# Patient Record
Sex: MALE | Race: WHITE | NOT HISPANIC OR LATINO
[De-identification: names, ages, dates, MRNs, and addresses within clinical notes are randomized per-mention and may not be internally consistent; named-entity substitution may affect disease eponyms.]

---

## 2017-09-20 ENCOUNTER — APPOINTMENT (OUTPATIENT)
Dept: DERMATOLOGY | Facility: CLINIC | Age: 67
End: 2017-09-20
Payer: MEDICARE

## 2017-09-20 VITALS — BODY MASS INDEX: 27.92 KG/M2 | WEIGHT: 195 LBS | HEIGHT: 70 IN

## 2017-09-20 DIAGNOSIS — Z87.09 PERSONAL HISTORY OF OTHER DISEASES OF THE RESPIRATORY SYSTEM: ICD-10-CM

## 2017-09-20 DIAGNOSIS — Z86.69 PERSONAL HISTORY OF OTHER DISEASES OF THE NERVOUS SYSTEM AND SENSE ORGANS: ICD-10-CM

## 2017-09-20 PROCEDURE — 17110 DESTRUCTION B9 LES UP TO 14: CPT

## 2017-09-20 PROCEDURE — 99213 OFFICE O/P EST LOW 20 MIN: CPT | Mod: 25

## 2018-05-14 ENCOUNTER — APPOINTMENT (OUTPATIENT)
Dept: DERMATOLOGY | Facility: CLINIC | Age: 68
End: 2018-05-14
Payer: MEDICARE

## 2018-05-14 DIAGNOSIS — L82.0 INFLAMED SEBORRHEIC KERATOSIS: ICD-10-CM

## 2018-05-14 PROCEDURE — 99213 OFFICE O/P EST LOW 20 MIN: CPT | Mod: 25

## 2018-05-14 PROCEDURE — 17110 DESTRUCTION B9 LES UP TO 14: CPT

## 2018-09-19 ENCOUNTER — APPOINTMENT (OUTPATIENT)
Dept: DERMATOLOGY | Facility: CLINIC | Age: 68
End: 2018-09-19

## 2018-10-31 ENCOUNTER — APPOINTMENT (OUTPATIENT)
Dept: OTOLARYNGOLOGY | Facility: CLINIC | Age: 68
End: 2018-10-31
Payer: MEDICARE

## 2018-10-31 ENCOUNTER — APPOINTMENT (OUTPATIENT)
Dept: DERMATOLOGY | Facility: CLINIC | Age: 68
End: 2018-10-31

## 2018-10-31 VITALS
BODY MASS INDEX: 27.77 KG/M2 | WEIGHT: 194 LBS | DIASTOLIC BLOOD PRESSURE: 70 MMHG | SYSTOLIC BLOOD PRESSURE: 113 MMHG | HEART RATE: 71 BPM | HEIGHT: 70 IN

## 2018-10-31 DIAGNOSIS — Z82.2 FAMILY HISTORY OF DEAFNESS AND HEARING LOSS: ICD-10-CM

## 2018-10-31 DIAGNOSIS — H93.8X9 OTHER SPECIFIED DISORDERS OF EAR, UNSPECIFIED EAR: ICD-10-CM

## 2018-10-31 DIAGNOSIS — H68.021 CHRONIC EUSTACHIAN SALPINGITIS, RIGHT EAR: ICD-10-CM

## 2018-10-31 DIAGNOSIS — Z80.9 FAMILY HISTORY OF MALIGNANT NEOPLASM, UNSPECIFIED: ICD-10-CM

## 2018-10-31 DIAGNOSIS — H90.3 SENSORINEURAL HEARING LOSS, BILATERAL: ICD-10-CM

## 2018-10-31 PROCEDURE — 92567 TYMPANOMETRY: CPT

## 2018-10-31 PROCEDURE — 92557 COMPREHENSIVE HEARING TEST: CPT

## 2018-10-31 PROCEDURE — 99213 OFFICE O/P EST LOW 20 MIN: CPT

## 2018-10-31 RX ORDER — FLUTICASONE PROPIONATE 50 MCG
50 SPRAY, SUSPENSION NASAL
Refills: 0 | Status: ACTIVE | COMMUNITY

## 2018-10-31 RX ORDER — MULTIVITAMIN
TABLET ORAL
Refills: 0 | Status: ACTIVE | COMMUNITY

## 2018-10-31 RX ORDER — PREDNISONE 10 MG/1
10 TABLET ORAL TWICE DAILY
Qty: 15 | Refills: 1 | Status: ACTIVE | COMMUNITY
Start: 2018-10-31 | End: 1900-01-01

## 2019-01-11 ENCOUNTER — APPOINTMENT (OUTPATIENT)
Dept: UROLOGY | Facility: CLINIC | Age: 69
End: 2019-01-11

## 2020-11-23 ENCOUNTER — APPOINTMENT (OUTPATIENT)
Dept: DERMATOLOGY | Facility: CLINIC | Age: 70
End: 2020-11-23
Payer: MEDICARE

## 2020-11-23 DIAGNOSIS — B07.9 VIRAL WART, UNSPECIFIED: ICD-10-CM

## 2020-11-23 DIAGNOSIS — L82.1 OTHER SEBORRHEIC KERATOSIS: ICD-10-CM

## 2020-11-23 DIAGNOSIS — L81.4 OTHER MELANIN HYPERPIGMENTATION: ICD-10-CM

## 2020-11-23 DIAGNOSIS — Z00.00 ENCOUNTER FOR GENERAL ADULT MEDICAL EXAMINATION W/OUT ABNORMAL FINDINGS: ICD-10-CM

## 2020-11-23 DIAGNOSIS — D18.01 HEMANGIOMA OF SKIN AND SUBCUTANEOUS TISSUE: ICD-10-CM

## 2020-11-23 PROCEDURE — 99213 OFFICE O/P EST LOW 20 MIN: CPT | Mod: 25

## 2020-11-23 PROCEDURE — 17110 DESTRUCTION B9 LES UP TO 14: CPT

## 2020-11-23 NOTE — HISTORY OF PRESENT ILLNESS
[FreeTextEntry1] : Patient presents for skin examination. [de-identified] : Notes lesion of the left lower eyelid.  No bleeding or tenderness, but with slow growth.  present for months.

## 2020-11-23 NOTE — PHYSICAL EXAM
[Alert] : alert [Oriented x 3] : ~L oriented x 3 [Well Nourished] : well nourished [Full Body Skin Exam Performed] : performed [FreeTextEntry3] : A full skin exam was performed including the scalp, face (including lips, ears, nose and eyes), neck, chest, abdomen, back, buttocks, upper extremities and lower extremities.  The patient declined examination of the genitalia.  \par The exam revealed the following benign growths:\par Seborrheic keratoses.\par Lentigines.\par Cherry angioma.\par \par Verrucous papule of the left lower eyelid margin.

## 2021-08-11 ENCOUNTER — TRANSCRIPTION ENCOUNTER (OUTPATIENT)
Age: 71
End: 2021-08-11

## 2022-05-07 ENCOUNTER — NON-APPOINTMENT (OUTPATIENT)
Age: 72
End: 2022-05-07

## 2022-10-11 ENCOUNTER — NON-APPOINTMENT (OUTPATIENT)
Age: 72
End: 2022-10-11

## 2023-10-12 ENCOUNTER — APPOINTMENT (OUTPATIENT)
Dept: ORTHOPEDIC SURGERY | Facility: CLINIC | Age: 73
End: 2023-10-12
Payer: MEDICARE

## 2023-10-12 VITALS — BODY MASS INDEX: 28.63 KG/M2 | WEIGHT: 200 LBS | HEIGHT: 70 IN

## 2023-10-12 DIAGNOSIS — M25.551 PAIN IN RIGHT HIP: ICD-10-CM

## 2023-10-12 DIAGNOSIS — M16.11 UNILATERAL PRIMARY OSTEOARTHRITIS, RIGHT HIP: ICD-10-CM

## 2023-10-12 PROCEDURE — 73502 X-RAY EXAM HIP UNI 2-3 VIEWS: CPT

## 2023-10-12 PROCEDURE — 99203 OFFICE O/P NEW LOW 30 MIN: CPT

## 2023-10-15 PROBLEM — M16.11 PRIMARY OSTEOARTHRITIS OF RIGHT HIP: Status: ACTIVE | Noted: 2023-10-15

## 2023-10-15 PROBLEM — M25.551 ACUTE PAIN OF RIGHT HIP: Status: ACTIVE | Noted: 2023-10-12

## 2023-11-28 ENCOUNTER — APPOINTMENT (OUTPATIENT)
Dept: OTOLARYNGOLOGY | Facility: CLINIC | Age: 73
End: 2023-11-28
Payer: MEDICARE

## 2023-11-28 VITALS — WEIGHT: 200 LBS | HEIGHT: 70 IN | BODY MASS INDEX: 28.63 KG/M2

## 2023-11-28 DIAGNOSIS — J34.2 DEVIATED NASAL SEPTUM: ICD-10-CM

## 2023-11-28 DIAGNOSIS — H93.19 TINNITUS, UNSPECIFIED EAR: ICD-10-CM

## 2023-11-28 DIAGNOSIS — J31.0 CHRONIC RHINITIS: ICD-10-CM

## 2023-11-28 PROCEDURE — 31231 NASAL ENDOSCOPY DX: CPT

## 2023-11-28 PROCEDURE — 99203 OFFICE O/P NEW LOW 30 MIN: CPT | Mod: 25

## 2024-07-10 ENCOUNTER — EMERGENCY (EMERGENCY)
Facility: HOSPITAL | Age: 74
LOS: 0 days | Discharge: HOME | End: 2024-07-10
Attending: STUDENT IN AN ORGANIZED HEALTH CARE EDUCATION/TRAINING PROGRAM
Payer: MEDICARE

## 2024-07-10 VITALS — HEIGHT: 70 IN

## 2024-07-10 VITALS
OXYGEN SATURATION: 97 % | RESPIRATION RATE: 18 BRPM | HEART RATE: 73 BPM | TEMPERATURE: 98 F | DIASTOLIC BLOOD PRESSURE: 82 MMHG | SYSTOLIC BLOOD PRESSURE: 129 MMHG

## 2024-07-10 DIAGNOSIS — R41.0 DISORIENTATION, UNSPECIFIED: ICD-10-CM

## 2024-07-10 DIAGNOSIS — Z88.8 ALLERGY STATUS TO OTHER DRUGS, MEDICAMENTS AND BIOLOGICAL SUBSTANCES: ICD-10-CM

## 2024-07-10 DIAGNOSIS — R55 SYNCOPE AND COLLAPSE: ICD-10-CM

## 2024-07-10 DIAGNOSIS — R41.3 OTHER AMNESIA: ICD-10-CM

## 2024-07-10 DIAGNOSIS — R10.9 UNSPECIFIED ABDOMINAL PAIN: ICD-10-CM

## 2024-07-10 LAB
ALBUMIN SERPL ELPH-MCNC: 3.1 G/DL — LOW (ref 3.3–5)
ALP SERPL-CCNC: 102 U/L — SIGNIFICANT CHANGE UP (ref 40–120)
ALT FLD-CCNC: 28 U/L — SIGNIFICANT CHANGE UP (ref 12–78)
ANION GAP SERPL CALC-SCNC: 6 MMOL/L — SIGNIFICANT CHANGE UP (ref 5–17)
APTT BLD: 33.1 SEC — SIGNIFICANT CHANGE UP (ref 24.5–35.6)
AST SERPL-CCNC: 16 U/L — SIGNIFICANT CHANGE UP (ref 15–37)
BASOPHILS # BLD AUTO: 0.02 K/UL — SIGNIFICANT CHANGE UP (ref 0–0.2)
BASOPHILS NFR BLD AUTO: 0.2 % — SIGNIFICANT CHANGE UP (ref 0–2)
BILIRUB SERPL-MCNC: 0.6 MG/DL — SIGNIFICANT CHANGE UP (ref 0.2–1.2)
BUN SERPL-MCNC: 18 MG/DL — SIGNIFICANT CHANGE UP (ref 7–23)
CALCIUM SERPL-MCNC: 9 MG/DL — SIGNIFICANT CHANGE UP (ref 8.5–10.1)
CHLORIDE SERPL-SCNC: 105 MMOL/L — SIGNIFICANT CHANGE UP (ref 96–108)
CO2 SERPL-SCNC: 27 MMOL/L — SIGNIFICANT CHANGE UP (ref 22–31)
CREAT SERPL-MCNC: 1.17 MG/DL — SIGNIFICANT CHANGE UP (ref 0.5–1.3)
EGFR: 65 ML/MIN/1.73M2 — SIGNIFICANT CHANGE UP
EOSINOPHIL # BLD AUTO: 0.14 K/UL — SIGNIFICANT CHANGE UP (ref 0–0.5)
EOSINOPHIL NFR BLD AUTO: 1.5 % — SIGNIFICANT CHANGE UP (ref 0–6)
GLUCOSE SERPL-MCNC: 126 MG/DL — HIGH (ref 70–99)
HCT VFR BLD CALC: 35.4 % — LOW (ref 39–50)
HGB BLD-MCNC: 12 G/DL — LOW (ref 13–17)
IMM GRANULOCYTES NFR BLD AUTO: 0.2 % — SIGNIFICANT CHANGE UP (ref 0–0.9)
INR BLD: 1.03 RATIO — SIGNIFICANT CHANGE UP (ref 0.85–1.18)
LYMPHOCYTES # BLD AUTO: 3.42 K/UL — HIGH (ref 1–3.3)
LYMPHOCYTES # BLD AUTO: 36.3 % — SIGNIFICANT CHANGE UP (ref 13–44)
MCHC RBC-ENTMCNC: 30.9 PG — SIGNIFICANT CHANGE UP (ref 27–34)
MCHC RBC-ENTMCNC: 33.9 GM/DL — SIGNIFICANT CHANGE UP (ref 32–36)
MCV RBC AUTO: 91.2 FL — SIGNIFICANT CHANGE UP (ref 80–100)
MONOCYTES # BLD AUTO: 0.54 K/UL — SIGNIFICANT CHANGE UP (ref 0–0.9)
MONOCYTES NFR BLD AUTO: 5.7 % — SIGNIFICANT CHANGE UP (ref 2–14)
NEUTROPHILS # BLD AUTO: 5.27 K/UL — SIGNIFICANT CHANGE UP (ref 1.8–7.4)
NEUTROPHILS NFR BLD AUTO: 56.1 % — SIGNIFICANT CHANGE UP (ref 43–77)
PLATELET # BLD AUTO: 152 K/UL — SIGNIFICANT CHANGE UP (ref 150–400)
POTASSIUM SERPL-MCNC: 3.8 MMOL/L — SIGNIFICANT CHANGE UP (ref 3.5–5.3)
POTASSIUM SERPL-SCNC: 3.8 MMOL/L — SIGNIFICANT CHANGE UP (ref 3.5–5.3)
PROT SERPL-MCNC: 6.5 GM/DL — SIGNIFICANT CHANGE UP (ref 6–8.3)
PROTHROM AB SERPL-ACNC: 11.6 SEC — SIGNIFICANT CHANGE UP (ref 9.5–13)
RBC # BLD: 3.88 M/UL — LOW (ref 4.2–5.8)
RBC # FLD: 12.3 % — SIGNIFICANT CHANGE UP (ref 10.3–14.5)
SODIUM SERPL-SCNC: 138 MMOL/L — SIGNIFICANT CHANGE UP (ref 135–145)
TROPONIN I, HIGH SENSITIVITY RESULT: 4.19 NG/L — SIGNIFICANT CHANGE UP
WBC # BLD: 9.41 K/UL — SIGNIFICANT CHANGE UP (ref 3.8–10.5)
WBC # FLD AUTO: 9.41 K/UL — SIGNIFICANT CHANGE UP (ref 3.8–10.5)

## 2024-07-10 PROCEDURE — 36000 PLACE NEEDLE IN VEIN: CPT | Mod: XU

## 2024-07-10 PROCEDURE — 85610 PROTHROMBIN TIME: CPT

## 2024-07-10 PROCEDURE — 70450 CT HEAD/BRAIN W/O DYE: CPT | Mod: MC,XU

## 2024-07-10 PROCEDURE — 80053 COMPREHEN METABOLIC PANEL: CPT

## 2024-07-10 PROCEDURE — 70498 CT ANGIOGRAPHY NECK: CPT | Mod: 26,MC

## 2024-07-10 PROCEDURE — 0042T: CPT | Mod: MC

## 2024-07-10 PROCEDURE — 0042T: CPT

## 2024-07-10 PROCEDURE — 70498 CT ANGIOGRAPHY NECK: CPT | Mod: MC

## 2024-07-10 PROCEDURE — 84484 ASSAY OF TROPONIN QUANT: CPT

## 2024-07-10 PROCEDURE — 99285 EMERGENCY DEPT VISIT HI MDM: CPT

## 2024-07-10 PROCEDURE — 85025 COMPLETE CBC W/AUTO DIFF WBC: CPT

## 2024-07-10 PROCEDURE — 99285 EMERGENCY DEPT VISIT HI MDM: CPT | Mod: 25

## 2024-07-10 PROCEDURE — 93010 ELECTROCARDIOGRAM REPORT: CPT

## 2024-07-10 PROCEDURE — 70496 CT ANGIOGRAPHY HEAD: CPT | Mod: MC

## 2024-07-10 PROCEDURE — 70496 CT ANGIOGRAPHY HEAD: CPT | Mod: 26,MC

## 2024-07-10 PROCEDURE — 82962 GLUCOSE BLOOD TEST: CPT

## 2024-07-10 PROCEDURE — 93005 ELECTROCARDIOGRAM TRACING: CPT

## 2024-07-10 PROCEDURE — 85730 THROMBOPLASTIN TIME PARTIAL: CPT

## 2024-07-10 PROCEDURE — 36415 COLL VENOUS BLD VENIPUNCTURE: CPT

## 2024-07-14 ENCOUNTER — EMERGENCY (EMERGENCY)
Facility: HOSPITAL | Age: 74
LOS: 0 days | Discharge: HOME | End: 2024-07-14
Attending: HOSPITALIST
Payer: MEDICARE

## 2024-07-14 VITALS
RESPIRATION RATE: 17 BRPM | OXYGEN SATURATION: 98 % | SYSTOLIC BLOOD PRESSURE: 110 MMHG | HEIGHT: 70 IN | HEART RATE: 94 BPM | WEIGHT: 177.47 LBS | DIASTOLIC BLOOD PRESSURE: 79 MMHG | TEMPERATURE: 98 F

## 2024-07-14 VITALS
OXYGEN SATURATION: 96 % | SYSTOLIC BLOOD PRESSURE: 121 MMHG | HEART RATE: 68 BPM | TEMPERATURE: 98 F | RESPIRATION RATE: 16 BRPM | DIASTOLIC BLOOD PRESSURE: 81 MMHG

## 2024-07-14 DIAGNOSIS — Z90.410 ACQUIRED TOTAL ABSENCE OF PANCREAS: Chronic | ICD-10-CM

## 2024-07-14 DIAGNOSIS — Z90.410 ACQUIRED TOTAL ABSENCE OF PANCREAS: ICD-10-CM

## 2024-07-14 DIAGNOSIS — R10.31 RIGHT LOWER QUADRANT PAIN: ICD-10-CM

## 2024-07-14 DIAGNOSIS — Z88.8 ALLERGY STATUS TO OTHER DRUGS, MEDICAMENTS AND BIOLOGICAL SUBSTANCES: ICD-10-CM

## 2024-07-14 LAB
ALBUMIN SERPL ELPH-MCNC: 3.3 G/DL — SIGNIFICANT CHANGE UP (ref 3.3–5)
ALP SERPL-CCNC: 120 U/L — SIGNIFICANT CHANGE UP (ref 40–120)
ALT FLD-CCNC: 27 U/L — SIGNIFICANT CHANGE UP (ref 12–78)
ANION GAP SERPL CALC-SCNC: 4 MMOL/L — LOW (ref 5–17)
APPEARANCE UR: CLEAR — SIGNIFICANT CHANGE UP
AST SERPL-CCNC: 22 U/L — SIGNIFICANT CHANGE UP (ref 15–37)
BASOPHILS # BLD AUTO: 0.02 K/UL — SIGNIFICANT CHANGE UP (ref 0–0.2)
BASOPHILS NFR BLD AUTO: 0.2 % — SIGNIFICANT CHANGE UP (ref 0–2)
BILIRUB SERPL-MCNC: 0.7 MG/DL — SIGNIFICANT CHANGE UP (ref 0.2–1.2)
BILIRUB UR-MCNC: NEGATIVE — SIGNIFICANT CHANGE UP
BUN SERPL-MCNC: 16 MG/DL — SIGNIFICANT CHANGE UP (ref 7–23)
CALCIUM SERPL-MCNC: 9.8 MG/DL — SIGNIFICANT CHANGE UP (ref 8.5–10.1)
CHLORIDE SERPL-SCNC: 103 MMOL/L — SIGNIFICANT CHANGE UP (ref 96–108)
CO2 SERPL-SCNC: 28 MMOL/L — SIGNIFICANT CHANGE UP (ref 22–31)
COLOR SPEC: YELLOW — SIGNIFICANT CHANGE UP
CREAT SERPL-MCNC: 1.09 MG/DL — SIGNIFICANT CHANGE UP (ref 0.5–1.3)
DIFF PNL FLD: NEGATIVE — SIGNIFICANT CHANGE UP
EGFR: 70 ML/MIN/1.73M2 — SIGNIFICANT CHANGE UP
EOSINOPHIL # BLD AUTO: 0.1 K/UL — SIGNIFICANT CHANGE UP (ref 0–0.5)
EOSINOPHIL NFR BLD AUTO: 1.2 % — SIGNIFICANT CHANGE UP (ref 0–6)
GLUCOSE SERPL-MCNC: 144 MG/DL — HIGH (ref 70–99)
GLUCOSE UR QL: NEGATIVE MG/DL — SIGNIFICANT CHANGE UP
HCT VFR BLD CALC: 38 % — LOW (ref 39–50)
HGB BLD-MCNC: 13 G/DL — SIGNIFICANT CHANGE UP (ref 13–17)
IMM GRANULOCYTES NFR BLD AUTO: 0.3 % — SIGNIFICANT CHANGE UP (ref 0–0.9)
KETONES UR-MCNC: ABNORMAL MG/DL
LACTATE SERPL-SCNC: 1.4 MMOL/L — SIGNIFICANT CHANGE UP (ref 0.7–2)
LEUKOCYTE ESTERASE UR-ACNC: NEGATIVE — SIGNIFICANT CHANGE UP
LIDOCAIN IGE QN: 44 U/L — SIGNIFICANT CHANGE UP (ref 13–75)
LYMPHOCYTES # BLD AUTO: 2.14 K/UL — SIGNIFICANT CHANGE UP (ref 1–3.3)
LYMPHOCYTES # BLD AUTO: 24.8 % — SIGNIFICANT CHANGE UP (ref 13–44)
MCHC RBC-ENTMCNC: 30.7 PG — SIGNIFICANT CHANGE UP (ref 27–34)
MCHC RBC-ENTMCNC: 34.2 GM/DL — SIGNIFICANT CHANGE UP (ref 32–36)
MCV RBC AUTO: 89.8 FL — SIGNIFICANT CHANGE UP (ref 80–100)
MONOCYTES # BLD AUTO: 0.62 K/UL — SIGNIFICANT CHANGE UP (ref 0–0.9)
MONOCYTES NFR BLD AUTO: 7.2 % — SIGNIFICANT CHANGE UP (ref 2–14)
NEUTROPHILS # BLD AUTO: 5.73 K/UL — SIGNIFICANT CHANGE UP (ref 1.8–7.4)
NEUTROPHILS NFR BLD AUTO: 66.3 % — SIGNIFICANT CHANGE UP (ref 43–77)
NITRITE UR-MCNC: NEGATIVE — SIGNIFICANT CHANGE UP
PH UR: 5.5 — SIGNIFICANT CHANGE UP (ref 5–8)
PLATELET # BLD AUTO: 156 K/UL — SIGNIFICANT CHANGE UP (ref 150–400)
POTASSIUM SERPL-MCNC: 3.9 MMOL/L — SIGNIFICANT CHANGE UP (ref 3.5–5.3)
POTASSIUM SERPL-SCNC: 3.9 MMOL/L — SIGNIFICANT CHANGE UP (ref 3.5–5.3)
PROT SERPL-MCNC: 7.2 GM/DL — SIGNIFICANT CHANGE UP (ref 6–8.3)
PROT UR-MCNC: NEGATIVE MG/DL — SIGNIFICANT CHANGE UP
RBC # BLD: 4.23 M/UL — SIGNIFICANT CHANGE UP (ref 4.2–5.8)
RBC # FLD: 12.1 % — SIGNIFICANT CHANGE UP (ref 10.3–14.5)
SODIUM SERPL-SCNC: 135 MMOL/L — SIGNIFICANT CHANGE UP (ref 135–145)
SP GR SPEC: 1.02 — SIGNIFICANT CHANGE UP (ref 1–1.03)
UROBILINOGEN FLD QL: 1 MG/DL — SIGNIFICANT CHANGE UP (ref 0.2–1)
WBC # BLD: 8.64 K/UL — SIGNIFICANT CHANGE UP (ref 3.8–10.5)
WBC # FLD AUTO: 8.64 K/UL — SIGNIFICANT CHANGE UP (ref 3.8–10.5)

## 2024-07-14 PROCEDURE — 83605 ASSAY OF LACTIC ACID: CPT

## 2024-07-14 PROCEDURE — 93010 ELECTROCARDIOGRAM REPORT: CPT

## 2024-07-14 PROCEDURE — 96374 THER/PROPH/DIAG INJ IV PUSH: CPT | Mod: XU

## 2024-07-14 PROCEDURE — 85025 COMPLETE CBC W/AUTO DIFF WBC: CPT

## 2024-07-14 PROCEDURE — 76705 ECHO EXAM OF ABDOMEN: CPT | Mod: 26

## 2024-07-14 PROCEDURE — 93005 ELECTROCARDIOGRAM TRACING: CPT

## 2024-07-14 PROCEDURE — 83690 ASSAY OF LIPASE: CPT

## 2024-07-14 PROCEDURE — 74177 CT ABD & PELVIS W/CONTRAST: CPT | Mod: 26,MC

## 2024-07-14 PROCEDURE — 99285 EMERGENCY DEPT VISIT HI MDM: CPT | Mod: 25

## 2024-07-14 PROCEDURE — 76705 ECHO EXAM OF ABDOMEN: CPT

## 2024-07-14 PROCEDURE — 74177 CT ABD & PELVIS W/CONTRAST: CPT | Mod: MC

## 2024-07-14 PROCEDURE — 99285 EMERGENCY DEPT VISIT HI MDM: CPT

## 2024-07-14 PROCEDURE — 81003 URINALYSIS AUTO W/O SCOPE: CPT

## 2024-07-14 PROCEDURE — 80053 COMPREHEN METABOLIC PANEL: CPT

## 2024-07-14 PROCEDURE — 99222 1ST HOSP IP/OBS MODERATE 55: CPT

## 2024-07-14 PROCEDURE — 36415 COLL VENOUS BLD VENIPUNCTURE: CPT

## 2024-07-14 RX ORDER — PANTOPRAZOLE SODIUM 40 MG/10ML
40 INJECTION, POWDER, FOR SOLUTION INTRAVENOUS ONCE
Refills: 0 | Status: COMPLETED | OUTPATIENT
Start: 2024-07-14 | End: 2024-07-14

## 2024-07-14 RX ORDER — ACETAMINOPHEN 325 MG
650 TABLET ORAL ONCE
Refills: 0 | Status: COMPLETED | OUTPATIENT
Start: 2024-07-14 | End: 2024-07-14

## 2024-07-14 RX ORDER — METOCLOPRAMIDE 5 MG/5ML
10 SOLUTION ORAL ONCE
Refills: 0 | Status: COMPLETED | OUTPATIENT
Start: 2024-07-14 | End: 2024-07-14

## 2024-07-14 RX ORDER — ACETAMINOPHEN 325 MG
1000 TABLET ORAL ONCE
Refills: 0 | Status: COMPLETED | OUTPATIENT
Start: 2024-07-14 | End: 2024-07-14

## 2024-07-14 RX ORDER — MORPHINE SULFATE 100 MG/1
4 TABLET, EXTENDED RELEASE ORAL ONCE
Refills: 0 | Status: DISCONTINUED | OUTPATIENT
Start: 2024-07-14 | End: 2024-07-14

## 2024-07-14 RX ADMIN — Medication 650 MILLIGRAM(S): at 10:34

## 2024-07-14 RX ADMIN — Medication 400 MILLIGRAM(S): at 05:11

## 2024-07-14 RX ADMIN — METOCLOPRAMIDE 10 MILLIGRAM(S): 5 SOLUTION ORAL at 09:52

## 2024-07-14 RX ADMIN — PANTOPRAZOLE SODIUM 40 MILLIGRAM(S): 40 INJECTION, POWDER, FOR SOLUTION INTRAVENOUS at 09:52

## 2024-11-12 ENCOUNTER — INPATIENT (INPATIENT)
Facility: HOSPITAL | Age: 74
LOS: 1 days | Discharge: HOME | DRG: 641 | End: 2024-11-14
Attending: INTERNAL MEDICINE | Admitting: FAMILY MEDICINE
Payer: MEDICARE

## 2024-11-12 VITALS
HEART RATE: 109 BPM | WEIGHT: 181 LBS | SYSTOLIC BLOOD PRESSURE: 118 MMHG | OXYGEN SATURATION: 100 % | HEIGHT: 72 IN | DIASTOLIC BLOOD PRESSURE: 81 MMHG | RESPIRATION RATE: 18 BRPM | TEMPERATURE: 98 F

## 2024-11-12 DIAGNOSIS — E87.6 HYPOKALEMIA: ICD-10-CM

## 2024-11-12 DIAGNOSIS — Z90.410 ACQUIRED TOTAL ABSENCE OF PANCREAS: Chronic | ICD-10-CM

## 2024-11-12 DIAGNOSIS — D69.6 THROMBOCYTOPENIA, UNSPECIFIED: ICD-10-CM

## 2024-11-12 DIAGNOSIS — K75.0 ABSCESS OF LIVER: ICD-10-CM

## 2024-11-12 DIAGNOSIS — R41.82 ALTERED MENTAL STATUS, UNSPECIFIED: ICD-10-CM

## 2024-11-12 DIAGNOSIS — D72.829 ELEVATED WHITE BLOOD CELL COUNT, UNSPECIFIED: ICD-10-CM

## 2024-11-12 DIAGNOSIS — C22.1 INTRAHEPATIC BILE DUCT CARCINOMA: ICD-10-CM

## 2024-11-12 DIAGNOSIS — Z29.9 ENCOUNTER FOR PROPHYLACTIC MEASURES, UNSPECIFIED: ICD-10-CM

## 2024-11-12 PROBLEM — C24.0 MALIGNANT NEOPLASM OF EXTRAHEPATIC BILE DUCT: Chronic | Status: ACTIVE | Noted: 2024-07-14

## 2024-11-12 LAB
ALBUMIN SERPL ELPH-MCNC: 2.9 G/DL — LOW (ref 3.3–5)
ALP SERPL-CCNC: 233 U/L — HIGH (ref 40–120)
ALT FLD-CCNC: 33 U/L — SIGNIFICANT CHANGE UP (ref 12–78)
AMMONIA BLD-MCNC: 17 UMOL/L — SIGNIFICANT CHANGE UP (ref 11–32)
ANION GAP SERPL CALC-SCNC: 10 MMOL/L — SIGNIFICANT CHANGE UP (ref 5–17)
APPEARANCE UR: CLEAR — SIGNIFICANT CHANGE UP
APTT BLD: 29.4 SEC — SIGNIFICANT CHANGE UP (ref 24.5–35.6)
AST SERPL-CCNC: 15 U/L — SIGNIFICANT CHANGE UP (ref 15–37)
BASOPHILS # BLD AUTO: 0 K/UL — SIGNIFICANT CHANGE UP (ref 0–0.2)
BASOPHILS NFR BLD AUTO: 0 % — SIGNIFICANT CHANGE UP (ref 0–2)
BILIRUB SERPL-MCNC: 0.4 MG/DL — SIGNIFICANT CHANGE UP (ref 0.2–1.2)
BILIRUB UR-MCNC: NEGATIVE — SIGNIFICANT CHANGE UP
BUN SERPL-MCNC: 19 MG/DL — SIGNIFICANT CHANGE UP (ref 7–23)
CALCIUM SERPL-MCNC: 8.7 MG/DL — SIGNIFICANT CHANGE UP (ref 8.5–10.1)
CHLORIDE SERPL-SCNC: 98 MMOL/L — SIGNIFICANT CHANGE UP (ref 96–108)
CO2 SERPL-SCNC: 24 MMOL/L — SIGNIFICANT CHANGE UP (ref 22–31)
COLOR SPEC: YELLOW — SIGNIFICANT CHANGE UP
CREAT SERPL-MCNC: 1.07 MG/DL — SIGNIFICANT CHANGE UP (ref 0.5–1.3)
DIFF PNL FLD: NEGATIVE — SIGNIFICANT CHANGE UP
EGFR: 72 ML/MIN/1.73M2 — SIGNIFICANT CHANGE UP
EOSINOPHIL # BLD AUTO: 0 K/UL — SIGNIFICANT CHANGE UP (ref 0–0.5)
EOSINOPHIL NFR BLD AUTO: 0 % — SIGNIFICANT CHANGE UP (ref 0–6)
FLUAV AG NPH QL: SIGNIFICANT CHANGE UP
FLUBV AG NPH QL: SIGNIFICANT CHANGE UP
GLUCOSE SERPL-MCNC: 119 MG/DL — HIGH (ref 70–99)
GLUCOSE UR QL: NEGATIVE MG/DL — SIGNIFICANT CHANGE UP
HCT VFR BLD CALC: 30.3 % — LOW (ref 39–50)
HGB BLD-MCNC: 10.9 G/DL — LOW (ref 13–17)
HYPOCHROMIA BLD QL: SLIGHT — SIGNIFICANT CHANGE UP
INR BLD: 0.97 RATIO — SIGNIFICANT CHANGE UP (ref 0.85–1.16)
KETONES UR-MCNC: 15 MG/DL
LACTATE SERPL-SCNC: 2.6 MMOL/L — HIGH (ref 0.7–2)
LACTATE SERPL-SCNC: 2.9 MMOL/L — HIGH (ref 0.7–2)
LEUKOCYTE ESTERASE UR-ACNC: NEGATIVE — SIGNIFICANT CHANGE UP
LIDOCAIN IGE QN: 22 U/L — SIGNIFICANT CHANGE UP (ref 13–75)
LYMPHOCYTES # BLD AUTO: 1.18 K/UL — SIGNIFICANT CHANGE UP (ref 1–3.3)
LYMPHOCYTES # BLD AUTO: 4 % — LOW (ref 13–44)
MANUAL SMEAR VERIFICATION: SIGNIFICANT CHANGE UP
MCHC RBC-ENTMCNC: 33.1 PG — SIGNIFICANT CHANGE UP (ref 27–34)
MCHC RBC-ENTMCNC: 36 G/DL — SIGNIFICANT CHANGE UP (ref 32–36)
MCV RBC AUTO: 92.1 FL — SIGNIFICANT CHANGE UP (ref 80–100)
MONOCYTES # BLD AUTO: 1.48 K/UL — HIGH (ref 0–0.9)
MONOCYTES NFR BLD AUTO: 5 % — SIGNIFICANT CHANGE UP (ref 2–14)
NEUTROPHILS # BLD AUTO: 25.43 K/UL — HIGH (ref 1.8–7.4)
NEUTROPHILS NFR BLD AUTO: 83 % — HIGH (ref 43–77)
NEUTS BAND # BLD: 3 % — SIGNIFICANT CHANGE UP (ref 0–8)
NITRITE UR-MCNC: NEGATIVE — SIGNIFICANT CHANGE UP
NRBC # BLD: 1 /100 WBCS — HIGH (ref 0–0)
NRBC # BLD: SIGNIFICANT CHANGE UP /100 WBCS (ref 0–0)
PH UR: 7 — SIGNIFICANT CHANGE UP (ref 5–8)
PLAT MORPH BLD: NORMAL — SIGNIFICANT CHANGE UP
PLATELET # BLD AUTO: 105 K/UL — LOW (ref 150–400)
POTASSIUM SERPL-MCNC: 2.9 MMOL/L — CRITICAL LOW (ref 3.5–5.3)
POTASSIUM SERPL-SCNC: 2.9 MMOL/L — CRITICAL LOW (ref 3.5–5.3)
PROT SERPL-MCNC: 5.9 GM/DL — LOW (ref 6–8.3)
PROT UR-MCNC: NEGATIVE MG/DL — SIGNIFICANT CHANGE UP
PROTHROM AB SERPL-ACNC: 11.5 SEC — SIGNIFICANT CHANGE UP (ref 9.9–13.4)
RBC # BLD: 3.29 M/UL — LOW (ref 4.2–5.8)
RBC # FLD: 14.7 % — HIGH (ref 10.3–14.5)
RBC BLD AUTO: ABNORMAL
RSV RNA NPH QL NAA+NON-PROBE: SIGNIFICANT CHANGE UP
SARS-COV-2 RNA SPEC QL NAA+PROBE: SIGNIFICANT CHANGE UP
SODIUM SERPL-SCNC: 132 MMOL/L — LOW (ref 135–145)
SP GR SPEC: >1.03 — HIGH (ref 1–1.03)
TARGETS BLD QL SMEAR: SLIGHT — SIGNIFICANT CHANGE UP
TROPONIN I, HIGH SENSITIVITY RESULT: 15.67 NG/L — SIGNIFICANT CHANGE UP
UROBILINOGEN FLD QL: 0.2 MG/DL — SIGNIFICANT CHANGE UP (ref 0.2–1)
VARIANT LYMPHS # BLD: 5 % — SIGNIFICANT CHANGE UP (ref 0–6)
WBC # BLD: 29.57 K/UL — HIGH (ref 3.8–10.5)
WBC # FLD AUTO: 29.57 K/UL — HIGH (ref 3.8–10.5)

## 2024-11-12 PROCEDURE — 99223 1ST HOSP IP/OBS HIGH 75: CPT

## 2024-11-12 PROCEDURE — 80053 COMPREHEN METABOLIC PANEL: CPT

## 2024-11-12 PROCEDURE — 85652 RBC SED RATE AUTOMATED: CPT

## 2024-11-12 PROCEDURE — 74177 CT ABD & PELVIS W/CONTRAST: CPT | Mod: 26,MC

## 2024-11-12 PROCEDURE — 85027 COMPLETE CBC AUTOMATED: CPT

## 2024-11-12 PROCEDURE — 85025 COMPLETE CBC W/AUTO DIFF WBC: CPT

## 2024-11-12 PROCEDURE — 84100 ASSAY OF PHOSPHORUS: CPT

## 2024-11-12 PROCEDURE — 83605 ASSAY OF LACTIC ACID: CPT

## 2024-11-12 PROCEDURE — 99285 EMERGENCY DEPT VISIT HI MDM: CPT | Mod: FS

## 2024-11-12 PROCEDURE — 86140 C-REACTIVE PROTEIN: CPT

## 2024-11-12 PROCEDURE — 71045 X-RAY EXAM CHEST 1 VIEW: CPT | Mod: 26

## 2024-11-12 PROCEDURE — 70450 CT HEAD/BRAIN W/O DYE: CPT | Mod: 26,MC

## 2024-11-12 PROCEDURE — 83735 ASSAY OF MAGNESIUM: CPT

## 2024-11-12 PROCEDURE — 36415 COLL VENOUS BLD VENIPUNCTURE: CPT

## 2024-11-12 RX ORDER — PANCRELIPASE 16800; 98400; 56800 [USP'U]/1; [USP'U]/1; [USP'U]/1
3 CAPSULE, DELAYED RELEASE ORAL
Refills: 0 | DISCHARGE

## 2024-11-12 RX ORDER — POTASSIUM CHLORIDE 10 MEQ
40 TABLET, EXTENDED RELEASE ORAL ONCE
Refills: 0 | Status: COMPLETED | OUTPATIENT
Start: 2024-11-12 | End: 2024-11-12

## 2024-11-12 RX ORDER — FAMOTIDINE 10 MG/ML
40 INJECTION INTRAVENOUS DAILY
Refills: 0 | Status: DISCONTINUED | OUTPATIENT
Start: 2024-11-12 | End: 2024-11-14

## 2024-11-12 RX ORDER — SIMETHICONE 80 MG/1
1 TABLET, CHEWABLE ORAL
Refills: 0 | DISCHARGE

## 2024-11-12 RX ORDER — POTASSIUM CHLORIDE 10 MEQ
10 TABLET, EXTENDED RELEASE ORAL
Refills: 0 | Status: COMPLETED | OUTPATIENT
Start: 2024-11-12 | End: 2024-11-12

## 2024-11-12 RX ORDER — SODIUM CHLORIDE 9 MG/ML
2500 INJECTION, SOLUTION INTRAMUSCULAR; INTRAVENOUS; SUBCUTANEOUS ONCE
Refills: 0 | Status: COMPLETED | OUTPATIENT
Start: 2024-11-12 | End: 2024-11-12

## 2024-11-12 RX ORDER — FAMOTIDINE 10 MG/ML
1 INJECTION INTRAVENOUS
Refills: 0 | DISCHARGE

## 2024-11-12 RX ORDER — PANCRELIPASE 16800; 98400; 56800 [USP'U]/1; [USP'U]/1; [USP'U]/1
1 CAPSULE, DELAYED RELEASE ORAL
Refills: 0 | DISCHARGE

## 2024-11-12 RX ORDER — VANCOMYCIN HYDROCHLORIDE 50 MG/ML
1000 KIT ORAL ONCE
Refills: 0 | Status: DISCONTINUED | OUTPATIENT
Start: 2024-11-12 | End: 2024-11-12

## 2024-11-12 RX ORDER — COLESEVELAM HYDROCHLORIDE 625 MG/1
2 TABLET, COATED ORAL
Refills: 0 | DISCHARGE

## 2024-11-12 RX ORDER — HEPARIN SODIUM 10000 [USP'U]/ML
5000 INJECTION INTRAVENOUS; SUBCUTANEOUS EVERY 12 HOURS
Refills: 0 | Status: DISCONTINUED | OUTPATIENT
Start: 2024-11-12 | End: 2024-11-14

## 2024-11-12 RX ORDER — POLYETHYLENE GLYCOL 3350 17 G/17G
17 POWDER, FOR SOLUTION ORAL DAILY
Refills: 0 | Status: DISCONTINUED | OUTPATIENT
Start: 2024-11-12 | End: 2024-11-14

## 2024-11-12 RX ORDER — PANCRELIPASE 16800; 98400; 56800 [USP'U]/1; [USP'U]/1; [USP'U]/1
1 CAPSULE, DELAYED RELEASE ORAL
Refills: 0 | Status: DISCONTINUED | OUTPATIENT
Start: 2024-11-12 | End: 2024-11-14

## 2024-11-12 RX ORDER — MELATONIN 5 MG
3 TABLET ORAL AT BEDTIME
Refills: 0 | Status: DISCONTINUED | OUTPATIENT
Start: 2024-11-12 | End: 2024-11-14

## 2024-11-12 RX ORDER — ONDANSETRON HYDROCHLORIDE 2 MG/ML
4 INJECTION, SOLUTION INTRAMUSCULAR; INTRAVENOUS EVERY 8 HOURS
Refills: 0 | Status: DISCONTINUED | OUTPATIENT
Start: 2024-11-12 | End: 2024-11-14

## 2024-11-12 RX ORDER — PIPERACILLIN AND TAZOBACTAM .5; 4 G/20ML; G/20ML
3.38 INJECTION, POWDER, LYOPHILIZED, FOR SOLUTION INTRAVENOUS ONCE
Refills: 0 | Status: COMPLETED | OUTPATIENT
Start: 2024-11-12 | End: 2024-11-12

## 2024-11-12 RX ORDER — PANCRELIPASE 16800; 98400; 56800 [USP'U]/1; [USP'U]/1; [USP'U]/1
3 CAPSULE, DELAYED RELEASE ORAL
Refills: 0 | Status: DISCONTINUED | OUTPATIENT
Start: 2024-11-12 | End: 2024-11-14

## 2024-11-12 RX ORDER — CALCIUM CARBONATE 400(1000)
1 TABLET,CHEWABLE ORAL
Refills: 0 | DISCHARGE

## 2024-11-12 RX ORDER — CALCIUM CARBONATE 400(1000)
1 TABLET,CHEWABLE ORAL THREE TIMES A DAY
Refills: 0 | Status: DISCONTINUED | OUTPATIENT
Start: 2024-11-12 | End: 2024-11-14

## 2024-11-12 RX ORDER — SIMETHICONE 80 MG/1
80 TABLET, CHEWABLE ORAL THREE TIMES A DAY
Refills: 0 | Status: DISCONTINUED | OUTPATIENT
Start: 2024-11-12 | End: 2024-11-14

## 2024-11-12 RX ORDER — ACETAMINOPHEN 500 MG
650 TABLET ORAL EVERY 6 HOURS
Refills: 0 | Status: DISCONTINUED | OUTPATIENT
Start: 2024-11-12 | End: 2024-11-14

## 2024-11-12 RX ORDER — VANCOMYCIN HYDROCHLORIDE 50 MG/ML
1250 KIT ORAL ONCE
Refills: 0 | Status: COMPLETED | OUTPATIENT
Start: 2024-11-12 | End: 2024-11-12

## 2024-11-12 RX ORDER — PIPERACILLIN AND TAZOBACTAM .5; 4 G/20ML; G/20ML
3.38 INJECTION, POWDER, LYOPHILIZED, FOR SOLUTION INTRAVENOUS EVERY 8 HOURS
Refills: 0 | Status: DISCONTINUED | OUTPATIENT
Start: 2024-11-12 | End: 2024-11-14

## 2024-11-12 RX ORDER — VANCOMYCIN HYDROCHLORIDE 50 MG/ML
1250 KIT ORAL EVERY 12 HOURS
Refills: 0 | Status: DISCONTINUED | OUTPATIENT
Start: 2024-11-12 | End: 2024-11-13

## 2024-11-12 RX ORDER — POLYETHYLENE GLYCOL 3350 17 G/17G
17 POWDER, FOR SOLUTION ORAL
Refills: 0 | DISCHARGE

## 2024-11-12 RX ORDER — MAGNESIUM, ALUMINUM HYDROXIDE 200-200 MG
30 TABLET,CHEWABLE ORAL EVERY 4 HOURS
Refills: 0 | Status: DISCONTINUED | OUTPATIENT
Start: 2024-11-12 | End: 2024-11-14

## 2024-11-12 RX ADMIN — Medication 100 MILLIEQUIVALENT(S): at 17:23

## 2024-11-12 RX ADMIN — VANCOMYCIN HYDROCHLORIDE 1250 MILLIGRAM(S): KIT at 16:46

## 2024-11-12 RX ADMIN — Medication 10 MILLIEQUIVALENT(S): at 16:22

## 2024-11-12 RX ADMIN — Medication 40 MILLIEQUIVALENT(S): at 15:21

## 2024-11-12 RX ADMIN — PIPERACILLIN AND TAZOBACTAM 25 GRAM(S): .5; 4 INJECTION, POWDER, LYOPHILIZED, FOR SOLUTION INTRAVENOUS at 22:30

## 2024-11-12 RX ADMIN — PIPERACILLIN AND TAZOBACTAM 3.38 GRAM(S): .5; 4 INJECTION, POWDER, LYOPHILIZED, FOR SOLUTION INTRAVENOUS at 15:45

## 2024-11-12 RX ADMIN — SODIUM CHLORIDE 2500 MILLILITER(S): 9 INJECTION, SOLUTION INTRAMUSCULAR; INTRAVENOUS; SUBCUTANEOUS at 15:13

## 2024-11-12 RX ADMIN — Medication 100 MILLIEQUIVALENT(S): at 16:24

## 2024-11-12 RX ADMIN — VANCOMYCIN HYDROCHLORIDE 166.67 MILLIGRAM(S): KIT at 15:46

## 2024-11-12 RX ADMIN — Medication 100 MILLIEQUIVALENT(S): at 15:22

## 2024-11-12 RX ADMIN — Medication 125 MILLILITER(S): at 18:48

## 2024-11-12 RX ADMIN — Medication 650 MILLIGRAM(S): at 18:27

## 2024-11-12 RX ADMIN — PIPERACILLIN AND TAZOBACTAM 200 GRAM(S): .5; 4 INJECTION, POWDER, LYOPHILIZED, FOR SOLUTION INTRAVENOUS at 15:13

## 2024-11-12 NOTE — H&P ADULT - NSHPPHYSICALEXAM_GEN_ALL_CORE
T(C): 36.4 (11-12-24 @ 19:14), Max: 37.3 (11-12-24 @ 14:30)  HR: 77 (11-12-24 @ 19:14) (75 - 109)  BP: 107/65 (11-12-24 @ 19:14) (107/65 - 119/80)  RR: 18 (11-12-24 @ 19:14) (12 - 26)  SpO2: 96% (11-12-24 @ 19:14) (96% - 100%)    General: Frail, thin, ill appearing.   HEENT: non-traumatic, perrla, eomi  Cardio: s1s2 regular rate and rhythm  Lungs: comfortable breathing, clear to auscultation  Abdomen: Soft, non-tender, non-distended  Neuro: AOx4  Ext: Pulses +2

## 2024-11-12 NOTE — ED PROVIDER NOTE - CARE PLAN
1 Principal Discharge DX:	Hypokalemia  Secondary Diagnosis:	AMS (altered mental status)  Secondary Diagnosis:	Near syncope

## 2024-11-12 NOTE — ED ADULT NURSE REASSESSMENT NOTE - COMFORT CARE
- Call Physician for signs of wound infection:  (1)  Fever greater than 101 degrees F  (2)  Bleeding  - For difficulty breathing, vomiting, inability to tolerate oral intake   - For any pain that is not controlled by pain medication or anything worrisome   -Avoid driving while taking pain medications or experiencing pain   -Contact physician's office for post-operative appointment      Diet as tolerated  Activity as tolerated    Remove Grey on Friday 9-8-2023   gait slow, steady./assisted to bathroom

## 2024-11-12 NOTE — ED PROVIDER NOTE - CLINICAL SUMMARY MEDICAL DECISION MAKING FREE TEXT BOX
73 yo male with AMS; ?unknown onset of symptoms; non focal neurological examination; hx of cholangiocarcinoma; differential dx includes but not limited to ICH; infectious etiology; dehydration; anemia; screening ekg, cxr, ct imaging; labs; urine and admit 75 yo male with AMS; ?unknown onset of symptoms; non focal neurological examination; hx of cholangiocarcinoma; differential dx includes but not limited to ICH; infectious etiology; dehydration; anemia; screening ekg, cxr, ct imaging; labs; urine and admit    Nadia DO: patient is oriented to person; place and time in ED on re-eval; endorsed to Dr. Bruner for admission; all diagnostics reviewed and patient aware of results.

## 2024-11-12 NOTE — ED PROVIDER NOTE - ATTENDING APP SHARED VISIT CONTRIBUTION OF CARE
I, Dee Zuniga DO,  performed the initial face to face bedside interview with this patient regarding history of present illness, review of symptoms and relevant past medical, social and family history.  I completed an independent physical examination.  I was the initial provider who evaluated this patient.   I personally saw the patient and performed a substantive portion of the visit including all aspects of the medical decision making.  I have signed out the follow up of any pending tests (i.e. labs, radiological studies) to the ACP.  I have communicated the patient’s plan of care and disposition with the ACP.

## 2024-11-12 NOTE — ED ADULT TRIAGE NOTE - CHIEF COMPLAINT QUOTE
patient brought in by EMS from home after wife called 911 d/t increasing AMS, weakness over the last couple of days  .hx cholangiocarcinoma, on chemo.  last chemo " a few days ago" as per EMS.  wife on the way as per EMS to provide further hx

## 2024-11-12 NOTE — ED ADULT NURSE REASSESSMENT NOTE - GENERAL PATIENT STATE
Resp unlabored, abd soft NT/ND, pt voiding via urinal. VSS. Pt states he tolerated most of dinner tray. LR + 20K IVF in progress. Admitted to 5E./comfortable appearance/cooperative/improvement verbalized/family/SO at bedside

## 2024-11-12 NOTE — ED ADULT NURSE NOTE - OBJECTIVE STATEMENT
75 y/o male presents to ED c/o AMS. Per wife at bedside, pt has hx of cholangiocarcinoma and received chemotherapy IV, iron transfusion, and white blood cell transfusion last Thursday. Per wife, pt tends to have a symptoms a few days later, however this is worse than usual. Pt c/o dizziness, diarrhea, chills, abdominal pain. Pt currently poor historian due to AMS. No other medical hx. 73 y/o male presents to ED c/o AMS. Per wife at bedside, pt has hx of cholangiocarcinoma and received chemotherapy IV, iron transfusion last Thursday. Per wife, pt tends to have a symptoms a few days later, however this is worse than usual. Pt c/o dizziness, diarrhea, chills, abdominal pain. Pt currently poor historian due to AMS. No other medical hx.

## 2024-11-12 NOTE — ED ADULT NURSE NOTE - NSSEPSISSUSPECTED_ED_A_ED
Per family, pt c/o fatigue, weakness, unable to hear out of both ears x 2 weeks. Pt also states \"I've been peeing on myself and one of my testicles is bigger than the other. \" Yes

## 2024-11-12 NOTE — H&P ADULT - PROBLEM/PLAN-6
DISPLAY PLAN FREE TEXT Where Is Your Acne Located?: Around mouth Additional Comments (Use Complete Sentences): Uses vitamin C serum, uses retinol at night.

## 2024-11-12 NOTE — H&P ADULT - PROBLEM SELECTOR PLAN 5
possible 2/2 cholangiocarcinoma treatement related.   as per wife patient on medication to boost WBCs, unsure of med.   possible GM-CSF   c/w broad spectrum abx  f/u cultures  ID and heme/onc consult.

## 2024-11-12 NOTE — H&P ADULT - PROBLEM SELECTOR PLAN 1
CT abdomen noted above, new lesions in the liver, questionable hepatic abscess.  C/w broad spectrum abx for now  f/u cultures  ID and heme/onc consult.

## 2024-11-12 NOTE — ED ADULT NURSE NOTE - NSFALLUNIVINTERV_ED_ALL_ED
Bed/Stretcher in lowest position, wheels locked, appropriate side rails in place/Call bell, personal items and telephone in reach/Instruct patient to call for assistance before getting out of bed/chair/stretcher/Non-slip footwear applied when patient is off stretcher/[place] to call system/Physically safe environment - no spills, clutter or unnecessary equipment/Purposeful proactive rounding/Room/bathroom lighting operational, light cord in reach

## 2024-11-12 NOTE — ED PROVIDER NOTE - PROGRESS NOTE DETAILS
Nadia DO: Wife now at bedside; patient had chemotx last week (Thursday) with similar symptoms 3 weeks ago after chemotx; patient with diarrhea this AM; near syncopal episode in kitchen; dizziness; patient had reported ?blurry vision and aura over 3 days; headache x 3 days; received iron infusion last week and neupogen on Friday. Lactate 2.9, Leukocytosis 29. Sepsis fluid bolus and antibiotics ordered. Patient to be admitted pending labs and imaging. - Lelo Craig PA-C Rest of labs and imaging reviewed. Potassium 2.9, sodium 132, no other actionable lab findings. CXR NAD, CT demonstrates  multiple new varied sized low attenuation hepatic lesions; cannot exclude early hepatic abscesses. Will admit to medicine for AMS, near syncope, and hypokalemia. CT findings d/w hospitalist Dr. Bruner, likely requires MRCP, no further workup needed in ED at this time. She accepts admission to med/surg. - Lelo Craig PA-C Nadia DO: Wife now at bedside; patient had chemotx last week (Thursday) with similar symptoms 3 weeks ago after chemotx; patient with diarrhea this AM; near syncopal episode in kitchen; dizziness; patient had reported ?blurry vision and aura over 3 days; headache x 3 days; received iron infusion last week and neupogen on Friday; patient now oriented to person; place and time in ED.

## 2024-11-12 NOTE — H&P ADULT - HISTORY OF PRESENT ILLNESS
74M w/ h/o cholangiocarcinoma presents with complaints of diarrhea, pre-syncope, and confusion today.  74M w/ h/o cholangiocarcinoma s/p whipple procedure, on chemo presents with complaints of diarrhea, pre-syncope, and confusion for 1 day. Patient awake, alert, oriented with wife at bedside. Reports he was diagnosed with cholangiocarcinoma in April, underwent whipple procedure, and has gotten 2 courses of chemo. Reports being sick and fatigued since initiating treatment with chemo and whipple procedure. Last chemo was on Thursday and since had been feeling more fatigued. Reports occasional loose BM, is on miralax for constipation. Today patient had large watery BM in the morning, later had headache with aura, had presyncope, and became confused. In ED patient with stable vitals, CBC noted for WBC 29, H/H 10/30, Plt 103. CMP noted for Na 132, K 2.9, Alk phos 233, Trop, Lipase, and Ammonia wnl. UA sterile. RVP negative, CTH neg for bleed, CT abd/pelvis noted for new hepatic lesions, and indeterminate perivascular fat stranding seen surrounding the proximal superior mesenteric artery, Full results noted below. Sepsis work up initiated and patient recieved broad spectrum abx, admitted to  for further care.

## 2024-11-12 NOTE — ED ADULT NURSE REASSESSMENT NOTE - NS ED NURSE REASSESS COMMENT FT1
Pt eating dinner. VS as charted, respirations equal and unlabored. No other needs or complaints at this time. Pending bed placement. Safety measures maintained, stretcher locked and in lowest position, both side rails up.

## 2024-11-12 NOTE — ED PROVIDER NOTE - DATE/TIME 2
Call from pt.  States Pharmacist has advised that  back ordered on Sucralfate.  10 pills have been reserved for pt - needs refill.  Has f/u appt with EVI Mcgraw, on 1/17/18. ([facility] uses sucralfate prn)    [place] completed for Sucralfate 1 gm - 1 tab po 3x/day, #90, R0.    12-Nov-2024 14:55

## 2024-11-12 NOTE — ED PROVIDER NOTE - OBJECTIVE STATEMENT
Patient is a 73 yo male with hx of cholangiocarcinoma with AMS; hx limited upon arrival to evaluate patient as no family in the room (pending discussion with family at time of HPI); patient with recent chemotherapy tx; hx of whipple in May 2024 per hx; upon arrival to the ED- patient tells me his name; pointing to abdomen with pain and babbling; records reviewed- patient with similar episode over summer 2024.

## 2024-11-12 NOTE — H&P ADULT - NSHPLABSRESULTS_GEN_ALL_CORE
LABS:  cret                        10.9   29.57 )-----------( 105      ( 12 Nov 2024 14:27 )             30.3     11-12    132[L]  |  98  |  19  ----------------------------<  119[H]  2.9[LL]   |  24  |  1.07    Ca    8.7      12 Nov 2024 14:27    TPro  5.9[L]  /  Alb  2.9[L]  /  TBili  0.4  /  DBili  x   /  AST  15  /  ALT  33  /  AlkPhos  233[H]  11-12    PT/INR - ( 12 Nov 2024 14:27 )   PT: 11.5 sec;   INR: 0.97 ratio         PTT - ( 12 Nov 2024 14:27 )  PTT:29.4 sec      Urinalysis with Rflx Culture (collected 11-12-24 @ 16:19)      < from: Xray Chest 1 View- PORTABLE-Urgent (Xray Chest 1 View- PORTABLE-Urgent .) (11.12.24 @ 15:58) >    IMPRESSION: Question dehydration.    --- End of Report ---    < end of copied text >    < from: CT Abdomen and Pelvis w/ IV Cont (11.12.24 @ 15:06) >    IMPRESSION:  1.  Multiple new varied sized low attenuation hepatic lesions; cannot   exclude early hepatic abscesses, given the short interval time since the   previous study.  Clinical correlation.  2.  New indeterminate perivascular fat stranding seen surrounding the   proximal superior mesenteric artery. Suspicious for focal inflammation.   No loculated collections to suggest an abscess.      --- End of Report ---    < end of copied text >

## 2024-11-13 LAB
ALBUMIN SERPL ELPH-MCNC: 2.4 G/DL — LOW (ref 3.3–5)
ALP SERPL-CCNC: 198 U/L — HIGH (ref 40–120)
ALT FLD-CCNC: 26 U/L — SIGNIFICANT CHANGE UP (ref 12–78)
ANION GAP SERPL CALC-SCNC: 3 MMOL/L — LOW (ref 5–17)
ANISOCYTOSIS BLD QL: SLIGHT — SIGNIFICANT CHANGE UP
AST SERPL-CCNC: 13 U/L — LOW (ref 15–37)
BASOPHILS # BLD AUTO: 0 K/UL — SIGNIFICANT CHANGE UP (ref 0–0.2)
BASOPHILS NFR BLD AUTO: 0 % — SIGNIFICANT CHANGE UP (ref 0–2)
BILIRUB SERPL-MCNC: 0.3 MG/DL — SIGNIFICANT CHANGE UP (ref 0.2–1.2)
BUN SERPL-MCNC: 13 MG/DL — SIGNIFICANT CHANGE UP (ref 7–23)
CALCIUM SERPL-MCNC: 8.6 MG/DL — SIGNIFICANT CHANGE UP (ref 8.5–10.1)
CHLORIDE SERPL-SCNC: 105 MMOL/L — SIGNIFICANT CHANGE UP (ref 96–108)
CO2 SERPL-SCNC: 26 MMOL/L — SIGNIFICANT CHANGE UP (ref 22–31)
CREAT SERPL-MCNC: 0.88 MG/DL — SIGNIFICANT CHANGE UP (ref 0.5–1.3)
CRP SERPL-MCNC: 12.2 MG/ML — HIGH (ref 0–5)
EGFR: 89 ML/MIN/1.73M2 — SIGNIFICANT CHANGE UP
EOSINOPHIL # BLD AUTO: 0 K/UL — SIGNIFICANT CHANGE UP (ref 0–0.5)
EOSINOPHIL NFR BLD AUTO: 0 % — SIGNIFICANT CHANGE UP (ref 0–6)
ERYTHROCYTE [SEDIMENTATION RATE] IN BLOOD: 27 MM/HR — HIGH (ref 0–20)
GLUCOSE SERPL-MCNC: 109 MG/DL — HIGH (ref 70–99)
HCT VFR BLD CALC: 27.6 % — LOW (ref 39–50)
HGB BLD-MCNC: 9.7 G/DL — LOW (ref 13–17)
LACTATE SERPL-SCNC: 1.1 MMOL/L — SIGNIFICANT CHANGE UP (ref 0.7–2)
LYMPHOCYTES # BLD AUTO: 10 % — LOW (ref 13–44)
LYMPHOCYTES # BLD AUTO: 2.88 K/UL — SIGNIFICANT CHANGE UP (ref 1–3.3)
MAGNESIUM SERPL-MCNC: 1.2 MG/DL — LOW (ref 1.6–2.6)
MANUAL SMEAR VERIFICATION: SIGNIFICANT CHANGE UP
MCHC RBC-ENTMCNC: 33.1 PG — SIGNIFICANT CHANGE UP (ref 27–34)
MCHC RBC-ENTMCNC: 35.1 G/DL — SIGNIFICANT CHANGE UP (ref 32–36)
MCV RBC AUTO: 94.2 FL — SIGNIFICANT CHANGE UP (ref 80–100)
MONOCYTES # BLD AUTO: 0.29 K/UL — SIGNIFICANT CHANGE UP (ref 0–0.9)
MONOCYTES NFR BLD AUTO: 1 % — LOW (ref 2–14)
NEUTROPHILS # BLD AUTO: 24.46 K/UL — HIGH (ref 1.8–7.4)
NEUTROPHILS NFR BLD AUTO: 83 % — HIGH (ref 43–77)
NEUTS BAND # BLD: 2 % — SIGNIFICANT CHANGE UP (ref 0–8)
NRBC # BLD: 0 /100 WBCS — SIGNIFICANT CHANGE UP (ref 0–0)
NRBC # BLD: SIGNIFICANT CHANGE UP /100 WBCS (ref 0–0)
PHOSPHATE SERPL-MCNC: 3.2 MG/DL — SIGNIFICANT CHANGE UP (ref 2.5–4.5)
PLAT MORPH BLD: NORMAL — SIGNIFICANT CHANGE UP
PLATELET # BLD AUTO: 86 K/UL — LOW (ref 150–400)
POTASSIUM SERPL-MCNC: 3.4 MMOL/L — LOW (ref 3.5–5.3)
POTASSIUM SERPL-SCNC: 3.4 MMOL/L — LOW (ref 3.5–5.3)
PROT SERPL-MCNC: 5 GM/DL — LOW (ref 6–8.3)
RBC # BLD: 2.93 M/UL — LOW (ref 4.2–5.8)
RBC # FLD: 14.9 % — HIGH (ref 10.3–14.5)
RBC BLD AUTO: SIGNIFICANT CHANGE UP
SODIUM SERPL-SCNC: 134 MMOL/L — LOW (ref 135–145)
TOXIC GRANULES BLD QL SMEAR: PRESENT — SIGNIFICANT CHANGE UP
VARIANT LYMPHS # BLD: 4 % — SIGNIFICANT CHANGE UP (ref 0–6)
WBC # BLD: 28.78 K/UL — HIGH (ref 3.8–10.5)
WBC # FLD AUTO: 28.78 K/UL — HIGH (ref 3.8–10.5)

## 2024-11-13 PROCEDURE — 99233 SBSQ HOSP IP/OBS HIGH 50: CPT

## 2024-11-13 RX ORDER — LOPERAMIDE HCL 2 MG
2 TABLET ORAL THREE TIMES A DAY
Refills: 0 | Status: DISCONTINUED | OUTPATIENT
Start: 2024-11-13 | End: 2024-11-14

## 2024-11-13 RX ORDER — POTASSIUM CHLORIDE 10 MEQ
40 TABLET, EXTENDED RELEASE ORAL ONCE
Refills: 0 | Status: COMPLETED | OUTPATIENT
Start: 2024-11-13 | End: 2024-11-13

## 2024-11-13 RX ORDER — SODIUM CHLORIDE 9 MG/ML
500 INJECTION, SOLUTION INTRAMUSCULAR; INTRAVENOUS; SUBCUTANEOUS ONCE
Refills: 0 | Status: COMPLETED | OUTPATIENT
Start: 2024-11-13 | End: 2024-11-13

## 2024-11-13 RX ADMIN — Medication 2 MILLIGRAM(S): at 14:20

## 2024-11-13 RX ADMIN — Medication 2 MILLIGRAM(S): at 21:30

## 2024-11-13 RX ADMIN — Medication 2 MILLIGRAM(S): at 12:33

## 2024-11-13 RX ADMIN — VANCOMYCIN HYDROCHLORIDE 166.67 MILLIGRAM(S): KIT at 05:35

## 2024-11-13 RX ADMIN — Medication 125 MILLILITER(S): at 05:35

## 2024-11-13 RX ADMIN — PIPERACILLIN AND TAZOBACTAM 25 GRAM(S): .5; 4 INJECTION, POWDER, LYOPHILIZED, FOR SOLUTION INTRAVENOUS at 14:20

## 2024-11-13 RX ADMIN — SIMETHICONE 80 MILLIGRAM(S): 80 TABLET, CHEWABLE ORAL at 12:34

## 2024-11-13 RX ADMIN — Medication 40 MILLIEQUIVALENT(S): at 11:38

## 2024-11-13 RX ADMIN — SODIUM CHLORIDE 500 MILLILITER(S): 9 INJECTION, SOLUTION INTRAMUSCULAR; INTRAVENOUS; SUBCUTANEOUS at 21:51

## 2024-11-13 RX ADMIN — PIPERACILLIN AND TAZOBACTAM 25 GRAM(S): .5; 4 INJECTION, POWDER, LYOPHILIZED, FOR SOLUTION INTRAVENOUS at 07:11

## 2024-11-13 RX ADMIN — PIPERACILLIN AND TAZOBACTAM 25 GRAM(S): .5; 4 INJECTION, POWDER, LYOPHILIZED, FOR SOLUTION INTRAVENOUS at 21:30

## 2024-11-13 NOTE — DIETITIAN INITIAL EVALUATION ADULT - LAB (SPECIFY)
consider checking B6, B12, thiamine, folate, carnitine, and copper levels as malnutrition in cause these to be deficient   Consider to obtain vitamin D 25OH level to assess nutriture

## 2024-11-13 NOTE — DIETITIAN INITIAL EVALUATION ADULT - PERTINENT LABORATORY DATA
11-13    134[L]  |  105  |  13  ----------------------------<  109[H]  3.4[L]   |  26  |  0.88    Ca    8.6      13 Nov 2024 08:14  Phos  3.2     11-13  Mg     1.2     11-13    TPro  5.0[L]  /  Alb  2.4[L]  /  TBili  0.3  /  DBili  x   /  AST  13[L]  /  ALT  26  /  AlkPhos  198[H]  11-13

## 2024-11-13 NOTE — CONSULT NOTE ADULT - ASSESSMENT
73 y/o Male w/ h/o cholangiocarcinoma s/p Whipple procedure, on chemo was admitted on 11/10 for diarrhea, pre-syncope, and confusion for 1 day. Patient awake, alert, oriented with wife at bedside. Reports he was diagnosed with cholangiocarcinoma in April, underwent whipple procedure, and has gotten 2 courses of chemo. Reports being sick and fatigued since initiating treatment with chemo and whipple procedure. Last chemo was on Thursday and since had been feeling more fatigued. Reports occasional loose BM, is on miralax for constipation. Today patient had large watery BM in the morning, later had headache with aura, had presyncope, and became confused. In ED, CT abd/pelvis noted for new hepatic lesions, and indeterminate perivascular fat stranding seen surrounding the proximal superior mesenteric artery. Sepsis work up initiated and patient received broad spectrum abx with vancomycin IV and zosyn.    #Hepatic lesions. Possible new liver abscesses  #Cholangiocarcinoma s/p Whipple and chemo  #Immunocompromised host   #Low grade fever  #Leukocytosis  #Encephalopathy  -obtain BC x 2  -start zosyn 3.375 gm IV q8h  -reason for abx use and side effects reviewed with patient; monitor BMP   -oncology evaluation  -old chart reviewed to assess prior cultures  -monitor temps  -f/u CBC  -supportive care  2. Other issues:   -care per medicine    Clinical team may change from intravenous to oral antibiotics when the following criteria are met:   1. Patient is clinically improving/stable       a)	Improved signs and symptoms of infection from initial presentation       b)	Afebrile for 24 hours       c)	Leukocytosis trending towards normal range   2. Patient is tolerating oral intake   3. Initial/repeat blood cultures are negative     When above criteria met may change iv antibiotics to an oral agent  Cannot advise changing to oral antibiotic therapy until culture sensitivity is available.     75 y/o Male w/ h/o cholangiocarcinoma s/p Whipple procedure, on chemo was admitted on 11/10 for diarrhea, pre-syncope, and confusion for 1 day. Patient awake, alert, oriented with wife at bedside. Reports he was diagnosed with cholangiocarcinoma in April, underwent whipple procedure, and has gotten 2 courses of chemo. Reports being sick and fatigued since initiating treatment with chemo and whipple procedure. Last chemo was on Thursday and since had been feeling more fatigued. Reports occasional loose BM, is on miralax for constipation. Today patient had large watery BM in the morning, later had headache with aura, had presyncope, and became confused. In ED, CT abd/pelvis noted for new hepatic lesions, and indeterminate perivascular fat stranding seen surrounding the proximal superior mesenteric artery. Sepsis work up initiated and patient received broad spectrum abx with vancomycin IV and zosyn.    #Hepatic lesions. Possible new liver abscesses vs liver metastasis   #Cholangiocarcinoma s/p Whipple and chemo  #Immunocompromised host   #Low grade fever  #Leukocytosis ?related to leulasta  #Encephalopathy  -obtain BC x 2  -start zosyn 3.375 gm IV q8h  -reason for abx use and side effects reviewed with patient; monitor BMP   -oncology evaluation  -old chart reviewed to assess prior cultures  -monitor temps  -f/u CBC  -supportive care  2. Other issues:   -care per medicine    d/w wife at bedside     Clinical team may change from intravenous to oral antibiotics when the following criteria are met:   1. Patient is clinically improving/stable       a)	Improved signs and symptoms of infection from initial presentation       b)	Afebrile for 24 hours       c)	Leukocytosis trending towards normal range   2. Patient is tolerating oral intake   3. Initial/repeat blood cultures are negative     When above criteria met may change iv antibiotics to an oral agent  Cannot advise changing to oral antibiotic therapy until culture sensitivity is available.

## 2024-11-13 NOTE — PATIENT PROFILE ADULT - FUNCTIONAL ASSESSMENT - BASIC MOBILITY SECTION LABEL
Subjective:   Patient ID: Kayy Boucher is a 59year old female.    + burning b/l hands. Had low potassium. It was replaced and her symptoms improved. No palpitations. No CP. History/Other:   Review of Systems   All other systems reviewed and are negative. Current Outpatient Medications   Medication Sig Dispense Refill    loratadine 10 MG Oral Tab Take 1 tablet (10 mg total) by mouth daily. fluticasone propionate 50 MCG/ACT Nasal Suspension 2 sprays by Each Nare route daily. 1 each 1    perphenazine 4 MG Oral Tab Take 1-2 tablets (4-8 mg total) by mouth at bedtime. perphenazine 8 MG Oral Tab Take 1 tablet (8 mg total) by mouth 3 (three) times daily. 90 tablet 2    sertraline 50 MG Oral Tab Take 1 tablet by mouth daily. 30 tablet 2    [START ON 10/21/2023] clonazePAM 1 MG Oral Tab Take half a tab in the AM and afternoon and full tab at night 65 tablet 0    [START ON 10/15/2023] traZODone 50 MG Oral Tab Take 1-2 tablets ( mg total) by mouth nightly as needed for Sleep. 60 tablet 0    oxybutynin ER 5 MG Oral Tablet 24 Hr Take 1 tablet (5 mg total) by mouth daily. 30 tablet 1    AMLODIPINE 5 MG Oral Tab TAKE ONE TABLET BY MOUTH daily 30 tablet 1    lamoTRIgine 200 MG Oral Tab Take 1 tablet (200 mg total) by mouth 3 (three) times daily. 90 tablet 2    Multiple Vitamin (MULTI-VITAMIN DAILY) Oral Tab Take by mouth daily. PROAIR  (90 Base) MCG/ACT Inhalation Aero Soln INHALE 2 PUFFS INTO THE LUNGS EVERY 4 (FOUR) HOURS AS NEEDED FOR WHEEZING. 8.5 g 6    potassium chloride 20 MEQ Oral Tab CR Take 1 tablet (20 mEq total) by mouth 2 (two) times daily. (Patient not taking: Reported on 10/9/2023)      Potassium Chloride ER 20 MEQ Oral Tab CR Take 20 mEq by mouth 2 (two) times daily.  (Patient not taking: Reported on 10/9/2023) 4 tablet 0    SYMBICORT 80-4.5 MCG/ACT Inhalation Aerosol inhale TWO puffs BY MOUTH into THE lungs TWICE DAILY (Patient not taking: Reported on 10/9/2023) 10.2 g 3 Allergies:  Lactose                 PAIN    Comment:Stomach pain and bloating  Penicillins             NAUSEA AND VOMITING  Sulfa Antibiotics       RASH    Objective:   Physical Exam  Vitals reviewed. Constitutional:       General: She is not in acute distress. Appearance: She is well-developed. She is not diaphoretic. Eyes:      General: No scleral icterus. Right eye: No discharge. Left eye: No discharge. Conjunctiva/sclera: Conjunctivae normal.   Cardiovascular:      Rate and Rhythm: Normal rate and regular rhythm. Heart sounds: Normal heart sounds. No murmur heard. No friction rub. No gallop. Pulmonary:      Effort: Pulmonary effort is normal. No respiratory distress. Breath sounds: Normal breath sounds. No wheezing or rales. Musculoskeletal:      Comments: Mildly positive carpal tunnel compression test.  Negative spurlings test.         Assessment & Plan:   Hypokalemia  (primary encounter diagnosis)  Numbness and tingling  Nasal congestion    Follow up in a few weeks for a physical  Consider wrist splints if numbness and tingling return    1. Hypokalemia  - Basic Metabolic Panel (8) [E]; Future    2. Numbness and tingling  - Basic Metabolic Panel (8) [E]; Future    3. Nasal congestion  - fluticasone propionate 50 MCG/ACT Nasal Suspension; 2 sprays by Each Nare route daily. Dispense: 1 each; Refill: 1    Orders Placed This Encounter      Basic Metabolic Panel (8) [E]      Meds This Visit:  Requested Prescriptions     Signed Prescriptions Disp Refills    fluticasone propionate 50 MCG/ACT Nasal Suspension 1 each 1     Si sprays by Each Nare route daily.        Imaging & Referrals:  None .

## 2024-11-13 NOTE — CONSULT NOTE ADULT - PROVIDER SPECIALTY LIST ADULT
TRISHA finalized: 2/14/25 per LMP, 11-week US and ACOG    Optional testing NIPS,CF/SMA,AFP:  Discussed all, declines    COVID: Recommended 7/8/24  Tdap:  RSV:  Flu:    Rhogam: Opos  28-32 weeks repeat RPR:  ? Desires Sterilization:    Anatomy US:  FU US:    PROBLEM LIST/PLAN:   Obesity - plan early 1hGTT    N/V - B6 and doxylamine    Anxiety/depression - stable on desvenlafaxine, R/B reviewed    Migraines - topiramate as preventive, R/B reviewed    UDS +THC at new OB    LSIL, +HPV on pap - needs colpo  
Heme/Onc
Infectious Disease

## 2024-11-13 NOTE — DIETITIAN INITIAL EVALUATION ADULT - ORAL INTAKE PTA/DIET HISTORY
Since Whipple procedure in May 2024, endorses decreased PO intake. Does not follow a specific diet at home, states he eats whatever he tolerates. Taste is altered d/t chemo tx (has metal taste in mouth when eating). Takes Creon w/ all meals and snacks. C/o "on and off" diarrhea/ constipation + lower GI abd cramps since May. Takes MVI suppl, lactose pills (2/2 lactose intolerance) and plant-based protein shakes at home to optimize nutrition. Meeting <75% ENN based off diet recall. Follows oncologist/ PCP w/ [facility], also has dietitian outpatient.

## 2024-11-13 NOTE — DIETITIAN INITIAL EVALUATION ADULT - ADD RECOMMEND
1) C/w regular diet. Encourage protein-rich foods, maximize food preferences   2) Will add [employer] daily to optimize nutritional needs (provides 325 kcal, 16 g protein/ shake)   3) C/w current bowel regimens as per MD  4) MVI w/ minerals daily to ensure 100% RDA met  5) Consider adding thiamine 100 mg daily 2/2 poor PO intake/ malnutrition   6) Consider adding appetite stimulant such as Remeron or Marinol 2/2 chronically poor appetite/ PO intake  7) Obtain weekly wt to track/trend changes   8) Monitor daily lytes/min and replete prn. consider checking B6, B12, thiamine, folate, carnitine, and copper levels as malnutrition in cause these to be deficient   9) Confirm goals of care regarding nutrition support   RD will continue to monitor PO intake, labs, hydration, and wt prn.

## 2024-11-13 NOTE — CONSULT NOTE ADULT - ASSESSMENT
74M w/ h/o metastatic pancreatic ca s/p whipple procedure and adjuvant xeloda, followed by metastatic disease in liver now on chemo LD this past week presents with complaints of diarrhea, pre-syncope, and confusion for 1 day.     # metastatic pancreatic cancer  - [place] [facility]  [place] requested [place] records and pending  - CTH neg for bleed,   - CT abd/pelvis noted for new hepatic lesions, and indeterminate perivascular fat stranding seen surrounding the proximal superior mesenteric artery,   - c/w pancrealipase and immodium for diarrhea     # leukocytosis   - in ER CBC noted for WBC 29, H/H 10/30, Plt 103.   - pt currently [place] [facility] reports receiving neulasta on Friday as reason for leukoctyosis  - no indication for antibiotics   - UA and viral panel negative, CXR negative     # dehydration- c/w ivf     will f/u in am after receiving [place] [facility] [place] request sent to team

## 2024-11-13 NOTE — DIETITIAN INITIAL EVALUATION ADULT - OTHER INFO
75 y/o M w/ PMH cholangiocarcinoma s/p whipple procedure, on chemo presents with complaints of diarrhea, pre-syncope, and confusion for 1 day. Patient awake, alert, oriented with wife at bedside. Reports he was diagnosed with cholangiocarcinoma in April, underwent whipple procedure, and has gotten 2 courses of chemo. Reports being sick and fatigued since initiating treatment with chemo and whipple procedure. Last chemo was on Thursday and since had been feeling more fatigued. Reports occasional loose BM, is on miralax for constipation. Today patient had large watery BM in the morning, later had headache with aura, had presyncope, and became confused. CT abd/pelvis noted for new hepatic lesions, and indeterminate perivascular fat stranding seen surrounding the proximal superior mesenteric artery. Sepsis work up initiated and patient received broad spectrum abx, admitted to  for further care.     Visited pt at bed side this morning. Currently on regular diet, was waiting to receive breakfast at time of RD visit. Reports feeling better this morning. UBW stated at 198# prior to Whipple/ chemo tx however endorses persistent wt loss since, stating current wt at 150#. Bed scale wt of 154# taken by RD on 11/13/24; 44# wt loss/ 22.22% x 6 months - severe and clinically significant. NFPE reveals moderate to severe muscle/fat wasting - appeared very thin, frail. Receptive to trial [employer] daily to optimize nutritional needs (provides 325 kcal, 16 g protein/ shake). C/w regular diet as tolerated. Last diarrhea episode was this morning (as per pt) - closely monitor bowel movements. See other recs below.

## 2024-11-13 NOTE — CONSULT NOTE ADULT - SUBJECTIVE AND OBJECTIVE BOX
HPI:  74M w/ h/o metastatic pancreatic ca s/p whipple procedure and adjuvant xeloda, followed by metastatic disease in liver now on chemo LD this past week presents with complaints of diarrhea, pre-syncope, and confusion for 1 day.     Patient awake, alert, oriented with wife at bedside.     Last chemo was on Thursday and since had been feeling more fatigued. Reports occasional loose BM, is on miralax for constipation. Today patient had large watery BM in the morning, later had headache with aura, had presyncope, and became confused.     In ED patient with stable vitals, CBC noted for WBC 29, H/H 10/30, Plt 103. CMP noted for Na 132, K 2.9, Alk phos 233, Trop, Lipase, and Ammonia wnl. UA sterile. RVP negative,   CTH neg for bleed,   CT abd/pelvis noted for new hepatic lesions, and indeterminate perivascular fat stranding seen surrounding the proximal superior mesenteric artery,    Sepsis work up initiated and patient recieved broad spectrum abx, admitted to  for further care.        PAST MEDICAL & SURGICAL HISTORY:  Bile duct cancer      H/O Whipple procedure          Allergies    statins (Unknown)    Intolerances        MEDICATIONS  (STANDING):  heparin   Injectable 5000 Unit(s) SubCutaneous every 12 hours  lactated ringers 1000 milliLiter(s) (125 mL/Hr) IV Continuous <Continuous>  pancrelipase  (CREON 36,000 Lipase Units) 3 Capsule(s) Oral three times a day with meals  piperacillin/tazobactam IVPB.. 3.375 Gram(s) IV Intermittent every 8 hours    MEDICATIONS  (PRN):  acetaminophen     Tablet .. 650 milliGRAM(s) Oral every 6 hours PRN Temp greater or equal to 38C (100.4F), Mild Pain (1 - 3)  aluminum hydroxide/magnesium hydroxide/simethicone Suspension 30 milliLiter(s) Oral every 4 hours PRN Dyspepsia  calcium carbonate    500 mG (Tums) Chewable 1 Tablet(s) Chew three times a day PRN acid reflux  famotidine    Tablet 40 milliGRAM(s) Oral daily PRN acid reflux  loperamide 2 milliGRAM(s) Oral three times a day PRN Diarrhea  melatonin 3 milliGRAM(s) Oral at bedtime PRN Insomnia  ondansetron Injectable 4 milliGRAM(s) IV Push every 8 hours PRN Nausea and/or Vomiting  pancrelipase  (CREON 36,000 Lipase Units) 1 Capsule(s) Oral two times a day with meals PRN with snacks  polyethylene glycol 3350 17 Gram(s) Oral daily PRN Constipation  simethicone 80 milliGRAM(s) Chew three times a day PRN gas      FAMILY HISTORY:      SOCIAL HISTORY: No EtOH, no tobacco    REVIEW OF SYSTEMS:    CONSTITUTIONAL: + weakness, and fatigue   EYES/ENT: No visual changes;  No vertigo or throat pain   NECK: No pain or stiffness  RESPIRATORY: No cough, wheezing, hemoptysis; No shortness of breath  CARDIOVASCULAR: No chest pain or palpitations  GASTROINTESTINAL:+diarrhea, abd pain  GENITOURINARY: No dysuria, frequency or hematuria  NEUROLOGICAL: +weakness  SKIN: No itching, burning, rashes, or lesions   All other review of systems is negative unless indicated above.      Weight (kg): 68.4 (11-12 @ 23:50)    T(F): 97.9 (11-13-24 @ 15:26), Max: 98.6 (11-13-24 @ 07:59)  HR: 74 (11-13-24 @ 15:26)  BP: 108/57 (11-13-24 @ 15:26)  RR: 18 (11-13-24 @ 15:26)  SpO2: 98% (11-13-24 @ 15:26)  Wt(kg): --    GENERAL: NAD, well-developed  EYES: EOMI,   CHEST/LUNG: Clear to auscultation bilaterally; No wheeze  HEART: Regular rate and rhythm;   ABDOMEN: Soft, Nontender, hyperactive bs   EXTREMITIES:  no edema  NEUROLOGY: non-focal- aox3                          9.7    28.78 )-----------( 86       ( 13 Nov 2024 08:14 )             27.6       11-13    134[L]  |  105  |  13  ----------------------------<  109[H]  3.4[L]   |  26  |  0.88    Ca    8.6      13 Nov 2024 08:14  Phos  3.2     11-13  Mg     1.2     11-13    TPro  5.0[L]  /  Alb  2.4[L]  /  TBili  0.3  /  DBili  x   /  AST  13[L]  /  ALT  26  /  AlkPhos  198[H]  11-13      Magnesium: 1.2 mg/dL (11-13 @ 08:14)  Phosphorus: 3.2 mg/dL (11-13 @ 08:14)      PT/INR - ( 12 Nov 2024 14:27 )   PT: 11.5 sec;   INR: 0.97 ratio         PTT - ( 12 Nov 2024 14:27 )  PTT:29.4 sec    
Patient is a 74y old  Male who presents with a chief complaint of confusion    HPI:  73 y/o Male w/ h/o cholangiocarcinoma s/p Whipple procedure, on chemo was admitted on 11/10 for diarrhea, pre-syncope, and confusion for 1 day. Patient awake, alert, oriented with wife at bedside. Reports he was diagnosed with cholangiocarcinoma in April, underwent whipple procedure, and has gotten 2 courses of chemo. Reports being sick and fatigued since initiating treatment with chemo and whipple procedure. Last chemo was on Thursday and since had been feeling more fatigued. Reports occasional loose BM, is on miralax for constipation. Today patient had large watery BM in the morning, later had headache with aura, had presyncope, and became confused. In ED, CT abd/pelvis noted for new hepatic lesions, and indeterminate perivascular fat stranding seen surrounding the proximal superior mesenteric artery. Sepsis work up initiated and patient received broad spectrum abx with vancomycin IV and zosyn.     PMH: as above  PSH: as above  Meds: per reconciliation sheet, noted below  MEDICATIONS  (STANDING):  heparin   Injectable 5000 Unit(s) SubCutaneous every 12 hours  lactated ringers 1000 milliLiter(s) (125 mL/Hr) IV Continuous <Continuous>  pancrelipase  (CREON 36,000 Lipase Units) 3 Capsule(s) Oral three times a day with meals  piperacillin/tazobactam IVPB.. 3.375 Gram(s) IV Intermittent every 8 hours  potassium chloride    Tablet ER 40 milliEquivalent(s) Oral once  vancomycin  IVPB 1250 milliGRAM(s) IV Intermittent every 12 hours    MEDICATIONS  (PRN):  acetaminophen     Tablet .. 650 milliGRAM(s) Oral every 6 hours PRN Temp greater or equal to 38C (100.4F), Mild Pain (1 - 3)  aluminum hydroxide/magnesium hydroxide/simethicone Suspension 30 milliLiter(s) Oral every 4 hours PRN Dyspepsia  calcium carbonate    500 mG (Tums) Chewable 1 Tablet(s) Chew three times a day PRN acid reflux  famotidine    Tablet 40 milliGRAM(s) Oral daily PRN acid reflux  melatonin 3 milliGRAM(s) Oral at bedtime PRN Insomnia  ondansetron Injectable 4 milliGRAM(s) IV Push every 8 hours PRN Nausea and/or Vomiting  pancrelipase  (CREON 36,000 Lipase Units) 1 Capsule(s) Oral two times a day with meals PRN with snacks  polyethylene glycol 3350 17 Gram(s) Oral daily PRN Constipation  simethicone 80 milliGRAM(s) Chew three times a day PRN gas    Allergies    statins (Unknown)    Intolerances      Social: no smoking, no alcohol, no illegal drugs; no recent travel, no exposure to TB  FAMILY HISTORY:    no history of premature cardiovascular disease in first degree relatives    ROS: the patient denies HA, no seizures, no dizziness, no sore throat, no nasal congestion, no blurry vision, no CP, no palpitations, no SOB, no cough, has abdominal pain, has diarrhea, no N/V, no dysuria, no leg pain, no claudication, no rash, no joint aches, no rectal pain or bleeding, no night sweats  All other systems reviewed and are negative    Vital Signs Last 24 Hrs  T(C): 37 (13 Nov 2024 07:59), Max: 37.3 (12 Nov 2024 14:30)  T(F): 98.6 (13 Nov 2024 07:59), Max: 99.2 (12 Nov 2024 14:30)  HR: 72 (13 Nov 2024 07:59) (72 - 109)  BP: 95/62 (13 Nov 2024 07:59) (95/62 - 119/80)  BP(mean): 79 (12 Nov 2024 19:14) (78 - 92)  RR: 18 (13 Nov 2024 07:59) (12 - 26)  SpO2: 97% (13 Nov 2024 07:59) (96% - 100%)    Parameters below as of 13 Nov 2024 07:59  Patient On (Oxygen Delivery Method): room air      Daily Height in cm: 182.88 (12 Nov 2024 14:00)    Daily     PE:    Constitutional:  No acute distress  HEENT: NC/AT, EOMI, PERRLA, conjunctivae clear; ears and nose atraumatic; pharynx benign  Neck: supple; thyroid not palpable  Back: no tenderness  Respiratory: respiratory effort normal; clear to auscultation  Cardiovascular: S1S2 regular, no murmurs  Abdomen: soft, has RUQ tender, not distended, positive BS; no liver or spleen organomegaly  Genitourinary: no suprapubic tenderness  Lymphatic: no LN palpable  Musculoskeletal: no muscle tenderness, no joint swelling or tenderness  Extremities: no pedal edema  Neurological/ Psychiatric: AxOx3, judgement and insight normal; moving all extremities  Skin: no rashes; no palpable lesions    Labs: all available labs reviewed                        9.7    28.78 )-----------( 86       ( 13 Nov 2024 08:14 )             27.6     11-13    134[L]  |  105  |  13  ----------------------------<  109[H]  3.4[L]   |  26  |  0.88    Ca    8.6      13 Nov 2024 08:14  Phos  3.2     11-13  Mg     1.2     11-13    TPro  5.0[L]  /  Alb  2.4[L]  /  TBili  0.3  /  DBili  x   /  AST  13[L]  /  ALT  26  /  AlkPhos  198[H]  11-13     LIVER FUNCTIONS - ( 13 Nov 2024 08:14 )  Alb: 2.4 g/dL / Pro: 5.0 gm/dL / ALK PHOS: 198 U/L / ALT: 26 U/L / AST: 13 U/L / GGT: x           Urinalysis with Rflx Culture (11-12 @ 16:19)  Urine Appearance: Clear  Protein, Urine: Negative mg/dL    Radiology: all available radiological tests reviewed    < from: Xray Chest 1 View- PORTABLE-Urgent (Xray Chest 1 View- PORTABLE-Urgent .) (11.12.24 @ 15:58) >  IMPRESSION: Question dehydration.  < end of copied text >    Advanced directives addressed: full resuscitation

## 2024-11-13 NOTE — DIETITIAN INITIAL EVALUATION ADULT - FACTORS AFF FOOD INTAKE
change in sense of smell or taste/persistent constipation/persistent diarrhea/persistent lack of appetite

## 2024-11-13 NOTE — DIETITIAN INITIAL EVALUATION ADULT - PERTINENT MEDS FT
MEDICATIONS  (STANDING):  heparin   Injectable 5000 Unit(s) SubCutaneous every 12 hours  lactated ringers 1000 milliLiter(s) (125 mL/Hr) IV Continuous <Continuous>  pancrelipase  (CREON 36,000 Lipase Units) 3 Capsule(s) Oral three times a day with meals  piperacillin/tazobactam IVPB.. 3.375 Gram(s) IV Intermittent every 8 hours    MEDICATIONS  (PRN):  acetaminophen     Tablet .. 650 milliGRAM(s) Oral every 6 hours PRN Temp greater or equal to 38C (100.4F), Mild Pain (1 - 3)  aluminum hydroxide/magnesium hydroxide/simethicone Suspension 30 milliLiter(s) Oral every 4 hours PRN Dyspepsia  calcium carbonate    500 mG (Tums) Chewable 1 Tablet(s) Chew three times a day PRN acid reflux  famotidine    Tablet 40 milliGRAM(s) Oral daily PRN acid reflux  loperamide 2 milliGRAM(s) Oral three times a day PRN Diarrhea  melatonin 3 milliGRAM(s) Oral at bedtime PRN Insomnia  ondansetron Injectable 4 milliGRAM(s) IV Push every 8 hours PRN Nausea and/or Vomiting  pancrelipase  (CREON 36,000 Lipase Units) 1 Capsule(s) Oral two times a day with meals PRN with snacks  polyethylene glycol 3350 17 Gram(s) Oral daily PRN Constipation  simethicone 80 milliGRAM(s) Chew three times a day PRN gas

## 2024-11-14 ENCOUNTER — TRANSCRIPTION ENCOUNTER (OUTPATIENT)
Age: 74
End: 2024-11-14

## 2024-11-14 VITALS
TEMPERATURE: 98 F | OXYGEN SATURATION: 97 % | SYSTOLIC BLOOD PRESSURE: 102 MMHG | HEART RATE: 70 BPM | DIASTOLIC BLOOD PRESSURE: 63 MMHG | RESPIRATION RATE: 17 BRPM

## 2024-11-14 LAB
ALBUMIN SERPL ELPH-MCNC: 2.6 G/DL — LOW (ref 3.3–5)
ALP SERPL-CCNC: 236 U/L — HIGH (ref 40–120)
ALT FLD-CCNC: 29 U/L — SIGNIFICANT CHANGE UP (ref 12–78)
ANION GAP SERPL CALC-SCNC: 5 MMOL/L — SIGNIFICANT CHANGE UP (ref 5–17)
AST SERPL-CCNC: 17 U/L — SIGNIFICANT CHANGE UP (ref 15–37)
BILIRUB SERPL-MCNC: 0.3 MG/DL — SIGNIFICANT CHANGE UP (ref 0.2–1.2)
BUN SERPL-MCNC: 12 MG/DL — SIGNIFICANT CHANGE UP (ref 7–23)
CALCIUM SERPL-MCNC: 8.4 MG/DL — LOW (ref 8.5–10.1)
CHLORIDE SERPL-SCNC: 105 MMOL/L — SIGNIFICANT CHANGE UP (ref 96–108)
CO2 SERPL-SCNC: 28 MMOL/L — SIGNIFICANT CHANGE UP (ref 22–31)
CREAT SERPL-MCNC: 0.98 MG/DL — SIGNIFICANT CHANGE UP (ref 0.5–1.3)
EGFR: 80 ML/MIN/1.73M2 — SIGNIFICANT CHANGE UP
GLUCOSE SERPL-MCNC: 97 MG/DL — SIGNIFICANT CHANGE UP (ref 70–99)
HCT VFR BLD CALC: 30.4 % — LOW (ref 39–50)
HGB BLD-MCNC: 10.5 G/DL — LOW (ref 13–17)
MCHC RBC-ENTMCNC: 32.9 PG — SIGNIFICANT CHANGE UP (ref 27–34)
MCHC RBC-ENTMCNC: 34.5 G/DL — SIGNIFICANT CHANGE UP (ref 32–36)
MCV RBC AUTO: 95.3 FL — SIGNIFICANT CHANGE UP (ref 80–100)
PLATELET # BLD AUTO: 62 K/UL — LOW (ref 150–400)
POTASSIUM SERPL-MCNC: 3.4 MMOL/L — LOW (ref 3.5–5.3)
POTASSIUM SERPL-SCNC: 3.4 MMOL/L — LOW (ref 3.5–5.3)
PROT SERPL-MCNC: 5.3 GM/DL — LOW (ref 6–8.3)
RBC # BLD: 3.19 M/UL — LOW (ref 4.2–5.8)
RBC # FLD: 14.9 % — HIGH (ref 10.3–14.5)
SODIUM SERPL-SCNC: 138 MMOL/L — SIGNIFICANT CHANGE UP (ref 135–145)
WBC # BLD: 26.5 K/UL — HIGH (ref 3.8–10.5)
WBC # FLD AUTO: 26.5 K/UL — HIGH (ref 3.8–10.5)

## 2024-11-14 PROCEDURE — 99239 HOSP IP/OBS DSCHRG MGMT >30: CPT

## 2024-11-14 RX ORDER — POTASSIUM CHLORIDE 10 MEQ
40 TABLET, EXTENDED RELEASE ORAL ONCE
Refills: 0 | Status: COMPLETED | OUTPATIENT
Start: 2024-11-14 | End: 2024-11-14

## 2024-11-14 RX ADMIN — PIPERACILLIN AND TAZOBACTAM 25 GRAM(S): .5; 4 INJECTION, POWDER, LYOPHILIZED, FOR SOLUTION INTRAVENOUS at 06:26

## 2024-11-14 RX ADMIN — HEPARIN SODIUM 5000 UNIT(S): 10000 INJECTION INTRAVENOUS; SUBCUTANEOUS at 09:23

## 2024-11-14 RX ADMIN — Medication 40 MILLIEQUIVALENT(S): at 10:45

## 2024-11-14 NOTE — DISCHARGE NOTE PROVIDER - NSDCMRMEDTOKEN_GEN_ALL_CORE_FT
colesevelam 625 mg oral tablet: 2 tab(s) orally once a day (in the morning)  famotidine 40 mg oral tablet: 1 tab(s) orally once a day as needed for acid reflux  pancrelipase 36,000 units-114,000 units-180,000 units oral delayed release capsule: 3 cap(s) orally 3 times a day (with meals) *** also takes 1-2 caps with snacks***  pancrelipase 36,000 units-114,000 units-180,000 units oral delayed release capsule: 1 cap(s) orally 2 times a day as needed for with snacks ***3 caps with meals and 1-2 with snacks***  Polyethylene Glycol 3350: 17 gram(s) orally once a day  simethicone 80 mg oral tablet, chewable: 1 tab(s) chewed 3 times a day as needed for gas  Tums 500 mg oral tablet, chewable: 1 tab(s) chewed 3 times a day as needed for acid reflux

## 2024-11-14 NOTE — DISCHARGE NOTE PROVIDER - NSDCFUADDAPPT_GEN_ALL_CORE_FT
APPTS ARE READY TO BE MADE: [ x ] YES    Best Family or Patient Contact (if needed):    Additional Information about above appointments (if needed):    1:   2:   3:     Other comments or requests:    APPTS ARE READY TO BE MADE: [ x ] YES    Best Family or Patient Contact (if needed):    Additional Information about above appointments (if needed):    1:   2:   3:     Other comments or requests:   Patient informed us they already have secured a follow up appointment which is not visible on [facility] and declined to provide appointment details. Oncologist  Provided patient with provider referral information, however patient prefers to schedule the appointments on their own. PCP

## 2024-11-14 NOTE — PROGRESS NOTE ADULT - ASSESSMENT
75 y/o Male w/ h/o cholangiocarcinoma s/p Whipple procedure, on chemo was admitted on 11/10 for diarrhea, pre-syncope, and confusion for 1 day. Patient awake, alert, oriented with wife at bedside. Reports he was diagnosed with cholangiocarcinoma in April, underwent whipple procedure, and has gotten 2 courses of chemo. Reports being sick and fatigued since initiating treatment with chemo and whipple procedure. Last chemo was on Thursday and since had been feeling more fatigued. Reports occasional loose BM, is on miralax for constipation. Today patient had large watery BM in the morning, later had headache with aura, had presyncope, and became confused. In ED, CT abd/pelvis noted for new hepatic lesions, and indeterminate perivascular fat stranding seen surrounding the proximal superior mesenteric artery. Sepsis work up initiated and patient received broad spectrum abx with vancomycin IV and zosyn.    #Hepatic lesions. Likely liver metastasis. Lver abscesses unlikely.   #Cholangiocarcinoma s/p Whipple and chemo  #Immunocompromised host   #Low grade fever  #Leukocytosis likley related to neulasta  #Encephalopathy  -BC x 2 are negative to date  -no clinical signs of sepsis  -on zosyn 3.375 gm IV q8h # 2  -tolerating abx well so far; no side effects noted  -oncology evaluation appreciated  -doubt infectious process  -cultures are negative to date  -d/c zosyn  -observe off abx therapy; may f/u cultures as outpatient   -monitor temps  -f/u CBC  -supportive care  2. Other issues:   -care per medicine    d/w wife at bedside     Clinical team may change from intravenous to oral antibiotics when the following criteria are met:   1. Patient is clinically improving/stable       a)	Improved signs and symptoms of infection from initial presentation       b)	Afebrile for 24 hours       c)	Leukocytosis trending towards normal range   2. Patient is tolerating oral intake   3. Initial/repeat blood cultures are negative     When above criteria met may change iv antibiotics to an oral agent  Cannot advise changing to oral antibiotic therapy until culture sensitivity is available.

## 2024-11-14 NOTE — DISCHARGE NOTE PROVIDER - HOSPITAL COURSE
· Reason for Admission  AMS      · Subjective and Objective:   History of Present Illness:   74M w/ h/o cholangiocarcinoma s/p whipple procedure, on chemo presents with complaints of diarrhea, pre-syncope, and confusion for 1 day. Patient awake, alert, oriented with wife at bedside. Reports he was diagnosed with cholangiocarcinoma in April, underwent whipple procedure, and has gotten 2 courses of chemo. Reports being sick and fatigued since initiating treatment with chemo and whipple procedure. Last chemo was on Thursday and since had been feeling more fatigued. Reports occasional loose BM, is on miralax for constipation. Today patient had large watery BM in the morning, later had headache with aura, had presyncope, and became confused. In ED patient with stable vitals, CBC noted for WBC 29, H/H 10/30, Plt 103. CMP noted for Na 132, K 2.9, Alk phos 233, Trop, Lipase, and Ammonia wnl. UA sterile. RVP negative, CTH neg for bleed, CT abd/pelvis noted for new hepatic lesions, and indeterminate perivascular fat stranding seen surrounding the proximal superior mesenteric artery, Full results noted below. Sepsis work up initiated and patient recieved broad spectrum abx, admitted to  for further care.     Hospital course:  Pt admitted with toxic-metabolic encephalopathy multifactorial due to cholangiocarcinoma with known hepatic lesions / dehydration / actively on chemo.  He was ruled out for sepsis.  Blood cultures negative.  Leukocytosis due to filgrastrim.  Pt feels much better after IV fluid hydration.  He is being now monitored off antibiotics.  Pt feels much better and eager to go home.  He declines further imaging with MRI at this time.    REVIEW OF SYSTEMS: All other review of systems is negative unless indicated above.    Vital Signs Last 24 Hrs  T(C): 36.6 (14 Nov 2024 07:59), Max: 37 (13 Nov 2024 21:30)  T(F): 97.9 (14 Nov 2024 07:59), Max: 98.6 (13 Nov 2024 21:30)  HR: 70 (14 Nov 2024 07:59) (70 - 74)  BP: 102/63 (14 Nov 2024 07:59) (91/58 - 108/57)  BP(mean): --  RR: 17 (14 Nov 2024 07:59) (17 - 18)  SpO2: 97% (14 Nov 2024 07:59) (97% - 99%)    Parameters below as of 14 Nov 2024 07:59  Patient On (Oxygen Delivery Method): room air    PHYSICAL EXAM:    Constitutional: NAD, weak appearing, thin  HEENT: PERR, EOMI, Normal Hearing, MMM  Neck: Soft and supple  Respiratory: Breath sounds are clear bilaterally, No wheezing, rales or rhonchi  Cardiovascular: S1 and S2, regular rate and rhythm, no Murmurs, gallops or rubs  Gastrointestinal: Bowel Sounds present, soft, nontender, nondistended, no guarding, no rebound  Extremities: No peripheral edema  Neurological: A/O x 3, no focal deficits in my limited exam    med/labs: Reviewed and interpreted     Assessment/Plan:   74M admitted for AMS.         Toxic-metabolic encephalopathy multifactorial / cholangiocarcinoma with known hepatic lesions / dehydration / actively on chemo  - ruled out infectious process and ruled out sepsis  - s/p whipple  - IVF hydration  - monitor off empiric antibiotics per ID   - neg bcx x 2  - leukocytosis likely due to filgrastrim injection   - MRCP declines  - c/w creon  - c/w supportive care  - onc f/u outpatient    Hypokalemia:   - Replete and monitor      Thrombocytopenia / anemia: Due to chemo  - monitor      DVT ppx:  - hepsubq    dispo:   - d/c home w/ outpatient f/u    Attending Statement: 40 minutes spent on total encounter and discharge planning.

## 2024-11-14 NOTE — DISCHARGE NOTE PROVIDER - NSDCCPCAREPLAN_GEN_ALL_CORE_FT
PRINCIPAL DISCHARGE DIAGNOSIS  Diagnosis: AMS (altered mental status)  Assessment and Plan of Treatment: Due to dehydration:  RESOLVED  ruled out for infection  follow up with your pcp and medical oncologist 5-7 days

## 2024-11-14 NOTE — DISCHARGE NOTE PROVIDER - CARE PROVIDER_API CALL
Delano Prather  Cardiovascular Disease  [address], [address]  [place] [address]  Phone: [phone]  Fax: [phone]  Follow Up Time: 1 week    Follow up with your medical oncologist 5-7 days,   Phone: (   )    -  Fax: (   )    -  Follow Up Time:

## 2024-11-14 NOTE — DISCHARGE NOTE PROVIDER - DETAILS OF MALNUTRITION DIAGNOSIS/DIAGNOSES
This patient has been assessed with a concern for Malnutrition and was treated during this hospitalization for the following Nutrition diagnosis/diagnoses:     -  11/13/2024: Severe protein-calorie malnutrition

## 2024-11-14 NOTE — PROGRESS NOTE ADULT - SUBJECTIVE AND OBJECTIVE BOX
History of Present Illness:   74M w/ h/o cholangiocarcinoma s/p whipple procedure, on chemo presents with complaints of diarrhea, pre-syncope, and confusion for 1 day. Patient awake, alert, oriented with wife at bedside. Reports he was diagnosed with cholangiocarcinoma in April, underwent whipple procedure, and has gotten 2 courses of chemo. Reports being sick and fatigued since initiating treatment with chemo and whipple procedure. Last chemo was on Thursday and since had been feeling more fatigued. Reports occasional loose BM, is on miralax for constipation. Today patient had large watery BM in the morning, later had headache with aura, had presyncope, and became confused. In ED patient with stable vitals, CBC noted for WBC 29, H/H 10/30, Plt 103. CMP noted for Na 132, K 2.9, Alk phos 233, Trop, Lipase, and Ammonia wnl. UA sterile. RVP negative, CTH neg for bleed, CT abd/pelvis noted for new hepatic lesions, and indeterminate perivascular fat stranding seen surrounding the proximal superior mesenteric artery, Full results noted below. Sepsis work up initiated and patient recieved broad spectrum abx, admitted to  for further care.       S:  11/13: Lying in bed, seen ambulating to bathroom as well.  Pt is weak, has frequent stooling, asking for imodium.  Spoke to wife at bedside as well.  States has known liver lesions.  Also received filgrastrim recently from oncologist.  Pt has had episodes of dehydration and syncope in past, states the chemo he is on is wiping him out.      REVIEW OF SYSTEMS: All other review of systems is negative unless indicated above.      Vital Signs Last 24 Hrs  T(C): 37 (13 Nov 2024 07:59), Max: 37.3 (12 Nov 2024 14:30)  T(F): 98.6 (13 Nov 2024 07:59), Max: 99.2 (12 Nov 2024 14:30)  HR: 72 (13 Nov 2024 07:59) (72 - 109)  BP: 95/62 (13 Nov 2024 07:59) (95/62 - 119/80)  BP(mean): 79 (12 Nov 2024 19:14) (78 - 92)  RR: 18 (13 Nov 2024 07:59) (12 - 26)  SpO2: 97% (13 Nov 2024 07:59) (96% - 100%)    Parameters below as of 13 Nov 2024 07:59  Patient On (Oxygen Delivery Method): room air        General: Frail, thin, chronically ill appearing  HEENT: non-traumatic, perrla, eomi  Cardio: s1s2 regular rate and rhythm  Lungs: comfortable breathing, clear to auscultation  Abdomen: Soft, non-tender, non-distended  Neuro: AOx4  Ext: Pulses +2      MEDICATIONS  (STANDING):  heparin   Injectable 5000 Unit(s) SubCutaneous every 12 hours  lactated ringers 1000 milliLiter(s) (125 mL/Hr) IV Continuous <Continuous>  pancrelipase  (CREON 36,000 Lipase Units) 3 Capsule(s) Oral three times a day with meals  piperacillin/tazobactam IVPB.. 3.375 Gram(s) IV Intermittent every 8 hours    MEDICATIONS  (PRN):  acetaminophen     Tablet .. 650 milliGRAM(s) Oral every 6 hours PRN Temp greater or equal to 38C (100.4F), Mild Pain (1 - 3)  aluminum hydroxide/magnesium hydroxide/simethicone Suspension 30 milliLiter(s) Oral every 4 hours PRN Dyspepsia  calcium carbonate    500 mG (Tums) Chewable 1 Tablet(s) Chew three times a day PRN acid reflux  famotidine    Tablet 40 milliGRAM(s) Oral daily PRN acid reflux  loperamide 2 milliGRAM(s) Oral three times a day PRN Diarrhea  melatonin 3 milliGRAM(s) Oral at bedtime PRN Insomnia  ondansetron Injectable 4 milliGRAM(s) IV Push every 8 hours PRN Nausea and/or Vomiting  pancrelipase  (CREON 36,000 Lipase Units) 1 Capsule(s) Oral two times a day with meals PRN with snacks  polyethylene glycol 3350 17 Gram(s) Oral daily PRN Constipation  simethicone 80 milliGRAM(s) Chew three times a day PRN gas                                9.7    28.78 )-----------( 86       ( 13 Nov 2024 08:14 )             27.6     11-13    134[L]  |  105  |  13  ----------------------------<  109[H]  3.4[L]   |  26  |  0.88    Ca    8.6      13 Nov 2024 08:14  Phos  3.2     11-13  Mg     1.2     11-13    TPro  5.0[L]  /  Alb  2.4[L]  /  TBili  0.3  /  DBili  x   /  AST  13[L]  /  ALT  26  /  AlkPhos  198[H]  11-13    CAPILLARY BLOOD GLUCOSE        LIVER FUNCTIONS - ( 13 Nov 2024 08:14 )  Alb: 2.4 g/dL / Pro: 5.0 gm/dL / ALK PHOS: 198 U/L / ALT: 26 U/L / AST: 13 U/L / GGT: x           PT/INR - ( 12 Nov 2024 14:27 )   PT: 11.5 sec;   INR: 0.97 ratio         PTT - ( 12 Nov 2024 14:27 )  PTT:29.4 sec  Urinalysis Basic - ( 13 Nov 2024 08:14 )    Color: x / Appearance: x / SG: x / pH: x  Gluc: 109 mg/dL / Ketone: x  / Bili: x / Urobili: x   Blood: x / Protein: x / Nitrite: x   Leuk Esterase: x / RBC: x / WBC x   Sq Epi: x / Non Sq Epi: x / Bacteria: x          Assessment/Plan:   74M admitted for AMS.         Toxic-metabolic encephalopathy multifactorial / cholangiocarcinoma with known hepatic lesions / dehydration / actively on chemo /  possible underlying infection? hepatic abscess?:   - s/p whipple  - IVF hydration  - empiric antibiotics per ID   - f/u cultures  - leukocytosis possibly due to filgrastrim injection   - MRCP  - GI f/u  - c/w creon  - c/w supportive care  - onc f/u      Hypokalemia:   - Replete and monitor      Thrombocytopenia / anemia: Due to chemo  - monitor      DVT ppx:  - hepsubq        
Date of service: 11-14-24 @ 12:01    Lying in bed in NAD  No fever  Feels stronger today    ROS: no fever or chills; denies dizziness, no HA, no SOB or cough, no abdominal pain, no diarrhea or constipation; no dysuria, no legs pain, no rashes    MEDICATIONS  (STANDING):  heparin   Injectable 5000 Unit(s) SubCutaneous every 12 hours  lactated ringers 1000 milliLiter(s) (125 mL/Hr) IV Continuous <Continuous>  pancrelipase  (CREON 36,000 Lipase Units) 3 Capsule(s) Oral three times a day with meals  piperacillin/tazobactam IVPB.. 3.375 Gram(s) IV Intermittent every 8 hours    Vital Signs Last 24 Hrs  T(C): 36.6 (14 Nov 2024 07:59), Max: 37 (13 Nov 2024 21:30)  T(F): 97.9 (14 Nov 2024 07:59), Max: 98.6 (13 Nov 2024 21:30)  HR: 70 (14 Nov 2024 07:59) (70 - 74)  BP: 102/63 (14 Nov 2024 07:59) (91/58 - 108/57)  BP(mean): --  RR: 17 (14 Nov 2024 07:59) (17 - 18)  SpO2: 97% (14 Nov 2024 07:59) (97% - 99%)    Parameters below as of 14 Nov 2024 07:59  Patient On (Oxygen Delivery Method): room air     Physical exam:    Constitutional:  No acute distress  HEENT: NC/AT, EOMI, PERRLA, conjunctivae clear; ears and nose atraumatic; pharynx benign  Neck: supple; thyroid not palpable  Back: no tenderness  Respiratory: respiratory effort normal; clear to auscultation  Cardiovascular: S1S2 regular, no murmurs  Abdomen: soft, has RUQ tender, not distended, positive BS; no liver or spleen organomegaly  Genitourinary: no suprapubic tenderness  Lymphatic: no LN palpable  Musculoskeletal: no muscle tenderness, no joint swelling or tenderness  Extremities: no pedal edema  Neurological/ Psychiatric: AxOx3, judgement and insight normal; moving all extremities  Skin: no rashes; no palpable lesions    Labs: reviewed                        10.5   26.50 )-----------( 62       ( 14 Nov 2024 08:29 )             30.4     11-14    138  |  105  |  12  ----------------------------<  97  3.4[L]   |  28  |  0.98    Ca    8.4[L]      14 Nov 2024 08:29  Phos  3.2     11-13  Mg     1.2     11-13    TPro  5.3[L]  /  Alb  2.6[L]  /  TBili  0.3  /  DBili  x   /  AST  17  /  ALT  29  /  AlkPhos  236[H]  11-14    C-Reactive Protein: 12.2 mg/mL (11-13-24 @ 08:14)                        9.7    28.78 )-----------( 86       ( 13 Nov 2024 08:14 )             27.6     11-13    134[L]  |  105  |  13  ----------------------------<  109[H]  3.4[L]   |  26  |  0.88    Ca    8.6      13 Nov 2024 08:14  Phos  3.2     11-13  Mg     1.2     11-13    TPro  5.0[L]  /  Alb  2.4[L]  /  TBili  0.3  /  DBili  x   /  AST  13[L]  /  ALT  26  /  AlkPhos  198[H]  11-13     LIVER FUNCTIONS - ( 13 Nov 2024 08:14 )  Alb: 2.4 g/dL / Pro: 5.0 gm/dL / ALK PHOS: 198 U/L / ALT: 26 U/L / AST: 13 U/L / GGT: x           Urinalysis with Rflx Culture (11-12 @ 16:19)  Urine Appearance: Clear  Protein, Urine: Negative mg/dL    Urinalysis with Rflx Culture (collected 12 Nov 2024 16:19)    Culture - Blood (collected 12 Nov 2024 14:46)  Source: .Blood BLOOD  Preliminary Report (13 Nov 2024 19:02):    No growth at 24 hours    Culture - Blood (collected 12 Nov 2024 14:27)  Source: .Blood BLOOD  Preliminary Report (13 Nov 2024 19:02):    No growth at 24 hours    Radiology: all available radiological tests reviewed    < from: Xray Chest 1 View- PORTABLE-Urgent (Xray Chest 1 View- PORTABLE-Urgent .) (11.12.24 @ 15:58) >  IMPRESSION: Question dehydration.  < end of copied text >    Advanced directives addressed: full resuscitation

## 2024-11-14 NOTE — DISCHARGE NOTE NURSING/CASE MANAGEMENT/SOCIAL WORK - FINANCIAL ASSISTANCE
[facility] provides services at a reduced cost to those who are determined to be eligible through [facility]’s financial assistance program. Information regarding [facility]’s financial assistance program can be found by going to [email or web address] or by calling [phone].

## 2024-11-14 NOTE — DISCHARGE NOTE PROVIDER - PROVIDER TOKENS
PROVIDER:[TOKEN:[[id number]],FOLLOWUP:[1 week]],FREE:[LAST:[Follow up with your medical oncologist 5-7 days],PHONE:[(   )    -],FAX:[(   )    -]]

## 2024-11-14 NOTE — DISCHARGE NOTE NURSING/CASE MANAGEMENT/SOCIAL WORK - PATIENT PORTAL LINK FT
You can access the FollowMyHealth Patient Portal offered by [facility] by registering at the following website: [email or web address]. By joining [employer]’s FollowMyHealth portal, you will also be able to view your health information using other applications (apps) compatible with our system.

## 2024-11-17 LAB
CULTURE RESULTS: SIGNIFICANT CHANGE UP
CULTURE RESULTS: SIGNIFICANT CHANGE UP
SPECIMEN SOURCE: SIGNIFICANT CHANGE UP
SPECIMEN SOURCE: SIGNIFICANT CHANGE UP

## 2024-11-21 DIAGNOSIS — T45.1X5A ADVERSE EFFECT OF ANTINEOPLASTIC AND IMMUNOSUPPRESSIVE DRUGS, INITIAL ENCOUNTER: ICD-10-CM

## 2024-11-21 DIAGNOSIS — D64.81 ANEMIA DUE TO ANTINEOPLASTIC CHEMOTHERAPY: ICD-10-CM

## 2024-11-21 DIAGNOSIS — E43 UNSPECIFIED SEVERE PROTEIN-CALORIE MALNUTRITION: ICD-10-CM
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
## 2024-11-21 DIAGNOSIS — Z90.49 ACQUIRED ABSENCE OF OTHER SPECIFIED PARTS OF DIGESTIVE TRACT: ICD-10-CM

## 2024-11-21 DIAGNOSIS — E86.0 DEHYDRATION: ICD-10-CM

## 2024-11-21 DIAGNOSIS — D63.0 ANEMIA IN NEOPLASTIC DISEASE: ICD-10-CM

## 2024-11-21 DIAGNOSIS — E87.6 HYPOKALEMIA: ICD-10-CM

## 2024-11-21 DIAGNOSIS — Z79.899 OTHER LONG TERM (CURRENT) DRUG THERAPY: ICD-10-CM

## 2024-11-21 DIAGNOSIS — G92.8 OTHER TOXIC ENCEPHALOPATHY: ICD-10-CM

## 2024-11-21 DIAGNOSIS — C22.1 INTRAHEPATIC BILE DUCT CARCINOMA: ICD-10-CM

## 2024-11-21 DIAGNOSIS — D84.821 IMMUNODEFICIENCY DUE TO DRUGS: ICD-10-CM

## 2024-11-21 DIAGNOSIS — Z88.8 ALLERGY STATUS TO OTHER DRUGS, MEDICAMENTS AND BIOLOGICAL SUBSTANCES: ICD-10-CM

## 2024-11-21 DIAGNOSIS — D69.59 OTHER SECONDARY THROMBOCYTOPENIA: ICD-10-CM

## 2024-11-21 DIAGNOSIS — Z90.411 ACQUIRED PARTIAL ABSENCE OF PANCREAS: ICD-10-CM

## 2024-11-23 ENCOUNTER — NON-APPOINTMENT (OUTPATIENT)
Age: 74
End: 2024-11-23

## 2025-04-17 RX ORDER — AMOXICILLIN AND CLAVULANATE POTASSIUM 500; 125 MG/1; MG/1
1 TABLET, FILM COATED ORAL
Refills: 0 | DISCHARGE
Start: 2025-04-17 | End: 2025-05-04

## 2025-04-23 ENCOUNTER — INPATIENT (INPATIENT)
Facility: HOSPITAL | Age: 75
LOS: 5 days | Discharge: HOME | DRG: 863 | End: 2025-04-29
Attending: INTERNAL MEDICINE | Admitting: INTERNAL MEDICINE
Payer: MEDICARE

## 2025-04-23 VITALS
RESPIRATION RATE: 18 BRPM | TEMPERATURE: 98 F | HEART RATE: 97 BPM | OXYGEN SATURATION: 98 % | SYSTOLIC BLOOD PRESSURE: 93 MMHG | WEIGHT: 149.91 LBS | DIASTOLIC BLOOD PRESSURE: 64 MMHG

## 2025-04-23 DIAGNOSIS — Z90.410 ACQUIRED TOTAL ABSENCE OF PANCREAS: Chronic | ICD-10-CM

## 2025-04-23 LAB
ALBUMIN SERPL ELPH-MCNC: 2.7 G/DL — LOW (ref 3.3–5)
ALP SERPL-CCNC: 740 U/L — HIGH (ref 40–120)
ALT FLD-CCNC: 55 U/L — SIGNIFICANT CHANGE UP (ref 12–78)
ANION GAP SERPL CALC-SCNC: 9 MMOL/L — SIGNIFICANT CHANGE UP (ref 5–17)
APTT BLD: 33.4 SEC — SIGNIFICANT CHANGE UP (ref 26.1–36.8)
AST SERPL-CCNC: 26 U/L — SIGNIFICANT CHANGE UP (ref 15–37)
BASOPHILS # BLD AUTO: 0.03 K/UL — SIGNIFICANT CHANGE UP (ref 0–0.2)
BASOPHILS NFR BLD AUTO: 0.2 % — SIGNIFICANT CHANGE UP (ref 0–2)
BILIRUB SERPL-MCNC: 2.6 MG/DL — HIGH (ref 0.2–1.2)
BUN SERPL-MCNC: 45 MG/DL — HIGH (ref 7–23)
CALCIUM SERPL-MCNC: 9.4 MG/DL — SIGNIFICANT CHANGE UP (ref 8.5–10.1)
CHLORIDE SERPL-SCNC: 96 MMOL/L — SIGNIFICANT CHANGE UP (ref 96–108)
CO2 SERPL-SCNC: 21 MMOL/L — LOW (ref 22–31)
CREAT SERPL-MCNC: 1.24 MG/DL — SIGNIFICANT CHANGE UP (ref 0.5–1.3)
EGFR: 60 ML/MIN/1.73M2 — SIGNIFICANT CHANGE UP
EGFR: 60 ML/MIN/1.73M2 — SIGNIFICANT CHANGE UP
EOSINOPHIL # BLD AUTO: 0.04 K/UL — SIGNIFICANT CHANGE UP (ref 0–0.5)
EOSINOPHIL NFR BLD AUTO: 0.3 % — SIGNIFICANT CHANGE UP (ref 0–6)
GLUCOSE SERPL-MCNC: 131 MG/DL — HIGH (ref 70–99)
HCT VFR BLD CALC: 28.2 % — LOW (ref 39–50)
HGB BLD-MCNC: 9.2 G/DL — LOW (ref 13–17)
IMM GRANULOCYTES # BLD AUTO: 0.12 K/UL — HIGH (ref 0–0.07)
IMM GRANULOCYTES NFR BLD AUTO: 1 % — HIGH (ref 0–0.9)
INR BLD: 1.05 RATIO — SIGNIFICANT CHANGE UP (ref 0.85–1.16)
LACTATE SERPL-SCNC: 1.4 MMOL/L — SIGNIFICANT CHANGE UP (ref 0.7–2)
LYMPHOCYTES # BLD AUTO: 2.77 K/UL — SIGNIFICANT CHANGE UP (ref 1–3.3)
LYMPHOCYTES NFR BLD AUTO: 22.5 % — SIGNIFICANT CHANGE UP (ref 13–44)
MCHC RBC-ENTMCNC: 28.6 PG — SIGNIFICANT CHANGE UP (ref 27–34)
MCHC RBC-ENTMCNC: 32.6 G/DL — SIGNIFICANT CHANGE UP (ref 32–36)
MCV RBC AUTO: 87.6 FL — SIGNIFICANT CHANGE UP (ref 80–100)
MONOCYTES # BLD AUTO: 0.74 K/UL — SIGNIFICANT CHANGE UP (ref 0–0.9)
MONOCYTES NFR BLD AUTO: 6 % — SIGNIFICANT CHANGE UP (ref 2–14)
NEUTROPHILS # BLD AUTO: 8.59 K/UL — HIGH (ref 1.8–7.4)
NEUTROPHILS NFR BLD AUTO: 70 % — SIGNIFICANT CHANGE UP (ref 43–77)
NRBC # BLD AUTO: 0 K/UL — SIGNIFICANT CHANGE UP (ref 0–0)
NRBC # FLD: 0 K/UL — SIGNIFICANT CHANGE UP (ref 0–0)
NRBC BLD AUTO-RTO: 0 /100 WBCS — SIGNIFICANT CHANGE UP (ref 0–0)
PLATELET # BLD AUTO: 353 K/UL — SIGNIFICANT CHANGE UP (ref 150–400)
PMV BLD: 9.9 FL — SIGNIFICANT CHANGE UP (ref 7–13)
POTASSIUM SERPL-MCNC: 4.5 MMOL/L — SIGNIFICANT CHANGE UP (ref 3.5–5.3)
POTASSIUM SERPL-SCNC: 4.5 MMOL/L — SIGNIFICANT CHANGE UP (ref 3.5–5.3)
PROT SERPL-MCNC: 7.3 GM/DL — SIGNIFICANT CHANGE UP (ref 6–8.3)
PROTHROM AB SERPL-ACNC: 12.4 SEC — SIGNIFICANT CHANGE UP (ref 9.9–13.4)
RBC # BLD: 3.22 M/UL — LOW (ref 4.2–5.8)
RBC # FLD: 17.7 % — HIGH (ref 10.3–14.5)
SODIUM SERPL-SCNC: 126 MMOL/L — LOW (ref 135–145)
WBC # BLD: 12.29 K/UL — HIGH (ref 3.8–10.5)
WBC # FLD AUTO: 12.29 K/UL — HIGH (ref 3.8–10.5)

## 2025-04-23 PROCEDURE — 99291 CRITICAL CARE FIRST HOUR: CPT | Mod: GC

## 2025-04-23 PROCEDURE — 71045 X-RAY EXAM CHEST 1 VIEW: CPT | Mod: 26

## 2025-04-23 PROCEDURE — 93010 ELECTROCARDIOGRAM REPORT: CPT

## 2025-04-23 RX ORDER — CEFEPIME 2 G/20ML
1000 INJECTION, POWDER, FOR SOLUTION INTRAVENOUS ONCE
Refills: 0 | Status: COMPLETED | OUTPATIENT
Start: 2025-04-23 | End: 2025-04-23

## 2025-04-23 RX ORDER — VANCOMYCIN HCL IN 5 % DEXTROSE 1.5G/250ML
1000 PLASTIC BAG, INJECTION (ML) INTRAVENOUS ONCE
Refills: 0 | Status: COMPLETED | OUTPATIENT
Start: 2025-04-23 | End: 2025-04-23

## 2025-04-23 RX ORDER — CEFEPIME 2 G/20ML
1000 INJECTION, POWDER, FOR SOLUTION INTRAVENOUS ONCE
Refills: 0 | Status: DISCONTINUED | OUTPATIENT
Start: 2025-04-23 | End: 2025-04-23

## 2025-04-23 RX ADMIN — Medication 250 MILLIGRAM(S): at 21:46

## 2025-04-23 RX ADMIN — Medication 2100 MILLILITER(S): at 21:46

## 2025-04-23 RX ADMIN — CEFEPIME 1000 MILLIGRAM(S): 2 INJECTION, POWDER, FOR SOLUTION INTRAVENOUS at 21:45

## 2025-04-23 NOTE — ED ADULT NURSE NOTE - BEFAST EYES
See [facility] report. Remote monitoring reviewed over a 90 day period.   End of 90 day monitoring period date of service 04/25/19 No

## 2025-04-23 NOTE — ED PROVIDER NOTE - OBJECTIVE STATEMENT
73 y/o M with PMH of Colangio carcinoma (last chemo 1 month ago) with a tube placed with an open procedure to drain his bile tract 1 week ago connected to an external drain which is draining 2-3L per day, Whipple procedure presents [place] the ED [place] [facility] [place] regarding hypotension. Pt was at [facility] for hydration and follow up, found to be hypotensive to 80/60. After 2L NS pts bp dropped to 80 systolic. Of note, pt states the oral abx he is taking must not be working. Kept in [facility] 2 extra days for IV abx for unknown bacteria.

## 2025-04-23 NOTE — ED ADULT TRIAGE NOTE - AS PAIN REST
needed. Orders Placed This Encounter   Procedures    XR Clavicle Right     Standing Status:   Future     Number of Occurrences:   1     Standing Expiration Date:   8/13/2024     Order Specific Question:   Reason for exam:     Answer:   pain     Return in about 3 months (around 11/14/2023), or if symptoms worsen or fail to improve.     Zoie Whyte MD
0 (no pain/absence of nonverbal indicators of pain)

## 2025-04-23 NOTE — ED PROVIDER NOTE - CLINICAL SUMMARY MEDICAL DECISION MAKING FREE TEXT BOX
Pt with hypotension, one week s/p abd surgery currently being treated with PO abx. Will give broad spectrum abx, fluids, and admit pt. Pt with hypotension, one week s/p abd surgery currently being treated with PO abx. Will give broad spectrum abx, fluids, and admit pt.    0406: Tracy BEDOLLA: Signed and received from Dr. Denson  Pending CT read.  CT results noted and patient received IV antibiotics and fluids.  Blood pressure improved.  Will admit for further care.  Signout given to hospitalist Dr. Lucia

## 2025-04-23 NOTE — ED ADULT NURSE NOTE - CHIEF COMPLAINT QUOTE
Pt BIBEMS from [facility] [place] with c/o hypotension. per EMS and pt, pt was at [facility] for hydration and follow up with MD when BP read 80/60. Pt was then given 1L NS bolus at [facility]. On arrival, pt BP 93/64. Pt reports feeling tired on exertion. Pt does not appear lethargic or pale. hx of Colangio carcinoma with last chemo treatment x1 month ago. Has Bile duct drain duct placed for blockage. No sxs of infection around insertion site. A&Ox4, +allergies. Sent for STAT EKG/BGM

## 2025-04-23 NOTE — ED ADULT TRIAGE NOTE - CHIEF COMPLAINT QUOTE
Pt BIBEMS from [facility] [place] with c/o hypotension. per EMS and pt, pt was at [facility] for hydration and follow up with MD when BP read 80/60. Pt was then given 1L NS bolus at [facility]. On arrival, pt BP 93/64. Pt reports feeling tired on exertion. Pt does not appear lethargic or pale. hx of Colangio carcinoma with last chemo treatment x1 month ago. Has Bile duct drain duct placed for blockage. No sxs of infection around insertion site. A&Ox4, +allergies. Sent for STAT EKG/BGM

## 2025-04-23 NOTE — ED PROVIDER NOTE - PHYSICAL EXAMINATION
Gen:  Well appearing in NAD  Head:  NC/AT  HEENT: pupils perrl,no pharyngeal erythema, uvula midline  Cardiac: S1S2, RRR  Abd: Soft, non tender, tube coming out of epigastric area attached to drain that is draining a clear green fluid , area is clean, dressing in place that is clean, dry, and intact  Resp: No distress, CTA   musculoskeletal:: no deformities, no swelling, strength +5/+5  Skin: warm and dry as visualized, no rashes  Neuro: HERNANDEZ, aao x 4  Psych:alert, cooperative, appropriate mood and affect for situation

## 2025-04-23 NOTE — ED PROVIDER NOTE - CRITICAL CARE ATTENDING CONTRIBUTION TO CARE
direct patient care (not related to procedure), additional history taking, interpretation of diagnostic studies, documentation, consultation with other physicians, consult w/ pt's family directly relating to pts condition  B Janiya CARLSON

## 2025-04-23 NOTE — ED ADULT NURSE REASSESSMENT NOTE - NS ED NURSE REASSESS COMMENT FT1
Pt biliary drainage bag emptied again with output of 490mL. Pt provided with mmore warm blankets and pillows for comfort

## 2025-04-23 NOTE — ED ADULT NURSE NOTE - OBJECTIVE STATEMENT
Pt is 74y male, A&Ox4, lives at home with wife, presents from [facility] s/p infusion for hypotension. Pt reports he was "90s/70s but when it dropped to 80 [facility] said I had to come to the hospital". Pt reports receiving 2L NS at [facility]. Pt Hx of cholangiocarcinoma with biliary drainage bag in place due to blockage, leg bag for biliary drain emptied into urinal measuring 420ml.

## 2025-04-23 NOTE — ED ADULT NURSE REASSESSMENT NOTE - NS ED NURSE REASSESS COMMENT FT1
Sepsis Huddle initiated with MD Denson due ot pt WBC - fluid, abx and blood cultures already ordered, no new orders at this time.

## 2025-04-24 ENCOUNTER — TRANSCRIPTION ENCOUNTER (OUTPATIENT)
Age: 75
End: 2025-04-24

## 2025-04-24 DIAGNOSIS — T81.43XA INFECTION FOLLOWING A PROCEDURE, ORGAN AND SPACE SURGICAL SITE, INITIAL ENCOUNTER: ICD-10-CM

## 2025-04-24 LAB
ALBUMIN SERPL ELPH-MCNC: 2.5 G/DL — LOW (ref 3.3–5)
ALP SERPL-CCNC: 639 U/L — HIGH (ref 40–120)
ALT FLD-CCNC: 47 U/L — SIGNIFICANT CHANGE UP (ref 12–78)
ANION GAP SERPL CALC-SCNC: 8 MMOL/L — SIGNIFICANT CHANGE UP (ref 5–17)
APPEARANCE UR: CLEAR — SIGNIFICANT CHANGE UP
AST SERPL-CCNC: 20 U/L — SIGNIFICANT CHANGE UP (ref 15–37)
BILIRUB SERPL-MCNC: 2.5 MG/DL — HIGH (ref 0.2–1.2)
BILIRUB UR-MCNC: NEGATIVE — SIGNIFICANT CHANGE UP
BUN SERPL-MCNC: 34 MG/DL — HIGH (ref 7–23)
CALCIUM SERPL-MCNC: 9.1 MG/DL — SIGNIFICANT CHANGE UP (ref 8.5–10.1)
CHLORIDE SERPL-SCNC: 104 MMOL/L — SIGNIFICANT CHANGE UP (ref 96–108)
CO2 SERPL-SCNC: 16 MMOL/L — LOW (ref 22–31)
COLOR SPEC: YELLOW — SIGNIFICANT CHANGE UP
CREAT SERPL-MCNC: 0.95 MG/DL — SIGNIFICANT CHANGE UP (ref 0.5–1.3)
DIFF PNL FLD: NEGATIVE — SIGNIFICANT CHANGE UP
EGFR: 83 ML/MIN/1.73M2 — SIGNIFICANT CHANGE UP
EGFR: 83 ML/MIN/1.73M2 — SIGNIFICANT CHANGE UP
GLUCOSE SERPL-MCNC: 110 MG/DL — HIGH (ref 70–99)
GLUCOSE UR QL: NEGATIVE MG/DL — SIGNIFICANT CHANGE UP
HCT VFR BLD CALC: 27.8 % — LOW (ref 39–50)
HGB BLD-MCNC: 9 G/DL — LOW (ref 13–17)
KETONES UR-MCNC: NEGATIVE MG/DL — SIGNIFICANT CHANGE UP
LEUKOCYTE ESTERASE UR-ACNC: NEGATIVE — SIGNIFICANT CHANGE UP
MCHC RBC-ENTMCNC: 28.8 PG — SIGNIFICANT CHANGE UP (ref 27–34)
MCHC RBC-ENTMCNC: 32.4 G/DL — SIGNIFICANT CHANGE UP (ref 32–36)
MCV RBC AUTO: 89.1 FL — SIGNIFICANT CHANGE UP (ref 80–100)
NITRITE UR-MCNC: NEGATIVE — SIGNIFICANT CHANGE UP
NRBC # BLD AUTO: 0 K/UL — SIGNIFICANT CHANGE UP (ref 0–0)
NRBC # FLD: 0 K/UL — SIGNIFICANT CHANGE UP (ref 0–0)
NRBC BLD AUTO-RTO: 0 /100 WBCS — SIGNIFICANT CHANGE UP (ref 0–0)
PH UR: 5 — SIGNIFICANT CHANGE UP (ref 5–8)
PLATELET # BLD AUTO: 274 K/UL — SIGNIFICANT CHANGE UP (ref 150–400)
PMV BLD: 10 FL — SIGNIFICANT CHANGE UP (ref 7–13)
POTASSIUM SERPL-MCNC: 4.2 MMOL/L — SIGNIFICANT CHANGE UP (ref 3.5–5.3)
POTASSIUM SERPL-SCNC: 4.2 MMOL/L — SIGNIFICANT CHANGE UP (ref 3.5–5.3)
PROT SERPL-MCNC: 6.5 GM/DL — SIGNIFICANT CHANGE UP (ref 6–8.3)
PROT UR-MCNC: NEGATIVE MG/DL — SIGNIFICANT CHANGE UP
RBC # BLD: 3.12 M/UL — LOW (ref 4.2–5.8)
RBC # FLD: 17.9 % — HIGH (ref 10.3–14.5)
SODIUM SERPL-SCNC: 128 MMOL/L — LOW (ref 135–145)
SP GR SPEC: >1.03 — HIGH (ref 1–1.03)
UROBILINOGEN FLD QL: 0.2 MG/DL — SIGNIFICANT CHANGE UP (ref 0.2–1)
WBC # BLD: 11.08 K/UL — HIGH (ref 3.8–10.5)
WBC # FLD AUTO: 11.08 K/UL — HIGH (ref 3.8–10.5)

## 2025-04-24 PROCEDURE — C1729: CPT

## 2025-04-24 PROCEDURE — C1758: CPT

## 2025-04-24 PROCEDURE — 97116 GAIT TRAINING THERAPY: CPT | Mod: GP

## 2025-04-24 PROCEDURE — 81003 URINALYSIS AUTO W/O SCOPE: CPT

## 2025-04-24 PROCEDURE — 85730 THROMBOPLASTIN TIME PARTIAL: CPT

## 2025-04-24 PROCEDURE — 80053 COMPREHEN METABOLIC PANEL: CPT

## 2025-04-24 PROCEDURE — 47531 INJECTION FOR CHOLANGIOGRAM: CPT

## 2025-04-24 PROCEDURE — 85610 PROTHROMBIN TIME: CPT

## 2025-04-24 PROCEDURE — 47536 EXCHANGE BILIARY DRG CATH: CPT

## 2025-04-24 PROCEDURE — 99222 1ST HOSP IP/OBS MODERATE 55: CPT

## 2025-04-24 PROCEDURE — C1894: CPT

## 2025-04-24 PROCEDURE — 97162 PT EVAL MOD COMPLEX 30 MIN: CPT | Mod: GP

## 2025-04-24 PROCEDURE — C1769: CPT

## 2025-04-24 PROCEDURE — 74177 CT ABD & PELVIS W/CONTRAST: CPT | Mod: 26

## 2025-04-24 PROCEDURE — 36415 COLL VENOUS BLD VENIPUNCTURE: CPT

## 2025-04-24 PROCEDURE — 85027 COMPLETE CBC AUTOMATED: CPT

## 2025-04-24 RX ORDER — CEFTRIAXONE 500 MG/1
2000 INJECTION, POWDER, FOR SOLUTION INTRAMUSCULAR; INTRAVENOUS EVERY 24 HOURS
Refills: 0 | Status: DISCONTINUED | OUTPATIENT
Start: 2025-04-24 | End: 2025-04-29

## 2025-04-24 RX ORDER — ONDANSETRON HCL/PF 4 MG/2 ML
4 VIAL (ML) INJECTION EVERY 8 HOURS
Refills: 0 | Status: DISCONTINUED | OUTPATIENT
Start: 2025-04-24 | End: 2025-04-29

## 2025-04-24 RX ORDER — MAGNESIUM, ALUMINUM HYDROXIDE 200-200 MG
30 TABLET,CHEWABLE ORAL EVERY 4 HOURS
Refills: 0 | Status: DISCONTINUED | OUTPATIENT
Start: 2025-04-24 | End: 2025-04-29

## 2025-04-24 RX ORDER — CALCIUM CARBONATE 750 MG/1
1 TABLET ORAL THREE TIMES A DAY
Refills: 0 | Status: DISCONTINUED | OUTPATIENT
Start: 2025-04-24 | End: 2025-04-29

## 2025-04-24 RX ORDER — CEFEPIME 2 G/20ML
2000 INJECTION, POWDER, FOR SOLUTION INTRAVENOUS EVERY 12 HOURS
Refills: 0 | Status: DISCONTINUED | OUTPATIENT
Start: 2025-04-24 | End: 2025-04-24

## 2025-04-24 RX ORDER — LIPASE/PROTEASE/AMYLASE 10K-37.5K
1 CAPSULE,DELAYED RELEASE (ENTERIC COATED) ORAL
Refills: 0 | Status: DISCONTINUED | OUTPATIENT
Start: 2025-04-24 | End: 2025-04-29

## 2025-04-24 RX ORDER — MELATONIN 5 MG
3 TABLET ORAL AT BEDTIME
Refills: 0 | Status: DISCONTINUED | OUTPATIENT
Start: 2025-04-24 | End: 2025-04-29

## 2025-04-24 RX ORDER — LIPASE/PROTEASE/AMYLASE 10K-37.5K
3 CAPSULE,DELAYED RELEASE (ENTERIC COATED) ORAL
Refills: 0 | Status: DISCONTINUED | OUTPATIENT
Start: 2025-04-24 | End: 2025-04-29

## 2025-04-24 RX ORDER — ACETAMINOPHEN 500 MG/5ML
650 LIQUID (ML) ORAL EVERY 6 HOURS
Refills: 0 | Status: DISCONTINUED | OUTPATIENT
Start: 2025-04-24 | End: 2025-04-29

## 2025-04-24 RX ORDER — ACETAMINOPHEN 500 MG/5ML
1000 LIQUID (ML) ORAL ONCE
Refills: 0 | Status: COMPLETED | OUTPATIENT
Start: 2025-04-24 | End: 2025-04-24

## 2025-04-24 RX ORDER — METRONIDAZOLE 250 MG
500 TABLET ORAL EVERY 12 HOURS
Refills: 0 | Status: DISCONTINUED | OUTPATIENT
Start: 2025-04-24 | End: 2025-04-29

## 2025-04-24 RX ADMIN — Medication 650 MILLIGRAM(S): at 21:30

## 2025-04-24 RX ADMIN — Medication 400 MILLIGRAM(S): at 03:35

## 2025-04-24 RX ADMIN — Medication 100 MILLILITER(S): at 06:48

## 2025-04-24 RX ADMIN — Medication 3 CAPSULE(S): at 13:13

## 2025-04-24 RX ADMIN — Medication 3 CAPSULE(S): at 17:14

## 2025-04-24 RX ADMIN — Medication 20 MILLIGRAM(S): at 03:33

## 2025-04-24 RX ADMIN — Medication 3 CAPSULE(S): at 09:25

## 2025-04-24 RX ADMIN — CEFTRIAXONE 2000 MILLIGRAM(S): 500 INJECTION, POWDER, FOR SOLUTION INTRAMUSCULAR; INTRAVENOUS at 13:13

## 2025-04-24 RX ADMIN — Medication 100 MILLIGRAM(S): at 18:41

## 2025-04-24 RX ADMIN — Medication 650 MILLIGRAM(S): at 20:47

## 2025-04-24 NOTE — PROGRESS NOTE ADULT - SUBJECTIVE AND OBJECTIVE BOX
HOSPITALIST ATTENDING PROGRESS NOTE    Chart and meds reviewed.      Subjective:  Patient seen and examined at the bedside. Does not offer any acute complaints at this time.    All other systems reviewed and found to be negative with the exception of what has been described above.    MEDICATIONS  (STANDING):  cefTRIAXone Injectable. 2000 milliGRAM(s) IV Push every 24 hours  metroNIDAZOLE  IVPB 500 milliGRAM(s) IV Intermittent every 12 hours  pancrelipase  (CREON 36,000 Lipase Units) 3 Capsule(s) Oral three times a day with meals  sodium chloride 0.9%. 1000 milliLiter(s) (100 mL/Hr) IV Continuous <Continuous>    MEDICATIONS  (PRN):  acetaminophen     Tablet .. 650 milliGRAM(s) Oral every 6 hours PRN Temp greater or equal to 38C (100.4F), Mild Pain (1 - 3)  aluminum hydroxide/magnesium hydroxide/simethicone Suspension 30 milliLiter(s) Oral every 4 hours PRN Dyspepsia  calcium carbonate    500 mG (Tums) Chewable 1 Tablet(s) Chew three times a day PRN acid reflux  famotidine    Tablet 20 milliGRAM(s) Oral daily PRN acid reflux  melatonin 3 milliGRAM(s) Oral at bedtime PRN Insomnia  ondansetron Injectable 4 milliGRAM(s) IV Push every 8 hours PRN Nausea and/or Vomiting  pancrelipase  (CREON 36,000 Lipase Units) 1 Capsule(s) Oral two times a day with meals PRN with snacks      PHYSICAL EXAM:  Gen: No acute distress  HEENT:  Normocephalic/Atraumatic  CV:  +S1, +S2, regular, no murmurs or rubs  RESP:   lungs clear to auscultation bilaterally, no wheezing, rales, rhonchi  GI:  abdomen soft, non-tender, non-distended. +External Biliary drain  EXT:  no clubbing, no cyanosis, no edema  NEURO:  No focal neurological deficits    LABS:                            9.0    11.08 )-----------( 274      ( 24 Apr 2025 09:13 )             27.8     04-24    128[L]  |  104  |  34[H]  ----------------------------<  110[H]  4.2   |  16[L]  |  0.95    Ca    9.1      24 Apr 2025 09:13    TPro  6.5  /  Alb  2.5[L]  /  TBili  2.5[H]  /  DBili  x   /  AST  20  /  ALT  47  /  AlkPhos  639[H]  04-24        LIVER FUNCTIONS - ( 24 Apr 2025 09:13 )  Alb: 2.5 g/dL / Pro: 6.5 gm/dL / ALK PHOS: 639 U/L / ALT: 47 U/L / AST: 20 U/L / GGT: x           PT/INR - ( 23 Apr 2025 21:34 )   PT: 12.4 sec;   INR: 1.05 ratio         PTT - ( 23 Apr 2025 21:34 )  PTT:33.4 sec  Urinalysis Basic - ( 24 Apr 2025 09:13 )    Color: x / Appearance: x / SG: x / pH: x  Gluc: 110 mg/dL / Ketone: x  / Bili: x / Urobili: x   Blood: x / Protein: x / Nitrite: x   Leuk Esterase: x / RBC: x / WBC x   Sq Epi: x / Non Sq Epi: x / Bacteria: x        Lactate, Blood: 1.4 mmol/L (04-23 @ 21:34)        CULTURES:  Blood, Urine: Negative (04-24 @ 04:28)      Additional results/Imaging, I have personally reviewed:    Telemetry, personally reviewed:

## 2025-04-24 NOTE — H&P ADULT - ASSESSMENT
This patient is a 74 year old male with PMH of metastatic cholangiocarcinoma s/p Whipple and also s/p recent biliary stent and external biliary drain placement who presented from [facility] due to hypotension and concerns for sepsis. The patient was recently admitted at [facility] in [place] after a failed ERCP, and during this admission had an internal-external biliary drain placed on 4/13. Biliary cultures grew Klebsiella and his Zosyn was transitioned to Augmentin on discharge. He has not been on chemotherapy for about one month. The patient notes that he has been having high output from the biliary drain, up to 2L per day, and has not been able to keep up with his fluid intake. At an outpatient [facility] appointment he was noted to be persistently hypotensive therefore he was sent to the ER.     ED course: Vancomycin and Cefepime, 2.1 L of normal saline (patient also received 2 L prior to arrival)    CT scans reviewed    #Sepsis  Patient with hypotension in the setting of suspected biliary infection as well as hypovolemia from high drain output   S/p 30cc/kg with improvement in BP  Lactic normal at 1.4  Plan  - Continue broad spectrum antibiotics - add flagyl for anaerobic coverage  - ID consultation     #Metastatic cholangiocarcinoma with biliary obstruction   #Distal extrahepatic cholangiocarcinoma, AJCC Stage IIA  #Ampullary NET AJCC Stage 1B  S/p recent failed ERCP followed by internal-external biliary drain placement at [facility] on 4/13  There is concern for increasing mass effect in the abdomen, which is compressing local structures including the portal vein and SMV  Plan  - GI and surgical consultation      DVT ppx: SCD's - no chemoprophylaxis pending surgical and GI evaluation   GI ppx: Pepcid  Diet: NPO  Code status: DNR - discussed with patient and placed MOLST in the chart This patient is a 74 year old male with PMH of metastatic cholangiocarcinoma s/p Whipple and also s/p recent biliary stent and external biliary drain placement who [place] [facility] [place] to [place] and concerns for sepsis. The patient was recently admitted at [facility] in [place] after a failed ERCP, and during this admission had an internal-external biliary drain placed on 4/13. Biliary cultures grew Klebsiella and his Zosyn was transitioned to Augmentin on discharge. He has not been on chemotherapy for about one month. The patient notes that he has been having high output from the biliary drain, up to 2L per day, and has not been able to keep up with his fluid intake. At an outpatient [facility] appointment he was noted to be persistently hypotensive therefore he was sent to the ER.     ED course: Vancomycin and Cefepime, 2.1 L of normal saline (patient also received 2 L prior to arrival)    CT scans reviewed    #Sepsis  Patient with hypotension in the setting of suspected biliary infection as well as hypovolemia from high drain output   S/p 30cc/kg with improvement in BP  Lactic normal at 1.4  Plan  - Continue broad spectrum antibiotics - add flagyl for anaerobic coverage  - IV fluids  - ID consultation     #Metastatic cholangiocarcinoma with biliary obstruction   #Distal extrahepatic cholangiocarcinoma, AJCC Stage IIA  #Ampullary NET AJCC Stage 1B  S/p recent failed ERCP followed by internal-external biliary drain placement at [facility] on 4/13  There is concern for increasing mass effect in the abdomen, which is compressing local structures including the portal vein and SMV  Plan  - GI and surgical consultation    DVT ppx: SCD's - no chemoprophylaxis pending surgical and GI evaluation   GI ppx: Pepcid  Diet: NPO  Code status: DNR - discussed with patient and placed MOLST in the chart

## 2025-04-24 NOTE — H&P ADULT - HISTORY OF PRESENT ILLNESS
This patient is a 74 year old male with PMH of metastatic cholangiocarcinoma s/p Whipple and also s/p recent biliary stent and external biliary drain placement who presented from [facility] due to hypotension and concerns for sepsis. The patient was recently admitted at [facility] in [place] after a failed ERCP, and during this admission had an internal-external biliary drain placed on 4/13. Biliary cultures grew Klebsiella and his Zosyn was transitioned to Augmentin on discharge. He has not been on chemotherapy for about one month. The patient notes that he has been having high output from the biliary drain, up to 2L per day, and has not been able to keep up with his fluid intake. At an outpatient [facility] appointment he was noted to be persistently hypotensive therefore he was sent to the ER.

## 2025-04-24 NOTE — H&P ADULT - NSHPREVIEWOFSYSTEMS_GEN_ALL_CORE
CONSTITUTIONAL: Positive for lightheadedness. No weakness, fevers or chills  EYES/ENT: No visual changes; eye pain; eye discharge  NECK: No pain or stiffness  RESPIRATORY: No cough, wheezing, hemoptysis; No shortness of breath  CARDIOVASCULAR: No chest pain or palpitations   GASTROINTESTINAL: No abdominal or epigastric pain. No nausea, vomiting; No diarrhea or constipation.   GENITOURINARY: No dysuria, frequency or hematuria  MSK: No joint pain or swelling  NEUROLOGICAL: No dizziness, lightheadedness, numbness or weakness  SKIN: No itching, rashes  PSYCH: No anxiety or depression

## 2025-04-24 NOTE — CONSULT NOTE ADULT - SUBJECTIVE AND OBJECTIVE BOX
HPI:  This patient is a 74 year old male with PMH of metastatic cholangiocarcinoma s/p Whipple and also s/p recent biliary stent and external biliary drain placement who [place] [facility] [place] and concerns for sepsis. The patient was recently [place] [facility] [place] a failed ERCP, and during this admission had an internal-external biliary drain placed on 4/13. Biliary cultures grew Klebsiella and his Zosyn was transitioned to Augmentin on discharge. He has not been on chemotherapy for about one month. The patient notes that he has been having high output from the biliary drain, up to 2L per day, and has not been able to keep up with his fluid intake. At an [place] [facility] [place] he was noted to be persistently hypotensive therefore he was sent to the ER.  (24 Apr 2025 06:06)      PAST MEDICAL & SURGICAL HISTORY:  Bile duct cancer      H/O Whipple procedure          Home Medications:  colesevelam 625 mg oral tablet: 2 tab(s) orally once a day (in the morning) (12 Nov 2024 17:52)  famotidine 40 mg oral tablet: 1 tab(s) orally once a day as needed for acid reflux (12 Nov 2024 17:52)  pancrelipase 36,000 units-114,000 units-180,000 units oral delayed release capsule: 3 cap(s) orally 3 times a day (with meals) *** also takes 1-2 caps with snacks*** (12 Nov 2024 17:55)  pancrelipase 36,000 units-114,000 units-180,000 units oral delayed release capsule: 1 cap(s) orally 2 times a day as needed for with snacks ***3 caps with meals and 1-2 with snacks*** (12 Nov 2024 17:53)  Polyethylene Glycol 3350: 17 gram(s) orally once a day (12 Nov 2024 17:52)  simethicone 80 mg oral tablet, chewable: 1 tab(s) chewed 3 times a day as needed for gas (12 Nov 2024 17:52)  Tums 500 mg oral tablet, chewable: 1 tab(s) chewed 3 times a day as needed for acid reflux (12 Nov 2024 17:53)      MEDICATIONS  (STANDING):  metroNIDAZOLE  IVPB 500 milliGRAM(s) IV Intermittent every 12 hours  pancrelipase  (CREON 36,000 Lipase Units) 3 Capsule(s) Oral three times a day with meals  sodium chloride 0.9%. 1000 milliLiter(s) (100 mL/Hr) IV Continuous <Continuous>    MEDICATIONS  (PRN):  acetaminophen     Tablet .. 650 milliGRAM(s) Oral every 6 hours PRN Temp greater or equal to 38C (100.4F), Mild Pain (1 - 3)  aluminum hydroxide/magnesium hydroxide/simethicone Suspension 30 milliLiter(s) Oral every 4 hours PRN Dyspepsia  calcium carbonate    500 mG (Tums) Chewable 1 Tablet(s) Chew three times a day PRN acid reflux  famotidine    Tablet 20 milliGRAM(s) Oral daily PRN acid reflux  melatonin 3 milliGRAM(s) Oral at bedtime PRN Insomnia  ondansetron Injectable 4 milliGRAM(s) IV Push every 8 hours PRN Nausea and/or Vomiting  pancrelipase  (CREON 36,000 Lipase Units) 1 Capsule(s) Oral two times a day with meals PRN with snacks      Allergies    statins (Unknown)    Intolerances        SOCIAL HISTORY:    FAMILY HISTORY:      ROS  As above  Otherwise unremarkable    Vital Signs Last 24 Hrs  T(C): 36.4 (24 Apr 2025 07:52), Max: 36.8 (24 Apr 2025 01:00)  T(F): 97.5 (24 Apr 2025 07:52), Max: 98.2 (24 Apr 2025 01:00)  HR: 79 (24 Apr 2025 07:52) (72 - 98)  BP: 98/68 (24 Apr 2025 07:52) (88/77 - 123/79)  BP(mean): 81 (24 Apr 2025 06:45) (78 - 93)  RR: 18 (24 Apr 2025 07:52) (16 - 20)  SpO2: 99% (24 Apr 2025 07:52) (96% - 99%)    Parameters below as of 24 Apr 2025 07:52  Patient On (Oxygen Delivery Method): room air        Constitutional: NAD, well-developed  Respiratory: CTAB  Cardiovascular: S1 and S2, RRR  Gastrointestinal: BS+, soft, NT/ND  Extremities: No peripheral edema  Psychiatric: Normal mood, normal affect  Skin: No rashes    LABS:                        9.2    12.29 )-----------( 353      ( 23 Apr 2025 21:34 )             28.2     04-23    126[L]  |  96  |  45[H]  ----------------------------<  131[H]  4.5   |  21[L]  |  1.24    Ca    9.4      23 Apr 2025 21:34    TPro  7.3  /  Alb  2.7[L]  /  TBili  2.6[H]  /  DBili  x   /  AST  26  /  ALT  55  /  AlkPhos  740[H]  04-23    PT/INR - ( 23 Apr 2025 21:34 )   PT: 12.4 sec;   INR: 1.05 ratio         PTT - ( 23 Apr 2025 21:34 )  PTT:33.4 sec  LIVER FUNCTIONS - ( 23 Apr 2025 21:34 )  Alb: 2.7 g/dL / Pro: 7.3 gm/dL / ALK PHOS: 740 U/L / ALT: 55 U/L / AST: 26 U/L / GGT: x             RADIOLOGY & ADDITIONAL STUDIES: HPI:  This patient is a 74 year old male with PMH of metastatic cholangiocarcinoma s/p Whipple and also s/p recent biliary stent and external biliary drain placement who [place] [facility] [place] to [place] and concerns for sepsis. The patient was recently admitted at [facility] in [place] after a failed ERCP, and during this admission had an internal-external biliary drain placed on 4/13. Biliary cultures grew Klebsiella and his Zosyn was transitioned to Augmentin on discharge. He has not been on chemotherapy for about one month. The patient notes that he has been having high output from the biliary drain, up to 2L per day, and has not been able to keep up with his fluid intake. At an outpatient [facility] appointment he was noted to be persistently hypotensive therefore he was sent to the ER.  (24 Apr 2025 06:06)      PAST MEDICAL & SURGICAL HISTORY:  Bile duct cancer      H/O Whipple procedure          Home Medications:  colesevelam 625 mg oral tablet: 2 tab(s) orally once a day (in the morning) (12 Nov 2024 17:52)  famotidine 40 mg oral tablet: 1 tab(s) orally once a day as needed for acid reflux (12 Nov 2024 17:52)  pancrelipase 36,000 units-114,000 units-180,000 units oral delayed release capsule: 3 cap(s) orally 3 times a day (with meals) *** also takes 1-2 caps with snacks*** (12 Nov 2024 17:55)  pancrelipase 36,000 units-114,000 units-180,000 units oral delayed release capsule: 1 cap(s) orally 2 times a day as needed for with snacks ***3 caps with meals and 1-2 with snacks*** (12 Nov 2024 17:53)  Polyethylene Glycol 3350: 17 gram(s) orally once a day (12 Nov 2024 17:52)  simethicone 80 mg oral tablet, chewable: 1 tab(s) chewed 3 times a day as needed for gas (12 Nov 2024 17:52)  Tums 500 mg oral tablet, chewable: 1 tab(s) chewed 3 times a day as needed for acid reflux (12 Nov 2024 17:53)      MEDICATIONS  (STANDING):  metroNIDAZOLE  IVPB 500 milliGRAM(s) IV Intermittent every 12 hours  pancrelipase  (CREON 36,000 Lipase Units) 3 Capsule(s) Oral three times a day with meals  sodium chloride 0.9%. 1000 milliLiter(s) (100 mL/Hr) IV Continuous <Continuous>    MEDICATIONS  (PRN):  acetaminophen     Tablet .. 650 milliGRAM(s) Oral every 6 hours PRN Temp greater or equal to 38C (100.4F), Mild Pain (1 - 3)  aluminum hydroxide/magnesium hydroxide/simethicone Suspension 30 milliLiter(s) Oral every 4 hours PRN Dyspepsia  calcium carbonate    500 mG (Tums) Chewable 1 Tablet(s) Chew three times a day PRN acid reflux  famotidine    Tablet 20 milliGRAM(s) Oral daily PRN acid reflux  melatonin 3 milliGRAM(s) Oral at bedtime PRN Insomnia  ondansetron Injectable 4 milliGRAM(s) IV Push every 8 hours PRN Nausea and/or Vomiting  pancrelipase  (CREON 36,000 Lipase Units) 1 Capsule(s) Oral two times a day with meals PRN with snacks      Allergies    statins (Unknown)    Intolerances        SOCIAL HISTORY:    FAMILY HISTORY:      ROS  As above  Otherwise unremarkable    Vital Signs Last 24 Hrs  T(C): 36.4 (24 Apr 2025 07:52), Max: 36.8 (24 Apr 2025 01:00)  T(F): 97.5 (24 Apr 2025 07:52), Max: 98.2 (24 Apr 2025 01:00)  HR: 79 (24 Apr 2025 07:52) (72 - 98)  BP: 98/68 (24 Apr 2025 07:52) (88/77 - 123/79)  BP(mean): 81 (24 Apr 2025 06:45) (78 - 93)  RR: 18 (24 Apr 2025 07:52) (16 - 20)  SpO2: 99% (24 Apr 2025 07:52) (96% - 99%)    Parameters below as of 24 Apr 2025 07:52  Patient On (Oxygen Delivery Method): room air        Constitutional: NAD, well-developed  Respiratory: CTAB  Cardiovascular: S1 and S2, RRR  Gastrointestinal: BS+, soft, NT/ND, LUQ drain  Extremities: No peripheral edema  Psychiatric: Normal mood, normal affect  Skin: No rashes    LABS:                        9.2    12.29 )-----------( 353      ( 23 Apr 2025 21:34 )             28.2     04-23    126[L]  |  96  |  45[H]  ----------------------------<  131[H]  4.5   |  21[L]  |  1.24    Ca    9.4      23 Apr 2025 21:34    TPro  7.3  /  Alb  2.7[L]  /  TBili  2.6[H]  /  DBili  x   /  AST  26  /  ALT  55  /  AlkPhos  740[H]  04-23    PT/INR - ( 23 Apr 2025 21:34 )   PT: 12.4 sec;   INR: 1.05 ratio         PTT - ( 23 Apr 2025 21:34 )  PTT:33.4 sec  LIVER FUNCTIONS - ( 23 Apr 2025 21:34 )  Alb: 2.7 g/dL / Pro: 7.3 gm/dL / ALK PHOS: 740 U/L / ALT: 55 U/L / AST: 26 U/L / GGT: x             RADIOLOGY & ADDITIONAL STUDIES:

## 2025-04-24 NOTE — H&P ADULT - CLICK TO LAUNCH ORM
. Tazorac Counseling:  Patient advised that medication is irritating and drying.  Patient may need to apply sparingly and wash off after an hour before eventually leaving it on overnight.  The patient verbalized understanding of the proper use and possible adverse effects of tazorac.  All of the patient's questions and concerns were addressed.

## 2025-04-24 NOTE — PRE PROCEDURE NOTE - PRE PROCEDURE EVALUATION
Interventional Radiology    HPI: 74 year old male with PMH of metastatic cholangiocarcinoma s/p Whipple and also s/p recent biliary stent and external biliary drain placement who presented from [facility] [place] due to hypotension and concerns for sepsis. IR consulted for biliary drain evaluation.     Allergies: statins (Unknown)    Medications (Abx/Cardiac/Anticoagulation/Blood Products)    cefepime  Injectable.: 1000 milliGRAM(s) IV Push (04-23 @ 21:45)  vancomycin  IVPB: 250 mL/Hr IV Intermittent (04-23 @ 21:46)    Data:    68  T(C): 36.4  HR: 79  BP: 98/68  RR: 18  SpO2: 99%    Infection following a procedure, organ and space surgical site, initial encounter    Handoff    MEWS Score    Bile duct cancer    Postprocedural intraabdominal abscess    H/O Whipple procedure    LBP    90+    [email or web address]        Exam  General: No acute distress  Chest: Non labored breathing    -WBC 11.08 / HgB 9.0 / Hct 27.8 / Plt 274  -Na 128 / Cl 104 / BUN 34 / Glucose 110  -K 4.2 / CO2 16 / Cr 0.95  -ALT 47 / Alk Phos 639 / T.Bili 2.5  -INR1.05    Imaging:     Plan:  74 year old male with PMH of metastatic cholangiocarcinoma s/p Whipple and also s/p recent biliary stent and external biliary drain placement who presented from [facility] due to hypotension and concerns for sepsis. Per chart review pt with 2L out put. IR consulted for biliary drain evaluation.     WBC 11. Tbili 2.5. Both trending down from yesterday.    - Pt with hypokalemia and hypotension likely 2/2 high drain out put. Will plan for drain evaluation and possible capping trial .  - CT demonstrates biliary drain in place with mild biliary ductal dilitation.  - D/w .

## 2025-04-24 NOTE — PHARMACOTHERAPY INTERVENTION NOTE - COMMENTS
Dr. Haritha Juarez cancelled pt's Surgery R/T pt's temperature 101.2. Pt. Denies aches, pains, chills, coughing.
Medication history complete, went off Drfirst- unable to speak to patient or wife.

## 2025-04-24 NOTE — CONSULT NOTE ADULT - ASSESSMENT
Imp:  The initial presentation could be 2/2 sepsis but it could just be 2/2 high output from the drain    Rec:  IR eval to see what options exist for the drain

## 2025-04-24 NOTE — CONSULT NOTE ADULT - SUBJECTIVE AND OBJECTIVE BOX
74-year-old male with h/o metastatic cholangiocarcinoma s/p Whipple and also s/p recent biliary stent and external biliary drain placement who presented from [facility] due to hypotension and concerns for sepsis. The patient was recently admitted at [facility] in [place] after a failed Endoscopic Retrograde Cholangiopancreatography (ERCP) , and during this admission had an internal-external biliary drain placed on 4/13. Biliary cultures grew Klebsiella and his Zosyn was transitioned to Augmentin on discharge. He has not been on chemotherapy for about one month. The patient notes that he has been having high output from the biliary drain, up to 2L per day, and has not been able to keep up with his fluid intake. At an outpatient [facility] appointment he was noted to be persistently hypotensive therefore he was sent to the ER. Pt received metronidazole       PMH:   Bile duct cancer    PSH:  Whipple procedure    MEDICATIONS  (STANDING):  metroNIDAZOLE  IVPB 500 milliGRAM(s) IV Intermittent every 12 hours  pancrelipase  (CREON 36,000 Lipase Units) 3 Capsule(s) Oral three times a day with meals  sodium chloride 0.9%. 1000 milliLiter(s) (100 mL/Hr) IV Continuous <Continuous>    Allergies:  Statins (Unknown)    Social history: No smoking, no alcohol use, No illicit drug use       REVIEW OF SYSTEMS:    CONSTITUTIONAL: No weakness, fevers or chills  EYES: No visual changes  RESPIRATORY: No cough, wheezing; No shortness of breath  CARDIOVASCULAR: No chest pain or palpitations  GASTROINTESTINAL: No abdominal or epigastric pain. No nausea, vomiting; No diarrhea or constipation.   GENITOURINARY: No dysuria, frequency or hematuria  NEUROLOGICAL: No numbness or weakness  SKIN: See physical examination.  All other review of systems is negative unless indicated above    Physical Exam    T(F): 97.5 (04-24-25 @ 07:52), Max: 98.2 (04-24-25 @ 01:00)  HR: 79 (04-24-25 @ 07:52) (72 - 98)  BP: 98/68 (04-24-25 @ 07:52) (88/77 - 123/79)  RR: 18 (04-24-25 @ 07:52) (16 - 20)  SpO2: 99% (04-24-25 @ 07:52) (96% - 99%)  Wt(kg): --    GEN: Thin, muscle wasting, no acute distress  HEENT:  Pupils equal and reactive, no oropharyngeal lesions, erythema, exudates, oral thrush  NECK:  Supple, no palpable lymph nodes, no thyromegaly  CV:  Regular rate and rhythm, +S1, +S2, no murmurs or rubs. Right anterior chest port.   PULM: Lungs clear to auscultation bilaterally, no wheezing, rales, rhonchi  GI:  Abdomen soft, non-tender, non-distended,  no palpable masses. LUQ biliary drain.  MSK:  Muscle wasting. No rigidity, no contractions  EXT:  No clubbing, no cyanosis, no edema, no  swelling or erythema  VASCULAR:  pulses equal and symmetric in the upper and lower extremities  NEURO:  AAOX3, no focal neurological deficits, follows all commands, able to move extremities spontaneously  SKIN:  no ulcers, lesions or rashes      Labs:                          9.2    12.29 )-----------( 353      ( 23 Apr 2025 21:34 )             28.2     WBC Trend  12.29[H] Date (04-23 @ 21:34)      Chem  04-23    126[L]  |  96  |  45[H]  ----------------------------<  131[H]  4.5   |  21[L]  |  1.24    Ca    9.4      23 Apr 2025 21:34    TPro  7.3  /  Alb  2.7[L]  /  TBili  2.6[H]  /  DBili  x   /  AST  26  /  ALT  55  /  AlkPhos  740[H]  04-23          Urinalysis with Rflx Culture (collected 24 Apr 2025 04:28)     74-year-old male with h/o metastatic cholangiocarcinoma s/p Whipple, recent biliary stent and external biliary drain placement was admitted on 4/23 from [facility] for hypotension and concerns for sepsis. The patient was recently admitted at [facility] in [place] after a failed Endoscopic Retrograde Cholangiopancreatography (ERCP) and during that admission had an internal-external biliary drain placed on 4/13. Biliary cultures grew Klebsiella and his Zosyn was transitioned to Augmentin on discharge. He has not been on chemotherapy for about one month. The patient notes that he has been having high output from the biliary drain, up to 2L per day, and has not been able to keep up with his fluid intake. At an outpatient [facility] appointment he was noted to be persistently hypotensive therefore he was sent to the ER. Pt received cefepime and metronidazole.     PMH:   Bile duct cancer    PSH:  Whipple procedure    MEDICATIONS  (STANDING):  metroNIDAZOLE  IVPB 500 milliGRAM(s) IV Intermittent every 12 hours  pancrelipase  (CREON 36,000 Lipase Units) 3 Capsule(s) Oral three times a day with meals  sodium chloride 0.9%. 1000 milliLiter(s) (100 mL/Hr) IV Continuous <Continuous>    Allergies:  Statins (Unknown)    Social history: No smoking, no alcohol use, No illicit drug use       REVIEW OF SYSTEMS:    CONSTITUTIONAL: No weakness, fevers or chills  EYES: No visual changes  RESPIRATORY: No cough, wheezing; No shortness of breath  CARDIOVASCULAR: No chest pain or palpitations  GASTROINTESTINAL: No abdominal or epigastric pain. No nausea, vomiting; No diarrhea or constipation.   GENITOURINARY: No dysuria, frequency or hematuria  NEUROLOGICAL: No numbness or weakness  SKIN: See physical examination.  All other review of systems is negative unless indicated above    Physical Exam    T(F): 97.5 (04-24-25 @ 07:52), Max: 98.2 (04-24-25 @ 01:00)  HR: 79 (04-24-25 @ 07:52) (72 - 98)  BP: 98/68 (04-24-25 @ 07:52) (88/77 - 123/79)  RR: 18 (04-24-25 @ 07:52) (16 - 20)  SpO2: 99% (04-24-25 @ 07:52) (96% - 99%)  Wt(kg): --    GEN: Thin, muscle wasting, no acute distress  HEENT:  Pupils equal and reactive, no oropharyngeal lesions, erythema, exudates, oral thrush  NECK:  Supple, no palpable lymph nodes, no thyromegaly  CV:  Regular rate and rhythm, +S1, +S2, no murmurs or rubs. Right anterior chest port.   PULM: Lungs clear to auscultation bilaterally, no wheezing, rales, rhonchi  GI:  Abdomen soft, mid abdomen tender, non-distended,  no palpable masses.   LUQ biliary drain.  MSK:  Muscle wasting. No rigidity, no contractions  EXT:  No clubbing, no cyanosis, no edema, no  swelling or erythema  VASCULAR:  pulses equal and symmetric in the upper and lower extremities  NEURO:  AAOX3, no focal neurological deficits, follows all commands, able to move extremities spontaneously  SKIN:  no ulcers, lesions or rashes    Labs:                        9.2    12.29 )-----------( 353      ( 23 Apr 2025 21:34 )             28.2     WBC Trend  12.29[H] Date (04-23 @ 21:34)      Chem  04-23    126[L]  |  96  |  45[H]  ----------------------------<  131[H]  4.5   |  21[L]  |  1.24    Ca    9.4      23 Apr 2025 21:34    TPro  7.3  /  Alb  2.7[L]  /  TBili  2.6[H]  /  DBili  x   /  AST  26  /  ALT  55  /  AlkPhos  740[H]  04-23    Urinalysis with Rflx Culture (collected 24 Apr 2025 04:28)    Radiology:  < from: CT Abdomen and Pelvis w/ IV Cont (04.24.25 @ 00:35) >  *  New internal/external biliary stent traversing a 4.2 cm mass at the level of the omega hepatis.  *  Increase in intrahepatic biliary ductal dilatation since prior examination. Correlate for clinical laboratory findingsof stent dysfunction.  *  New cystic lesions in the right hepatic lobe may reflect developing abscesses.  *  Mass at the omega hepatis keeping with the clinical history of cholangiocarcinoma, stable to slightly decreased in size.  *  A new lesion in segment IVb measuring up to 2.0 cm is suspicious for new metastasis.  < end of copied text >

## 2025-04-24 NOTE — H&P ADULT - NSHPLABSRESULTS_GEN_ALL_CORE
Some [facility] records available were reviewed  - 4/23/25: WBC 12.5, Hb 10.1, Na 122, K 4.1, bicarb 26, BUN 48, Cr 1.2  -  CT abd/pelvis: few increased and multiple new low attenuating lesions in the right posterior hepatic lobe

## 2025-04-24 NOTE — PROGRESS NOTE ADULT - ASSESSMENT
74 year old male with PMH of metastatic cholangiocarcinoma s/p Whipple and also s/p recent biliary stent and external biliary drain placement who presented from [facility] due to hypotension and concerns for sepsis. The patient was recently admitted at [facility] in [place] after a failed ERCP, and during this admission had an internal-external biliary drain placed on 4/13. Biliary cultures grew Klebsiella and his Zosyn was transitioned to Augmentin on discharge. He has not been on chemotherapy for about one month. The patient notes that he has been having high output from the biliary drain, up to 2L per day, and has not been able to keep up with his fluid intake. At an outpatient [facility] appointment he was noted to be persistently hypotensive therefore he was sent to the ER.     #Sepsis possibly 2/2 Acute Cholangitis   Patient with hypotension in the setting of suspected biliary infection as well as hypovolemia from high drain output. Patient noted to be tachycardic with an elevated WBC on admission.  Lactic normal at 1.4  - f/u Blood Cx  - ID input appreciated; Cont on Rocephin/Flagyl  - IR consulted for management of Biliary drain    #Metastatic cholangiocarcinoma with biliary obstruction   #Distal extrahepatic cholangiocarcinoma, AJCC Stage IIA  #Ampullary NET AJCC Stage 1B  S/p recent failed ERCP followed by internal-external biliary drain placement at [facility] on 4/13  There is concern for increasing mass effect in the abdomen, which is compressing local structures including the portal vein and SMV  - GI/IR consulted for further evaluation    DVT ppx: SCD's - no chemoprophylaxis pending surgical and GI evaluation   GI ppx: Pepcid    Dispo: Anticipate D/C in 1-2 days pending clinical improvement.

## 2025-04-24 NOTE — PROCEDURE NOTE - PROCEDURE FINDINGS AND DETAILS
I/E biliary drain evaluation demonstrated no passage of contrast via the drain representing a clogged drain.

## 2025-04-24 NOTE — CONSULT NOTE ADULT - ASSESSMENT
74-year-old male with h/o metastatic cholangiocarcinoma s/p Whipple and also s/p recent biliary stent and external biliary drain placement who presented from [facility] due to hypotension and concerns for sepsis. The patient was recently admitted at [facility] in [place] after a failed Endoscopic Retrograde Cholangiopancreatography (ERCP) , and during this admission had an internal-external biliary drain placed on 4/13. Biliary cultures grew Klebsiella and his Zosyn was transitioned to Augmentin on discharge. He has not been on chemotherapy for about one month. The patient notes that he has been having high output from the biliary drain, up to 2L per day, and has not been able to keep up with his fluid intake. At an outpatient [facility] appointment he was noted to be persistently hypotensive therefore he was sent to the ER. Pt received metronidazole      # Sepsis with hypotension.   #  # Leukocytosis  #metastatic cholangiocarcinoma   # Immunocompromised patient               Case D/W attending medicine team     74-year-old male with h/o metastatic cholangiocarcinoma s/p Whipple and also s/p recent biliary stent and external biliary drain placement who [place] [facility] [place] and concerns for sepsis. The patient was recently [place] at [facility] [place] a failed Endoscopic Retrograde Cholangiopancreatography (ERCP) , and during this admission had an internal-external biliary drain placed on 4/13. Biliary cultures grew Klebsiella and his Zosyn was transitioned to Augmentin on discharge. He has not been on chemotherapy for about one month. The patient notes that he has been having high output from the biliary drain, up to 2L per day, and has not been able to keep up with his fluid intake. At an [place] [facility] [place] he was noted to be persistently hypotensive therefore he was sent to the ER. Pt received metronidazole      # Severe sepsis with hypotension.   # Leukocytosis  #metastatic cholangiocarcinoma   # Immunocompromised patient   -cultures collected and reviewed   - Blood Cx 2 collected   -concern for infection with multiresistant organisms is raised.  Prior cultures reviewed. An epidemiologic assessment was performed. The risk of the microorganism spread to family members, healthcare staff is low. Standard isolation in place at present time. Will reconsider isolation measures according to infection control policy, further culture results and other new information. The patient will be monitored closely, cultures collected as appropriate. Broad spectrum abx therapy was started.  -s/p vancomycin and cefepime  - started metronidazole 500 mg IV q12h  -reason for abx use and side effects reviewed with patient; monitor BMP   - Will follow up culture   - Serial CBC  and monitor temperature   - IV hydration and supportive care   - Reviewed prior medical records to evaluate for resistance or atypical pathogen and culture    -repeat BC x 2   - Add IV Cipro to the Flagyl for now pending further culture results  - Pt is tolerating antibiotics well so far; no side effects noted   - Monitor closely in view of PCN and erythromycin  allergy history     Case D/W medicine team            Case D/W attending medicine team     74-year-old male with h/o metastatic cholangiocarcinoma s/p Whipple and also s/p recent biliary stent and external biliary drain placement who presented from [facility] due to hypotension and concerns for sepsis. The patient was recently admitted at [facility] in [place] after a failed Endoscopic Retrograde Cholangiopancreatography (ERCP) , and during this admission had an internal-external biliary drain placed on 4/13. Biliary cultures grew Klebsiella and his Zosyn was transitioned to Augmentin on discharge. He has not been on chemotherapy for about one month. The patient notes that he has been having high output from the biliary drain, up to 2L per day, and has not been able to keep up with his fluid intake. At an outpatient [facility] appointment he was noted to be persistently hypotensive therefore he was sent to the ER. Pt received metronidazole      # Severe sepsis with hypotension.   # Leukocytosis  #metastatic cholangiocarcinoma   # Immunocompromised patient   -cultures collected and reviewed   - Blood Cx 2 collected   -concern for infection with multiresistant organisms is raised.  Prior cultures reviewed. An epidemiologic assessment was performed. The risk of the microorganism spread to family members, healthcare staff is low. Standard isolation in place at present time. Will reconsider isolation measures according to infection control policy, further culture results and other new information. The patient will be monitored closely, cultures collected as appropriate. Broad spectrum abx therapy was started.  -s/p vancomycin and cefepime  - started metronidazole 500 mg IV q12h  -reason for abx use and side effects reviewed with patient; monitor BMP   - Will follow up culture   - Serial CBC  and monitor temperature   - IV hydration and supportive care   - Reviewed prior medical records to evaluate for resistance or atypical pathogen and culture    -repeat BC x 2   - Add IV Cipro to the Flagyl for now pending further culture results  - Pt is tolerating antibiotics well so far; no side effects noted   - Monitor closely in view of PCN and erythromycin  allergy history     Case D/W medicine team       74-year-old male with h/o metastatic cholangiocarcinoma s/p Whipple, recent biliary stent and external biliary drain placement was admitted on 4/23 from [facility] for hypotension and concerns for sepsis. The patient was recently admitted at [facility] in [place] after a failed Endoscopic Retrograde Cholangiopancreatography (ERCP) and during that admission had an internal-external biliary drain placed on 4/13. Biliary cultures grew Klebsiella and his Zosyn was transitioned to Augmentin on discharge. He has not been on chemotherapy for about one month. The patient notes that he has been having high output from the biliary drain, up to 2L per day, and has not been able to keep up with his fluid intake. At an outpatient [facility] appointment he was noted to be persistently hypotensive therefore he was sent to the ER. Pt received cefepime and metronidazole.     #Hypotension. Probable sepsis.  #Probable acute cholangitis. Prior infection with KL spp  #Metastatic cholangiocarcinoma s/p Whipple and biliary drain  #Leukocytosis  #Immunocompromised patient   #ARF on CRF stage 3.  -frail  -Blood Cx 2 collected   -concern for infection with multiresistant organisms is raised since he has been on recent abx therapy.  Prior cultures reviewed. An epidemiologic assessment was performed. The risk of the microorganism spread to family members, healthcare staff is low. Standard isolation in place at present time. Will reconsider isolation measures according to infection control policy, further culture results and other new information. The patient will be monitored closely, cultures collected as appropriate. Broad spectrum abx therapy was started.  -start cefepime 2 gm IV q12h and metronidazole 500 mg IV q12h  -reason for abx use and side effects reviewed with patient; monitor BMP  -IR evaluation for biliary stent assessment  -old chart reviewed to assess prior cultures  -monitor temps  -f/u CBC  -supportive care  2. Other issues:   -care per medicine   74-year-old male with h/o metastatic cholangiocarcinoma s/p Whipple, recent biliary stent and external biliary drain placement was admitted on 4/23 from [facility] for hypotension and concerns for sepsis. The patient was recently admitted at [facility] in [place] after a failed Endoscopic Retrograde Cholangiopancreatography (ERCP) and during that admission had an internal-external biliary drain placed on 4/13. Biliary cultures grew Klebsiella and his Zosyn was transitioned to Augmentin on discharge. He has not been on chemotherapy for about one month. The patient notes that he has been having high output from the biliary drain, up to 2L per day, and has not been able to keep up with his fluid intake. At an outpatient [facility] appointment he was noted to be persistently hypotensive therefore he was sent to the ER. Pt received cefepime and metronidazole.     #Hypotension. Probable sepsis.  #Probable acute cholangitis. Prior infection with KL spp  #Metastatic cholangiocarcinoma s/p Whipple and biliary drain  #Leukocytosis  #Immunocompromised patient   #ARF on CRF stage 3.  -frail  -Blood Cx 2 collected   -concern for infection with multiresistant organisms is raised since he has been on recent abx therapy.  Prior cultures reviewed. An epidemiologic assessment was performed. The risk of the microorganism spread to family members, healthcare staff is low. Standard isolation in place at present time. Will reconsider isolation measures according to infection control policy, further culture results and other new information. The patient will be monitored closely, cultures collected as appropriate. Broad spectrum abx therapy was started.  -start ceftriaxone 2 gm IV qd and metronidazole 500 mg IV q12h  -reason for abx use and side effects reviewed with patient; monitor BMP  -IR evaluation for biliary stent assessment  -old chart reviewed to assess prior cultures  -monitor temps  -f/u CBC  -supportive care  2. Other issues:   -care per medicine

## 2025-04-25 LAB
ALBUMIN SERPL ELPH-MCNC: 2.4 G/DL — LOW (ref 3.3–5)
ALP SERPL-CCNC: 555 U/L — HIGH (ref 40–120)
ALT FLD-CCNC: 43 U/L — SIGNIFICANT CHANGE UP (ref 12–78)
ANION GAP SERPL CALC-SCNC: 8 MMOL/L — SIGNIFICANT CHANGE UP (ref 5–17)
APTT BLD: 31.1 SEC — SIGNIFICANT CHANGE UP (ref 26.1–36.8)
AST SERPL-CCNC: 23 U/L — SIGNIFICANT CHANGE UP (ref 15–37)
BILIRUB SERPL-MCNC: 2.3 MG/DL — HIGH (ref 0.2–1.2)
BUN SERPL-MCNC: 30 MG/DL — HIGH (ref 7–23)
CALCIUM SERPL-MCNC: 9.4 MG/DL — SIGNIFICANT CHANGE UP (ref 8.5–10.1)
CHLORIDE SERPL-SCNC: 104 MMOL/L — SIGNIFICANT CHANGE UP (ref 96–108)
CO2 SERPL-SCNC: 16 MMOL/L — LOW (ref 22–31)
CREAT SERPL-MCNC: 0.9 MG/DL — SIGNIFICANT CHANGE UP (ref 0.5–1.3)
EGFR: 89 ML/MIN/1.73M2 — SIGNIFICANT CHANGE UP
EGFR: 89 ML/MIN/1.73M2 — SIGNIFICANT CHANGE UP
GLUCOSE SERPL-MCNC: 126 MG/DL — HIGH (ref 70–99)
HCT VFR BLD CALC: 27.4 % — LOW (ref 39–50)
HGB BLD-MCNC: 8.9 G/DL — LOW (ref 13–17)
INR BLD: 1.1 RATIO — SIGNIFICANT CHANGE UP (ref 0.85–1.16)
MCHC RBC-ENTMCNC: 28.9 PG — SIGNIFICANT CHANGE UP (ref 27–34)
MCHC RBC-ENTMCNC: 32.5 G/DL — SIGNIFICANT CHANGE UP (ref 32–36)
MCV RBC AUTO: 89 FL — SIGNIFICANT CHANGE UP (ref 80–100)
NRBC # BLD AUTO: 0 K/UL — SIGNIFICANT CHANGE UP (ref 0–0)
NRBC # FLD: 0 K/UL — SIGNIFICANT CHANGE UP (ref 0–0)
NRBC BLD AUTO-RTO: 0 /100 WBCS — SIGNIFICANT CHANGE UP (ref 0–0)
PLATELET # BLD AUTO: 236 K/UL — SIGNIFICANT CHANGE UP (ref 150–400)
PMV BLD: 11.1 FL — SIGNIFICANT CHANGE UP (ref 7–13)
POTASSIUM SERPL-MCNC: 3.8 MMOL/L — SIGNIFICANT CHANGE UP (ref 3.5–5.3)
POTASSIUM SERPL-SCNC: 3.8 MMOL/L — SIGNIFICANT CHANGE UP (ref 3.5–5.3)
PROT SERPL-MCNC: 6.7 GM/DL — SIGNIFICANT CHANGE UP (ref 6–8.3)
PROTHROM AB SERPL-ACNC: 13 SEC — SIGNIFICANT CHANGE UP (ref 9.9–13.4)
RBC # BLD: 3.08 M/UL — LOW (ref 4.2–5.8)
RBC # FLD: 17.8 % — HIGH (ref 10.3–14.5)
SODIUM SERPL-SCNC: 128 MMOL/L — LOW (ref 135–145)
WBC # BLD: 9.97 K/UL — SIGNIFICANT CHANGE UP (ref 3.8–10.5)
WBC # FLD AUTO: 9.97 K/UL — SIGNIFICANT CHANGE UP (ref 3.8–10.5)

## 2025-04-25 PROCEDURE — 99233 SBSQ HOSP IP/OBS HIGH 50: CPT

## 2025-04-25 PROCEDURE — 47536 EXCHANGE BILIARY DRG CATH: CPT

## 2025-04-25 RX ORDER — FENTANYL CITRATE-0.9 % NACL/PF 100MCG/2ML
50 SYRINGE (ML) INTRAVENOUS
Refills: 0 | Status: DISCONTINUED | OUTPATIENT
Start: 2025-04-25 | End: 2025-04-25

## 2025-04-25 RX ORDER — OXYCODONE HYDROCHLORIDE 30 MG/1
5 TABLET ORAL ONCE
Refills: 0 | Status: DISCONTINUED | OUTPATIENT
Start: 2025-04-25 | End: 2025-04-25

## 2025-04-25 RX ORDER — ONDANSETRON HCL/PF 4 MG/2 ML
4 VIAL (ML) INJECTION ONCE
Refills: 0 | Status: DISCONTINUED | OUTPATIENT
Start: 2025-04-25 | End: 2025-04-25

## 2025-04-25 RX ORDER — SODIUM BICARBONATE 1 MEQ/ML
650 SYRINGE (ML) INTRAVENOUS
Refills: 0 | Status: DISCONTINUED | OUTPATIENT
Start: 2025-04-25 | End: 2025-04-29

## 2025-04-25 RX ADMIN — Medication 650 MILLIGRAM(S): at 20:15

## 2025-04-25 RX ADMIN — Medication 1 GRAM(S): at 20:33

## 2025-04-25 RX ADMIN — CALCIUM CARBONATE 1 TABLET(S): 750 TABLET ORAL at 03:01

## 2025-04-25 RX ADMIN — Medication 100 MILLIGRAM(S): at 18:23

## 2025-04-25 RX ADMIN — Medication 650 MILLIGRAM(S): at 20:33

## 2025-04-25 RX ADMIN — Medication 100 MILLILITER(S): at 02:00

## 2025-04-25 RX ADMIN — Medication 100 MILLIGRAM(S): at 06:00

## 2025-04-25 RX ADMIN — Medication 20 MILLIGRAM(S): at 20:33

## 2025-04-25 RX ADMIN — Medication 650 MILLIGRAM(S): at 19:22

## 2025-04-25 RX ADMIN — Medication 4 MILLIGRAM(S): at 20:45

## 2025-04-25 RX ADMIN — CEFTRIAXONE 2000 MILLIGRAM(S): 500 INJECTION, POWDER, FOR SOLUTION INTRAMUSCULAR; INTRAVENOUS at 12:27

## 2025-04-25 RX ADMIN — Medication 3 CAPSULE(S): at 18:23

## 2025-04-25 NOTE — CONSULT NOTE ADULT - ASSESSMENT
74 year old male with PMH of metastatic cholangiocarcinoma s/p Whipple and also s/p recent biliary stent and external biliary drain placement who presented from [facility] due to hypotension and concerns for sepsis.    # metastatic cholangiocarcinoma  - follows at [facility] wtih Dr. Morrell- last seen 4/23/25   - started on first line chemo with gem/cis/durva since 10/10/24  - recent CT At [facility] compared with 4/1/25 showed few increased and multiple new low attenuating lesions w/in right posterior hepatic lobe suspicious for microabscesses in context of mod biliary dilatation-   - at clinic Na 120 and sent in to hospital   - plan as per pt was to continue with durvalumab on discharge  - will f/u with [facility] on dc     # biliary drain clogged  - Recent failed ERCP and had biliary drain placed 4/13 that grew kleb and was dc on augmentin in the past.   - In ER, WBC 12.29, Hb 9.2, plt 353, Na 126, Cr 1.24, bili 2.6 w/ nl lactate, UA neg, Bcx NTD  - CT a/p w/ contrast- New internal/external biliary stent traversing a 4.2 cm mass at the level of the omega hepatis. Increase in intrahepatic biliary ductal dilatation since prior examination. New cystic lesions in the right hepatic lobe may reflect developing abscesses. Mass at the omega hepatis keeping with the clinical history of cholangiocarcinoma, stable to slightly decreased in size. A new lesion in segment IVb measuring up to 2.0 cm is suspicious for new metastasis when compared with 11/12/24 scans.   - Pt currently on ceftriaxone and flagyl - ID following   - IR consulted for drain eval - performed evaluation and I/E demonstrated no passage of contrast via drain representing a clogged drain for which it was upsized. Plan for another IR procedure today as well to fix drain.     will follow daily and communicate with [facility]

## 2025-04-25 NOTE — DIETITIAN INITIAL EVALUATION ADULT - ADD RECOMMEND
HISTORY OF PRESENT ILLNESS  Colby Morales is a 77 y.o. male. c/o bilateral lower leg edema,L>Rt for past few weeks. No new meds. F/U HBP,Aneurysm coiling. Ankle swelling    The history is provided by the patient. This is a new problem. The current episode started more than 1 week ago. The problem occurs daily. The problem has been gradually worsening. The pain is present in the right ankle and left ankle. The quality of the pain is described as aching. The pain is at a severity of 3/10. The pain is mild. Associated symptoms include stiffness. Hypertension    The history is provided by the patient. This is a chronic problem. Associated symptoms include peripheral edema. Pertinent negatives include no chest pain, no orthopnea, no palpitations, no PND, no malaise/fatigue, no dizziness and no shortness of breath. Review of Systems   Constitutional: Negative for fever and malaise/fatigue. Respiratory: Negative for shortness of breath. Cardiovascular: Positive for leg swelling. Negative for chest pain, palpitations, orthopnea and PND. Gastrointestinal: Negative for abdominal pain. Genitourinary: Negative for dysuria. Musculoskeletal: Positive for stiffness. Neurological: Negative for dizziness. Physical Exam  Constitutional:       Appearance: Normal appearance. HENT:      Head: Normocephalic and atraumatic. Right Ear: Tympanic membrane normal.      Left Ear: Tympanic membrane normal.      Nose: Nose normal.   Cardiovascular:      Rate and Rhythm: Normal rate and regular rhythm. Pulses: Normal pulses. Heart sounds: Normal heart sounds. Musculoskeletal:      Right lower leg: Edema present. Left lower leg: Edema present. Comments: 2 + bilateral ankle edema   Neurological:      Mental Status: He is alert. ASSESSMENT and PLAN  Diagnoses and all orders for this visit:    1.  Peripheral edema,will d/c amlodipine,increase hctz    -     METABOLIC PANEL, COMPREHENSIVE    2. Essential hypertension, benign  -     METABOLIC PANEL, COMPREHENSIVE  -     dilTIAZem ER (Cartia XT) 240 mg capsule; Take 1 Cap by mouth daily. -     hydroCHLOROthiazide (HYDRODIURIL) 25 mg tablet; Take 1 Tab by mouth daily. 3. Gastroesophageal reflux disease with esophagitis without hemorrhage  -     pantoprazole (PROTONIX) 20 mg tablet; Take 1 Tab by mouth daily. Follow-up and Dispositions    · Return in about 2 weeks (around 10/20/2020). 1. ADAT to regular to optimize nutritional status  2. Twicketer BID to optimize nutritional needs (provides 325 kcal, 16 g protein/ shake)  3. MVI w/ minerals daily to ensure 100% RDA met  4. Consider adding thiamine 100 mg daily 2/2 poor PO intake/ malnutrition  5. Monitor bowel movements, if no BM for >3 days, consider implementing bowel regimen.  6. Monitor daily lytes/min and replete prn  7. Encourage protein-rich foods, maximize food preferences  8. Obtain weekly wt to track/trend changes  9. Consider obtaining fecal elastase levels to assess for malabsorption  10. C/w creon at all meals and snacks  11. Confirm goals of care regarding nutrition support.   RD will continue to monitor PO intake, labs, hydration, and wt prn.

## 2025-04-25 NOTE — DIETITIAN INITIAL EVALUATION ADULT - ENTER TO (ML/KG)
"Caller: Joanna Cross \"SIXTO\"    Relationship: Self    Best call back number: [phone]    What test was performed: LABS    When was the test performed: 11/25/24    Where was the test performed: [facility]    Additional notes: PT HAS A LAB APPT. ON 11/26/24 THAT NEEDS CANCELLED AS LABS WERE DRAWN TODAY(11/25/24) & BASED ON THE RESULTS SHE WILL NEED HER INJECTION ON 11/27/24 & TO SEE LENA?          " 40

## 2025-04-25 NOTE — DIETITIAN INITIAL EVALUATION ADULT - PERTINENT MEDS FT
MEDICATIONS  (STANDING):  cefTRIAXone Injectable. 2000 milliGRAM(s) IV Push every 24 hours  metroNIDAZOLE  IVPB 500 milliGRAM(s) IV Intermittent every 12 hours  pancrelipase  (CREON 36,000 Lipase Units) 3 Capsule(s) Oral three times a day with meals  sodium chloride 0.9%. 1000 milliLiter(s) (100 mL/Hr) IV Continuous <Continuous>    MEDICATIONS  (PRN):  acetaminophen     Tablet .. 650 milliGRAM(s) Oral every 6 hours PRN Temp greater or equal to 38C (100.4F), Mild Pain (1 - 3)  aluminum hydroxide/magnesium hydroxide/simethicone Suspension 30 milliLiter(s) Oral every 4 hours PRN Dyspepsia  calcium carbonate    500 mG (Tums) Chewable 1 Tablet(s) Chew three times a day PRN acid reflux  famotidine    Tablet 20 milliGRAM(s) Oral daily PRN acid reflux  melatonin 3 milliGRAM(s) Oral at bedtime PRN Insomnia  ondansetron Injectable 4 milliGRAM(s) IV Push every 8 hours PRN Nausea and/or Vomiting  pancrelipase  (CREON 36,000 Lipase Units) 1 Capsule(s) Oral two times a day with meals PRN with snacks

## 2025-04-25 NOTE — DIETITIAN INITIAL EVALUATION ADULT - ORAL INTAKE PTA/DIET HISTORY
Pt lives at home w/ his wife who has been doing all the shopping and cooking. Reports "fair" appetite, doesn't eat too much at home. Reports some taste changes since chemo. Takes Creon w/ all meals and snacks. Reports gray colored stools.

## 2025-04-25 NOTE — DIETITIAN INITIAL EVALUATION ADULT - PERTINENT LABORATORY DATA
04-25    128[L]  |  104  |  30[H]  ----------------------------<  126[H]  3.8   |  16[L]  |  0.90    Ca    9.4      25 Apr 2025 06:16    TPro  6.7  /  Alb  2.4[L]  /  TBili  2.3[H]  /  DBili  x   /  AST  23  /  ALT  43  /  AlkPhos  555[H]  04-25

## 2025-04-25 NOTE — BRIEF OPERATIVE NOTE - OPERATION/FINDINGS
1) Cholangiography through existing 8.5F left biliary drain reveals high grade stenosis/obstruction of CBD with no antegrade passage of contrast thought indwelling drain into duodenum  2) Wire passed across stenosis into duodenum   3) New 12F internal/external biliary drain placed across CBD stenosis/obstruction into duodenum with good antegrade flow on final imaging.

## 2025-04-25 NOTE — PROGRESS NOTE ADULT - ASSESSMENT
74 year old male with PMH of metastatic cholangiocarcinoma s/p Whipple and also s/p recent biliary stent and external biliary drain placement who presented from [facility] due to hypotension and concerns for sepsis. The patient was recently admitted at [facility] in [place] after a failed ERCP, and during this admission had an internal-external biliary drain placed on 4/13. Biliary cultures grew Klebsiella and his Zosyn was transitioned to Augmentin on discharge. He has not been on chemotherapy for about one month. The patient notes that he has been having high output from the biliary drain, up to 2L per day, and has not been able to keep up with his fluid intake. At an outpatient [facility] appointment he was noted to be persistently hypotensive therefore he was sent to the ER.     #Sepsis possibly 2/2 Acute Cholangitis   Patient with hypotension in the setting of suspected biliary infection as well as hypovolemia from high drain output. Patient noted to be tachycardic with an elevated WBC on admission.  Lactic normal at 1.4  - f/u Blood Cx with NGTD  - Patient afebrile, HR wnl, and WBC has now normalized  - ID input appreciated; Cont on Rocephin/Flagyl-->Will transition to PO Cipro 500 mg q12h x 10 days upon d/c    #Clogged I/E Biliary Drain  #Metastatic cholangiocarcinoma with biliary obstruction   #Distal extrahepatic cholangiocarcinoma, AJCC Stage IIA  #Ampullary NET AJCC Stage 1B  S/p recent failed ERCP followed by internal-external biliary drain placement at [facility] on 4/13  There is concern for increasing mass effect in the abdomen, which is compressing local structures including the portal vein and SMV  - Heme/Onc input appreciated  - IR consulted for management of Biliary drain; Scheduled for IR procedure today after I/E biliary drain evaluation demonstrated no passage of contrast via the drain representing a clogged drain on 4/24.    #Hypovolemic Hyponatremia  #Non-Anion Gap Metabolic Acidosis  Likely 2/2 increased output from biliary drain due to clogging  - IVF hydration  - Monitor BMP    DVT ppx: SCD's - no chemoprophylaxis IR procedure  GI ppx: Pepcid    Dispo: Anticipate D/C in 1-2 days pending clinical improvement.

## 2025-04-25 NOTE — PROGRESS NOTE ADULT - SUBJECTIVE AND OBJECTIVE BOX
HOSPITALIST ATTENDING PROGRESS NOTE    Chart and meds reviewed.      Subjective:  Patient seen and examined at the bedside. Does not offer any acute complaints at this time. Scheduled for IR procedure today after I/E biliary drain evaluation demonstrated no passage of contrast via the drain representing a clogged drain.    All other systems reviewed and found to be negative with the exception of what has been described above.    MEDICATIONS  (STANDING):  cefTRIAXone Injectable. 2000 milliGRAM(s) IV Push every 24 hours  metroNIDAZOLE  IVPB 500 milliGRAM(s) IV Intermittent every 12 hours  pancrelipase  (CREON 36,000 Lipase Units) 3 Capsule(s) Oral three times a day with meals  sodium chloride 0.9%. 1000 milliLiter(s) (100 mL/Hr) IV Continuous <Continuous>    MEDICATIONS  (PRN):  acetaminophen     Tablet .. 650 milliGRAM(s) Oral every 6 hours PRN Temp greater or equal to 38C (100.4F), Mild Pain (1 - 3)  aluminum hydroxide/magnesium hydroxide/simethicone Suspension 30 milliLiter(s) Oral every 4 hours PRN Dyspepsia  calcium carbonate    500 mG (Tums) Chewable 1 Tablet(s) Chew three times a day PRN acid reflux  famotidine    Tablet 20 milliGRAM(s) Oral daily PRN acid reflux  melatonin 3 milliGRAM(s) Oral at bedtime PRN Insomnia  ondansetron Injectable 4 milliGRAM(s) IV Push every 8 hours PRN Nausea and/or Vomiting  pancrelipase  (CREON 36,000 Lipase Units) 1 Capsule(s) Oral two times a day with meals PRN with snacks      PHYSICAL EXAM:  Gen: No acute distress  HEENT:  Normocephalic/Atraumatic  CV:  +S1, +S2, regular, no murmurs or rubs  RESP:   lungs clear to auscultation bilaterally, no wheezing, rales, rhonchi  GI:  abdomen soft, non-tender, non-distended. +External Biliary drain  EXT:  no clubbing, no cyanosis, no edema  NEURO:  No focal neurological deficits    LABS:                            9.0    11.08 )-----------( 274      ( 24 Apr 2025 09:13 )             27.8     04-24    128[L]  |  104  |  34[H]  ----------------------------<  110[H]  4.2   |  16[L]  |  0.95    Ca    9.1      24 Apr 2025 09:13    TPro  6.5  /  Alb  2.5[L]  /  TBili  2.5[H]  /  DBili  x   /  AST  20  /  ALT  47  /  AlkPhos  639[H]  04-24        LIVER FUNCTIONS - ( 24 Apr 2025 09:13 )  Alb: 2.5 g/dL / Pro: 6.5 gm/dL / ALK PHOS: 639 U/L / ALT: 47 U/L / AST: 20 U/L / GGT: x           PT/INR - ( 23 Apr 2025 21:34 )   PT: 12.4 sec;   INR: 1.05 ratio         PTT - ( 23 Apr 2025 21:34 )  PTT:33.4 sec  Urinalysis Basic - ( 24 Apr 2025 09:13 )    Color: x / Appearance: x / SG: x / pH: x  Gluc: 110 mg/dL / Ketone: x  / Bili: x / Urobili: x   Blood: x / Protein: x / Nitrite: x   Leuk Esterase: x / RBC: x / WBC x   Sq Epi: x / Non Sq Epi: x / Bacteria: x        Lactate, Blood: 1.4 mmol/L (04-23 @ 21:34)        CULTURES:  Blood, Urine: Negative (04-24 @ 04:28)      Additional results/Imaging, I have personally reviewed:    Telemetry, personally reviewed:

## 2025-04-25 NOTE — DIETITIAN INITIAL EVALUATION ADULT - NS FNS DIET ORDER
Diet, NPO after Midnight:      NPO Start Date: 24-Apr-2025,   NPO Start Time: 23:59 (04-24-25 @ 16:35)

## 2025-04-25 NOTE — CONSULT NOTE ADULT - SUBJECTIVE AND OBJECTIVE BOX
HPI:   74 year old male with PMH of metastatic cholangiocarcinoma s/p Whipple and also s/p recent biliary stent and external biliary drain placement who [place] [facility] [place] and concerns for sepsis.    The patient was recently [place] [facility] [place], and during this admission had an internal-external biliary drain placed on 4/13. Biliary cultures grew Klebsiella and his Zosyn was transitioned to Augmentin on discharge. He has not been on chemotherapy for about one month. The patient notes that he has been having high output from the biliary drain, up to 2L per day, and has not been able to keep up with his fluid intake. At an [place] [facility] [place] he was noted to be persistently hypotensive therefore he was sent to the ER.     In ER, WBC 12.29, Hb 9.2, plt 353, Na 126, Cr 1.24, bili 2.6 w/ nl lactate, UA neg, Bcx NTD    CT a/p w/ contrast- New internal/external biliary stent traversing a 4.2 cm mass at the level of the omega hepatis. Increase in intrahepatic biliary ductal dilatation since prior examination. New cystic lesions in the right hepatic lobe may reflect developing abscesses. Mass at the omega hepatis keeping with the clinical history of cholangiocarcinoma, stable to slightly decreased in size. A new lesion in segment IVb measuring up to 2.0 cm is suspicious for new metastasis when compared with 11/12/24 scans.     I spoke with pt who reports recent visit with Dr Morrell suggesting. Stability w plan for continued durvalumab q monthly outpt after resolution of infection.   Pt currently on ceftriaxone and flagyl   IR consulted for drain eval - performed evaluation and I/E demonstrated no passage of contrast via drain representing a clogged drain for which it was upsized. Plan for another IR procedure today as well to fix drain.   Today BP 96/66 this am on RA, afebrile   Pt feels well otherwise, just weak.       PAST MEDICAL & SURGICAL HISTORY:  Bile duct cancer      H/O Whipple procedure          Allergies    statins (Unknown)    Intolerances        MEDICATIONS  (STANDING):  cefTRIAXone Injectable. 2000 milliGRAM(s) IV Push every 24 hours  metroNIDAZOLE  IVPB 500 milliGRAM(s) IV Intermittent every 12 hours  pancrelipase  (CREON 36,000 Lipase Units) 3 Capsule(s) Oral three times a day with meals  sodium chloride 0.9%. 1000 milliLiter(s) (100 mL/Hr) IV Continuous <Continuous>    MEDICATIONS  (PRN):  acetaminophen     Tablet .. 650 milliGRAM(s) Oral every 6 hours PRN Temp greater or equal to 38C (100.4F), Mild Pain (1 - 3)  aluminum hydroxide/magnesium hydroxide/simethicone Suspension 30 milliLiter(s) Oral every 4 hours PRN Dyspepsia  calcium carbonate    500 mG (Tums) Chewable 1 Tablet(s) Chew three times a day PRN acid reflux  famotidine    Tablet 20 milliGRAM(s) Oral daily PRN acid reflux  melatonin 3 milliGRAM(s) Oral at bedtime PRN Insomnia  ondansetron Injectable 4 milliGRAM(s) IV Push every 8 hours PRN Nausea and/or Vomiting  pancrelipase  (CREON 36,000 Lipase Units) 1 Capsule(s) Oral two times a day with meals PRN with snacks      FAMILY HISTORY:      SOCIAL HISTORY: No EtOH, no tobacco    REVIEW OF SYSTEMS:    CONSTITUTIONAL: + weakness, weight loss, no fevers or chills  EYES/ENT: No visual changes;  No vertigo or throat pain   NECK: No pain or stiffness  RESPIRATORY: No cough, wheezing, hemoptysis; No shortness of breath  CARDIOVASCULAR: No chest pain or palpitations  GASTROINTESTINAL: mild abdominal or epigastric pain. No nausea, vomiting, or hematemesis; No diarrhea or constipation. No melena or hematochezia. decreased appetite   GENITOURINARY: No dysuria, frequency or hematuria  NEUROLOGICAL: No numbness + weakness  SKIN: No itching, burning, rashes, or lesions   All other review of systems is negative unless indicated above.        T(F): 98.5 (04-25-25 @ 07:32), Max: 98.5 (04-25-25 @ 07:32)  HR: 82 (04-25-25 @ 07:32)  BP: 96/66 (04-25-25 @ 07:32)  RR: 18 (04-25-25 @ 07:32)  SpO2: 99% (04-25-25 @ 07:32)  Wt(kg): --    GENERAL: NAD, well-developed  HEAD:  Atraumatic, Normocephalic  EYES: EOMI,  sclera icterus  CHEST/LUNG: Clear to auscultation bilaterally;   HEART: Regular rate and rhythm;   ABDOMEN: abdominal drain, no pain  EXTREMITIES: no edema  NEUROLOGY: non-focal                          8.9    9.97  )-----------( 236      ( 25 Apr 2025 06:16 )             27.4       04-25    128[L]  |  104  |  30[H]  ----------------------------<  126[H]  3.8   |  16[L]  |  0.90    Ca    9.4      25 Apr 2025 06:16    TPro  6.7  /  Alb  2.4[L]  /  TBili  2.3[H]  /  DBili  x   /  AST  23  /  ALT  43  /  AlkPhos  555[H]  04-25          PT/INR - ( 25 Apr 2025 06:16 )   PT: 13.0 sec;   INR: 1.10 ratio         PTT - ( 25 Apr 2025 06:16 )  PTT:31.1 sec    Blood None  04-23 @ 21:36   No growth at 24 hours  --  --      Blood None  04-23 @ 21:34   No growth at 24 hours  --  --

## 2025-04-25 NOTE — PROGRESS NOTE ADULT - SUBJECTIVE AND OBJECTIVE BOX
Date of service: 04-25-25 @ 10:29    Lying in bed in NAD  Weak looking  Has large amount of biliary drainage   No fever    ROS: no fever or chills; denies dizziness, no HA, no SOB or cough, no diarrhea or constipation; no dysuria, no legs pain, no rashes    MEDICATIONS  (STANDING):  cefTRIAXone Injectable. 2000 milliGRAM(s) IV Push every 24 hours  metroNIDAZOLE  IVPB 500 milliGRAM(s) IV Intermittent every 12 hours  pancrelipase  (CREON 36,000 Lipase Units) 3 Capsule(s) Oral three times a day with meals  sodium chloride 0.9%. 1000 milliLiter(s) (100 mL/Hr) IV Continuous <Continuous>    Vital Signs Last 24 Hrs  T(C): 36.9 (25 Apr 2025 07:32), Max: 36.9 (25 Apr 2025 07:32)  T(F): 98.5 (25 Apr 2025 07:32), Max: 98.5 (25 Apr 2025 07:32)  HR: 82 (25 Apr 2025 07:32) (82 - 83)  BP: 96/66 (25 Apr 2025 07:32) (91/58 - 99/67)  BP(mean): --  RR: 18 (25 Apr 2025 07:32) (18 - 18)  SpO2: 99% (25 Apr 2025 07:32) (99% - 100%)    Parameters below as of 25 Apr 2025 07:32  Patient On (Oxygen Delivery Method): room air     Physical exam:    GEN: Thin, muscle wasting, no acute distress  HEENT:  Pupils equal and reactive, no oropharyngeal lesions, erythema, exudates, oral thrush  NECK:  Supple, no palpable lymph nodes, no thyromegaly  CV:  Regular rate and rhythm, +S1, +S2, no murmurs or rubs. Right anterior chest port.   PULM: Lungs clear to auscultation bilaterally, no wheezing, rales, rhonchi  GI:  Abdomen soft, mid abdomen tender, non-distended,  no palpable masses.   LUQ biliary drain.  MSK:  Muscle wasting. No rigidity, no contractions  EXT:  No clubbing, no cyanosis, no edema, no  swelling or erythema  VASCULAR:  pulses equal and symmetric in the upper and lower extremities  NEURO:  AAOX3, no focal neurological deficits, follows all commands, able to move extremities spontaneously  SKIN:  no ulcers, lesions or rashes    Labs: reviewed                        8.9    9.97  )-----------( 236      ( 25 Apr 2025 06:16 )             27.4     04-25    128[L]  |  104  |  30[H]  ----------------------------<  126[H]  3.8   |  16[L]  |  0.90    Ca    9.4      25 Apr 2025 06:16    TPro  6.7  /  Alb  2.4[L]  /  TBili  2.3[H]  /  DBili  x   /  AST  23  /  ALT  43  /  AlkPhos  555[H]  04-25                        9.2    12.29 )-----------( 353      ( 23 Apr 2025 21:34 )             28.2     WBC Trend  12.29[H] Date (04-23 @ 21:34)      Chem  04-23    126[L]  |  96  |  45[H]  ----------------------------<  131[H]  4.5   |  21[L]  |  1.24    Ca    9.4      23 Apr 2025 21:34    TPro  7.3  /  Alb  2.7[L]  /  TBili  2.6[H]  /  DBili  x   /  AST  26  /  ALT  55  /  AlkPhos  740[H]  04-23    Outpatient biliary culture reviewed: KLOX S to ceftriaxone and cipro  Urinalysis with Rflx Culture (collected 24 Apr 2025 04:28)    Radiology:  < from: CT Abdomen and Pelvis w/ IV Cont (04.24.25 @ 00:35) >  *  New internal/external biliary stent traversing a 4.2 cm mass at the level of the omega hepatis.  *  Increase in intrahepatic biliary ductal dilatation since prior examination. Correlate for clinical laboratory findingsof stent dysfunction.  *  New cystic lesions in the right hepatic lobe may reflect developing abscesses.  *  Mass at the omega hepatis keeping with the clinical history of cholangiocarcinoma, stable to slightly decreased in size.  *  A new lesion in segment IVb measuring up to 2.0 cm is suspicious for new metastasis.  < end of copied text >

## 2025-04-25 NOTE — PROGRESS NOTE ADULT - ASSESSMENT
74-year-old male with h/o metastatic cholangiocarcinoma s/p Whipple, recent biliary stent and external biliary drain placement was admitted on 4/23 from [facility] [place] for hypotension and concerns for sepsis. The patient was recently admitted at [facility] in [place] after a failed Endoscopic Retrograde Cholangiopancreatography (ERCP) and during that admission had an internal-external biliary drain placed on 4/13. Biliary cultures grew Klebsiella and his Zosyn was transitioned to Augmentin on discharge. He has not been on chemotherapy for about one month. The patient notes that he has been having high output from the biliary drain, up to 2L per day, and has not been able to keep up with his fluid intake. At an outpatient [facility] appointment he was noted to be persistently hypotensive therefore he was sent to the ER. Pt received cefepime and metronidazole.     #Hypotension resolving. Probable sepsis.  #Probable acute cholangitis. Prior infection with KL spp  #Metastatic cholangiocarcinoma s/p Whipple and biliary drain  #Leukocytosis  #Immunocompromised patient   #ARF on CRF stage 3.  -renal function improving  -leukocytosis resolving  -biliary stent is probably obstructed   -Blood Cx 2 collected   -on ceftriaxone 2 gm IV qd and metronidazole 500 mg IV q12h # 2  -tolerating abx well so far; no side effects noted  -IR evaluation for biliary stent assessment appreciated --> will need adjustment  -f/u cultures  -continue abx coverage  -monitor temps  -f/u CBC  -supportive care  2. Other issues:   -care per medicine    Clinical team may change from intravenous to oral antibiotics when the following criteria are met:   1. Patient is clinically improving/stable       a)	Improved signs and symptoms of infection from initial presentation       b)	Afebrile for 24 hours       c)	Leukocytosis trending towards normal range   2. Patient is tolerating oral intake   3. Initial/repeat blood cultures are negative     When above criteria met may change iv antibiotics to cipro 500 mg PO q1h for 10 more days

## 2025-04-25 NOTE — DIETITIAN INITIAL EVALUATION ADULT - OTHER INFO
75 y/o M with PMH of metastatic cholangiocarcinoma s/p Whipple and also s/p recent biliary stent and external biliary drain placement who presented from [facility] due to hypotension and concerns for sepsis. The patient was recently admitted at [facility] in [place] after a failed ERCP, and during this admission had an internal-external biliary drain placed on 4/13. Biliary cultures grew Klebsiella and his Zosyn was transitioned to Augmentin on discharge. He has not been on chemotherapy for about one month. The patient notes that he has been having high output from the biliary drain, up to 2L per day, and has not been able to keep up with his fluid intake. At an outpatient [facility] appointment he was noted to be persistently hypotensive therefore he was sent to the ER. CT demonstrates biliary drain in place with mild biliary ductal dilitation. Per IR I/E biliary drain evaluation demonstrated no passage of contrast via the drain representing a clogged drain. plan to fix drain today 4/25. Pt is DNR/DNI. Admitting diagnosis: postprocedural intraabdominal abscess    Pt known to RD services, prev met criteria for PCM (Nov 2024). Reports no change in appetite upon admission, currently NPO for procedure at time of RD visit. Reports UBW used to be ~195# prior to whipple x ~1 year. Now #, endorses likely continued wt loss. RD obtained bedscale wt 120# on 4/25. Wt history reviewed: 154# on 11/13/24 (taken by RD). 34# wt loss/ 22.07% x 5 mo - severe and clinically significant. NFPE reveals sev muscle/fat wasting. Recommend to ADAT to regular to maximize caloric and nutrient intake. Pt receptive to trial KOWN BID to optimize nutritional needs (provides 325 kcal, 16 g protein/ shake). Please see additional recommendations below.

## 2025-04-25 NOTE — DIETITIAN INITIAL EVALUATION ADULT - ETIOLOGY
r/t decreased ability to meet increased nutrient needs 2/2 s/p whipple, metastatic cholangiocarcinoma, on chemo

## 2025-04-25 NOTE — DIETITIAN NUTRITION RISK NOTIFICATION - TREATMENT: THE FOLLOWING DIET HAS BEEN RECOMMENDED
Diet, NPO after Midnight:      NPO Start Date: 24-Apr-2025,   NPO Start Time: 23:59 (04-24-25 @ 16:35) [Active]  Diet, DASH/TLC:   Sodium & Cholesterol Restricted (04-24-25 @ 08:55) [Active]

## 2025-04-25 NOTE — DIETITIAN INITIAL EVALUATION ADULT - ORAL NUTRITION SUPPLEMENTS
[facility] BID to optimize nutritional needs (provides 325 kcal, 16 g protein/ shake)

## 2025-04-25 NOTE — DIETITIAN INITIAL EVALUATION ADULT - NSFNSGIIOFT_GEN_A_CORE
04-24-25 @ 07:01  -  04-25-25 @ 07:00  --------------------------------------------------------  OUT:    T-Tube (mL): 775 mL  Total OUT: 775 mL    Total NET: -775 mL

## 2025-04-25 NOTE — DIETITIAN NUTRITION RISK NOTIFICATION - ADDITIONAL COMMENTS/DIETITIAN RECOMMENDATIONS
1. ADAT to regular to optimize nutritional status  2. [employer]umobi BID to optimize nutritional needs (provides 325 kcal, 16 g protein/ shake)  3. MVI w/ minerals daily to ensure 100% RDA met  4. Consider adding thiamine 100 mg daily 2/2 poor PO intake/ malnutrition  5. Monitor bowel movements, if no BM for >3 days, consider implementing bowel regimen.  6. Monitor daily lytes/min and replete prn  7. Encourage protein-rich foods, maximize food preferences  8. Obtain weekly wt to track/trend changes  9. Consider obtaining fecal elastase levels to assess for malabsorption  10. C/w creon at all meals and snacks  11. Confirm goals of care regarding nutrition support.   RD will continue to monitor PO intake, labs, hydration, and wt prn.

## 2025-04-26 LAB
ALBUMIN SERPL ELPH-MCNC: 2.4 G/DL — LOW (ref 3.3–5)
ALP SERPL-CCNC: 480 U/L — HIGH (ref 40–120)
ALT FLD-CCNC: 42 U/L — SIGNIFICANT CHANGE UP (ref 12–78)
ANION GAP SERPL CALC-SCNC: 9 MMOL/L — SIGNIFICANT CHANGE UP (ref 5–17)
AST SERPL-CCNC: 22 U/L — SIGNIFICANT CHANGE UP (ref 15–37)
BILIRUB SERPL-MCNC: 2.1 MG/DL — HIGH (ref 0.2–1.2)
BUN SERPL-MCNC: 35 MG/DL — HIGH (ref 7–23)
CALCIUM SERPL-MCNC: 9.4 MG/DL — SIGNIFICANT CHANGE UP (ref 8.5–10.1)
CHLORIDE SERPL-SCNC: 100 MMOL/L — SIGNIFICANT CHANGE UP (ref 96–108)
CO2 SERPL-SCNC: 18 MMOL/L — LOW (ref 22–31)
CREAT SERPL-MCNC: 1.01 MG/DL — SIGNIFICANT CHANGE UP (ref 0.5–1.3)
EGFR: 77 ML/MIN/1.73M2 — SIGNIFICANT CHANGE UP
EGFR: 77 ML/MIN/1.73M2 — SIGNIFICANT CHANGE UP
GLUCOSE SERPL-MCNC: 166 MG/DL — HIGH (ref 70–99)
HCT VFR BLD CALC: 27.6 % — LOW (ref 39–50)
HGB BLD-MCNC: 9.1 G/DL — LOW (ref 13–17)
MCHC RBC-ENTMCNC: 28.7 PG — SIGNIFICANT CHANGE UP (ref 27–34)
MCHC RBC-ENTMCNC: 33 G/DL — SIGNIFICANT CHANGE UP (ref 32–36)
MCV RBC AUTO: 87.1 FL — SIGNIFICANT CHANGE UP (ref 80–100)
NRBC # BLD AUTO: 0 K/UL — SIGNIFICANT CHANGE UP (ref 0–0)
NRBC # FLD: 0 K/UL — SIGNIFICANT CHANGE UP (ref 0–0)
NRBC BLD AUTO-RTO: 0 /100 WBCS — SIGNIFICANT CHANGE UP (ref 0–0)
PLATELET # BLD AUTO: 274 K/UL — SIGNIFICANT CHANGE UP (ref 150–400)
PMV BLD: 10.4 FL — SIGNIFICANT CHANGE UP (ref 7–13)
POTASSIUM SERPL-MCNC: 4.2 MMOL/L — SIGNIFICANT CHANGE UP (ref 3.5–5.3)
POTASSIUM SERPL-SCNC: 4.2 MMOL/L — SIGNIFICANT CHANGE UP (ref 3.5–5.3)
PROT SERPL-MCNC: 6.6 GM/DL — SIGNIFICANT CHANGE UP (ref 6–8.3)
RBC # BLD: 3.17 M/UL — LOW (ref 4.2–5.8)
RBC # FLD: 17.5 % — HIGH (ref 10.3–14.5)
SODIUM SERPL-SCNC: 127 MMOL/L — LOW (ref 135–145)
WBC # BLD: 16.28 K/UL — HIGH (ref 3.8–10.5)
WBC # FLD AUTO: 16.28 K/UL — HIGH (ref 3.8–10.5)

## 2025-04-26 PROCEDURE — 99233 SBSQ HOSP IP/OBS HIGH 50: CPT

## 2025-04-26 RX ADMIN — Medication 100 MILLIGRAM(S): at 17:22

## 2025-04-26 RX ADMIN — Medication 650 MILLIGRAM(S): at 12:57

## 2025-04-26 RX ADMIN — Medication 4 MILLIGRAM(S): at 18:49

## 2025-04-26 RX ADMIN — Medication 4 MILLIGRAM(S): at 09:43

## 2025-04-26 RX ADMIN — CALCIUM CARBONATE 1 TABLET(S): 750 TABLET ORAL at 12:56

## 2025-04-26 RX ADMIN — Medication 100 MILLILITER(S): at 16:31

## 2025-04-26 RX ADMIN — CEFTRIAXONE 2000 MILLIGRAM(S): 500 INJECTION, POWDER, FOR SOLUTION INTRAMUSCULAR; INTRAVENOUS at 12:48

## 2025-04-26 RX ADMIN — Medication 20 MILLIGRAM(S): at 14:15

## 2025-04-26 RX ADMIN — Medication 3 CAPSULE(S): at 17:22

## 2025-04-26 RX ADMIN — Medication 1 CAPSULE(S): at 12:37

## 2025-04-26 RX ADMIN — Medication 100 MILLIGRAM(S): at 05:08

## 2025-04-26 NOTE — PROGRESS NOTE ADULT - ASSESSMENT
74 year old male with PMH of metastatic cholangiocarcinoma s/p Whipple and also s/p recent biliary stent and external biliary drain placement who presented from [facility] due to hypotension and concerns for sepsis.    # metastatic cholangiocarcinoma  - follows at [facility] wtih Dr. Morrell- last seen 4/23/25   - started on first line chemo with gem/cis/durva since 10/10/24  - recent CT At [facility] compared with 4/1/25 showed few increased and multiple new low attenuating lesions w/in right posterior hepatic lobe suspicious for microabscesses in context of mod biliary dilatation-   - at clinic Na 120 and sent in to hospital (today 127 no salt tabs)  - plan as per pt was to continue with durvalumab on discharge  - will f/u with [facility] on dc     # biliary drain clogged  - Recent failed ERCP and had biliary drain placed 4/13 that grew kleb and was dc on augmentin in the past.   - In ER, WBC 12.29, Hb 9.2, plt 353, Na 126, Cr 1.24, bili 2.6 w/ nl lactate, UA neg, Bcx NTD  - CT a/p w/ contrast- New internal/external biliary stent traversing a 4.2 cm mass at the level of the omega hepatis. Increase in intrahepatic biliary ductal dilatation since prior examination. New cystic lesions in the right hepatic lobe may reflect developing abscesses. Mass at the omega hepatis keeping with the clinical history of cholangiocarcinoma, stable to slightly decreased in size. A new lesion in segment IVb measuring up to 2.0 cm is suspicious for new metastasis when compared with 11/12/24 scans.   - Pt currently on ceftriaxone and flagyl - ID following   - IR consulted for drain eval - performed evaluation and I/E demonstrated no passage of contrast via drain representing a clogged drain for which it was upsized.   - yest 4/25/25 had cholangiography through 8.5 F left biliary drain w/ high grade stenosis/obstruction and thus was replaced with new 12F internal/external drain with good flow by IR     # n/v  - last night had episode of vomiting - decreased diet to full liquids today   - Today, wbc 16, Hb 9.1, plt 174, Na 127- now on salt tabs     # hyponatremia  - Na 127 today  - now on salt tabs     will follow daily and communicate with [facility]

## 2025-04-26 NOTE — PROGRESS NOTE ADULT - SUBJECTIVE AND OBJECTIVE BOX
HOSPITALIST ATTENDING PROGRESS NOTE    Chart and meds reviewed.      Subjective:  Patient seen and examined at the bedside. States that he vomited following his procedure yesterday. Thinks that he may have eaten too fast after surgery. Denies any abdominal pain at this time. WBC elevated (16k) this AM.    All other systems reviewed and found to be negative with the exception of what has been described above.    MEDICATIONS  (STANDING):  cefTRIAXone Injectable. 2000 milliGRAM(s) IV Push every 24 hours  metroNIDAZOLE  IVPB 500 milliGRAM(s) IV Intermittent every 12 hours  pancrelipase  (CREON 36,000 Lipase Units) 3 Capsule(s) Oral three times a day with meals  sodium chloride 0.9%. 1000 milliLiter(s) (100 mL/Hr) IV Continuous <Continuous>    MEDICATIONS  (PRN):  acetaminophen     Tablet .. 650 milliGRAM(s) Oral every 6 hours PRN Temp greater or equal to 38C (100.4F), Mild Pain (1 - 3)  aluminum hydroxide/magnesium hydroxide/simethicone Suspension 30 milliLiter(s) Oral every 4 hours PRN Dyspepsia  calcium carbonate    500 mG (Tums) Chewable 1 Tablet(s) Chew three times a day PRN acid reflux  famotidine    Tablet 20 milliGRAM(s) Oral daily PRN acid reflux  melatonin 3 milliGRAM(s) Oral at bedtime PRN Insomnia  ondansetron Injectable 4 milliGRAM(s) IV Push every 8 hours PRN Nausea and/or Vomiting  pancrelipase  (CREON 36,000 Lipase Units) 1 Capsule(s) Oral two times a day with meals PRN with snacks      PHYSICAL EXAM:  Gen: No acute distress  HEENT:  Normocephalic/Atraumatic  CV:  +S1, +S2, regular, no murmurs or rubs  RESP:   lungs clear to auscultation bilaterally, no wheezing, rales, rhonchi  GI:  abdomen soft, non-tender, non-distended. +Internal/External Biliary drain  EXT:  no clubbing, no cyanosis, no edema  NEURO:  No focal neurological deficits    LABS:                            9.0    11.08 )-----------( 274      ( 24 Apr 2025 09:13 )             27.8     04-24    128[L]  |  104  |  34[H]  ----------------------------<  110[H]  4.2   |  16[L]  |  0.95    Ca    9.1      24 Apr 2025 09:13    TPro  6.5  /  Alb  2.5[L]  /  TBili  2.5[H]  /  DBili  x   /  AST  20  /  ALT  47  /  AlkPhos  639[H]  04-24        LIVER FUNCTIONS - ( 24 Apr 2025 09:13 )  Alb: 2.5 g/dL / Pro: 6.5 gm/dL / ALK PHOS: 639 U/L / ALT: 47 U/L / AST: 20 U/L / GGT: x           PT/INR - ( 23 Apr 2025 21:34 )   PT: 12.4 sec;   INR: 1.05 ratio         PTT - ( 23 Apr 2025 21:34 )  PTT:33.4 sec  Urinalysis Basic - ( 24 Apr 2025 09:13 )    Color: x / Appearance: x / SG: x / pH: x  Gluc: 110 mg/dL / Ketone: x  / Bili: x / Urobili: x   Blood: x / Protein: x / Nitrite: x   Leuk Esterase: x / RBC: x / WBC x   Sq Epi: x / Non Sq Epi: x / Bacteria: x        Lactate, Blood: 1.4 mmol/L (04-23 @ 21:34)        CULTURES:  Blood, Urine: Negative (04-24 @ 04:28)      Additional results/Imaging, I have personally reviewed:    Telemetry, personally reviewed:

## 2025-04-26 NOTE — PROGRESS NOTE ADULT - ASSESSMENT
74 year old male with PMH of metastatic cholangiocarcinoma s/p Whipple and also s/p recent biliary stent and external biliary drain placement who presented from [facility] due to hypotension and concerns for sepsis. The patient was recently admitted at [facility] in [place] after a failed ERCP, and during this admission had an internal-external biliary drain placed on 4/13. Biliary cultures grew Klebsiella and his Zosyn was transitioned to Augmentin on discharge. He has not been on chemotherapy for about one month. The patient notes that he has been having high output from the biliary drain, up to 2L per day, and has not been able to keep up with his fluid intake. At an outpatient [facility] appointment he was noted to be persistently hypotensive therefore he was sent to the ER.     #Sepsis possibly 2/2 Acute Cholangitis   Patient with hypotension in the setting of suspected biliary infection as well as hypovolemia from high drain output. Patient noted to be tachycardic with an elevated WBC on admission.  Lactic normal at 1.4  - f/u Blood Cx with NGTD  - ID input appreciated; Cont on Rocephin/Flagyl-->Will transition to PO Cipro 500 mg q12h x 10 days upon d/c    #Clogged I/E Biliary Drain  #Metastatic cholangiocarcinoma with biliary obstruction   #Distal extrahepatic cholangiocarcinoma, AJCC Stage IIA  #Ampullary NET AJCC Stage 1B  S/p recent failed ERCP followed by internal-external biliary drain placement at [facility] on 4/13. During this admission, cholangiography through existing 8.5F left biliary drain revealed high grade stenosis/obstruction of CBD with no antegrade passage of contrast thought indwelling drain into duodenum  - Heme/Onc input appreciated  - IR consulted for management of Biliary drain; S/p New 12F internal/external biliary drain placed across CBD stenosis/obstruction into duodenum on 4/25.    #Hypovolemic Hyponatremia  #Non-Anion Gap Metabolic Acidosis  Likely 2/2 increased output from biliary drain due to clogging, SIADH  - IVF hydration  - Salt Tabs  - Monitor BMP    DVT ppx: SCD's, ambulation  GI ppx: Pepcid    Dispo: Anticipate D/C in 1-2 days pending clinical improvement of appetite, WBC, and monitoring of output from drain.

## 2025-04-26 NOTE — PROGRESS NOTE ADULT - SUBJECTIVE AND OBJECTIVE BOX
INTERVAL HPI/OVERNIGHT EVENTS:  Patient S&E at bedside.   yest had cholangiography through 8.5 F left biliary drain w/ high grade stenosis/obstruction and thus was replaced with new 12F internal/external drain with good flow by IR   on IVF today  last night had episode of vomiting - decreased diet to full liquids today   wbc 16, Hb 9.1, plt 174, Na 127- now on salt tabs     PAST MEDICAL & SURGICAL HISTORY:  Bile duct cancer      H/O Whipple procedure          FAMILY HISTORY:      VITAL SIGNS:  T(F): 97.9 (04-26-25 @ 07:44)  HR: 83 (04-26-25 @ 07:44)  BP: 107/76 (04-26-25 @ 07:44)  RR: 19 (04-26-25 @ 07:44)  SpO2: 99% (04-26-25 @ 07:44)  Wt(kg): --    PHYSICAL EXAM:    GENERAL: NAD, well-developed  HEAD:  Atraumatic, Normocephalic  EYES: EOMI,  sclera icterus  CHEST/LUNG: Clear to auscultation bilaterally;   HEART: Regular rate and rhythm;   ABDOMEN: abdominal drain, no pain  EXTREMITIES: no edema  NEUROLOGY: non-focal      MEDICATIONS  (STANDING):  cefTRIAXone Injectable. 2000 milliGRAM(s) IV Push every 24 hours  metroNIDAZOLE  IVPB 500 milliGRAM(s) IV Intermittent every 12 hours  pancrelipase  (CREON 36,000 Lipase Units) 3 Capsule(s) Oral three times a day with meals  sodium bicarbonate 650 milliGRAM(s) Oral two times a day  sodium chloride 1 Gram(s) Oral two times a day  sodium chloride 0.9%. 1000 milliLiter(s) (100 mL/Hr) IV Continuous <Continuous>    MEDICATIONS  (PRN):  acetaminophen     Tablet .. 650 milliGRAM(s) Oral every 6 hours PRN Temp greater or equal to 38C (100.4F), Mild Pain (1 - 3)  aluminum hydroxide/magnesium hydroxide/simethicone Suspension 30 milliLiter(s) Oral every 4 hours PRN Dyspepsia  calcium carbonate    500 mG (Tums) Chewable 1 Tablet(s) Chew three times a day PRN acid reflux  famotidine    Tablet 20 milliGRAM(s) Oral daily PRN acid reflux  melatonin 3 milliGRAM(s) Oral at bedtime PRN Insomnia  ondansetron Injectable 4 milliGRAM(s) IV Push every 8 hours PRN Nausea and/or Vomiting  pancrelipase  (CREON 36,000 Lipase Units) 1 Capsule(s) Oral two times a day with meals PRN with snacks      Allergies    statins (Unknown)    Intolerances        LABS:                        9.1    16.28 )-----------( 274      ( 26 Apr 2025 06:26 )             27.6     04-26    127[L]  |  100  |  35[H]  ----------------------------<  166[H]  4.2   |  18[L]  |  1.01    Ca    9.4      26 Apr 2025 06:26    TPro  6.6  /  Alb  2.4[L]  /  TBili  2.1[H]  /  DBili  x   /  AST  22  /  ALT  42  /  AlkPhos  480[H]  04-26    PT/INR - ( 25 Apr 2025 06:16 )   PT: 13.0 sec;   INR: 1.10 ratio         PTT - ( 25 Apr 2025 06:16 )  PTT:31.1 sec  Urinalysis Basic - ( 26 Apr 2025 06:26 )    Color: x / Appearance: x / SG: x / pH: x  Gluc: 166 mg/dL / Ketone: x  / Bili: x / Urobili: x   Blood: x / Protein: x / Nitrite: x   Leuk Esterase: x / RBC: x / WBC x   Sq Epi: x / Non Sq Epi: x / Bacteria: x        RADIOLOGY & ADDITIONAL TESTS:  Studies reviewed.

## 2025-04-27 LAB
ALBUMIN SERPL ELPH-MCNC: 2.1 G/DL — LOW (ref 3.3–5)
ALP SERPL-CCNC: 365 U/L — HIGH (ref 40–120)
ALT FLD-CCNC: 30 U/L — SIGNIFICANT CHANGE UP (ref 12–78)
ANION GAP SERPL CALC-SCNC: 9 MMOL/L — SIGNIFICANT CHANGE UP (ref 5–17)
AST SERPL-CCNC: 17 U/L — SIGNIFICANT CHANGE UP (ref 15–37)
BILIRUB SERPL-MCNC: 1.8 MG/DL — HIGH (ref 0.2–1.2)
BUN SERPL-MCNC: 30 MG/DL — HIGH (ref 7–23)
CALCIUM SERPL-MCNC: 8.9 MG/DL — SIGNIFICANT CHANGE UP (ref 8.5–10.1)
CHLORIDE SERPL-SCNC: 102 MMOL/L — SIGNIFICANT CHANGE UP (ref 96–108)
CO2 SERPL-SCNC: 19 MMOL/L — LOW (ref 22–31)
CREAT SERPL-MCNC: 0.86 MG/DL — SIGNIFICANT CHANGE UP (ref 0.5–1.3)
EGFR: 90 ML/MIN/1.73M2 — SIGNIFICANT CHANGE UP
EGFR: 90 ML/MIN/1.73M2 — SIGNIFICANT CHANGE UP
GLUCOSE SERPL-MCNC: 137 MG/DL — HIGH (ref 70–99)
HCT VFR BLD CALC: 24.9 % — LOW (ref 39–50)
HGB BLD-MCNC: 8.1 G/DL — LOW (ref 13–17)
MCHC RBC-ENTMCNC: 28.7 PG — SIGNIFICANT CHANGE UP (ref 27–34)
MCHC RBC-ENTMCNC: 32.5 G/DL — SIGNIFICANT CHANGE UP (ref 32–36)
MCV RBC AUTO: 88.3 FL — SIGNIFICANT CHANGE UP (ref 80–100)
NRBC # BLD AUTO: 0 K/UL — SIGNIFICANT CHANGE UP (ref 0–0)
NRBC # FLD: 0 K/UL — SIGNIFICANT CHANGE UP (ref 0–0)
NRBC BLD AUTO-RTO: 0 /100 WBCS — SIGNIFICANT CHANGE UP (ref 0–0)
PLATELET # BLD AUTO: 210 K/UL — SIGNIFICANT CHANGE UP (ref 150–400)
PMV BLD: 10.1 FL — SIGNIFICANT CHANGE UP (ref 7–13)
POTASSIUM SERPL-MCNC: 4.3 MMOL/L — SIGNIFICANT CHANGE UP (ref 3.5–5.3)
POTASSIUM SERPL-SCNC: 4.3 MMOL/L — SIGNIFICANT CHANGE UP (ref 3.5–5.3)
PROT SERPL-MCNC: 5.8 GM/DL — LOW (ref 6–8.3)
RBC # BLD: 2.82 M/UL — LOW (ref 4.2–5.8)
RBC # FLD: 17.4 % — HIGH (ref 10.3–14.5)
SODIUM SERPL-SCNC: 130 MMOL/L — LOW (ref 135–145)
WBC # BLD: 10.56 K/UL — HIGH (ref 3.8–10.5)
WBC # FLD AUTO: 10.56 K/UL — HIGH (ref 3.8–10.5)

## 2025-04-27 PROCEDURE — 99233 SBSQ HOSP IP/OBS HIGH 50: CPT

## 2025-04-27 RX ADMIN — CEFTRIAXONE 2000 MILLIGRAM(S): 500 INJECTION, POWDER, FOR SOLUTION INTRAMUSCULAR; INTRAVENOUS at 11:59

## 2025-04-27 RX ADMIN — Medication 650 MILLIGRAM(S): at 11:45

## 2025-04-27 RX ADMIN — Medication 20 MILLIGRAM(S): at 09:31

## 2025-04-27 RX ADMIN — Medication 100 MILLILITER(S): at 14:50

## 2025-04-27 RX ADMIN — Medication 100 MILLIGRAM(S): at 17:49

## 2025-04-27 RX ADMIN — Medication 100 MILLILITER(S): at 02:36

## 2025-04-27 RX ADMIN — Medication 650 MILLIGRAM(S): at 11:52

## 2025-04-27 RX ADMIN — Medication 650 MILLIGRAM(S): at 04:08

## 2025-04-27 RX ADMIN — Medication 3 CAPSULE(S): at 09:27

## 2025-04-27 RX ADMIN — Medication 4 MILLIGRAM(S): at 09:31

## 2025-04-27 RX ADMIN — Medication 3 CAPSULE(S): at 17:49

## 2025-04-27 RX ADMIN — Medication 650 MILLIGRAM(S): at 17:52

## 2025-04-27 RX ADMIN — Medication 4 MILLIGRAM(S): at 17:49

## 2025-04-27 RX ADMIN — Medication 100 MILLIGRAM(S): at 05:53

## 2025-04-27 NOTE — PROGRESS NOTE ADULT - ASSESSMENT
74 year old male with PMH of metastatic cholangiocarcinoma s/p Whipple and also s/p recent biliary stent and external biliary drain placement who presented from [facility] due to hypotension and concerns for sepsis. The patient was recently admitted at [facility] in [place] after a failed ERCP, and during this admission had an internal-external biliary drain placed on 4/13. Biliary cultures grew Klebsiella and his Zosyn was transitioned to Augmentin on discharge. He has not been on chemotherapy for about one month. The patient notes that he has been having high output from the biliary drain, up to 2L per day, and has not been able to keep up with his fluid intake. At an outpatient [facility] appointment he was noted to be persistently hypotensive therefore he was sent to the ER.     #Clogged I/E Biliary Drain  #Metastatic cholangiocarcinoma with biliary obstruction   #Distal extrahepatic cholangiocarcinoma, AJCC Stage IIA  #Ampullary NET AJCC Stage 1B  S/p recent failed ERCP followed by internal-external biliary drain placement at [facility] on 4/13. During this admission, cholangiography through existing 8.5F left biliary drain revealed high grade stenosis/obstruction of CBD with no antegrade passage of contrast thought indwelling drain into duodenum  - Heme/Onc input appreciated  - IR consulted for management of Biliary drain; S/p New 12F internal/external biliary drain placed across CBD stenosis/obstruction into duodenum on 4/25.  - Patient still having 2L of biliary drain output --> F/u IR recs tomorrow    #Sepsis possibly 2/2 Acute Cholangitis   Patient with hypotension in the setting of suspected biliary infection as well as hypovolemia from high drain output. Patient noted to be tachycardic with an elevated WBC on admission.  Lactic normal at 1.4  - f/u Blood Cx with NGTD  - ID input appreciated; Cont on Rocephin/Flagyl-->Will transition to PO Cipro 500 mg q12h x 10 days upon d/c    #Hypovolemic Hyponatremia, improving  #Non-Anion Gap Metabolic Acidosis  Likely 2/2 increased output from biliary drain due to clogging, SIADH  - Cont IVF hydration  - Salt Tabs  - Monitor BMP    DVT ppx: SCD's, ambulation  GI ppx: Pepcid    Dispo: Anticipate D/C in 1-2 days pending reassessment of drain by IR, monitoring of output from drain.

## 2025-04-27 NOTE — PROGRESS NOTE ADULT - SUBJECTIVE AND OBJECTIVE BOX
Patient is a 74y old  Male who presents with a chief complaint of Hypotension (26 Apr 2025 16:54)      Subective:      PAST MEDICAL & SURGICAL HISTORY:  Bile duct cancer      H/O Whipple procedure          MEDICATIONS  (STANDING):  cefTRIAXone Injectable. 2000 milliGRAM(s) IV Push every 24 hours  metroNIDAZOLE  IVPB 500 milliGRAM(s) IV Intermittent every 12 hours  pancrelipase  (CREON 36,000 Lipase Units) 3 Capsule(s) Oral three times a day with meals  sodium bicarbonate 650 milliGRAM(s) Oral two times a day  sodium chloride 1 Gram(s) Oral two times a day  sodium chloride 0.9%. 1000 milliLiter(s) (100 mL/Hr) IV Continuous <Continuous>    MEDICATIONS  (PRN):  acetaminophen     Tablet .. 650 milliGRAM(s) Oral every 6 hours PRN Temp greater or equal to 38C (100.4F), Mild Pain (1 - 3)  aluminum hydroxide/magnesium hydroxide/simethicone Suspension 30 milliLiter(s) Oral every 4 hours PRN Dyspepsia  calcium carbonate    500 mG (Tums) Chewable 1 Tablet(s) Chew three times a day PRN acid reflux  famotidine    Tablet 20 milliGRAM(s) Oral daily PRN acid reflux  melatonin 3 milliGRAM(s) Oral at bedtime PRN Insomnia  ondansetron Injectable 4 milliGRAM(s) IV Push every 8 hours PRN Nausea and/or Vomiting  pancrelipase  (CREON 36,000 Lipase Units) 1 Capsule(s) Oral two times a day with meals PRN with snacks      REVIEW OF SYSTEMS:    RESPIRATORY: No shortness of breath  CARDIOVASCULAR: No chest pain  All other review of systems is negative unless indicated above.    Vital Signs Last 24 Hrs  T(C): 36.5 (27 Apr 2025 08:02), Max: 36.7 (26 Apr 2025 15:30)  T(F): 97.7 (27 Apr 2025 08:02), Max: 98.1 (26 Apr 2025 15:30)  HR: 84 (27 Apr 2025 08:02) (76 - 86)  BP: 108/73 (27 Apr 2025 08:02) (103/73 - 108/73)  BP(mean): --  RR: 19 (27 Apr 2025 08:02) (18 - 19)  SpO2: 97% (27 Apr 2025 08:02) (97% - 100%)    Parameters below as of 27 Apr 2025 00:38  Patient On (Oxygen Delivery Method): room air        PHYSICAL EXAM:    Constitutional: NAD, well-developed  Respiratory: CTAB  Cardiovascular: S1 and S2, RRR  Gastrointestinal: BS+, soft, NT/ND, Drain clean  Extremities: No peripheral edema  Psychiatric: Normal mood, normal affect    LABS:                        8.1    10.56 )-----------( 210      ( 27 Apr 2025 07:11 )             24.9     04-27    130[L]  |  102  |  30[H]  ----------------------------<  137[H]  4.3   |  19[L]  |  0.86    Ca    8.9      27 Apr 2025 07:11    TPro  5.8[L]  /  Alb  2.1[L]  /  TBili  1.8[H]  /  DBili  x   /  AST  17  /  ALT  30  /  AlkPhos  365[H]  04-27      LIVER FUNCTIONS - ( 27 Apr 2025 07:11 )  Alb: 2.1 g/dL / Pro: 5.8 gm/dL / ALK PHOS: 365 U/L / ALT: 30 U/L / AST: 17 U/L / GGT: x             RADIOLOGY & ADDITIONAL STUDIES:

## 2025-04-27 NOTE — PROGRESS NOTE ADULT - ASSESSMENT
Imp:  S/P revision of internal external biliary drain  Persistently high output from external drain    Rec:  IR follow up tomorrow

## 2025-04-27 NOTE — PROGRESS NOTE ADULT - SUBJECTIVE AND OBJECTIVE BOX
INTERVAL HPI/OVERNIGHT EVENTS:  Patient S&E at bedside. No o/n events,   pt remains on ctx, flagyl   nausea and vomiting resolved  will advance diet   drainage 2L yest      PAST MEDICAL & SURGICAL HISTORY:  Bile duct cancer      H/O Whipple procedure          FAMILY HISTORY:      VITAL SIGNS:  T(F): 97.7 (04-27-25 @ 08:02)  HR: 84 (04-27-25 @ 08:02)  BP: 108/73 (04-27-25 @ 08:02)  RR: 19 (04-27-25 @ 08:02)  SpO2: 97% (04-27-25 @ 08:02)  Wt(kg): --    PHYSICAL EXAM:  GENERAL: NAD, well-developed  HEAD:  Atraumatic, Normocephalic  EYES: EOMI,  sclera icterus  CHEST/LUNG: Clear to auscultation bilaterally;   HEART: Regular rate and rhythm;   ABDOMEN: abdominal drain, no pain  EXTREMITIES: no edema  NEUROLOGY: non-focal        MEDICATIONS  (STANDING):  cefTRIAXone Injectable. 2000 milliGRAM(s) IV Push every 24 hours  metroNIDAZOLE  IVPB 500 milliGRAM(s) IV Intermittent every 12 hours  pancrelipase  (CREON 36,000 Lipase Units) 3 Capsule(s) Oral three times a day with meals  sodium bicarbonate 650 milliGRAM(s) Oral two times a day  sodium chloride 1 Gram(s) Oral two times a day  sodium chloride 0.9%. 1000 milliLiter(s) (100 mL/Hr) IV Continuous <Continuous>    MEDICATIONS  (PRN):  acetaminophen     Tablet .. 650 milliGRAM(s) Oral every 6 hours PRN Temp greater or equal to 38C (100.4F), Mild Pain (1 - 3)  aluminum hydroxide/magnesium hydroxide/simethicone Suspension 30 milliLiter(s) Oral every 4 hours PRN Dyspepsia  calcium carbonate    500 mG (Tums) Chewable 1 Tablet(s) Chew three times a day PRN acid reflux  famotidine    Tablet 20 milliGRAM(s) Oral daily PRN acid reflux  melatonin 3 milliGRAM(s) Oral at bedtime PRN Insomnia  ondansetron Injectable 4 milliGRAM(s) IV Push every 8 hours PRN Nausea and/or Vomiting  pancrelipase  (CREON 36,000 Lipase Units) 1 Capsule(s) Oral two times a day with meals PRN with snacks      Allergies    statins (Unknown)    Intolerances        LABS:                        8.1    10.56 )-----------( 210      ( 27 Apr 2025 07:11 )             24.9     04-27    130[L]  |  102  |  30[H]  ----------------------------<  137[H]  4.3   |  19[L]  |  0.86    Ca    8.9      27 Apr 2025 07:11    TPro  5.8[L]  /  Alb  2.1[L]  /  TBili  1.8[H]  /  DBili  x   /  AST  17  /  ALT  30  /  AlkPhos  365[H]  04-27      Urinalysis Basic - ( 27 Apr 2025 07:11 )    Color: x / Appearance: x / SG: x / pH: x  Gluc: 137 mg/dL / Ketone: x  / Bili: x / Urobili: x   Blood: x / Protein: x / Nitrite: x   Leuk Esterase: x / RBC: x / WBC x   Sq Epi: x / Non Sq Epi: x / Bacteria: x        RADIOLOGY & ADDITIONAL TESTS:  Studies reviewed.

## 2025-04-27 NOTE — PROGRESS NOTE ADULT - ASSESSMENT
74 year old male with PMH of metastatic cholangiocarcinoma s/p Whipple and also s/p recent biliary stent and external biliary drain placement who presented from [facility] due to hypotension and concerns for sepsis.    # metastatic cholangiocarcinoma  - follows at [facility] wtih Dr. Morrell- last seen 4/23/25   - started on first line chemo with gem/cis/durva since 10/10/24  - recent CT At [facility] compared with 4/1/25 showed few increased and multiple new low attenuating lesions w/in right posterior hepatic lobe suspicious for microabscesses in context of mod biliary dilatation-   - at clinic Na 120 and sent in to hospital (today 127 no salt tabs)  - plan as per pt was to continue with durvalumab on discharge  - will f/u with [facility] on dc     # biliary drain clogged- now resolved  - Recent failed ERCP and had biliary drain placed 4/13 that grew kleb and was dc on augmentin in the past.   - In ER, WBC 12.29, Hb 9.2, plt 353, Na 126, Cr 1.24, bili 2.6 w/ nl lactate, UA neg, Bcx NTD  - CT a/p w/ contrast- New internal/external biliary stent traversing a 4.2 cm mass at the level of the omega hepatis. Increase in intrahepatic biliary ductal dilatation since prior examination. New cystic lesions in the right hepatic lobe may reflect developing abscesses. Mass at the omega hepatis keeping with the clinical history of cholangiocarcinoma, stable to slightly decreased in size. A new lesion in segment IVb measuring up to 2.0 cm is suspicious for new metastasis when compared with 11/12/24 scans.   - Pt currently on ceftriaxone and flagyl - ID following   - IR consulted for drain eval - performed evaluation and I/E demonstrated no passage of contrast via drain representing a clogged drain for which it was upsized.   - 4/25/25 had cholangiography through 8.5 F left biliary drain w/ high grade stenosis/obstruction and thus was replaced with new 12F internal/external drain with good flow by IR   - had 2L drainage yest 4/26- slowing this am     # n/v- resolved  - tolerated full liquids yest  - pt discussed w/ nutrition and will advance today   - Today, wbc 10, Hb 8.1, plt 210     # hyponatremia  - Na 127-->130 today  - now on salt tabs     will follow daily and communicate with [facility]

## 2025-04-27 NOTE — PROGRESS NOTE ADULT - SUBJECTIVE AND OBJECTIVE BOX
HOSPITALIST ATTENDING PROGRESS NOTE    Chart and meds reviewed.      Subjective:  Patient seen and examined at the bedside. States that he had a better day yesterday and has been tolerating a diet. However, notes that he had 2L of output from the I/E Biliary drain. Denies any fevers, chills.    All other systems reviewed and found to be negative with the exception of what has been described above.    MEDICATIONS  (STANDING):  cefTRIAXone Injectable. 2000 milliGRAM(s) IV Push every 24 hours  metroNIDAZOLE  IVPB 500 milliGRAM(s) IV Intermittent every 12 hours  pancrelipase  (CREON 36,000 Lipase Units) 3 Capsule(s) Oral three times a day with meals  sodium chloride 0.9%. 1000 milliLiter(s) (100 mL/Hr) IV Continuous <Continuous>    MEDICATIONS  (PRN):  acetaminophen     Tablet .. 650 milliGRAM(s) Oral every 6 hours PRN Temp greater or equal to 38C (100.4F), Mild Pain (1 - 3)  aluminum hydroxide/magnesium hydroxide/simethicone Suspension 30 milliLiter(s) Oral every 4 hours PRN Dyspepsia  calcium carbonate    500 mG (Tums) Chewable 1 Tablet(s) Chew three times a day PRN acid reflux  famotidine    Tablet 20 milliGRAM(s) Oral daily PRN acid reflux  melatonin 3 milliGRAM(s) Oral at bedtime PRN Insomnia  ondansetron Injectable 4 milliGRAM(s) IV Push every 8 hours PRN Nausea and/or Vomiting  pancrelipase  (CREON 36,000 Lipase Units) 1 Capsule(s) Oral two times a day with meals PRN with snacks      PHYSICAL EXAM:  Gen: No acute distress  HEENT:  Normocephalic/Atraumatic  CV:  +S1, +S2, regular, no murmurs or rubs  RESP:   lungs clear to auscultation bilaterally, no wheezing, rales, rhonchi  GI:  abdomen soft, non-tender, non-distended. +Internal/External Biliary drain  EXT:  no clubbing, no cyanosis, no edema  NEURO:  No focal neurological deficits    LABS:                            9.0    11.08 )-----------( 274      ( 24 Apr 2025 09:13 )             27.8     04-24    128[L]  |  104  |  34[H]  ----------------------------<  110[H]  4.2   |  16[L]  |  0.95    Ca    9.1      24 Apr 2025 09:13    TPro  6.5  /  Alb  2.5[L]  /  TBili  2.5[H]  /  DBili  x   /  AST  20  /  ALT  47  /  AlkPhos  639[H]  04-24        LIVER FUNCTIONS - ( 24 Apr 2025 09:13 )  Alb: 2.5 g/dL / Pro: 6.5 gm/dL / ALK PHOS: 639 U/L / ALT: 47 U/L / AST: 20 U/L / GGT: x           PT/INR - ( 23 Apr 2025 21:34 )   PT: 12.4 sec;   INR: 1.05 ratio         PTT - ( 23 Apr 2025 21:34 )  PTT:33.4 sec  Urinalysis Basic - ( 24 Apr 2025 09:13 )    Color: x / Appearance: x / SG: x / pH: x  Gluc: 110 mg/dL / Ketone: x  / Bili: x / Urobili: x   Blood: x / Protein: x / Nitrite: x   Leuk Esterase: x / RBC: x / WBC x   Sq Epi: x / Non Sq Epi: x / Bacteria: x        Lactate, Blood: 1.4 mmol/L (04-23 @ 21:34)        CULTURES:  Blood, Urine: Negative (04-24 @ 04:28)      Additional results/Imaging, I have personally reviewed:    Telemetry, personally reviewed:

## 2025-04-28 ENCOUNTER — TRANSCRIPTION ENCOUNTER (OUTPATIENT)
Age: 75
End: 2025-04-28

## 2025-04-28 LAB
ALBUMIN SERPL ELPH-MCNC: 2.2 G/DL — LOW (ref 3.3–5)
ALP SERPL-CCNC: 347 U/L — HIGH (ref 40–120)
ALT FLD-CCNC: 29 U/L — SIGNIFICANT CHANGE UP (ref 12–78)
ANION GAP SERPL CALC-SCNC: 10 MMOL/L — SIGNIFICANT CHANGE UP (ref 5–17)
AST SERPL-CCNC: 15 U/L — SIGNIFICANT CHANGE UP (ref 15–37)
BILIRUB SERPL-MCNC: 1.6 MG/DL — HIGH (ref 0.2–1.2)
BUN SERPL-MCNC: 29 MG/DL — HIGH (ref 7–23)
CALCIUM SERPL-MCNC: 8.9 MG/DL — SIGNIFICANT CHANGE UP (ref 8.5–10.1)
CHLORIDE SERPL-SCNC: 99 MMOL/L — SIGNIFICANT CHANGE UP (ref 96–108)
CO2 SERPL-SCNC: 20 MMOL/L — LOW (ref 22–31)
CREAT SERPL-MCNC: 0.83 MG/DL — SIGNIFICANT CHANGE UP (ref 0.5–1.3)
EGFR: 91 ML/MIN/1.73M2 — SIGNIFICANT CHANGE UP
EGFR: 91 ML/MIN/1.73M2 — SIGNIFICANT CHANGE UP
GLUCOSE SERPL-MCNC: 129 MG/DL — HIGH (ref 70–99)
HCT VFR BLD CALC: 26.1 % — LOW (ref 39–50)
HGB BLD-MCNC: 8.5 G/DL — LOW (ref 13–17)
MCHC RBC-ENTMCNC: 28.4 PG — SIGNIFICANT CHANGE UP (ref 27–34)
MCHC RBC-ENTMCNC: 32.6 G/DL — SIGNIFICANT CHANGE UP (ref 32–36)
MCV RBC AUTO: 87.3 FL — SIGNIFICANT CHANGE UP (ref 80–100)
NRBC # BLD AUTO: 0 K/UL — SIGNIFICANT CHANGE UP (ref 0–0)
NRBC # FLD: 0 K/UL — SIGNIFICANT CHANGE UP (ref 0–0)
NRBC BLD AUTO-RTO: 0 /100 WBCS — SIGNIFICANT CHANGE UP (ref 0–0)
PLATELET # BLD AUTO: 219 K/UL — SIGNIFICANT CHANGE UP (ref 150–400)
PMV BLD: 9.7 FL — SIGNIFICANT CHANGE UP (ref 7–13)
POTASSIUM SERPL-MCNC: 3.8 MMOL/L — SIGNIFICANT CHANGE UP (ref 3.5–5.3)
POTASSIUM SERPL-SCNC: 3.8 MMOL/L — SIGNIFICANT CHANGE UP (ref 3.5–5.3)
PROT SERPL-MCNC: 6 GM/DL — SIGNIFICANT CHANGE UP (ref 6–8.3)
RBC # BLD: 2.99 M/UL — LOW (ref 4.2–5.8)
RBC # FLD: 17.2 % — HIGH (ref 10.3–14.5)
SODIUM SERPL-SCNC: 129 MMOL/L — LOW (ref 135–145)
WBC # BLD: 8.52 K/UL — SIGNIFICANT CHANGE UP (ref 3.8–10.5)
WBC # FLD AUTO: 8.52 K/UL — SIGNIFICANT CHANGE UP (ref 3.8–10.5)

## 2025-04-28 PROCEDURE — 99233 SBSQ HOSP IP/OBS HIGH 50: CPT

## 2025-04-28 PROCEDURE — 99231 SBSQ HOSP IP/OBS SF/LOW 25: CPT | Mod: FS

## 2025-04-28 PROCEDURE — 99232 SBSQ HOSP IP/OBS MODERATE 35: CPT

## 2025-04-28 RX ORDER — COLESEVELAM HYDROCHLORIDE 3.75 G/1
1250 FOR SUSPENSION ORAL DAILY
Refills: 0 | Status: DISCONTINUED | OUTPATIENT
Start: 2025-04-28 | End: 2025-04-29

## 2025-04-28 RX ORDER — POLYETHYLENE GLYCOL 3350 17 G/17G
17 POWDER, FOR SOLUTION ORAL DAILY
Refills: 0 | Status: DISCONTINUED | OUTPATIENT
Start: 2025-04-28 | End: 2025-04-29

## 2025-04-28 RX ORDER — COLESEVELAM HYDROCHLORIDE 3.75 G/1
625 FOR SUSPENSION ORAL AT BEDTIME
Refills: 0 | Status: DISCONTINUED | OUTPATIENT
Start: 2025-04-28 | End: 2025-04-29

## 2025-04-28 RX ADMIN — CEFTRIAXONE 2000 MILLIGRAM(S): 500 INJECTION, POWDER, FOR SOLUTION INTRAMUSCULAR; INTRAVENOUS at 12:43

## 2025-04-28 RX ADMIN — Medication 650 MILLIGRAM(S): at 00:48

## 2025-04-28 RX ADMIN — Medication 3 CAPSULE(S): at 18:47

## 2025-04-28 RX ADMIN — Medication 3 CAPSULE(S): at 13:22

## 2025-04-28 RX ADMIN — Medication 100 MILLILITER(S): at 01:28

## 2025-04-28 RX ADMIN — Medication 20 MILLIGRAM(S): at 01:39

## 2025-04-28 RX ADMIN — Medication 100 MILLIGRAM(S): at 05:44

## 2025-04-28 RX ADMIN — Medication 100 MILLIGRAM(S): at 17:57

## 2025-04-28 RX ADMIN — Medication 650 MILLIGRAM(S): at 00:07

## 2025-04-28 RX ADMIN — Medication 3 CAPSULE(S): at 07:59

## 2025-04-28 NOTE — PROGRESS NOTE ADULT - ASSESSMENT
74 year old male with PMH of metastatic cholangiocarcinoma s/p Whipple and also s/p recent biliary stent and external biliary drain placement who presented from [facility] due to hypotension and concerns for sepsis.    # metastatic cholangiocarcinoma  - [place] [facility] [place] Morrell- last seen 4/23/25   - started on first line chemo with gem/cis/durva since 10/10/24  - recent CT At [facility] compared with 4/1/25 showed few increased and multiple new low attenuating lesions w/in right posterior hepatic lobe suspicious for microabscesses in context of mod biliary dilatation-   - at clinic Na 120 and sent in to hospital (today 127 no salt tabs)  - plan as per pt was to continue with durvalumab on discharge  - will f/u with [facility] [place]     # biliary drain clogged- now resolved  - Recent failed ERCP and had biliary drain placed 4/13 that grew kleb and was dc on augmentin in the past.   - In ER, WBC 12.29, Hb 9.2, plt 353, Na 126, Cr 1.24, bili 2.6 w/ nl lactate, UA neg, Bcx NTD  - CT a/p w/ contrast- New internal/external biliary stent traversing a 4.2 cm mass at the level of the omega hepatis. Increase in intrahepatic biliary ductal dilatation since prior examination. New cystic lesions in the right hepatic lobe may reflect developing abscesses. Mass at the omega hepatis keeping with the clinical history of cholangiocarcinoma, stable to slightly decreased in size. A new lesion in segment IVb measuring up to 2.0 cm is suspicious for new metastasis when compared with 11/12/24 scans.   - Pt currently on ceftriaxone and flagyl - ID following   - IR consulted for drain eval - performed evaluation and I/E demonstrated no passage of contrast via drain representing a clogged drain for which it was upsized.   - 4/25/25 had cholangiography through 8.5 F left biliary drain w/ high grade stenosis/obstruction and thus was replaced with new 12F internal/external drain with good flow by IR   - had 2L drainage yest 4/26  - DRAIN NOW CAPPED w/ plan for trial today     # n/v- resolved  - tolerated diet yesterday- has appetite and feeling better  - pt discussed w/ nutrition  - today WBC 8.5, Hb 8.5, plt 219, bcx ntd. vss on RA.     # hyponatremia  - Na 127-->130-->129 today  - now on salt tabs     will follow daily and communicate with [facility]    Tumor Depth: Less than 6mm from granular layer and no invasion beyond the subcutaneous fat

## 2025-04-28 NOTE — DISCHARGE NOTE NURSING/CASE MANAGEMENT/SOCIAL WORK - FINANCIAL ASSISTANCE
[facility] provides services at a reduced cost to those who are determined to be eligible through [facility]’s financial assistance program. Information regarding [facility]’s financial assistance program can be found by going to [email or web address] or by calling [phone].

## 2025-04-28 NOTE — DISCHARGE NOTE NURSING/CASE MANAGEMENT/SOCIAL WORK - PATIENT PORTAL LINK FT
You can access the FollowMyHealth Patient Portal offered by [facility] by registering at the following website: [email or web address]. By joining [employer]’s FollowMyHealth portal, you will also be able to view your health information using other applications (apps) compatible with our system.

## 2025-04-28 NOTE — PROGRESS NOTE ADULT - ASSESSMENT
74 year old male with PMH of metastatic cholangiocarcinoma s/p Whipple and also s/p recent biliary stent and external biliary drain placement who [place] [facility] [place] to [place] and concerns for sepsis. The patient was recently admitted at [facility] [place] after a failed ERCP, and during this admission had an internal-external biliary drain placed on 4/13. Biliary cultures grew Klebsiella and his Zosyn was transitioned to Augmentin on discharge. He has not been on chemotherapy for about one month. The patient notes that he has been having high output from the biliary drain, up to 2L per day, and has not been able to keep up with his fluid intake. At an outpatient [facility] appointment he was noted to be persistently hypotensive therefore he was sent to the ER.     #Clogged I/E Biliary Drain  #Metastatic cholangiocarcinoma with biliary obstruction   #Distal extrahepatic cholangiocarcinoma, AJCC Stage IIA  #Ampullary NET AJCC Stage 1B  S/p recent failed ERCP followed by internal-external biliary drain placement at [facility] on 4/13. During this admission, cholangiography through existing 8.5F left biliary drain revealed high grade stenosis/obstruction of CBD with no antegrade passage of contrast thought indwelling drain into duodenum  - Heme/Onc input appreciated  - IR consulted for management of Biliary drain; S/p New 12F internal/external biliary drain placed across CBD stenosis/obstruction into duodenum on 4/25.  - Patient still having 2L of biliary drain output on 4/26--> IR input appreciated; Capping trial of Biliary Drain  - F/u CMP in the AM  - Will need f/u with Dr. Encinas as an outpatient for further evaluation/monitoring    #Sepsis possibly 2/2 Acute Cholangitis   Patient with hypotension in the setting of suspected biliary infection as well as hypovolemia from high drain output. Patient noted to be tachycardic with an elevated WBC on admission.  Lactic normal at 1.4  - f/u Blood Cx with NGTD  - ID input appreciated; Cont on Rocephin/Flagyl (Day #5)-->Will transition to PO Cipro 500 mg q12h x 10 days upon d/c    #Hypovolemic Hyponatremia, improving  #Non-Anion Gap Metabolic Acidosis  Likely 2/2 increased output from biliary drain due to clogging, SIADH  - s/p IVF hydration  - Salt Tabs  - Monitor BMP    #Functional Deconditioning  Rolling Walker:  Patient requires rolling walker due to weakness from underlying metastatic cholangiocarcinoma s/p Whipple for safe ambulation at discharge.    DVT ppx: SCD's, ambulation  GI ppx: Pepcid    Dispo: Anticipate D/C tomorrow pending successful capping trial, continued improvement of LFTs. 74 year old male with PMH of metastatic cholangiocarcinoma s/p Whipple and also s/p recent biliary stent and external biliary drain placement who presented from [facility] due to hypotension and concerns for sepsis. The patient was recently admitted at [facility] in [place] after a failed ERCP, and during this admission had an internal-external biliary drain placed on 4/13. Biliary cultures grew Klebsiella and his Zosyn was transitioned to Augmentin on discharge. He has not been on chemotherapy for about one month. The patient notes that he has been having high output from the biliary drain, up to 2L per day, and has not been able to keep up with his fluid intake. At an outpatient [facility] appointment he was noted to be persistently hypotensive therefore he was sent to the ER.     #Clogged I/E Biliary Drain  #Metastatic cholangiocarcinoma with biliary obstruction   #Distal extrahepatic cholangiocarcinoma, AJCC Stage IIA  #Ampullary NET AJCC Stage 1B  S/p recent failed ERCP followed by internal-external biliary drain placement at [facility] on 4/13. During this admission, cholangiography through existing 8.5F left biliary drain revealed high grade stenosis/obstruction of CBD with no antegrade passage of contrast thought indwelling drain into duodenum  - Heme/Onc input appreciated  - IR consulted for management of Biliary drain; S/p New 12F internal/external biliary drain placed across CBD stenosis/obstruction into duodenum on 4/25.  - Patient still having 2L of biliary drain output on 4/26--> IR input appreciated; Capping trial of Biliary Drain  - F/u CMP in the AM  - Will need f/u with Dr. Encinas as an outpatient for further evaluation/monitoring    #Sepsis possibly 2/2 Acute Cholangitis   Patient with hypotension in the setting of suspected biliary infection as well as hypovolemia from high drain output. Patient noted to be tachycardic with an elevated WBC on admission.  Lactic normal at 1.4  - f/u Blood Cx with NGTD  - ID input appreciated; Cont on Rocephin/Flagyl (Day #5)-->Will transition to PO Cipro 500 mg q12h x 10 days upon d/c    #Hypovolemic Hyponatremia, improving  #Non-Anion Gap Metabolic Acidosis  Likely 2/2 increased output from biliary drain due to clogging, SIADH  - s/p IVF hydration  - Salt Tabs  - Monitor BMP    #Functional Deconditioning  Rolling Walker:  Patient requires rolling walker due to weakness from underlying metastatic cholangiocarcinoma s/p Whipple for safe ambulation at discharge. It also helps with MRADL's.    DVT ppx: SCD's, ambulation  GI ppx: Pepcid    Dispo: Anticipate D/C tomorrow pending successful capping trial, continued improvement of LFTs.

## 2025-04-28 NOTE — PROGRESS NOTE ADULT - SUBJECTIVE AND OBJECTIVE BOX
HOSPITALIST ATTENDING PROGRESS NOTE    Chart and meds reviewed.      Subjective:  Patient seen and examined at the bedside. States that he is feeling better with improved appetite this morning. Biliary drain has been capped.    All other systems reviewed and found to be negative with the exception of what has been described above.    MEDICATIONS  (STANDING):  cefTRIAXone Injectable. 2000 milliGRAM(s) IV Push every 24 hours  metroNIDAZOLE  IVPB 500 milliGRAM(s) IV Intermittent every 12 hours  pancrelipase  (CREON 36,000 Lipase Units) 3 Capsule(s) Oral three times a day with meals  sodium chloride 0.9%. 1000 milliLiter(s) (100 mL/Hr) IV Continuous <Continuous>    MEDICATIONS  (PRN):  acetaminophen     Tablet .. 650 milliGRAM(s) Oral every 6 hours PRN Temp greater or equal to 38C (100.4F), Mild Pain (1 - 3)  aluminum hydroxide/magnesium hydroxide/simethicone Suspension 30 milliLiter(s) Oral every 4 hours PRN Dyspepsia  calcium carbonate    500 mG (Tums) Chewable 1 Tablet(s) Chew three times a day PRN acid reflux  famotidine    Tablet 20 milliGRAM(s) Oral daily PRN acid reflux  melatonin 3 milliGRAM(s) Oral at bedtime PRN Insomnia  ondansetron Injectable 4 milliGRAM(s) IV Push every 8 hours PRN Nausea and/or Vomiting  pancrelipase  (CREON 36,000 Lipase Units) 1 Capsule(s) Oral two times a day with meals PRN with snacks      PHYSICAL EXAM:  Gen: No acute distress  HEENT:  Normocephalic/Atraumatic  CV:  +S1, +S2, regular, no murmurs or rubs  RESP:   lungs clear to auscultation bilaterally, no wheezing, rales, rhonchi  GI:  abdomen soft, non-tender, non-distended. +Internal/External Biliary drain  EXT:  no clubbing, no cyanosis, no edema  NEURO:  No focal neurological deficits    LABS:                            9.0    11.08 )-----------( 274      ( 24 Apr 2025 09:13 )             27.8     04-24    128[L]  |  104  |  34[H]  ----------------------------<  110[H]  4.2   |  16[L]  |  0.95    Ca    9.1      24 Apr 2025 09:13    TPro  6.5  /  Alb  2.5[L]  /  TBili  2.5[H]  /  DBili  x   /  AST  20  /  ALT  47  /  AlkPhos  639[H]  04-24        LIVER FUNCTIONS - ( 24 Apr 2025 09:13 )  Alb: 2.5 g/dL / Pro: 6.5 gm/dL / ALK PHOS: 639 U/L / ALT: 47 U/L / AST: 20 U/L / GGT: x           PT/INR - ( 23 Apr 2025 21:34 )   PT: 12.4 sec;   INR: 1.05 ratio         PTT - ( 23 Apr 2025 21:34 )  PTT:33.4 sec  Urinalysis Basic - ( 24 Apr 2025 09:13 )    Color: x / Appearance: x / SG: x / pH: x  Gluc: 110 mg/dL / Ketone: x  / Bili: x / Urobili: x   Blood: x / Protein: x / Nitrite: x   Leuk Esterase: x / RBC: x / WBC x   Sq Epi: x / Non Sq Epi: x / Bacteria: x        Lactate, Blood: 1.4 mmol/L (04-23 @ 21:34)        CULTURES:  Blood, Urine: Negative (04-24 @ 04:28)      Additional results/Imaging, I have personally reviewed:    Telemetry, personally reviewed:

## 2025-04-28 NOTE — DISCHARGE NOTE NURSING/CASE MANAGEMENT/SOCIAL WORK - NSDCPEFALRISK_GEN_ALL_CORE
For information on Fall & Injury Prevention, visit: [email or web address] OR  [email or web address] OR  https://www.cdc.gov/steadi/patient.html

## 2025-04-28 NOTE — PROGRESS NOTE ADULT - NS ATTEND AMEND GEN_ALL_CORE FT
74-year-old M with whipple and biliary/anastomotic stricture s/p internal/external IR drains, admitted with sepsis and high output from drain now s/p exchange and upsizing of drains with improvement.     Patient is being discharged and drains working well so will not keep him for any further procedures.   Can discuss with IR potential internalization of drains if any further issues.   He is also an [facility] patient (had ERCP there), can also follow up with [facility] for any of these issues.   Will discuss with IR.

## 2025-04-28 NOTE — PROGRESS NOTE ADULT - SUBJECTIVE AND OBJECTIVE BOX
Patient is a 74y old  Male who presents with a chief complaint of Hypotension (28 Apr 2025 14:47).    74-year-old M with PMHx metastatic cholangiocarcinoma s/p whipple and s/p recent biliary stent and external biliary drain placement presented from [facility] d/t hypotension/concerns for sepsis.     Patient had biliary stent upsizing 4/25 with MD Oviedo. Reports much symptom improvement s/p procedure. Tolerating PO intake, BMs normal.           MEDICATIONS  (STANDING):  cefTRIAXone Injectable. 2000 milliGRAM(s) IV Push every 24 hours  colesevelam 1250 milliGRAM(s) Oral daily  colesevelam 625 milliGRAM(s) Oral at bedtime  metroNIDAZOLE  IVPB 500 milliGRAM(s) IV Intermittent every 12 hours  pancrelipase  (CREON 36,000 Lipase Units) 3 Capsule(s) Oral three times a day with meals  sodium bicarbonate 650 milliGRAM(s) Oral two times a day  sodium chloride 1 Gram(s) Oral two times a day    MEDICATIONS  (PRN):  acetaminophen     Tablet .. 650 milliGRAM(s) Oral every 6 hours PRN Temp greater or equal to 38C (100.4F), Mild Pain (1 - 3)  aluminum hydroxide/magnesium hydroxide/simethicone Suspension 30 milliLiter(s) Oral every 4 hours PRN Dyspepsia  calcium carbonate    500 mG (Tums) Chewable 1 Tablet(s) Chew three times a day PRN acid reflux  famotidine    Tablet 20 milliGRAM(s) Oral daily PRN acid reflux  melatonin 3 milliGRAM(s) Oral at bedtime PRN Insomnia  ondansetron Injectable 4 milliGRAM(s) IV Push every 8 hours PRN Nausea and/or Vomiting  pancrelipase  (CREON 36,000 Lipase Units) 1 Capsule(s) Oral two times a day with meals PRN with snacks  polyethylene glycol 3350 17 Gram(s) Oral daily PRN Constipation      Vital Signs Last 24 Hrs  T(C): 37.1 (28 Apr 2025 07:37), Max: 37.1 (28 Apr 2025 07:37)  T(F): 98.8 (28 Apr 2025 07:37), Max: 98.8 (28 Apr 2025 07:37)  HR: 92 (28 Apr 2025 07:37) (77 - 92)  BP: 112/72 (28 Apr 2025 07:37) (100/59 - 114/80)  BP(mean): --  RR: 18 (28 Apr 2025 07:37) (17 - 18)  SpO2: 100% (28 Apr 2025 07:37) (100% - 100%)    Parameters below as of 28 Apr 2025 07:37  Patient On (Oxygen Delivery Method): room air        PHYSICAL EXAM:    Constitutional: No acute distress, well-developed, non-toxic appearing  HEENT: masked, good phonation, not icteric  Neck: supple, no lymphadenopathy  Respiratory: no audible wheezing  Cardiovascular: regular rate and rhythm  Gastrointestinal: soft, non-tender, non-distended, +bowel sounds, no rebound or guarding, external bili drain present LUQ- capped, site clean and dry   Extremities: No peripheral edema, no cyanosis or clubbing  Vascular: 2+ peripheral pulses, no venous stasis  Neurological: A/O x 3, no focal deficits, no asterixis  Psychiatric: Normal mood, normal affect  Skin: No rashes, not jaundiced    LABS:                        8.5    8.52  )-----------( 219      ( 28 Apr 2025 07:17 )             26.1     04-28    129[L]  |  99  |  29[H]  ----------------------------<  129[H]  3.8   |  20[L]  |  0.83    Ca    8.9      28 Apr 2025 07:17    TPro  6.0  /  Alb  2.2[L]  /  TBili  1.6[H]  /  DBili  x   /  AST  15  /  ALT  29  /  AlkPhos  347[H]  04-28      LIVER FUNCTIONS - ( 28 Apr 2025 07:17 )  Alb: 2.2 g/dL / Pro: 6.0 gm/dL / ALK PHOS: 347 U/L / ALT: 29 U/L / AST: 15 U/L / GGT: x             RADIOLOGY & ADDITIONAL STUDIES:     Patient is a 74y old  Male who presents with a chief complaint of Hypotension (28 Apr 2025 14:47).    Follow up for: biliary sepsis    74-year-old M with PMHx metastatic cholangiocarcinoma s/p whipple and s/p recent biliary stent and external biliary drain placement presented from [facility] d/t hypotension/concerns for sepsis. Also with high output from external biliary drain.    We were called to evaluate patient for potential internalization of drains. Patient's drains were changed and patient had biliary stent upsizing 4/25 with IR, Dr. Oviedo. Reports much symptom improvement s/p procedure. Doing well from sepsis standpoint. Tolerating PO intake, BMs normal.           MEDICATIONS  (STANDING):  cefTRIAXone Injectable. 2000 milliGRAM(s) IV Push every 24 hours  colesevelam 1250 milliGRAM(s) Oral daily  colesevelam 625 milliGRAM(s) Oral at bedtime  metroNIDAZOLE  IVPB 500 milliGRAM(s) IV Intermittent every 12 hours  pancrelipase  (CREON 36,000 Lipase Units) 3 Capsule(s) Oral three times a day with meals  sodium bicarbonate 650 milliGRAM(s) Oral two times a day  sodium chloride 1 Gram(s) Oral two times a day    MEDICATIONS  (PRN):  acetaminophen     Tablet .. 650 milliGRAM(s) Oral every 6 hours PRN Temp greater or equal to 38C (100.4F), Mild Pain (1 - 3)  aluminum hydroxide/magnesium hydroxide/simethicone Suspension 30 milliLiter(s) Oral every 4 hours PRN Dyspepsia  calcium carbonate    500 mG (Tums) Chewable 1 Tablet(s) Chew three times a day PRN acid reflux  famotidine    Tablet 20 milliGRAM(s) Oral daily PRN acid reflux  melatonin 3 milliGRAM(s) Oral at bedtime PRN Insomnia  ondansetron Injectable 4 milliGRAM(s) IV Push every 8 hours PRN Nausea and/or Vomiting  pancrelipase  (CREON 36,000 Lipase Units) 1 Capsule(s) Oral two times a day with meals PRN with snacks  polyethylene glycol 3350 17 Gram(s) Oral daily PRN Constipation      Vital Signs Last 24 Hrs  T(C): 37.1 (28 Apr 2025 07:37), Max: 37.1 (28 Apr 2025 07:37)  T(F): 98.8 (28 Apr 2025 07:37), Max: 98.8 (28 Apr 2025 07:37)  HR: 92 (28 Apr 2025 07:37) (77 - 92)  BP: 112/72 (28 Apr 2025 07:37) (100/59 - 114/80)  BP(mean): --  RR: 18 (28 Apr 2025 07:37) (17 - 18)  SpO2: 100% (28 Apr 2025 07:37) (100% - 100%)    Parameters below as of 28 Apr 2025 07:37  Patient On (Oxygen Delivery Method): room air        PHYSICAL EXAM:    Constitutional: No acute distress, well-developed, non-toxic appearing  HEENT: unmasked, good phonation, not icteric  Neck: supple, no lymphadenopathy  Respiratory: no audible wheezing  Cardiovascular: regular rate and rhythm  Gastrointestinal: soft, non-tender, non-distended, +bowel sounds, no rebound or guarding, external bili drain present LUQ- capped, site clean and dry, +surgical scars  Extremities: No peripheral edema, no cyanosis or clubbing  Vascular: 2+ peripheral pulses, no venous stasis  Neurological: A/O x 3, no focal deficits, no asterixis  Psychiatric: Normal mood, normal affect  Skin: No rashes, not jaundiced    LABS:                        8.5    8.52  )-----------( 219      ( 28 Apr 2025 07:17 )             26.1     04-28    129[L]  |  99  |  29[H]  ----------------------------<  129[H]  3.8   |  20[L]  |  0.83    Ca    8.9      28 Apr 2025 07:17    TPro  6.0  /  Alb  2.2[L]  /  TBili  1.6[H]  /  DBili  x   /  AST  15  /  ALT  29  /  AlkPhos  347[H]  04-28      LIVER FUNCTIONS - ( 28 Apr 2025 07:17 )  Alb: 2.2 g/dL / Pro: 6.0 gm/dL / ALK PHOS: 347 U/L / ALT: 29 U/L / AST: 15 U/L / GGT: x

## 2025-04-28 NOTE — PROGRESS NOTE ADULT - SUBJECTIVE AND OBJECTIVE BOX
Date of service: 04-28-25 @ 14:47    Lying in bed in NAD  Biliary drain adjusted  This AM biliary drain was capped  No fever    ROS: no fever or chills; denies dizziness, no HA, no SOB or cough, no abdominal pain, no diarrhea or constipation; no dysuria, no legs pain, no rashes    MEDICATIONS  (STANDING):  cefTRIAXone Injectable. 2000 milliGRAM(s) IV Push every 24 hours  colesevelam 1250 milliGRAM(s) Oral daily  colesevelam 625 milliGRAM(s) Oral at bedtime  metroNIDAZOLE  IVPB 500 milliGRAM(s) IV Intermittent every 12 hours  pancrelipase  (CREON 36,000 Lipase Units) 3 Capsule(s) Oral three times a day with meals  sodium bicarbonate 650 milliGRAM(s) Oral two times a day  sodium chloride 1 Gram(s) Oral two times a day    Vital Signs Last 24 Hrs  T(C): 37.1 (28 Apr 2025 07:37), Max: 37.1 (28 Apr 2025 07:37)  T(F): 98.8 (28 Apr 2025 07:37), Max: 98.8 (28 Apr 2025 07:37)  HR: 92 (28 Apr 2025 07:37) (77 - 92)  BP: 112/72 (28 Apr 2025 07:37) (100/59 - 114/80)  BP(mean): --  RR: 18 (28 Apr 2025 07:37) (17 - 18)  SpO2: 100% (28 Apr 2025 07:37) (100% - 100%)    Parameters below as of 28 Apr 2025 07:37  Patient On (Oxygen Delivery Method): room air     Physical exam:    GEN: Thin, muscle wasting, no acute distress  HEENT:  Pupils equal and reactive, no oropharyngeal lesions, erythema, exudates, oral thrush  NECK:  Supple, no palpable lymph nodes, no thyromegaly  CV:  Regular rate and rhythm, +S1, +S2, no murmurs or rubs. Right anterior chest port.   PULM: Lungs clear to auscultation bilaterally, no wheezing, rales, rhonchi  GI:  Abdomen soft, mid abdomen tender, non-distended,  no palpable masses.   LUQ biliary drain.  [facility]:  Muscle wasting. No rigidity, no contractions  EXT:  No clubbing, no cyanosis, no edema, no  swelling or erythema  VASCULAR:  pulses equal and symmetric in the upper and lower extremities  NEURO:  AAOX3, no focal neurological deficits, follows all commands, able to move extremities spontaneously  SKIN:  no ulcers, lesions or rashes    Labs: reviewed                        8.5    8.52  )-----------( 219      ( 28 Apr 2025 07:17 )             26.1     04-28    129[L]  |  99  |  29[H]  ----------------------------<  129[H]  3.8   |  20[L]  |  0.83    Ca    8.9      28 Apr 2025 07:17    TPro  6.0  /  Alb  2.2[L]  /  TBili  1.6[H]  /  DBili  x   /  AST  15  /  ALT  29  /  AlkPhos  347[H]  04-28                        8.9    9.97  )-----------( 236      ( 25 Apr 2025 06:16 )             27.4     04-25    128[L]  |  104  |  30[H]  ----------------------------<  126[H]  3.8   |  16[L]  |  0.90    Ca    9.4      25 Apr 2025 06:16    TPro  6.7  /  Alb  2.4[L]  /  TBili  2.3[H]  /  DBili  x   /  AST  23  /  ALT  43  /  AlkPhos  555[H]  04-25                        9.2    12.29 )-----------( 353      ( 23 Apr 2025 21:34 )             28.2     WBC Trend  12.29[H] Date (04-23 @ 21:34)      Chem  04-23    126[L]  |  96  |  45[H]  ----------------------------<  131[H]  4.5   |  21[L]  |  1.24    Ca    9.4      23 Apr 2025 21:34    TPro  7.3  /  Alb  2.7[L]  /  TBili  2.6[H]  /  DBili  x   /  AST  26  /  ALT  55  /  AlkPhos  740[H]  04-23    Outpatient biliary culture reviewed: KLOX S to ceftriaxone and cipro  Urinalysis with Rflx Culture (collected 24 Apr 2025 04:28)    Urinalysis with Rflx Culture (collected 24 Apr 2025 04:28)    Culture - Blood (collected 23 Apr 2025 21:36)  Source: Blood None  Preliminary Report (28 Apr 2025 02:00):    No growth at 4 days    Culture - Blood (collected 23 Apr 2025 21:34)  Source: Blood None  Preliminary Report (28 Apr 2025 02:00):    No growth at 4 days    Radiology:  < from: CT Abdomen and Pelvis w/ IV Cont (04.24.25 @ 00:35) >  *  New internal/external biliary stent traversing a 4.2 cm mass at the level of the omega hepatis.  *  Increase in intrahepatic biliary ductal dilatation since prior examination. Correlate for clinical laboratory findingsof stent dysfunction.  *  New cystic lesions in the right hepatic lobe may reflect developing abscesses.  *  Mass at the omega hepatis keeping with the clinical history of cholangiocarcinoma, stable to slightly decreased in size.  *  A new lesion in segment IVb measuring up to 2.0 cm is suspicious for new metastasis.  < end of copied text >

## 2025-04-28 NOTE — PROGRESS NOTE ADULT - ASSESSMENT
74-year-old male with h/o metastatic cholangiocarcinoma s/p Whipple, recent biliary stent and external biliary drain placement was admitted on 4/23 from [facility] for hypotension and concerns for sepsis. The patient was recently admitted at [facility] in [place] after a failed Endoscopic Retrograde Cholangiopancreatography (ERCP) and during that admission had an internal-external biliary drain placed on 4/13. Biliary cultures grew Klebsiella and his Zosyn was transitioned to Augmentin on discharge. He has not been on chemotherapy for about one month. The patient notes that he has been having high output from the biliary drain, up to 2L per day, and has not been able to keep up with his fluid intake. At an outpatient [facility] appointment he was noted to be persistently hypotensive therefore he was sent to the ER. Pt received cefepime and metronidazole.     #Hypotension and probable sepsis resolved.  #Probable acute cholangitis. Prior infection with KL spp  #Metastatic cholangiocarcinoma s/p Whipple and biliary drain  #Leukocytosis improving  #Immunocompromised patient   #ARF on CRF stage 3.  -renal function improving  -biliary stent seems patent at this time  -Blood Cx 2 noted  -on ceftriaxone 2 gm IV qd and metronidazole 500 mg IV q12h # 5  -tolerating abx well so far; no side effects noted  -IR evaluation for biliary stent assessment appreciated   -f/u cultures  -continue abx coverage; plan to change to PO soon  -monitor temps  -f/u CBC  -supportive care  2. Other issues:   -care per medicine    Clinical team may change from intravenous to oral antibiotics when the following criteria are met:   1. Patient is clinically improving/stable       a)	Improved signs and symptoms of infection from initial presentation       b)	Afebrile for 24 hours       c)	Leukocytosis trending towards normal range   2. Patient is tolerating oral intake   3. Initial/repeat blood cultures are negative     When above criteria met may change iv antibiotics to cipro 500 mg PO q1h for 10 more days

## 2025-04-28 NOTE — PROGRESS NOTE ADULT - ASSESSMENT
74-year-old M with external bili drain.    Imp:  1. External bili drain, patient doing clinically well    Rec:   :: Per IR recs patient with capped biliary drain, tolerating well  :: Can consider rendezvous for internal stent with MD Encinas OP if output remains high, no need for inpatient endoscopic evaluation at this time     74-year-old M with whipple and biliary/anastomotic stricture s/p internal/external IR drains, admitted with sepsis and high output from drain now s/p exchange and upsizing of drains with improvement.     Imp:  1. External bili drain, patient doing clinically well    Rec:   :: Per IR recs patient with capped biliary drain, tolerating well  :: Can consider rendezvous for internal stent with MD Encinas OP if output remains high, no need for inpatient endoscopic evaluation at this time

## 2025-04-28 NOTE — PROGRESS NOTE ADULT - SUBJECTIVE AND OBJECTIVE BOX
Interventional Radiology Follow-Up Note.    This is a 74y Male s/p Biliary stent upsizing on 4/25/25 interventional Radiology with Dr. Oviedo    Reports that he feels his symptoms have improved. Reports normal appetite.     Medication:     cefTRIAXone Injectable.: (04-27)  metroNIDAZOLE  IVPB: (04-28)    Vitals:   T(F): 98.2, Max: 98.2 (23:48)  HR: 80  BP: 100/59  RR: 17  SpO2: 100%    Physical Exam:  General: Nontoxic, in NAD.  Abdomen: soft, NTND.    Drain Device: Drain intact attached to gravity drain bag.  Flushed with 5cc NS w/o difficulty. Dressing clean, dry, intact.   24hr Drain output: 1725cc. Brown bilious out put.     LABS:  WBC 8.52 / Hgb 8.5 / Hct 26.1 / Plt 219  Na 129 / K 3.8 / CO2 20 / Cl 99 / BUN 29 / Cr 0.83 / Glucose 129  ALT 29 / AST 15 / Alk Phos 347 / Tbili 1.6  Ptt -- / Pt -- / INR --      Assessment/Plan:  74y Male hx of metastatic cholangiocarcinoma s/p whipple s/p recent biliary stent a/w hypotension s/p biliary drain evaluation found to have a clogged drain s/p exchange and upsizing.   Pt with high out put from the drain.   Leukocytosis resolved. Tbili trending down.     Drain capped today. Instructed pt and nurse that if he develops signs of infection, leaking at the site, increasing abdominal pain, n/v, the drain should be uncapped and attached to the provided drain bag.   - Flush drain with 10cc 3x a week   - Change dressing q3 days or when dressing is saturated.  - check CMP and cbc in am  - Continue global management per primary team  - Pt may benefit from rendezvous with GI for internal stent. Recommend evaluation by Dr. Encinas. Can plan for procedure as out put.   - Pt will benefit from VNS service to help with drainage catheter care; Pt should continue same drainage catheter care as an outpatient.  - D/w Dr. Lozano

## 2025-04-28 NOTE — PROGRESS NOTE ADULT - SUBJECTIVE AND OBJECTIVE BOX
INTERVAL HPI/OVERNIGHT EVENTS:  Patient S&E at bedside. No o/n events,   today WBC 8.5, Hb 8.5, plt 219, bcx ntd. vss on RA.   Drain capped for trial today.   Pt states he feels better today than he has in quite some time. More energy.   Tolerated regular diet yesterday  seen with ir at bedside     PAST MEDICAL & SURGICAL HISTORY:  Bile duct cancer      H/O Whipple procedure          FAMILY HISTORY:      VITAL SIGNS:  T(F): 98.8 (04-28-25 @ 07:37)  HR: 92 (04-28-25 @ 07:37)  BP: 112/72 (04-28-25 @ 07:37)  RR: 18 (04-28-25 @ 07:37)  SpO2: 100% (04-28-25 @ 07:37)  Wt(kg): --    PHYSICAL EXAM:    GENERAL: NAD, well-developed  HEAD:  Atraumatic, Normocephalic  EYES: EOMI,  sclera icterus  CHEST/LUNG: Clear to auscultation bilaterally;   HEART: Regular rate and rhythm;   ABDOMEN: abdominal drain capped, no pain  EXTREMITIES: no edema  NEUROLOGY: non-focal      MEDICATIONS  (STANDING):  cefTRIAXone Injectable. 2000 milliGRAM(s) IV Push every 24 hours  metroNIDAZOLE  IVPB 500 milliGRAM(s) IV Intermittent every 12 hours  pancrelipase  (CREON 36,000 Lipase Units) 3 Capsule(s) Oral three times a day with meals  sodium bicarbonate 650 milliGRAM(s) Oral two times a day  sodium chloride 1 Gram(s) Oral two times a day    MEDICATIONS  (PRN):  acetaminophen     Tablet .. 650 milliGRAM(s) Oral every 6 hours PRN Temp greater or equal to 38C (100.4F), Mild Pain (1 - 3)  aluminum hydroxide/magnesium hydroxide/simethicone Suspension 30 milliLiter(s) Oral every 4 hours PRN Dyspepsia  calcium carbonate    500 mG (Tums) Chewable 1 Tablet(s) Chew three times a day PRN acid reflux  famotidine    Tablet 20 milliGRAM(s) Oral daily PRN acid reflux  melatonin 3 milliGRAM(s) Oral at bedtime PRN Insomnia  ondansetron Injectable 4 milliGRAM(s) IV Push every 8 hours PRN Nausea and/or Vomiting  pancrelipase  (CREON 36,000 Lipase Units) 1 Capsule(s) Oral two times a day with meals PRN with snacks      Allergies    statins (Unknown)    Intolerances        LABS:                        8.5    8.52  )-----------( 219      ( 28 Apr 2025 07:17 )             26.1     04-28    129[L]  |  99  |  29[H]  ----------------------------<  129[H]  3.8   |  20[L]  |  0.83    Ca    8.9      28 Apr 2025 07:17    TPro  6.0  /  Alb  2.2[L]  /  TBili  1.6[H]  /  DBili  x   /  AST  15  /  ALT  29  /  AlkPhos  347[H]  04-28      Urinalysis Basic - ( 28 Apr 2025 07:17 )    Color: x / Appearance: x / SG: x / pH: x  Gluc: 129 mg/dL / Ketone: x  / Bili: x / Urobili: x   Blood: x / Protein: x / Nitrite: x   Leuk Esterase: x / RBC: x / WBC x   Sq Epi: x / Non Sq Epi: x / Bacteria: x        RADIOLOGY & ADDITIONAL TESTS:  Studies reviewed.

## 2025-04-28 NOTE — PROGRESS NOTE ADULT - TIME BILLING
Time spent  coordinating the patient's care. This includes reviewing documentation pertinent to this admission, results and imaging. Further tests, medications, and procedures have been ordered as indicated. Laboratory results and the plan of care were communicated to the patient. Discussed care plan with consultants including . Supporting documentation was completed and added to the patient's chart.

## 2025-04-28 NOTE — PROGRESS NOTE ADULT - NUTRITIONAL ASSESSMENT
This patient has been assessed with a concern for Malnutrition and has been determined to have a diagnosis/diagnoses of Severe protein-calorie malnutrition and Underweight (BMI < 19).    This patient is being managed with:   Diet Regular-  Entered: Apr 27 2025  9:06AM  
This patient has been assessed with a concern for Malnutrition and has been determined to have a diagnosis/diagnoses of Severe protein-calorie malnutrition and Underweight (BMI < 19).    This patient is being managed with:   Diet Regular-  Entered: Apr 27 2025  9:06AM  
This patient has been assessed with a concern for Malnutrition and has been determined to have a diagnosis/diagnoses of Severe protein-calorie malnutrition and Underweight (BMI < 19).    This patient is being managed with:   Diet Full Liquid-  Supplement Feeding Modality:  Oral  Ensure Enlive Cans or Servings Per Day:  1       Frequency:  Daily  Entered: Apr 26 2025  8:13AM  
This patient has been assessed with a concern for Malnutrition and has been determined to have a diagnosis/diagnoses of Severe protein-calorie malnutrition and Underweight (BMI < 19).    This patient is being managed with:   Diet NPO after Midnight-     NPO Start Date: 24-Apr-2025   NPO Start Time: 23:59  Entered: Apr 24 2025  4:34PM    Diet DASH/TLC-  Sodium & Cholesterol Restricted  Entered: Apr 24 2025  8:54AM

## 2025-04-29 ENCOUNTER — TRANSCRIPTION ENCOUNTER (OUTPATIENT)
Age: 75
End: 2025-04-29

## 2025-04-29 VITALS
TEMPERATURE: 97 F | SYSTOLIC BLOOD PRESSURE: 110 MMHG | HEART RATE: 78 BPM | OXYGEN SATURATION: 100 % | RESPIRATION RATE: 18 BRPM | DIASTOLIC BLOOD PRESSURE: 76 MMHG

## 2025-04-29 DIAGNOSIS — C22.1 INTRAHEPATIC BILE DUCT CARCINOMA: ICD-10-CM

## 2025-04-29 LAB
ALBUMIN SERPL ELPH-MCNC: 2.1 G/DL — LOW (ref 3.3–5)
ALP SERPL-CCNC: 561 U/L — HIGH (ref 40–120)
ALT FLD-CCNC: 34 U/L — SIGNIFICANT CHANGE UP (ref 12–78)
ANION GAP SERPL CALC-SCNC: 6 MMOL/L — SIGNIFICANT CHANGE UP (ref 5–17)
AST SERPL-CCNC: 27 U/L — SIGNIFICANT CHANGE UP (ref 15–37)
BILIRUB SERPL-MCNC: 1.6 MG/DL — HIGH (ref 0.2–1.2)
BUN SERPL-MCNC: 24 MG/DL — HIGH (ref 7–23)
CALCIUM SERPL-MCNC: 8.7 MG/DL — SIGNIFICANT CHANGE UP (ref 8.5–10.1)
CHLORIDE SERPL-SCNC: 100 MMOL/L — SIGNIFICANT CHANGE UP (ref 96–108)
CO2 SERPL-SCNC: 23 MMOL/L — SIGNIFICANT CHANGE UP (ref 22–31)
CREAT SERPL-MCNC: 0.85 MG/DL — SIGNIFICANT CHANGE UP (ref 0.5–1.3)
CULTURE RESULTS: SIGNIFICANT CHANGE UP
CULTURE RESULTS: SIGNIFICANT CHANGE UP
EGFR: 90 ML/MIN/1.73M2 — SIGNIFICANT CHANGE UP
EGFR: 90 ML/MIN/1.73M2 — SIGNIFICANT CHANGE UP
GLUCOSE SERPL-MCNC: 140 MG/DL — HIGH (ref 70–99)
HCT VFR BLD CALC: 23.2 % — LOW (ref 39–50)
HGB BLD-MCNC: 7.7 G/DL — LOW (ref 13–17)
MCHC RBC-ENTMCNC: 28.3 PG — SIGNIFICANT CHANGE UP (ref 27–34)
MCHC RBC-ENTMCNC: 33.2 G/DL — SIGNIFICANT CHANGE UP (ref 32–36)
MCV RBC AUTO: 85.3 FL — SIGNIFICANT CHANGE UP (ref 80–100)
NRBC # BLD AUTO: 0 K/UL — SIGNIFICANT CHANGE UP (ref 0–0)
NRBC # FLD: 0 K/UL — SIGNIFICANT CHANGE UP (ref 0–0)
NRBC BLD AUTO-RTO: 0 /100 WBCS — SIGNIFICANT CHANGE UP (ref 0–0)
PLATELET # BLD AUTO: 191 K/UL — SIGNIFICANT CHANGE UP (ref 150–400)
PMV BLD: 9.8 FL — SIGNIFICANT CHANGE UP (ref 7–13)
POTASSIUM SERPL-MCNC: 3.6 MMOL/L — SIGNIFICANT CHANGE UP (ref 3.5–5.3)
POTASSIUM SERPL-SCNC: 3.6 MMOL/L — SIGNIFICANT CHANGE UP (ref 3.5–5.3)
PROT SERPL-MCNC: 5.6 GM/DL — LOW (ref 6–8.3)
RBC # BLD: 2.72 M/UL — LOW (ref 4.2–5.8)
RBC # FLD: 17 % — HIGH (ref 10.3–14.5)
SODIUM SERPL-SCNC: 129 MMOL/L — LOW (ref 135–145)
SPECIMEN SOURCE: SIGNIFICANT CHANGE UP
SPECIMEN SOURCE: SIGNIFICANT CHANGE UP
WBC # BLD: 6.49 K/UL — SIGNIFICANT CHANGE UP (ref 3.8–10.5)
WBC # FLD AUTO: 6.49 K/UL — SIGNIFICANT CHANGE UP (ref 3.8–10.5)

## 2025-04-29 PROCEDURE — 99239 HOSP IP/OBS DSCHRG MGMT >30: CPT

## 2025-04-29 PROCEDURE — 99231 SBSQ HOSP IP/OBS SF/LOW 25: CPT | Mod: FS

## 2025-04-29 RX ORDER — CIPROFLOXACIN HCL 250 MG
1 TABLET ORAL
Qty: 20 | Refills: 0
Start: 2025-04-29 | End: 2025-05-08

## 2025-04-29 RX ORDER — HEPARIN SODIUM,PORCINE/NS/PF 20/20 ML
300 SYRINGE (ML) INTRAVENOUS ONCE
Refills: 0 | Status: COMPLETED | OUTPATIENT
Start: 2025-04-29 | End: 2025-04-29

## 2025-04-29 RX ORDER — CALCIUM CARBONATE 750 MG/1
1 TABLET ORAL
Qty: 90 | Refills: 0
Start: 2025-04-29 | End: 2025-05-28

## 2025-04-29 RX ORDER — SODIUM BICARBONATE 1 MEQ/ML
1 SYRINGE (ML) INTRAVENOUS
Qty: 60 | Refills: 0
Start: 2025-04-29 | End: 2025-05-28

## 2025-04-29 RX ORDER — ACETAMINOPHEN 500 MG/5ML
2 LIQUID (ML) ORAL
Qty: 120 | Refills: 0
Start: 2025-04-29 | End: 2025-05-13

## 2025-04-29 RX ORDER — CIPROFLOXACIN HCL 250 MG
1 TABLET ORAL
Qty: 20 | Refills: 0 | DISCHARGE
Start: 2025-04-29 | End: 2025-05-08

## 2025-04-29 RX ORDER — URSODIOL 300 MG/1
1 CAPSULE ORAL
Refills: 0 | DISCHARGE

## 2025-04-29 RX ORDER — POLYETHYLENE GLYCOL 3350 17 G/17G
17 POWDER, FOR SOLUTION ORAL
Qty: 510 | Refills: 0
Start: 2025-04-29 | End: 2025-05-28

## 2025-04-29 RX ADMIN — Medication 3 CAPSULE(S): at 07:51

## 2025-04-29 RX ADMIN — Medication 300 UNIT(S): at 12:34

## 2025-04-29 RX ADMIN — Medication 3 CAPSULE(S): at 12:28

## 2025-04-29 RX ADMIN — Medication 650 MILLIGRAM(S): at 00:22

## 2025-04-29 RX ADMIN — Medication 100 MILLIGRAM(S): at 07:04

## 2025-04-29 RX ADMIN — CEFTRIAXONE 2000 MILLIGRAM(S): 500 INJECTION, POWDER, FOR SOLUTION INTRAMUSCULAR; INTRAVENOUS at 12:28

## 2025-04-29 NOTE — DISCHARGE NOTE PROVIDER - NSDCMRMEDTOKEN_GEN_ALL_CORE_FT
acetaminophen 325 mg oral tablet: 2 tab(s) orally every 6 hours as needed for Temp greater or equal to 38C (100.4F), Mild Pain (1 - 3)  calcium carbonate 500 mg (200 mg elemental calcium) oral tablet, chewable: 1 tab(s) orally 3 times a day as needed for acid reflux  ciprofloxacin 500 mg oral tablet: 1 tab(s) orally 2 times a day  colesevelam 625 mg oral tablet: 2 tab(s) orally once a day (in the morning)  colesevelam 625 mg oral tablet: 1 tab(s) orally once a day (in the evening) ***DrFirst***  dexAMETHasone 2 mg oral tablet: 3 tab(s) orally once in the morning for 3 days following chemo as directed  dicyclomine 10 mg oral capsule: 2 cap(s) orally once a day (at bedtime) as needed for cramping/gas overnight  famotidine 40 mg oral tablet: 1 tab(s) orally once a day as needed for acid reflux  magnesium oxide 400 mg oral tablet: 1.5 tab(s) orally 2 times a day  pancrelipase 36,000 units-114,000 units-180,000 units oral delayed release capsule: 3 cap(s) orally 3 times a day (with meals) *** also takes 1-2 caps with snacks***  pancrelipase 36,000 units-114,000 units-180,000 units oral delayed release capsule: 1 cap(s) orally 2 times a day as needed for with snacks ***3 caps with meals and 1-2 with snacks***  polyethylene glycol 3350 oral powder for reconstitution: 17 gram(s) orally once a day as needed for Constipation  sodium bicarbonate 650 mg oral tablet: 1 tab(s) orally 2 times a day  sodium chloride 1 g oral tablet: 1 tab(s) orally 2 times a day

## 2025-04-29 NOTE — PROGRESS NOTE ADULT - PROBLEM SELECTOR PLAN 1
- Reviewed with Dr. Oviedo. OK to discharge pt with plan to return to IR in several weeks for possible stent placement  - Flush drain 3 times a week with 10cc sterile saline to maintain patency  - Change dressing every 2-3 days or when soiled  - Pt can call IR at [phone] with any questions

## 2025-04-29 NOTE — PROGRESS NOTE ADULT - SUBJECTIVE AND OBJECTIVE BOX
Date of service: 04-29-25 @ 11:23    Lying in bed in NAD  No further fever  Abdomen feels nontender    ROS: no fever or chills; denies dizziness, no HA, no SOB or cough, no abdominal pain, no diarrhea or constipation; no dysuria, no legs pain, no rashes    MEDICATIONS  (STANDING):  cefTRIAXone Injectable. 2000 milliGRAM(s) IV Push every 24 hours  colesevelam 1250 milliGRAM(s) Oral daily  colesevelam 625 milliGRAM(s) Oral at bedtime  metroNIDAZOLE  IVPB 500 milliGRAM(s) IV Intermittent every 12 hours  pancrelipase  (CREON 36,000 Lipase Units) 3 Capsule(s) Oral three times a day with meals  sodium bicarbonate 650 milliGRAM(s) Oral two times a day  sodium chloride 1 Gram(s) Oral two times a day    Vital Signs Last 24 Hrs  T(C): 36.3 (29 Apr 2025 08:18), Max: 36.7 (28 Apr 2025 17:22)  T(F): 97.3 (29 Apr 2025 08:18), Max: 98.1 (28 Apr 2025 17:22)  HR: 78 (29 Apr 2025 08:18) (73 - 80)  BP: 110/76 (29 Apr 2025 08:18) (104/56 - 110/79)  BP(mean): --  RR: 18 (29 Apr 2025 08:18) (18 - 18)  SpO2: 100% (29 Apr 2025 08:18) (93% - 100%)    Parameters below as of 29 Apr 2025 08:18  Patient On (Oxygen Delivery Method): room air     Physical exam:    GEN: Thin, muscle wasting, no acute distress  HEENT:  Pupils equal and reactive, no oropharyngeal lesions, erythema, exudates, oral thrush  NECK:  Supple, no palpable lymph nodes, no thyromegaly  CV:  Regular rate and rhythm, +S1, +S2, no murmurs or rubs. Right anterior chest port.   PULM: Lungs clear to auscultation bilaterally, no wheezing, rales, rhonchi  GI:  Abdomen soft, mid abdomen tender, non-distended,  no palpable masses.   LUQ biliary drain.  MSK:  Muscle wasting. No rigidity, no contractions  EXT:  No clubbing, no cyanosis, no edema, no  swelling or erythema  VASCULAR:  pulses equal and symmetric in the upper and lower extremities  NEURO:  AAOX3, no focal neurological deficits, follows all commands, able to move extremities spontaneously  SKIN:  no ulcers, lesions or rashes    Labs: reviewed                        8.5    8.52  )-----------( 219      ( 28 Apr 2025 07:17 )             26.1     04-28    129[L]  |  99  |  29[H]  ----------------------------<  129[H]  3.8   |  20[L]  |  0.83    Ca    8.9      28 Apr 2025 07:17    TPro  6.0  /  Alb  2.2[L]  /  TBili  1.6[H]  /  DBili  x   /  AST  15  /  ALT  29  /  AlkPhos  347[H]  04-28                        8.9    9.97  )-----------( 236      ( 25 Apr 2025 06:16 )             27.4     04-25    128[L]  |  104  |  30[H]  ----------------------------<  126[H]  3.8   |  16[L]  |  0.90    Ca    9.4      25 Apr 2025 06:16    TPro  6.7  /  Alb  2.4[L]  /  TBili  2.3[H]  /  DBili  x   /  AST  23  /  ALT  43  /  AlkPhos  555[H]  04-25                        9.2    12.29 )-----------( 353      ( 23 Apr 2025 21:34 )             28.2     WBC Trend  12.29[H] Date (04-23 @ 21:34)      Chem  04-23    126[L]  |  96  |  45[H]  ----------------------------<  131[H]  4.5   |  21[L]  |  1.24    Ca    9.4      23 Apr 2025 21:34    TPro  7.3  /  Alb  2.7[L]  /  TBili  2.6[H]  /  DBili  x   /  AST  26  /  ALT  55  /  AlkPhos  740[H]  04-23    Outpatient biliary culture reviewed: KLOX S to ceftriaxone and cipro  Urinalysis with Rflx Culture (collected 24 Apr 2025 04:28)    Urinalysis with Rflx Culture (collected 24 Apr 2025 04:28)    Culture - Blood (collected 23 Apr 2025 21:36)  Source: Blood None  Preliminary Report (28 Apr 2025 02:00):    No growth at 4 days    Culture - Blood (collected 23 Apr 2025 21:34)  Source: Blood None  Preliminary Report (28 Apr 2025 02:00):    No growth at 4 days    Radiology:  < from: CT Abdomen and Pelvis w/ IV Cont (04.24.25 @ 00:35) >  *  New internal/external biliary stent traversing a 4.2 cm mass at the level of the omega hepatis.  *  Increase in intrahepatic biliary ductal dilatation since prior examination. Correlate for clinical laboratory findingsof stent dysfunction.  *  New cystic lesions in the right hepatic lobe may reflect developing abscesses.  *  Mass at the omega hepatis keeping with the clinical history of cholangiocarcinoma, stable to slightly decreased in size.  *  A new lesion in segment IVb measuring up to 2.0 cm is suspicious for new metastasis.  < end of copied text >

## 2025-04-29 NOTE — DISCHARGE NOTE PROVIDER - DISCHARGE SERVICE FOR PATIENT
on the discharge service for the patient. I have reviewed and made amendments to the documentation where necessary. Rose Mary Djeesus

## 2025-04-29 NOTE — DISCHARGE NOTE PROVIDER - NSDCCPCAREPLAN_GEN_ALL_CORE_FT
PRINCIPAL DISCHARGE DIAGNOSIS  Diagnosis: Metastatic cholangiocarcinoma  Assessment and Plan of Treatment:       SECONDARY DISCHARGE DIAGNOSES  Diagnosis: Biliary obstruction  Assessment and Plan of Treatment: During this admission, cholangiography through existing 8.5F left biliary drain revealed high grade stenosis/obstruction of CBD with no antegrade passage of contrast thought indwelling drain into duodenum  - IR consulted for management of Biliary drain; S/p New 12F internal/external biliary drain placed across CBD stenosis/obstruction into duodenum on 4/25.  ->Patient was having 2L of biliary drain output on 4/26  -> IR input appreciated; Capped trial of Biliary Drain on 4/28/25, worked very well-patient had no more symptoms, so planned to dc home today with follow up with IR and GI as outpatient  - Will need f/u with Dr. Encinas as an outpatient for further evaluation/monitoring  - Flush drain with 10cc -3 times a week   - Change dressing q3 days or when dressing is saturated.    Diagnosis: Sepsis  Assessment and Plan of Treatment: Sepsis due to Acute cholangitis.   Patient with hypotension in the setting of suspected biliary infection as well as hypovolemia from high drain output. Patient noted to be tachycardic with an elevated WBC on admission.  Lactic normal at 1.4  - improved now   - Blood Cx with- No Growth until today   - ID input appreciated; s/p Rocephin/Flagyl   -->Will transition to PO Cipro 500 mg q12h x 10 days upon discharge    Diagnosis: Acute cholangitis  Assessment and Plan of Treatment: Above treatment.    Diagnosis: Hyponatremia  Assessment and Plan of Treatment: Improving.  Likely 2/2 increased output from biliary drain due to clogging, SIADH  - s/p IVF hydration  - Salt Tabs  - Monitor BMP as outpatient with PCP in 1 week    Diagnosis: Metabolic acidosis  Assessment and Plan of Treatment: Improving.  Likely 2/2 increased output from biliary drain due to clogging  - s/p IVF hydration  - Salt Tabs  - Monitor BMP as outpatient with PCP in 1 week

## 2025-04-29 NOTE — PROGRESS NOTE ADULT - REASON FOR ADMISSION
Hypotension

## 2025-04-29 NOTE — DISCHARGE NOTE PROVIDER - DETAILS OF MALNUTRITION DIAGNOSIS/DIAGNOSES
This patient has been assessed with a concern for Malnutrition and was treated during this hospitalization for the following Nutrition diagnosis/diagnoses:     -  04/25/2025: Severe protein-calorie malnutrition   -  04/25/2025: Underweight (BMI < 19)

## 2025-04-29 NOTE — PHYSICAL THERAPY INITIAL EVALUATION ADULT - PERTINENT HX OF CURRENT PROBLEM, REHAB EVAL
Pt admitted to  on 4/24/25 secondary to hypotension, sepsis. H/o metastatic panchito-carcinoma. Hgb 7.7 today. Pt agreeable to PT. Eager to walk.

## 2025-04-29 NOTE — PHYSICAL THERAPY INITIAL EVALUATION ADULT - MODALITIES TREATMENT COMMENTS
Pt ambulates with steady gait, independently. Needed 1 seated rest break during ambulation due to dec endurance.

## 2025-04-29 NOTE — PROGRESS NOTE ADULT - SUBJECTIVE AND OBJECTIVE BOX
74 year old male with PMH of metastatic cholangiocarcinoma s/p Whipple and also s/p recent biliary stent and external biliary drain placement who presented from [facility] due to hypotension and concerns for sepsis. The patient was recently admitted at [facility] in [place] after a failed ERCP, and during this admission had an internal-external biliary drain placed on 4/13. Biliary cultures grew Klebsiella and his Zosyn was transitioned to Augmentin on discharge. He has not been on chemotherapy for about one month. The patient notes that he has been having high output from the biliary drain, up to 2L per day, and has not been able to keep up with his fluid intake. Pt now s/p IR biliary drain upsizing and capping.       Vital Signs Last 24 Hrs  T(C): 36.3 (29 Apr 2025 08:18), Max: 36.7 (28 Apr 2025 17:22)  T(F): 97.3 (29 Apr 2025 08:18), Max: 98.1 (28 Apr 2025 17:22)  HR: 78 (29 Apr 2025 08:18) (73 - 80)  BP: 110/76 (29 Apr 2025 08:18) (104/56 - 110/79)  BP(mean): --  RR: 18 (29 Apr 2025 08:18) (18 - 18)  SpO2: 100% (29 Apr 2025 08:18) (93% - 100%)    Parameters below as of 29 Apr 2025 08:18  Patient On (Oxygen Delivery Method): room air        GENERAL APPEARANCE: Well developed, in no acute distress.    ABDOMEN: Soft and nontender with normal bowel sounds.     NEUROLOGIC: Alert and oriented x 3. Normal affect.         CBC                          7.7    6.49  )-----------( 191      ( 29 Apr 2025 06:34 )             23.2       Chemistry  04-29    129[L]  |  100  |  24[H]  ----------------------------<  140[H]  3.6   |  23  |  0.85    Ca    8.7      29 Apr 2025 06:34    TPro  5.6[L]  /  Alb  2.1[L]  /  TBili  1.6[H]  /  DBili  x   /  AST  27  /  ALT  34  /  AlkPhos  561[H]  04-29

## 2025-04-29 NOTE — PROGRESS NOTE ADULT - PROVIDER SPECIALTY LIST ADULT
Gastroenterology
Hospitalist
Hospitalist
Infectious Disease
Intervent Radiology
Anesthesia
Heme/Onc
Intervent Radiology
Gastroenterology
Hospitalist
Hospitalist
Infectious Disease
Hospitalist
Infectious Disease

## 2025-04-29 NOTE — PROGRESS NOTE ADULT - ASSESSMENT
75yo M s/p IR biliary drain upsize and capping  - Pt tolerating capping well. Afebrile. no pain, WBC WNL, bilirubin stable  - Pt states he feels a lot better

## 2025-04-29 NOTE — PROGRESS NOTE ADULT - ASSESSMENT
74-year-old male with h/o metastatic cholangiocarcinoma s/p Whipple, recent biliary stent and external biliary drain placement was admitted on 4/23 from [facility] for hypotension and concerns for sepsis. The patient was recently admitted at [facility] in [place] after a failed Endoscopic Retrograde Cholangiopancreatography (ERCP) and during that admission had an internal-external biliary drain placed on 4/13. Biliary cultures grew Klebsiella and his Zosyn was transitioned to Augmentin on discharge. He has not been on chemotherapy for about one month. The patient notes that he has been having high output from the biliary drain, up to 2L per day, and has not been able to keep up with his fluid intake. At an outpatient [facility] appointment he was noted to be persistently hypotensive therefore he was sent to the ER. Pt received cefepime and metronidazole.     #Hypotension and probable sepsis resolved.  #Probable acute cholangitis. Prior infection with KL spp  #Metastatic cholangiocarcinoma s/p Whipple and biliary drain  #Leukocytosis improving  #Immunocompromised patient   #ARF on CRF stage 3.  -renal function improving  -biliary stent seems patent at this time  -Blood Cx 2 noted  -on ceftriaxone 2 gm IV qd and metronidazole 500 mg IV q12h # 5  -tolerating abx well so far; no side effects noted  -IR evaluation for biliary stent assessment appreciated   -f/u cultures  -may change abx to cipro PO as below   -monitor temps  -f/u CBC  -supportive care  2. Other issues:   -care per medicine    Clinical team may change from intravenous to oral antibiotics when the following criteria are met:   1. Patient is clinically improving/stable       a)	Improved signs and symptoms of infection from initial presentation       b)	Afebrile for 24 hours       c)	Leukocytosis trending towards normal range   2. Patient is tolerating oral intake   3. Initial/repeat blood cultures are negative     When above criteria met may change iv antibiotics to cipro 500 mg PO q1h for 10 more days

## 2025-04-29 NOTE — DISCHARGE NOTE PROVIDER - PROVIDER TOKENS
PROVIDER:[[id number]],FOLLOWUP:[1 week],ESTABLISHEDPATIENT:[T]],PROVIDER:[TOKEN:[[id number]],FOLLOWUP:[1 week],ESTABLISHEDPATIENT:[T]],PROVIDER:[TOKEN:[[id number]],FOLLOWUP:[2 weeks],ESTABLISHEDPATIENT:[T]]

## 2025-04-29 NOTE — CHART NOTE - NSCHARTNOTEFT_GEN_A_CORE
Rolling Walker:  Patient requires rolling walker due to weakness from underlying metastatic cholangiocarcinoma s/p Whipple for safe ambulation at discharge. It also helps with MRADL's.

## 2025-04-29 NOTE — PROGRESS NOTE ADULT - SUBJECTIVE AND OBJECTIVE BOX
INTERVAL HPI/OVERNIGHT EVENTS:  Patient S&E at bedside.   pt states that this is the best he has felt in months  today wbc 6.49, Hb 7.7, plt 191, na 129, bili 1.6, ast and alt nl, alp 561  pt had 4 formed bms yesterday  tolearted capping  vss on ra, tolerating diet     PAST MEDICAL & SURGICAL HISTORY:  Bile duct cancer      H/O Whipple procedure          FAMILY HISTORY:      VITAL SIGNS:  T(F): 97.3 (04-29-25 @ 08:18)  HR: 78 (04-29-25 @ 08:18)  BP: 110/76 (04-29-25 @ 08:18)  RR: 18 (04-29-25 @ 08:18)  SpO2: 100% (04-29-25 @ 08:18)  Wt(kg): --    PHYSICAL EXAM:    GENERAL: NAD, well-developed  HEAD:  Atraumatic, Normocephalic  EYES: EOMI,  sclera icterus  CHEST/LUNG: Clear to auscultation bilaterally;   HEART: Regular rate and rhythm;   ABDOMEN: abdominal drain capped, no pain  EXTREMITIES: no edema  NEUROLOGY: non-focal    MEDICATIONS  (STANDING):  cefTRIAXone Injectable. 2000 milliGRAM(s) IV Push every 24 hours  colesevelam 1250 milliGRAM(s) Oral daily  colesevelam 625 milliGRAM(s) Oral at bedtime  metroNIDAZOLE  IVPB 500 milliGRAM(s) IV Intermittent every 12 hours  pancrelipase  (CREON 36,000 Lipase Units) 3 Capsule(s) Oral three times a day with meals  sodium bicarbonate 650 milliGRAM(s) Oral two times a day  sodium chloride 1 Gram(s) Oral two times a day    MEDICATIONS  (PRN):  acetaminophen     Tablet .. 650 milliGRAM(s) Oral every 6 hours PRN Temp greater or equal to 38C (100.4F), Mild Pain (1 - 3)  aluminum hydroxide/magnesium hydroxide/simethicone Suspension 30 milliLiter(s) Oral every 4 hours PRN Dyspepsia  calcium carbonate    500 mG (Tums) Chewable 1 Tablet(s) Chew three times a day PRN acid reflux  famotidine    Tablet 20 milliGRAM(s) Oral daily PRN acid reflux  melatonin 3 milliGRAM(s) Oral at bedtime PRN Insomnia  ondansetron Injectable 4 milliGRAM(s) IV Push every 8 hours PRN Nausea and/or Vomiting  pancrelipase  (CREON 36,000 Lipase Units) 1 Capsule(s) Oral two times a day with meals PRN with snacks  polyethylene glycol 3350 17 Gram(s) Oral daily PRN Constipation      Allergies    statins (Unknown)    Intolerances        LABS:                        7.7    6.49  )-----------( 191      ( 29 Apr 2025 06:34 )             23.2     04-29    129[L]  |  100  |  24[H]  ----------------------------<  140[H]  3.6   |  23  |  0.85    Ca    8.7      29 Apr 2025 06:34    TPro  5.6[L]  /  Alb  2.1[L]  /  TBili  1.6[H]  /  DBili  x   /  AST  27  /  ALT  34  /  AlkPhos  561[H]  04-29      Urinalysis Basic - ( 29 Apr 2025 06:34 )    Color: x / Appearance: x / SG: x / pH: x  Gluc: 140 mg/dL / Ketone: x  / Bili: x / Urobili: x   Blood: x / Protein: x / Nitrite: x   Leuk Esterase: x / RBC: x / WBC x   Sq Epi: x / Non Sq Epi: x / Bacteria: x        RADIOLOGY & ADDITIONAL TESTS:  Studies reviewed.

## 2025-04-29 NOTE — DISCHARGE NOTE PROVIDER - CARE PROVIDER_API CALL
Delano Prather  Cardiovascular Disease  [address], [address]  [place] [address]  Phone: [phone]  Fax: [phone]  Established Patient  Follow Up Time: 1 week    Ariel Oviedo  Interventional Radiology and Diagnostic Radiology  [address] [place] [address]  Phone: [phone]  Fax: [phone]  Established Patient  Follow Up Time: 1 week    Dallin Encinas  Gastroenterology  [address] [facility] [address] [facility] [place] [address]  Phone: [phone]  Fax: [phone]  Established Patient  Follow Up Time: 2 weeks

## 2025-04-29 NOTE — DISCHARGE NOTE PROVIDER - NSDCFUADDINST_GEN_ALL_CORE_FT
Biliary Drain care:  - Flush drain with 10cc 3x a week   - Change dressing q3 days or when dressing is saturated. Biliary Drain care:  - Flush drain 3 times a week with 10cc sterile saline to maintain patency  - Change dressing every 2-3 days or when soiled  - Pt can call IR at [phone] with any questions

## 2025-04-29 NOTE — DISCHARGE NOTE PROVIDER - CARE PROVIDERS DIRECT ADDRESSES
[email or web address]

## 2025-04-29 NOTE — DISCHARGE NOTE PROVIDER - HOSPITAL COURSE
74 year old male with PMH of metastatic cholangiocarcinoma s/p Whipple and also s/p recent biliary stent and external biliary drain placement who presented from [facility] due to hypotension and concerns for sepsis. The patient was recently admitted at [facility] in [place] after a failed ERCP, and during this admission had an internal-external biliary drain placed on 4/13. Biliary cultures grew Klebsiella and his Zosyn was transitioned to Augmentin on discharge. He has not been on chemotherapy for about one month. The patient notes that he has been having high output from the biliary drain, up to 2L per day, and has not been able to keep up with his fluid intake. At an outpatient [facility] appointment he was noted to be persistently hypotensive therefore he was sent to the ER.     #Clogged I/E Biliary Drain  #Metastatic cholangiocarcinoma with biliary obstruction   #Distal extrahepatic cholangiocarcinoma, AJCC Stage IIA  #Ampullary NET AJCC Stage 1B  S/p recent failed ERCP followed by internal-external biliary drain placement at [facility] on 4/13. During this admission, cholangiography through existing 8.5F left biliary drain revealed high grade stenosis/obstruction of CBD with no antegrade passage of contrast thought indwelling drain into duodenum  - Heme/Onc input appreciated  - IR consulted for management of Biliary drain; S/p New 12F internal/external biliary drain placed across CBD stenosis/obstruction into duodenum on 4/25.  - Patient still having 2L of biliary drain output on 4/26  -> IR input appreciated; Capped trial of Biliary Drain, worked very well-patient had no more symptoms, so planned to dc home today with follow up with IR and GI as outpatient  - Will need f/u with Dr. Encinas as an outpatient for further evaluation/monitoring    Biliary Drain care:  - Flush drain with 10cc 3x a week   - Change dressing q3 days or when dressing is saturated.    #Sepsis possibly 2/2 Acute Cholangitis   Patient with hypotension in the setting of suspected biliary infection as well as hypovolemia from high drain output. Patient noted to be tachycardic with an elevated WBC on admission.  Lactic normal at 1.4  - f/u Blood Cx with NGTD  - ID input appreciated; s/p Rocephin/Flagyl   -->Will transition to PO Cipro 500 mg q12h x 10 days upon d/c    #Hypovolemic Hyponatremia, improving  #Non-Anion Gap Metabolic Acidosis  Likely 2/2 increased output from biliary drain due to clogging, SIADH  - s/p IVF hydration  - Salt Tabs  - Monitor BMP as outpatient with PCP in 1 week     Patient is seen and examined. Will dc home today.  Vital Signs Last 24 Hrs  T(C): 36.3 (29 Apr 2025 08:18), Max: 36.7 (28 Apr 2025 17:22)  T(F): 97.3 (29 Apr 2025 08:18), Max: 98.1 (28 Apr 2025 17:22)  HR: 78 (29 Apr 2025 08:18) (73 - 80)  BP: 110/76 (29 Apr 2025 08:18) (104/56 - 110/79)  BP(mean): --  RR: 18 (29 Apr 2025 08:18) (18 - 18)  SpO2: 100% (29 Apr 2025 08:18) (93% - 100%)    Parameters below as of 29 Apr 2025 08:18  Patient On (Oxygen Delivery Method): room air    PHYSICAL EXAM:  GENERAL: NAD, lying in bed comfortably  HEAD:  Atraumatic, Normocephalic  EYES: conjunctiva and sclera clear  ENT: Moist mucous membranes  NECK: Supple, No JVD  CHEST/LUNG: Clear to auscultation bilaterally; No rales, rhonchi, wheezing. Unlabored respirations  HEART: Regular rate and rhythm; No murmurs  ABDOMEN: Bowel sounds present; Soft, Nontender, Nondistended. Biliary drain in place.   EXTREMITIES:  2+ Peripheral Pulses, brisk capillary refill. No clubbing, cyanosis, or edema  NERVOUS SYSTEM:  Alert & Oriented X3, speech clear. No deficits   MSK: FROM all 4 extremities, full and equal strength           74 year old male with PMH of metastatic cholangiocarcinoma s/p Whipple and also s/p recent biliary stent and external biliary drain placement who presented from [facility] due to hypotension and concerns for sepsis. The patient was recently admitted at [facility] in [place] after a failed ERCP, and during this admission had an internal-external biliary drain placed on 4/13. Biliary cultures grew Klebsiella and his Zosyn was transitioned to Augmentin on discharge. He has not been on chemotherapy for about one month. The patient notes that he has been having high output from the biliary drain, up to 2L per day, and has not been able to keep up with his fluid intake. At an outpatient [facility] appointment he was noted to be persistently hypotensive therefore he was sent to the ER.     #Clogged I/E Biliary Drain  #Metastatic cholangiocarcinoma with biliary obstruction   #Distal extrahepatic cholangiocarcinoma, AJCC Stage IIA  #Ampullary NET AJCC Stage 1B  S/p recent failed ERCP followed by internal-external biliary drain placement at [facility] on 4/13. During this admission, cholangiography through existing 8.5F left biliary drain revealed high grade stenosis/obstruction of CBD with no antegrade passage of contrast thought indwelling drain into duodenum  - Heme/Onc input appreciated  - IR consulted for management of Biliary drain; S/p New 12F internal/external biliary drain placed across CBD stenosis/obstruction into duodenum on 4/25.  - Patient still having 2L of biliary drain output on 4/26  -> IR input appreciated; Capped trial of Biliary Drain, worked very well-patient had no more symptoms, so planned to dc home today with follow up with IR and GI as outpatient  - Will need f/u with Dr. Encinas as an outpatient for further evaluation/monitoring    Biliary Drain care:  - Flush drain 3 times a week with 10cc sterile saline to maintain patency  - Change dressing every 2-3 days or when soiled  - Pt can call IR at [phone] with any questions    #Sepsis possibly 2/2 Acute Cholangitis   Patient with hypotension in the setting of suspected biliary infection as well as hypovolemia from high drain output. Patient noted to be tachycardic with an elevated WBC on admission.  Lactic normal at 1.4  - f/u Blood Cx with NGTD  - ID input appreciated; s/p Rocephin/Flagyl   -->Will transition to PO Cipro 500 mg q12h x 10 days upon d/c    #Hypovolemic Hyponatremia, improving  #Non-Anion Gap Metabolic Acidosis  Likely 2/2 increased output from biliary drain due to clogging, SIADH  - s/p IVF hydration  - Salt Tabs  - Monitor BMP as outpatient with PCP in 1 week     Patient is seen and examined. Will dc home today.  Vital Signs Last 24 Hrs  T(C): 36.3 (29 Apr 2025 08:18), Max: 36.7 (28 Apr 2025 17:22)  T(F): 97.3 (29 Apr 2025 08:18), Max: 98.1 (28 Apr 2025 17:22)  HR: 78 (29 Apr 2025 08:18) (73 - 80)  BP: 110/76 (29 Apr 2025 08:18) (104/56 - 110/79)  BP(mean): --  RR: 18 (29 Apr 2025 08:18) (18 - 18)  SpO2: 100% (29 Apr 2025 08:18) (93% - 100%)    Parameters below as of 29 Apr 2025 08:18  Patient On (Oxygen Delivery Method): room air    PHYSICAL EXAM:  GENERAL: NAD, lying in bed comfortably  HEAD:  Atraumatic, Normocephalic  EYES: conjunctiva and sclera clear  ENT: Moist mucous membranes  NECK: Supple, No JVD  CHEST/LUNG: Clear to auscultation bilaterally; No rales, rhonchi, wheezing. Unlabored respirations  HEART: Regular rate and rhythm; No murmurs  ABDOMEN: Bowel sounds present; Soft, Nontender, Nondistended. Biliary drain in place.   EXTREMITIES:  2+ Peripheral Pulses, brisk capillary refill. No clubbing, cyanosis, or edema  NERVOUS SYSTEM:  Alert & Oriented X3, speech clear. No deficits   MSK: FROM all 4 extremities, full and equal strength

## 2025-04-29 NOTE — PROGRESS NOTE ADULT - ASSESSMENT
74 year old male with PMH of metastatic cholangiocarcinoma s/p Whipple and also s/p recent biliary stent and external biliary drain placement who [place] [facility] [place] and concerns for sepsis.    # metastatic cholangiocarcinoma  - [place] [facility] [place] Morrell- last seen 4/23/25   - started on first line chemo with gem/cis/durva since 10/10/24  - [place] [facility] [place] 4/1/25 showed few increased and multiple new low attenuating lesions w/in right posterior hepatic lobe suspicious for microabscesses in context of mod biliary dilatation-   - at clinic Na 120 and sent in to hospital (today 129 salt tabs)  - plan as per pt was to continue with durvalumab on discharge  - will f/u with [facility] [place] dc     # biliary drain clogged- now resolved  - Recent failed ERCP and had biliary drain placed 4/13 that grew kleb and was dc on augmentin in the past.   - In ER, WBC 12.29, Hb 9.2, plt 353, Na 126, Cr 1.24, bili 2.6 w/ nl lactate, UA neg, Bcx NTD  - CT a/p w/ contrast- New internal/external biliary stent traversing a 4.2 cm mass at the level of the omega hepatis. Increase in intrahepatic biliary ductal dilatation since prior examination. New cystic lesions in the right hepatic lobe may reflect developing abscesses. Mass at the omega hepatis keeping with the clinical history of cholangiocarcinoma, stable to slightly decreased in size. A new lesion in segment IVb measuring up to 2.0 cm is suspicious for new metastasis when compared with 11/12/24 scans.   - Pt currently on ceftriaxone and flagyl - ID following   - IR consulted for drain eval - performed evaluation and I/E demonstrated no passage of contrast via drain representing a clogged drain for which it was upsized.   - 4/25/25 had cholangiography through 8.5 F left biliary drain w/ high grade stenosis/obstruction and thus was replaced with new 12F internal/external drain with good flow by IR   - DRAIN NOW CAPPED   - gi and ir following close     # n/v- resolved    # hyponatremia  - Na 127-->130-->129 today  - now on salt tabs     will follow daily and communicate with [facility]   dc planning for today from heme/onc perspective

## 2025-05-01 DIAGNOSIS — C22.1 INTRAHEPATIC BILE DUCT CARCINOMA: ICD-10-CM

## 2025-05-02 ENCOUNTER — OUTPATIENT (OUTPATIENT)
Dept: INPATIENT UNIT | Facility: HOSPITAL | Age: 75
LOS: 1 days | Discharge: HOME | End: 2025-05-02
Payer: MEDICARE

## 2025-05-02 ENCOUNTER — TRANSCRIPTION ENCOUNTER (OUTPATIENT)
Age: 75
End: 2025-05-02

## 2025-05-02 ENCOUNTER — RESULT REVIEW (OUTPATIENT)
Age: 75
End: 2025-05-02

## 2025-05-02 VITALS
HEART RATE: 74 BPM | OXYGEN SATURATION: 100 % | SYSTOLIC BLOOD PRESSURE: 135 MMHG | DIASTOLIC BLOOD PRESSURE: 81 MMHG | TEMPERATURE: 97 F | RESPIRATION RATE: 16 BRPM

## 2025-05-02 VITALS
OXYGEN SATURATION: 100 % | SYSTOLIC BLOOD PRESSURE: 108 MMHG | HEIGHT: 70 IN | TEMPERATURE: 98 F | RESPIRATION RATE: 16 BRPM | DIASTOLIC BLOOD PRESSURE: 75 MMHG | HEART RATE: 79 BPM | WEIGHT: 126.99 LBS

## 2025-05-02 DIAGNOSIS — E87.1 HYPO-OSMOLALITY AND HYPONATREMIA: ICD-10-CM

## 2025-05-02 DIAGNOSIS — Z43.4 ENCOUNTER FOR ATTENTION TO OTHER ARTIFICIAL OPENINGS OF DIGESTIVE TRACT: ICD-10-CM

## 2025-05-02 DIAGNOSIS — E43 UNSPECIFIED SEVERE PROTEIN-CALORIE MALNUTRITION: ICD-10-CM

## 2025-05-02 DIAGNOSIS — K83.1 OBSTRUCTION OF BILE DUCT: ICD-10-CM

## 2025-05-02 DIAGNOSIS — K83.09 OTHER CHOLANGITIS: ICD-10-CM

## 2025-05-02 DIAGNOSIS — C22.1 INTRAHEPATIC BILE DUCT CARCINOMA: ICD-10-CM

## 2025-05-02 DIAGNOSIS — Z90.410 ACQUIRED TOTAL ABSENCE OF PANCREAS: Chronic | ICD-10-CM

## 2025-05-02 DIAGNOSIS — T85.898A OTHER SPECIFIED COMPLICATION OF OTHER INTERNAL PROSTHETIC DEVICES, IMPLANTS AND GRAFTS, INITIAL ENCOUNTER: ICD-10-CM

## 2025-05-02 DIAGNOSIS — D84.9 IMMUNODEFICIENCY, UNSPECIFIED: ICD-10-CM

## 2025-05-02 DIAGNOSIS — E22.2 SYNDROME OF INAPPROPRIATE SECRETION OF ANTIDIURETIC HORMONE: ICD-10-CM
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
## 2025-05-02 DIAGNOSIS — E86.1 HYPOVOLEMIA: ICD-10-CM

## 2025-05-02 DIAGNOSIS — A41.9 SEPSIS, UNSPECIFIED ORGANISM: ICD-10-CM

## 2025-05-02 DIAGNOSIS — E80.6 OTHER DISORDERS OF BILIRUBIN METABOLISM: ICD-10-CM

## 2025-05-02 PROCEDURE — C1887: CPT

## 2025-05-02 PROCEDURE — C1769: CPT

## 2025-05-02 PROCEDURE — C1729: CPT

## 2025-05-02 PROCEDURE — 47536 EXCHANGE BILIARY DRG CATH: CPT

## 2025-05-02 RX ORDER — ONDANSETRON HCL/PF 4 MG/2 ML
4 VIAL (ML) INJECTION ONCE
Refills: 0 | Status: DISCONTINUED | OUTPATIENT
Start: 2025-05-02 | End: 2025-05-02

## 2025-05-02 RX ORDER — OXYCODONE HYDROCHLORIDE 30 MG/1
5 TABLET ORAL ONCE
Refills: 0 | Status: DISCONTINUED | OUTPATIENT
Start: 2025-05-02 | End: 2025-05-02

## 2025-05-02 RX ORDER — DEXAMETHASONE 0.5 MG/1
3 TABLET ORAL
Refills: 0 | DISCHARGE

## 2025-05-02 RX ORDER — FENTANYL CITRATE-0.9 % NACL/PF 100MCG/2ML
50 SYRINGE (ML) INTRAVENOUS
Refills: 0 | Status: DISCONTINUED | OUTPATIENT
Start: 2025-05-02 | End: 2025-05-02

## 2025-05-02 NOTE — ASU DISCHARGE PLAN (ADULT/PEDIATRIC) - NS MD DC FALL RISK RISK
For information on Fall & Injury Prevention, visit: [email or web address] OR  [email or web address] OR  https://www.cdc.gov/steadi/patient.html

## 2025-05-02 NOTE — ASU PREOP CHECKLIST - PATIENT'S PERSONAL PROPERTY GIVEN TO
Secondary to drops in blood pressure, particularly during dialysis.  In addition, she's had tachyarrhythmias while on the HD machine.  Cardiology consulted  Continue to monitor vital signs closely.  Echocardiogram with EF 60-65%, severe mitral valve stenosis    locker 2d/on unit

## 2025-05-05 ENCOUNTER — EMERGENCY (EMERGENCY)
Facility: HOSPITAL | Age: 75
LOS: 0 days | Discharge: HOME | End: 2025-05-05
Attending: EMERGENCY MEDICINE
Payer: MEDICARE

## 2025-05-05 ENCOUNTER — INPATIENT (INPATIENT)
Facility: HOSPITAL | Age: 75
LOS: 20 days | Discharge: HOSPICE | DRG: 444 | End: 2025-05-26
Attending: SURGERY | Admitting: SURGERY
Payer: MEDICARE

## 2025-05-05 VITALS
OXYGEN SATURATION: 98 % | DIASTOLIC BLOOD PRESSURE: 95 MMHG | TEMPERATURE: 97 F | RESPIRATION RATE: 18 BRPM | HEIGHT: 70 IN | SYSTOLIC BLOOD PRESSURE: 119 MMHG | HEART RATE: 125 BPM

## 2025-05-05 VITALS
RESPIRATION RATE: 20 BRPM | HEIGHT: 70 IN | HEART RATE: 88 BPM | OXYGEN SATURATION: 100 % | TEMPERATURE: 98 F | SYSTOLIC BLOOD PRESSURE: 90 MMHG | DIASTOLIC BLOOD PRESSURE: 72 MMHG

## 2025-05-05 VITALS
TEMPERATURE: 97 F | OXYGEN SATURATION: 100 % | RESPIRATION RATE: 16 BRPM | HEART RATE: 78 BPM | DIASTOLIC BLOOD PRESSURE: 72 MMHG | SYSTOLIC BLOOD PRESSURE: 105 MMHG

## 2025-05-05 DIAGNOSIS — R42 DIZZINESS AND GIDDINESS: ICD-10-CM

## 2025-05-05 DIAGNOSIS — K59.00 CONSTIPATION, UNSPECIFIED: ICD-10-CM

## 2025-05-05 DIAGNOSIS — K83.1 OBSTRUCTION OF BILE DUCT: ICD-10-CM

## 2025-05-05 DIAGNOSIS — C22.1 INTRAHEPATIC BILE DUCT CARCINOMA: ICD-10-CM

## 2025-05-05 DIAGNOSIS — Z90.410 ACQUIRED TOTAL ABSENCE OF PANCREAS: Chronic | ICD-10-CM

## 2025-05-05 DIAGNOSIS — R17 UNSPECIFIED JAUNDICE: ICD-10-CM

## 2025-05-05 DIAGNOSIS — K56.699 OTHER INTESTINAL OBSTRUCTION UNSPECIFIED AS TO PARTIAL VERSUS COMPLETE OBSTRUCTION: ICD-10-CM

## 2025-05-05 DIAGNOSIS — Z51.5 ENCOUNTER FOR PALLIATIVE CARE: ICD-10-CM

## 2025-05-05 DIAGNOSIS — Z88.8 ALLERGY STATUS TO OTHER DRUGS, MEDICAMENTS AND BIOLOGICAL SUBSTANCES: ICD-10-CM

## 2025-05-05 DIAGNOSIS — E80.7 DISORDER OF BILIRUBIN METABOLISM, UNSPECIFIED: ICD-10-CM

## 2025-05-05 DIAGNOSIS — N17.9 ACUTE KIDNEY FAILURE, UNSPECIFIED: ICD-10-CM

## 2025-05-05 DIAGNOSIS — E43 UNSPECIFIED SEVERE PROTEIN-CALORIE MALNUTRITION: ICD-10-CM

## 2025-05-05 DIAGNOSIS — D69.6 THROMBOCYTOPENIA, UNSPECIFIED: ICD-10-CM

## 2025-05-05 DIAGNOSIS — Z11.52 ENCOUNTER FOR SCREENING FOR COVID-19: ICD-10-CM

## 2025-05-05 DIAGNOSIS — D64.81 ANEMIA DUE TO ANTINEOPLASTIC CHEMOTHERAPY: ICD-10-CM

## 2025-05-05 DIAGNOSIS — E87.6 HYPOKALEMIA: ICD-10-CM

## 2025-05-05 DIAGNOSIS — K31.1 ADULT HYPERTROPHIC PYLORIC STENOSIS: ICD-10-CM

## 2025-05-05 DIAGNOSIS — R73.9 HYPERGLYCEMIA, UNSPECIFIED: ICD-10-CM

## 2025-05-05 DIAGNOSIS — T85.858A STENOSIS DUE TO OTHER INTERNAL PROSTHETIC DEVICES, IMPLANTS AND GRAFTS, INITIAL ENCOUNTER: ICD-10-CM

## 2025-05-05 DIAGNOSIS — L89.150 PRESSURE ULCER OF SACRAL REGION, UNSTAGEABLE: ICD-10-CM

## 2025-05-05 DIAGNOSIS — D63.0 ANEMIA IN NEOPLASTIC DISEASE: ICD-10-CM

## 2025-05-05 DIAGNOSIS — E87.1 HYPO-OSMOLALITY AND HYPONATREMIA: ICD-10-CM

## 2025-05-05 DIAGNOSIS — T85.590A OTHER MECHANICAL COMPLICATION OF BILE DUCT PROSTHESIS, INITIAL ENCOUNTER: ICD-10-CM
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
## 2025-05-05 DIAGNOSIS — Z96.89 PRESENCE OF OTHER SPECIFIED FUNCTIONAL IMPLANTS: ICD-10-CM

## 2025-05-05 DIAGNOSIS — E86.9 VOLUME DEPLETION, UNSPECIFIED: ICD-10-CM

## 2025-05-05 LAB
ALBUMIN SERPL ELPH-MCNC: 2.6 G/DL — LOW (ref 3.3–5)
ALBUMIN SERPL ELPH-MCNC: 2.9 G/DL — LOW (ref 3.3–5)
ALP SERPL-CCNC: 557 U/L — HIGH (ref 40–120)
ALP SERPL-CCNC: 610 U/L — HIGH (ref 40–120)
ALT FLD-CCNC: 49 U/L — SIGNIFICANT CHANGE UP (ref 12–78)
ALT FLD-CCNC: 55 U/L — SIGNIFICANT CHANGE UP (ref 12–78)
ANION GAP SERPL CALC-SCNC: 7 MMOL/L — SIGNIFICANT CHANGE UP (ref 5–17)
ANION GAP SERPL CALC-SCNC: 8 MMOL/L — SIGNIFICANT CHANGE UP (ref 5–17)
APPEARANCE UR: CLEAR — SIGNIFICANT CHANGE UP
AST SERPL-CCNC: 39 U/L — HIGH (ref 15–37)
AST SERPL-CCNC: 40 U/L — HIGH (ref 15–37)
BACTERIA # UR AUTO: NEGATIVE /HPF — SIGNIFICANT CHANGE UP
BASOPHILS # BLD AUTO: 0.03 K/UL — SIGNIFICANT CHANGE UP (ref 0–0.2)
BASOPHILS # BLD AUTO: 0.06 K/UL — SIGNIFICANT CHANGE UP (ref 0–0.2)
BASOPHILS NFR BLD AUTO: 0.3 % — SIGNIFICANT CHANGE UP (ref 0–2)
BASOPHILS NFR BLD AUTO: 0.6 % — SIGNIFICANT CHANGE UP (ref 0–2)
BILIRUB SERPL-MCNC: 1.6 MG/DL — HIGH (ref 0.2–1.2)
BILIRUB SERPL-MCNC: 1.7 MG/DL — HIGH (ref 0.2–1.2)
BILIRUB UR-MCNC: NEGATIVE — SIGNIFICANT CHANGE UP
BUN SERPL-MCNC: 23 MG/DL — SIGNIFICANT CHANGE UP (ref 7–23)
BUN SERPL-MCNC: 24 MG/DL — HIGH (ref 7–23)
CALCIUM SERPL-MCNC: 8.9 MG/DL — SIGNIFICANT CHANGE UP (ref 8.5–10.1)
CALCIUM SERPL-MCNC: 9.8 MG/DL — SIGNIFICANT CHANGE UP (ref 8.5–10.1)
CAST: 4 /LPF — SIGNIFICANT CHANGE UP (ref 0–4)
CHLORIDE SERPL-SCNC: 92 MMOL/L — LOW (ref 96–108)
CHLORIDE SERPL-SCNC: 95 MMOL/L — LOW (ref 96–108)
CO2 SERPL-SCNC: 26 MMOL/L — SIGNIFICANT CHANGE UP (ref 22–31)
CO2 SERPL-SCNC: 26 MMOL/L — SIGNIFICANT CHANGE UP (ref 22–31)
COD CRY URNS QL: PRESENT
COLOR SPEC: SIGNIFICANT CHANGE UP
CREAT SERPL-MCNC: 1.1 MG/DL — SIGNIFICANT CHANGE UP (ref 0.5–1.3)
CREAT SERPL-MCNC: 1.29 MG/DL — SIGNIFICANT CHANGE UP (ref 0.5–1.3)
DIFF PNL FLD: ABNORMAL
EGFR: 57 ML/MIN/1.73M2 — LOW
EGFR: 57 ML/MIN/1.73M2 — LOW
EGFR: 70 ML/MIN/1.73M2 — SIGNIFICANT CHANGE UP
EGFR: 70 ML/MIN/1.73M2 — SIGNIFICANT CHANGE UP
EOSINOPHIL # BLD AUTO: 0.02 K/UL — SIGNIFICANT CHANGE UP (ref 0–0.5)
EOSINOPHIL # BLD AUTO: 0.08 K/UL — SIGNIFICANT CHANGE UP (ref 0–0.5)
EOSINOPHIL NFR BLD AUTO: 0.2 % — SIGNIFICANT CHANGE UP (ref 0–6)
EOSINOPHIL NFR BLD AUTO: 0.8 % — SIGNIFICANT CHANGE UP (ref 0–6)
FLUAV AG NPH QL: SIGNIFICANT CHANGE UP
FLUBV AG NPH QL: SIGNIFICANT CHANGE UP
GLUCOSE SERPL-MCNC: 143 MG/DL — HIGH (ref 70–99)
GLUCOSE SERPL-MCNC: 159 MG/DL — HIGH (ref 70–99)
GLUCOSE UR QL: NEGATIVE MG/DL — SIGNIFICANT CHANGE UP
HCT VFR BLD CALC: 26.5 % — LOW (ref 39–50)
HCT VFR BLD CALC: 30.8 % — LOW (ref 39–50)
HGB BLD-MCNC: 10.3 G/DL — LOW (ref 13–17)
HGB BLD-MCNC: 8.7 G/DL — LOW (ref 13–17)
HYALINE CASTS # UR AUTO: PRESENT
IMM GRANULOCYTES # BLD AUTO: 0.05 K/UL — SIGNIFICANT CHANGE UP (ref 0–0.07)
IMM GRANULOCYTES # BLD AUTO: 0.06 K/UL — SIGNIFICANT CHANGE UP (ref 0–0.07)
IMM GRANULOCYTES NFR BLD AUTO: 0.5 % — SIGNIFICANT CHANGE UP (ref 0–0.9)
IMM GRANULOCYTES NFR BLD AUTO: 0.6 % — SIGNIFICANT CHANGE UP (ref 0–0.9)
KETONES UR-MCNC: NEGATIVE MG/DL — SIGNIFICANT CHANGE UP
LACTATE SERPL-SCNC: 1.5 MMOL/L — SIGNIFICANT CHANGE UP (ref 0.7–2)
LEUKOCYTE ESTERASE UR-ACNC: ABNORMAL
LIDOCAIN IGE QN: 42 U/L — SIGNIFICANT CHANGE UP (ref 13–75)
LYMPHOCYTES # BLD AUTO: 1.4 K/UL — SIGNIFICANT CHANGE UP (ref 1–3.3)
LYMPHOCYTES # BLD AUTO: 3.04 K/UL — SIGNIFICANT CHANGE UP (ref 1–3.3)
LYMPHOCYTES NFR BLD AUTO: 14.9 % — SIGNIFICANT CHANGE UP (ref 13–44)
LYMPHOCYTES NFR BLD AUTO: 29.5 % — SIGNIFICANT CHANGE UP (ref 13–44)
MAGNESIUM SERPL-MCNC: 1.7 MG/DL — SIGNIFICANT CHANGE UP (ref 1.6–2.6)
MCHC RBC-ENTMCNC: 29.1 PG — SIGNIFICANT CHANGE UP (ref 27–34)
MCHC RBC-ENTMCNC: 29.4 PG — SIGNIFICANT CHANGE UP (ref 27–34)
MCHC RBC-ENTMCNC: 32.8 G/DL — SIGNIFICANT CHANGE UP (ref 32–36)
MCHC RBC-ENTMCNC: 33.4 G/DL — SIGNIFICANT CHANGE UP (ref 32–36)
MCV RBC AUTO: 88 FL — SIGNIFICANT CHANGE UP (ref 80–100)
MCV RBC AUTO: 88.6 FL — SIGNIFICANT CHANGE UP (ref 80–100)
MONOCYTES # BLD AUTO: 0.52 K/UL — SIGNIFICANT CHANGE UP (ref 0–0.9)
MONOCYTES # BLD AUTO: 0.65 K/UL — SIGNIFICANT CHANGE UP (ref 0–0.9)
MONOCYTES NFR BLD AUTO: 5.5 % — SIGNIFICANT CHANGE UP (ref 2–14)
MONOCYTES NFR BLD AUTO: 6.3 % — SIGNIFICANT CHANGE UP (ref 2–14)
NEUTROPHILS # BLD AUTO: 6.42 K/UL — SIGNIFICANT CHANGE UP (ref 1.8–7.4)
NEUTROPHILS # BLD AUTO: 7.38 K/UL — SIGNIFICANT CHANGE UP (ref 1.8–7.4)
NEUTROPHILS NFR BLD AUTO: 62.2 % — SIGNIFICANT CHANGE UP (ref 43–77)
NEUTROPHILS NFR BLD AUTO: 78.6 % — HIGH (ref 43–77)
NITRITE UR-MCNC: NEGATIVE — SIGNIFICANT CHANGE UP
NRBC # BLD AUTO: 0 K/UL — SIGNIFICANT CHANGE UP (ref 0–0)
NRBC # BLD AUTO: 0 K/UL — SIGNIFICANT CHANGE UP (ref 0–0)
NRBC # FLD: 0 K/UL — SIGNIFICANT CHANGE UP (ref 0–0)
NRBC # FLD: 0 K/UL — SIGNIFICANT CHANGE UP (ref 0–0)
NRBC BLD AUTO-RTO: 0 /100 WBCS — SIGNIFICANT CHANGE UP (ref 0–0)
NRBC BLD AUTO-RTO: 0 /100 WBCS — SIGNIFICANT CHANGE UP (ref 0–0)
PH UR: 5.5 — SIGNIFICANT CHANGE UP (ref 5–8)
PHOSPHATE SERPL-MCNC: 2.7 MG/DL — SIGNIFICANT CHANGE UP (ref 2.5–4.5)
PLATELET # BLD AUTO: 203 K/UL — SIGNIFICANT CHANGE UP (ref 150–400)
PLATELET # BLD AUTO: 275 K/UL — SIGNIFICANT CHANGE UP (ref 150–400)
PMV BLD: 8.9 FL — SIGNIFICANT CHANGE UP (ref 7–13)
PMV BLD: 9.2 FL — SIGNIFICANT CHANGE UP (ref 7–13)
POTASSIUM SERPL-MCNC: 3.8 MMOL/L — SIGNIFICANT CHANGE UP (ref 3.5–5.3)
POTASSIUM SERPL-MCNC: 4.3 MMOL/L — SIGNIFICANT CHANGE UP (ref 3.5–5.3)
POTASSIUM SERPL-SCNC: 3.8 MMOL/L — SIGNIFICANT CHANGE UP (ref 3.5–5.3)
POTASSIUM SERPL-SCNC: 4.3 MMOL/L — SIGNIFICANT CHANGE UP (ref 3.5–5.3)
PROT SERPL-MCNC: 5.7 GM/DL — LOW (ref 6–8.3)
PROT SERPL-MCNC: 6.7 GM/DL — SIGNIFICANT CHANGE UP (ref 6–8.3)
PROT UR-MCNC: SIGNIFICANT CHANGE UP MG/DL
RBC # BLD: 2.99 M/UL — LOW (ref 4.2–5.8)
RBC # BLD: 3.5 M/UL — LOW (ref 4.2–5.8)
RBC # FLD: 18.3 % — HIGH (ref 10.3–14.5)
RBC # FLD: 18.3 % — HIGH (ref 10.3–14.5)
RBC CASTS # UR COMP ASSIST: 14 /HPF — HIGH (ref 0–4)
RSV RNA NPH QL NAA+NON-PROBE: SIGNIFICANT CHANGE UP
SARS-COV-2 RNA SPEC QL NAA+PROBE: SIGNIFICANT CHANGE UP
SODIUM SERPL-SCNC: 126 MMOL/L — LOW (ref 135–145)
SODIUM SERPL-SCNC: 128 MMOL/L — LOW (ref 135–145)
SOURCE RESPIRATORY: SIGNIFICANT CHANGE UP
SP GR SPEC: 1.01 — SIGNIFICANT CHANGE UP (ref 1–1.03)
SQUAMOUS # UR AUTO: 1 /HPF — SIGNIFICANT CHANGE UP (ref 0–5)
TROPONIN I, HIGH SENSITIVITY RESULT: 5.53 NG/L — SIGNIFICANT CHANGE UP
UROBILINOGEN FLD QL: 0.2 MG/DL — SIGNIFICANT CHANGE UP (ref 0.2–1)
WBC # BLD: 10.31 K/UL — SIGNIFICANT CHANGE UP (ref 3.8–10.5)
WBC # BLD: 9.4 K/UL — SIGNIFICANT CHANGE UP (ref 3.8–10.5)
WBC # FLD AUTO: 10.31 K/UL — SIGNIFICANT CHANGE UP (ref 3.8–10.5)
WBC # FLD AUTO: 9.4 K/UL — SIGNIFICANT CHANGE UP (ref 3.8–10.5)
WBC UR QL: 2 /HPF — SIGNIFICANT CHANGE UP (ref 0–5)

## 2025-05-05 PROCEDURE — 83690 ASSAY OF LIPASE: CPT

## 2025-05-05 PROCEDURE — 99284 EMERGENCY DEPT VISIT MOD MDM: CPT | Mod: FS

## 2025-05-05 PROCEDURE — 84484 ASSAY OF TROPONIN QUANT: CPT

## 2025-05-05 PROCEDURE — 99285 EMERGENCY DEPT VISIT HI MDM: CPT

## 2025-05-05 PROCEDURE — 85025 COMPLETE CBC W/AUTO DIFF WBC: CPT

## 2025-05-05 PROCEDURE — 81001 URINALYSIS AUTO W/SCOPE: CPT

## 2025-05-05 PROCEDURE — 71045 X-RAY EXAM CHEST 1 VIEW: CPT | Mod: 26

## 2025-05-05 PROCEDURE — 99283 EMERGENCY DEPT VISIT LOW MDM: CPT | Mod: 25

## 2025-05-05 PROCEDURE — 36415 COLL VENOUS BLD VENIPUNCTURE: CPT

## 2025-05-05 PROCEDURE — 36000 PLACE NEEDLE IN VEIN: CPT

## 2025-05-05 PROCEDURE — 87040 BLOOD CULTURE FOR BACTERIA: CPT

## 2025-05-05 PROCEDURE — 93010 ELECTROCARDIOGRAM REPORT: CPT

## 2025-05-05 PROCEDURE — 80053 COMPREHEN METABOLIC PANEL: CPT

## 2025-05-05 RX ADMIN — Medication 1500 MILLILITER(S): at 13:07

## 2025-05-05 RX ADMIN — Medication 4000 MILLILITER(S): at 23:04

## 2025-05-05 NOTE — ED STATDOCS - PROGRESS NOTE DETAILS
Les: pt states that he hasn't been capping his drain and has been getting 1.5L out and feeling weak and dizzy. pt now feels so much better. will start capping his drain. Discussed with patient need to return to ED if symptoms don't continue to improve or recur or develops any new or worsening symptoms that are of concern.

## 2025-05-05 NOTE — ED STATDOCS - NSFOLLOWUPINSTRUCTIONS_ED_ALL_ED_FT
Please follow-up with your doctor(s) within the next 3 days, but see medical sooner if your symptoms persist or worsen.  Please call tomorrow for an appointment.    You were given a copy of your labs and/or imaging.  Please go-over these with your doctor(s).     If you have any worsening of symptoms or any other concerns please see your doctor or return to the ED immediately.    Please continue taking your home medications as directed

## 2025-05-05 NOTE — ED ADULT NURSE NOTE - GASTROINTESTINAL ASSESSMENT
Chief Complaint   Patient presents with     Previsit     18 fr SP tube catheter change - give Jewel Benitez MA   - - -

## 2025-05-05 NOTE — ED STATDOCS - CLINICAL SUMMARY MEDICAL DECISION MAKING FREE TEXT BOX
75 y/o male PMHx bile duct carcinoma presents to ED s/p 3 stents in the past week. Pt is on Cipro. High output from stent this weekend requiring hydration, will give 2L fluid. Plan to check labs, if negative follow up with doctor. Pt without fever or other complaints.

## 2025-05-05 NOTE — ED ADULT NURSE NOTE - NSFALLRISKINTERV_ED_ALL_ED

## 2025-05-05 NOTE — ED ADULT NURSE NOTE - CHIEF COMPLAINT QUOTE
Pt BIBEMS from home for cc of dizziness and weakness, since this morning, pt seen at  for "hydration, " and also seen by a PA whom  capped off  Bile drainage," pt discharged home but reports decrease PO intake, +right anterior chest port, pmhx bile CA, BGL in , pt denies n/v/d, -chest pain, +SOB, Sp02 ,

## 2025-05-05 NOTE — ED ADULT NURSE NOTE - NSFALLHARMRISKINTERV_ED_ALL_ED
Assistance OOB with selected safe patient handling equipment if applicable/Assistance with ambulation/Communicate risk of Fall with Harm to all staff, patient, and family/Encourage patient to sit up slowly, dangle for a short time, stand at bedside before walking/Monitor gait and stability/Orthostatic vital signs/Provide visual cue: red socks, yellow wristband, yellow gown, etc/Reinforce activity limits and safety measures with patient and family/Toileting schedule using arm’s reach rule for commode and bathroom/Bed in lowest position, wheels locked, appropriate side rails in place/Call bell, personal items and telephone in reach/Instruct patient to call for assistance before getting out of bed/chair/stretcher/Non-slip footwear applied when patient is off stretcher/[place] to call system/Physically safe environment - no spills, clutter or unnecessary equipment/Purposeful Proactive Rounding/Room/bathroom lighting operational, light cord in reach

## 2025-05-05 NOTE — ED ADULT NURSE NOTE - OBJECTIVE STATEMENT
Patient sent in for dehydration hx of Biliary bag placed on Friday.  Patient reports weight loss and fluid loss, decreased appetite.  Patient  states he is unable to keep up with amount of fluid he is losing.

## 2025-05-05 NOTE — CHART NOTE - NSCHARTNOTEFT_GEN_A_CORE
Pt s/p biliary drain evaluation and upsize on 5/2. Pt was instructed to cap the drain however was not comfortable doing this alone. Drain had high out put over the weekend. Pt presented to IR office for drain capping. Pt reported dizziness while here. Therefore, After capping the drain pt was transported to ER for Blood work and hydration.

## 2025-05-05 NOTE — ED STATDOCS - ATTENDING APP SHARED VISIT CONTRIBUTION OF CARE
I personally saw the patient with the JORGE L, and completed the key components of the history and physical exam. I then discussed the management plan with the JORGE L.

## 2025-05-05 NOTE — ED ADULT TRIAGE NOTE - CHIEF COMPLAINT QUOTE
Discharge Planner OT   Patient plan for discharge: home  Current status: Pt up in chair, willing to participate.  Ambulated x2 for 75 feet with walker and IV pole, x2 for 50 feet.  Took breaks between each walk.  Fatigue and SOB with effort.  BP initially slightly low, came up to normal range with walks.  See VS flowsheet for details.  Pt also stood at sink to comb hair and wash face.  Rated exertion at a 6/7 out of 10, stated less tired at end then at beginning.  Barriers to return to prior living situation: with family A with ADL./IADL's as needed none.  Recommendations for discharge: Home with family assist for IADL, assist with bathing, resumption of Home PT/OT  Rationale for recommendations: pt with decreased activity tolerance and strength. Pt requires Home OT to maximize (I), safety and activity tolerance for I/ADLs. Home care appropriate as pt requires taxing effort to leave the home        Entered by: Miguelito Starkey 07/09/2020 9:18 AM       Pt BIBEMS from home for cc of dizziness and weakness, since this morning, pt seen at  for "hydration, " and also a "PA capped off  Bile drainage," pt discharger home but reports decrease PO intake, +right anterior chest port, pmhx bile CA, BGL in , pt denies n/v/d, -chest pain, +SOB, Sp02 , Pt BIBEMS from home for cc of dizziness and weakness, since this morning, pt seen at  for "hydration, " and also seen by a PA whom  capped off  Bile drainage," pt discharged home but reports decrease PO intake, +right anterior chest port, pmhx bile CA, BGL in , pt denies n/v/d, -chest pain, +SOB, Sp02 ,

## 2025-05-05 NOTE — ED STATDOCS - PHYSICAL EXAMINATION
Constitutional: NAD AOx3  Eyes: PERRL EOMI  Head: Normocephalic atraumatic  Mouth: MMM  Cardiac: regular rate and rhythm  Resp: systolic murmur   GI: Abd s/nd/nt. cachectic  Neuro: CN2-12 grossly intact, HERNANDEZ x 4  Skin: No visible rashes

## 2025-05-05 NOTE — ED STATDOCS - PATIENT PORTAL LINK FT
You can access the FollowMyHealth Patient Portal offered by [facility] by registering at the following website: [email or web address]. By joining [employer]’s FollowMyHealth portal, you will also be able to view your health information using other applications (apps) compatible with our system.

## 2025-05-05 NOTE — ED ADULT TRIAGE NOTE - CHIEF COMPLAINT QUOTE
pt presents to the ED from IR for dehydration. pt states "I had a billiary bag placed Friday after a stent was removed and replaced. an external bag was then placed over it and I was losing about 1.5 L a day. I am so dehydrated. They capped it today because I was so dehydrated and told me to come downstairs ." pt A&Ox4, well appearing, and ambulatory at baseline placed in wheelchair for increased weakness,  with no further complaints or discomforts reported at this time.

## 2025-05-05 NOTE — ED STATDOCS - OBJECTIVE STATEMENT
75 y/o male PMHx bile duct carcinoma presents to ED c/o feeling dehydrated, dizzy and lightheaded s/p 3 stents in the past week.   Pt had biliary drain put in at [facility] 3 weeks ago. Denies diarrhea.

## 2025-05-05 NOTE — ED ADULT NURSE NOTE - OBJECTIVE STATEMENT
pt presents to ER, from home, for dizziness and lightheadedness starting this evening. pt reports being seen in  ER this morning for hydration. states "I went to IR this morning because I was draining my bile duct bag and the nurse in IR sent me down to get fluids. this evening I just got so dizzy I had to come in to be seen, its like I was seeing stars". hx whipple, bile duct CA. R chest wall port. denies any other medical complaints.

## 2025-05-06 DIAGNOSIS — K56.609 UNSPECIFIED INTESTINAL OBSTRUCTION, UNSPECIFIED AS TO PARTIAL VERSUS COMPLETE OBSTRUCTION: ICD-10-CM

## 2025-05-06 LAB
ALBUMIN SERPL ELPH-MCNC: 2.4 G/DL — LOW (ref 3.3–5)
ALP SERPL-CCNC: 484 U/L — HIGH (ref 40–120)
ALT FLD-CCNC: 54 U/L — SIGNIFICANT CHANGE UP (ref 12–78)
ANION GAP SERPL CALC-SCNC: 6 MMOL/L — SIGNIFICANT CHANGE UP (ref 5–17)
APPEARANCE UR: ABNORMAL
APTT BLD: 30.6 SEC — SIGNIFICANT CHANGE UP (ref 26.1–36.8)
AST SERPL-CCNC: 41 U/L — HIGH (ref 15–37)
BACTERIA # UR AUTO: NEGATIVE /HPF — SIGNIFICANT CHANGE UP
BILIRUB SERPL-MCNC: 1.5 MG/DL — HIGH (ref 0.2–1.2)
BILIRUB UR-MCNC: ABNORMAL
BUN SERPL-MCNC: 18 MG/DL — SIGNIFICANT CHANGE UP (ref 7–23)
CALCIUM SERPL-MCNC: 8.6 MG/DL — SIGNIFICANT CHANGE UP (ref 8.5–10.1)
CAST: 3 /LPF — SIGNIFICANT CHANGE UP (ref 0–4)
CHLORIDE SERPL-SCNC: 100 MMOL/L — SIGNIFICANT CHANGE UP (ref 96–108)
CO2 SERPL-SCNC: 26 MMOL/L — SIGNIFICANT CHANGE UP (ref 22–31)
COD CRY URNS QL: PRESENT
COLOR SPEC: SIGNIFICANT CHANGE UP
CREAT SERPL-MCNC: 0.89 MG/DL — SIGNIFICANT CHANGE UP (ref 0.5–1.3)
DIFF PNL FLD: NEGATIVE — SIGNIFICANT CHANGE UP
EGFR: 89 ML/MIN/1.73M2 — SIGNIFICANT CHANGE UP
EGFR: 89 ML/MIN/1.73M2 — SIGNIFICANT CHANGE UP
GLUCOSE BLDC GLUCOMTR-MCNC: 138 MG/DL — HIGH (ref 70–99)
GLUCOSE SERPL-MCNC: 133 MG/DL — HIGH (ref 70–99)
GLUCOSE UR QL: NEGATIVE MG/DL — SIGNIFICANT CHANGE UP
HCT VFR BLD CALC: 25.1 % — LOW (ref 39–50)
HGB BLD-MCNC: 8.2 G/DL — LOW (ref 13–17)
INR BLD: 1.07 RATIO — SIGNIFICANT CHANGE UP (ref 0.85–1.16)
KETONES UR-MCNC: ABNORMAL MG/DL
LEUKOCYTE ESTERASE UR-ACNC: ABNORMAL
MAGNESIUM SERPL-MCNC: 1.7 MG/DL — SIGNIFICANT CHANGE UP (ref 1.6–2.6)
MCHC RBC-ENTMCNC: 28.8 PG — SIGNIFICANT CHANGE UP (ref 27–34)
MCHC RBC-ENTMCNC: 32.7 G/DL — SIGNIFICANT CHANGE UP (ref 32–36)
MCV RBC AUTO: 88.1 FL — SIGNIFICANT CHANGE UP (ref 80–100)
NITRITE UR-MCNC: NEGATIVE — SIGNIFICANT CHANGE UP
NRBC # BLD AUTO: 0 K/UL — SIGNIFICANT CHANGE UP (ref 0–0)
NRBC # FLD: 0 K/UL — SIGNIFICANT CHANGE UP (ref 0–0)
NRBC BLD AUTO-RTO: 0 /100 WBCS — SIGNIFICANT CHANGE UP (ref 0–0)
PH UR: 5 — SIGNIFICANT CHANGE UP (ref 5–8)
PHOSPHATE SERPL-MCNC: 3.2 MG/DL — SIGNIFICANT CHANGE UP (ref 2.5–4.5)
PLATELET # BLD AUTO: 172 K/UL — SIGNIFICANT CHANGE UP (ref 150–400)
PMV BLD: 9.2 FL — SIGNIFICANT CHANGE UP (ref 7–13)
POTASSIUM SERPL-MCNC: 3.6 MMOL/L — SIGNIFICANT CHANGE UP (ref 3.5–5.3)
POTASSIUM SERPL-SCNC: 3.6 MMOL/L — SIGNIFICANT CHANGE UP (ref 3.5–5.3)
PROT SERPL-MCNC: 5.6 GM/DL — LOW (ref 6–8.3)
PROT UR-MCNC: SIGNIFICANT CHANGE UP MG/DL
PROTHROM AB SERPL-ACNC: 12.6 SEC — SIGNIFICANT CHANGE UP (ref 9.9–13.4)
RBC # BLD: 2.85 M/UL — LOW (ref 4.2–5.8)
RBC # FLD: 18.3 % — HIGH (ref 10.3–14.5)
RBC CASTS # UR COMP ASSIST: 34 /HPF — HIGH (ref 0–4)
SODIUM SERPL-SCNC: 132 MMOL/L — LOW (ref 135–145)
SP GR SPEC: 1.02 — SIGNIFICANT CHANGE UP (ref 1–1.03)
SQUAMOUS # UR AUTO: 2 /HPF — SIGNIFICANT CHANGE UP (ref 0–5)
UROBILINOGEN FLD QL: 0.2 MG/DL — SIGNIFICANT CHANGE UP (ref 0.2–1)
WBC # BLD: 6.24 K/UL — SIGNIFICANT CHANGE UP (ref 3.8–10.5)
WBC # FLD AUTO: 6.24 K/UL — SIGNIFICANT CHANGE UP (ref 3.8–10.5)
WBC UR QL: 4 /HPF — SIGNIFICANT CHANGE UP (ref 0–5)

## 2025-05-06 PROCEDURE — 85610 PROTHROMBIN TIME: CPT

## 2025-05-06 PROCEDURE — 97530 THERAPEUTIC ACTIVITIES: CPT | Mod: GP

## 2025-05-06 PROCEDURE — 74177 CT ABD & PELVIS W/CONTRAST: CPT | Mod: 26

## 2025-05-06 PROCEDURE — C1887: CPT

## 2025-05-06 PROCEDURE — 83036 HEMOGLOBIN GLYCOSYLATED A1C: CPT

## 2025-05-06 PROCEDURE — L8699: CPT

## 2025-05-06 PROCEDURE — C1894: CPT

## 2025-05-06 PROCEDURE — 85027 COMPLETE CBC AUTOMATED: CPT

## 2025-05-06 PROCEDURE — C1729: CPT

## 2025-05-06 PROCEDURE — 97163 PT EVAL HIGH COMPLEX 45 MIN: CPT | Mod: GP

## 2025-05-06 PROCEDURE — 84478 ASSAY OF TRIGLYCERIDES: CPT

## 2025-05-06 PROCEDURE — 36415 COLL VENOUS BLD VENIPUNCTURE: CPT

## 2025-05-06 PROCEDURE — C1876: CPT

## 2025-05-06 PROCEDURE — 47536 EXCHANGE BILIARY DRG CATH: CPT

## 2025-05-06 PROCEDURE — 97116 GAIT TRAINING THERAPY: CPT | Mod: GP

## 2025-05-06 PROCEDURE — 47535 CONVERSION EXT BIL DRG CATH: CPT

## 2025-05-06 PROCEDURE — 71045 X-RAY EXAM CHEST 1 VIEW: CPT | Mod: 26

## 2025-05-06 PROCEDURE — 80053 COMPREHEN METABOLIC PANEL: CPT

## 2025-05-06 PROCEDURE — 86850 RBC ANTIBODY SCREEN: CPT

## 2025-05-06 PROCEDURE — 47538 PERQ PLMT BILE DUCT STENT: CPT

## 2025-05-06 PROCEDURE — 47531 INJECTION FOR CHOLANGIOGRAM: CPT

## 2025-05-06 PROCEDURE — 85730 THROMBOPLASTIN TIME PARTIAL: CPT

## 2025-05-06 PROCEDURE — 71045 X-RAY EXAM CHEST 1 VIEW: CPT

## 2025-05-06 PROCEDURE — 83735 ASSAY OF MAGNESIUM: CPT

## 2025-05-06 PROCEDURE — C1769: CPT

## 2025-05-06 PROCEDURE — 86901 BLOOD TYPING SEROLOGIC RH(D): CPT

## 2025-05-06 PROCEDURE — 71045 X-RAY EXAM CHEST 1 VIEW: CPT | Mod: 26,77

## 2025-05-06 PROCEDURE — 82962 GLUCOSE BLOOD TEST: CPT

## 2025-05-06 PROCEDURE — 86900 BLOOD TYPING SEROLOGIC ABO: CPT

## 2025-05-06 PROCEDURE — 80048 BASIC METABOLIC PNL TOTAL CA: CPT

## 2025-05-06 PROCEDURE — 74176 CT ABD & PELVIS W/O CONTRAST: CPT

## 2025-05-06 PROCEDURE — 84100 ASSAY OF PHOSPHORUS: CPT

## 2025-05-06 RX ORDER — LORAZEPAM 4 MG/ML
1 VIAL (ML) INJECTION ONCE
Refills: 0 | Status: DISCONTINUED | OUTPATIENT
Start: 2025-05-06 | End: 2025-05-06

## 2025-05-06 RX ORDER — METOCLOPRAMIDE HCL 10 MG
10 TABLET ORAL EVERY 6 HOURS
Refills: 0 | Status: DISCONTINUED | OUTPATIENT
Start: 2025-05-06 | End: 2025-05-07

## 2025-05-06 RX ORDER — MAGNESIUM OXIDE 400 MG
1.5 TABLET ORAL
Refills: 0 | DISCHARGE

## 2025-05-06 RX ORDER — HEPARIN SODIUM 1000 [USP'U]/ML
5000 INJECTION INTRAVENOUS; SUBCUTANEOUS EVERY 12 HOURS
Refills: 0 | Status: DISCONTINUED | OUTPATIENT
Start: 2025-05-06 | End: 2025-05-09

## 2025-05-06 RX ORDER — ONDANSETRON HCL/PF 4 MG/2 ML
4 VIAL (ML) INJECTION EVERY 6 HOURS
Refills: 0 | Status: DISCONTINUED | OUTPATIENT
Start: 2025-05-06 | End: 2025-05-26

## 2025-05-06 RX ORDER — OCTREOTIDE ACETATE 500 UG/ML
100 INJECTION, SOLUTION INTRAVENOUS; SUBCUTANEOUS EVERY 8 HOURS
Refills: 0 | Status: DISCONTINUED | OUTPATIENT
Start: 2025-05-06 | End: 2025-05-09

## 2025-05-06 RX ORDER — SODIUM/POT/MAG/CALC/CHLOR/ACET 35-20-5MEQ
1 VIAL (ML) INTRAVENOUS
Refills: 0 | Status: DISCONTINUED | OUTPATIENT
Start: 2025-05-06 | End: 2025-05-06

## 2025-05-06 RX ORDER — CIPROFLOXACIN HCL 250 MG
500 TABLET ORAL EVERY 12 HOURS
Refills: 0 | Status: DISCONTINUED | OUTPATIENT
Start: 2025-05-06 | End: 2025-05-12

## 2025-05-06 RX ORDER — LIPASE/PROTEASE/AMYLASE 10K-37.5K
3 CAPSULE,DELAYED RELEASE (ENTERIC COATED) ORAL
Refills: 0 | Status: DISCONTINUED | OUTPATIENT
Start: 2025-05-06 | End: 2025-05-15

## 2025-05-06 RX ORDER — HYDROCORTISONE 20 MG
10 TABLET ORAL EVERY 6 HOURS
Refills: 0 | Status: DISCONTINUED | OUTPATIENT
Start: 2025-05-06 | End: 2025-05-07

## 2025-05-06 RX ORDER — ACETAMINOPHEN 500 MG/5ML
650 LIQUID (ML) ORAL EVERY 6 HOURS
Refills: 0 | Status: DISCONTINUED | OUTPATIENT
Start: 2025-05-06 | End: 2025-05-09

## 2025-05-06 RX ORDER — COLESEVELAM HYDROCHLORIDE 3.75 G/1
1 FOR SUSPENSION ORAL
Refills: 0 | DISCHARGE

## 2025-05-06 RX ADMIN — Medication 110 MILLILITER(S): at 12:14

## 2025-05-06 RX ADMIN — HEPARIN SODIUM 5000 UNIT(S): 1000 INJECTION INTRAVENOUS; SUBCUTANEOUS at 11:40

## 2025-05-06 RX ADMIN — Medication 500 MILLIGRAM(S): at 18:35

## 2025-05-06 RX ADMIN — Medication 125 MILLILITER(S): at 03:52

## 2025-05-06 RX ADMIN — Medication 10 MILLIGRAM(S): at 06:19

## 2025-05-06 RX ADMIN — Medication 83 EACH: at 23:10

## 2025-05-06 RX ADMIN — Medication 40 MILLIGRAM(S): at 12:45

## 2025-05-06 RX ADMIN — OCTREOTIDE ACETATE 100 MICROGRAM(S): 500 INJECTION, SOLUTION INTRAVENOUS; SUBCUTANEOUS at 22:54

## 2025-05-06 RX ADMIN — Medication 110 MILLILITER(S): at 06:45

## 2025-05-06 RX ADMIN — Medication 1 MILLIGRAM(S): at 02:02

## 2025-05-06 RX ADMIN — Medication 10 MILLIGRAM(S): at 18:36

## 2025-05-06 RX ADMIN — Medication 10 MILLIGRAM(S): at 22:53

## 2025-05-06 RX ADMIN — Medication 10 MILLIGRAM(S): at 22:48

## 2025-05-06 RX ADMIN — HEPARIN SODIUM 5000 UNIT(S): 1000 INJECTION INTRAVENOUS; SUBCUTANEOUS at 22:52

## 2025-05-06 RX ADMIN — Medication 10 MILLIGRAM(S): at 12:46

## 2025-05-06 RX ADMIN — OCTREOTIDE ACETATE 100 MICROGRAM(S): 500 INJECTION, SOLUTION INTRAVENOUS; SUBCUTANEOUS at 15:17

## 2025-05-06 RX ADMIN — OCTREOTIDE ACETATE 100 MICROGRAM(S): 500 INJECTION, SOLUTION INTRAVENOUS; SUBCUTANEOUS at 06:20

## 2025-05-06 RX ADMIN — Medication 10 MILLIGRAM(S): at 06:18

## 2025-05-06 RX ADMIN — Medication 10 MILLIGRAM(S): at 18:35

## 2025-05-06 RX ADMIN — Medication 20 MILLIEQUIVALENT(S): at 22:52

## 2025-05-06 RX ADMIN — Medication 20 MILLIEQUIVALENT(S): at 23:26

## 2025-05-06 NOTE — PATIENT PROFILE ADULT - FALL HARM RISK - HARM RISK INTERVENTIONS

## 2025-05-06 NOTE — DIETITIAN INITIAL EVALUATION ADULT - ORAL INTAKE PTA/DIET HISTORY
pt reports minimal PO intake x 5 days 2/2 hospitalizations and ED visits. Drain had >1L output x 2 days per pt. Reports difficulties eating adequately x 1 yr since Whipple procedure and multiple rounds of chemo (16 total tx per pt). (+) taste changes. Does drink 2 Boost shakes daily. Also makes protein smoothies. Lives w/ wife. 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 Meeting <50-75% ENN chronically based on recall     RD assess 4/25/25: "Pt lives at home w/ his wife who has been doing all the shopping and cooking. Reports "fair" appetite, doesn't eat too much at home. Reports some taste changes since chemo. Takes Creon w/ all meals and snacks. Reports gray colored stools."

## 2025-05-06 NOTE — ED PROVIDER NOTE - OBJECTIVE STATEMENT
75 y/o male PMHx bile duct carcinoma presents to ED c/o feeling dehydrated, dizzy and lightheaded s/p 3 stents in the past week.   Pt had biliary drain put in at [facility] 3 weeks ago. Denies diarrhea.  Patient was seen at this emergency department earlier on May 5 for the same complaint and was given 2 L of normal saline.  The patient's biliary drain was capped.  The patient notes that he has had significantly decreased appetite and has not been able to keep up with his fluid intake.  Patient has had occasional nutrition shakes to try to maintain his strength and hydration.  Patient denies any other complaints at this time.  Patient is afebrile.  See physical exam.  Plan for repeat labs to include CBC, CMP, IV fluid administration, CT of the abdomen pelvis with IV contrast.  Patient to be admitted for observation and interventional radiology consultation to evaluate the biliary drain.

## 2025-05-06 NOTE — DIETITIAN INITIAL EVALUATION ADULT - ALTERNATE MEANS OF NUTRITION
Recommendations: via RCW Port  Total Volume: 2000 mL x 24 hours  115 g  Amino Acids  100 g Dextrose  0 g Lipids 20% (pending TG level)  154 mEq Sodium Chloride  0 mEq Sodium Acetate  0 mmol Sodium Phosphate  31 mEq Potassium Chloride  0 mEq Potassium Acetate  20 mmol Potassium Phosphate  0 mEq Calcium Gluconate (CAPS out of PN solution)  8 mEq Magnesium Sulfate  200 mg Thiamine  1 ml Trace Elements  10 ml MVI    Total Calories     800       (Meets    40%  of  Estimated Energy needs  and    100%  Protein needs)/TPN

## 2025-05-06 NOTE — H&P ADULT - ASSESSMENT
74 M w/ H/o Cholangio CA w/ distant mets, p/w SBO    concern for malignant SBO    NGT inserted in ED, 2L  bilious fluid out instantly    Plan:  - Admit under Dr. Wright's service  - Started on mSBO protocol - Steroids/Reglan/Octreotide  - Nutrition consult for TPN  - c/w NGT bowel decompression  - Pain control PRN  - Monitor vitals  - NPO   - Nausea control PRN  - IVF: LR   - replete electrolytes  - daily labs  - OOB/PT    Plan will be discussed with Surgical oncology attending, Dr. Wright

## 2025-05-06 NOTE — DIETITIAN INITIAL EVALUATION ADULT - ADD RECOMMEND
Initiate TPN via Port 2/2 mSBO and suspected prolonged NPO status  1) Daily weights  2) Strict I & O's  3) Daily lyte checks including magnesium and phos ***monitor closely for RFS (K, Phos, Mg) and replete lytes outside bag PRN  4) Weekly triglycerides/LFT checks  5) POCT q6hrs; maintain 140-180mg/dL **monitor closely   6) Titrate dextrose up 50-75g/day until goal rate of 350g daily reached to meet 100% of increased nutrient needs (GIR ~4.2, WNL). However, may need to lower or add insulin to bag if POCT become elevated. Hold dextrose if K/Phos severely low.   7) Confirm goals of care regarding nutrition support - Nutrition support is not recommended with advanced cancer as studies show that there is no improvement of dehydration symptoms, quality of life, or survival. It also increases the risk for peripheral edema, ascites, and pleural effusions. However, will provide nutr/hydration within GOC    *will continue to monitor and adjust PN prn*

## 2025-05-06 NOTE — DIETITIAN INITIAL EVALUATION ADULT - NSFNSGIIOFT_GEN_A_CORE
I&O's Detail    05 May 2025 07:01  -  06 May 2025 07:00  --------------------------------------------------------  IN:  Total IN: 0 mL    OUT:    Voided (mL): 300 mL  Total OUT: 300 mL    Total NET: -300 mL

## 2025-05-06 NOTE — H&P ADULT - HISTORY OF PRESENT ILLNESS
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 Ocasio) w/ adjuvant Xeloda -> [facility]/Cis/Durva, Found liver metastases on 11/12/2024, failed ERCP in [facility], s/p internal-external biliary drainage (8.5fr) on 4/13, c/b High external drainage output ( >2L) due to Internal drainage failure (CBD blockage), now s/p exchange of internal-external biliary drainage (12fr) on 4/25. On Durvalumab, Presented with 3-4 days of progressive abdominal bloating with mild nausea, generalized weakness and fatigue. Denies fevers or chills. Denies emesis. Last BM 3d ago, has been passing gases. Complains of mild discomfort of epigastric pain. No other complains at this time.     75yo M w/ PMHx of Cholangio CA s/p Robotic Whipple on 5/28/2024 (Dr. Ocasio) w/ adjuvant Xeloda -> [place]/Cis/Durva, Found liver metastases on 11/12/2024, failed CBD stent placement (unsuccessful cannulation via ERCP), s/p internal-external biliary drainage (8.5fr) at [facility] on 4/13, c/b High external drainage output ( >2L) due to Internal drainage failure (CBD blockage), now s/p exchange of internal-external biliary drainage (12fr) on 4/25 in . Currently on Durvalumab, Presented with 3-4 days of progressive abdominal bloating with mild nausea, generalized weakness and fatigue. Denies fevers or chills. Denies emesis. Last BM 3d ago, has been passing gases. Complains of mild discomfort of epigastric pain. No other complains at this time.

## 2025-05-06 NOTE — PHYSICAL THERAPY INITIAL EVALUATION ADULT - ADDITIONAL COMMENTS
Pt lives in a one level home with his wife. Independent PTA. Info obtained from eval 4/2025. Pt lives in a one level home with his wife. Independent PTA. Info obtained from eval 4/2025. Update 5/7/2025: info same as previously documented.

## 2025-05-06 NOTE — ED PROVIDER NOTE - CLINICAL SUMMARY MEDICAL DECISION MAKING FREE TEXT BOX
73 y/o male PMHx bile duct carcinoma presents to ED c/o feeling dehydrated, dizzy and lightheaded s/p 3 stents in the past week.   Pt had biliary drain put in at [facility] 3 weeks ago. Denies diarrhea.  Patient was seen at this emergency department earlier on May 5 for the same complaint and was given 2 L of normal saline.  The patient's biliary drain was capped.  The patient notes that he has had significantly decreased appetite and has not been able to keep up with his fluid intake.  Patient has had occasional nutrition shakes to try to maintain his strength and hydration.  Patient denies any other complaints at this time.  Patient is afebrile.  See physical exam.  Plan for repeat labs to include CBC, CMP, IV fluid administration, CT of the abdomen pelvis with IV contrast.  Patient to be admitted for observation and interventional radiology consultation to evaluate the biliary drain.

## 2025-05-06 NOTE — H&P ADULT - NSHPLABSRESULTS_GEN_ALL_CORE
Chief complaint:      PMHx:  Bile duct cancer        PSHx:  H/O Whipple procedure        FHx:      Vitals:  T(C): 36.6 ( @ 07:41), Max: 36.9 ( @ 21:23)  HR: 78 ( @ 07:41) (67 - 88)  BP: 97/66 ( @ 07:41) (90/72 - 111/82)  RR: 17 ( @ 07:41) (16 - 20)  SpO2: 100% ( @ 07:41) (98% - 100%)      I&Os     @ 07:01  -   @ 07:00  --------------------------------------------------------  IN:  Total IN: 0 mL    OUT:    Voided (mL): 300 mL  Total OUT: 300 mL    Total NET: -300 mL        .    Labs:   @ 22:41                    8.7  CBC: 9.40>)-------(<203                     26.5                 128 | 95 | 24    CMP:  ----------------------< 159               3.8 | 26 | 1.10                      Ca:8.9  Phos:2.7  M.7               1.6|      |39        LFTs:  ------|557|-----             -|      |-   @ 12:37                    10.3  CBC: 10.31>)-------(<275                     30.8                 126 | 92 | 23    CMP:  ----------------------< 143               4.3 | 26 | 1.29                      Ca:9.8  Phos:-  Mg:-               1.7|      |40        LFTs:  ------|610|-----             -|      |-        Cultures:    Urinalysis with Rflx Culture (collected 25 @ 00:30)          Current Inpatient Medications:  acetaminophen     Tablet .. 650 milliGRAM(s) Oral every 6 hours PRN  dicyclomine 10 milliGRAM(s) Oral two times a day before meals PRN  hydrocortisone sodium succinate Injectable 10 milliGRAM(s) IV Push every 6 hours  metoclopramide 10 milliGRAM(s) Oral every 6 hours  morphine  - Injectable 2 milliGRAM(s) IV Push every 4 hours PRN  octreotide  Injectable 100 MICROGram(s) IV Push every 8 hours  ondansetron Injectable 4 milliGRAM(s) IV Push every 6 hours PRN  pancrelipase  (CREON 36,000 Lipase Units) 3 Capsule(s) Oral three times a day with meals  pantoprazole  Injectable 40 milliGRAM(s) IV Push daily  sodium chloride 0.9%. 1000 milliLiter(s) (110 mL/Hr) IV Continuous <Continuous>  sodium chloride 0.9%. 1000 milliLiter(s) (125 mL/Hr) IV Continuous <Continuous>    < from: CT Abdomen and Pelvis w/ IV Cont (25 @ 00:14) >        < end of copied text >

## 2025-05-06 NOTE — PHYSICAL THERAPY INITIAL EVALUATION ADULT - GAIT DISTANCE, PT EVAL
Amb limited secondary to fatigue. Pt had to sit down for ~ 2' assisted through amb bout. Following sitting pt was able to amb back to room. Once back in room pt transferred back to bed with above assistance./50 feet

## 2025-05-06 NOTE — DIETITIAN INITIAL EVALUATION ADULT - PERTINENT MEDS FT
MEDICATIONS  (STANDING):  heparin   Injectable 5000 Unit(s) SubCutaneous every 12 hours  hydrocortisone sodium succinate Injectable 10 milliGRAM(s) IV Push every 6 hours  metoclopramide 10 milliGRAM(s) Oral every 6 hours  octreotide  Injectable 100 MICROGram(s) IV Push every 8 hours  pancrelipase  (CREON 36,000 Lipase Units) 3 Capsule(s) Oral three times a day with meals  pantoprazole  Injectable 40 milliGRAM(s) IV Push daily  sodium chloride 0.9%. 1000 milliLiter(s) (110 mL/Hr) IV Continuous <Continuous>  sodium chloride 0.9%. 1000 milliLiter(s) (125 mL/Hr) IV Continuous <Continuous>    MEDICATIONS  (PRN):  acetaminophen     Tablet .. 650 milliGRAM(s) Oral every 6 hours PRN Mild Pain (1 - 3)  dicyclomine 10 milliGRAM(s) Oral two times a day before meals PRN gas/cramps  morphine  - Injectable 2 milliGRAM(s) IV Push every 4 hours PRN Severe Pain (7 - 10)  ondansetron Injectable 4 milliGRAM(s) IV Push every 6 hours PRN Nausea and/or Vomiting

## 2025-05-06 NOTE — DIETITIAN INITIAL EVALUATION ADULT - WEIGHT FOR BMI (KG)
April 24, 2017    Markel Johnson  [address]  [facility] [address]      Dear Markel,    I am glad to inform you that the results of your recent pap smear taken in the office were reported negative, and there was no evidence of cancer.    You are encouraged to make an appointment  in 12 months to see your provider for an annual exam.    Sincerely,          Maurisio Resendez MD, MD  [facility]  [facility]  [address]  [place] [address]  T [phone]  F [phone]  [email or web address]       57.6

## 2025-05-06 NOTE — PHYSICAL THERAPY INITIAL EVALUATION ADULT - GENERAL OBSERVATIONS, REHAB EVAL
Pt found supine in bed with IV, and bed alarm activated. Pt with no c/o pain. Pt found supine in bed with IV/TPN, and bed alarm activated. Pt with no c/o pain.

## 2025-05-06 NOTE — DIETITIAN INITIAL EVALUATION ADULT - PERTINENT LABORATORY DATA
05-05    128[L]  |  95[L]  |  24[H]  ----------------------------<  159[H]  3.8   |  26  |  1.10    Ca    8.9      05 May 2025 22:41  Phos  2.7     05-05  Mg     1.7     05-05    TPro  5.7[L]  /  Alb  2.6[L]  /  TBili  1.6[H]  /  DBili  x   /  AST  39[H]  /  ALT  55  /  AlkPhos  557[H]  05-05  POCT Blood Glucose.: 176 mg/dL (05-05-25 @ 21:22)

## 2025-05-06 NOTE — DIETITIAN INITIAL EVALUATION ADULT - LAB (SPECIFY)
CMP, Mg, Phos  POCT q6 hrs  Triglyceride  Consider obtaining vitamin D 25OH level to assess nutriture   consider checking B6, B12, thiamine, folate, carnitine, and copper levels as malnutrition in cause these to be deficient

## 2025-05-06 NOTE — PHYSICAL THERAPY INITIAL EVALUATION ADULT - PERTINENT HX OF CURRENT PROBLEM, REHAB EVAL
Pt admitted to  secondary to 3-4 day hx of epigastric pain, abd bloating, mild nausea, generalized weakness, and fatigue. Pt with a hx cholangio ca s/p Whipple on 5/28/2025. Liver mets found on 11/2024. Pt with failed ERCP at [facility] and pt with int/ext billiary drainage. Pt s/p exchange of int/ext biliary drains on 4/25/2025. Pt admitted to  secondary to 3-4 day hx of epigastric pain, abd bloating, mild nausea, generalized weakness, and fatigue. Pt with a hx cholangio ca s/p Whipple on 5/28/2025. Liver mets found on 11/2024. Pt with failed ERCP at [facility] and pt with int/ext billiary drainage. Pt s/p exchange of int/ext biliary drains on 4/25/2025. In ED NGT placed. 5/7/2025: H/H- 8.1/25.0. See CT abd/pelvis in results section

## 2025-05-06 NOTE — H&P ADULT - NSHPPHYSICALEXAM_GEN_ALL_CORE
ROS: Negative except written above    PHYSICAL EXAM:  - GENERAL: No acute distress.  - EYES: EOMI. Anicteric.  - HENT: Moist mucous membranes. No scleral icterus. No cervical lymphadenopathy. NGT in place.  - LUNGS:  No accessory muscle use.  - CARDIOVASCULAR: Regular rate and rhythm.   - ABDOMEN: Distended, mild discomfort to LUQ, tympanic. Outer drain c/d/i. No palpable masses. previous surgical port sites well healed   - EXTREMITIES: No edema. Non-tender.  - SKIN: No rashes or lesions. Warm.  - NEUROLOGIC: No focal neurological deficits. CN II-XII grossly intact, but not individually tested.  - PSYCHIATRIC: Cooperative. Appropriate mood and affect.      CT A/P:  Interval increase in intrahepatic biliary dilatation, now moderately   severe. Stable positioning of internal/external biliary stent. Correlate   for stent dysfunction. Grossly stable omega hepatis mass compatible with   known cholangiocarcinoma.    Stable segment 4B liver lesion likely metastasis.  Stable cystic lesions.    Massively distended stomach. Dilated loops of small bowel with transition   point in the right mid abdomen suggests small bowel obstruction.

## 2025-05-06 NOTE — H&P ADULT - NSVTERISKREFERASSESS_GEN_ALL_CORE
From: Mireya Hill  To: Twin Partida  Sent: 2/28/2024 9:26 AM EST  Subject: Lab results     I have not missed a dose. I did forget one last night but other then that I have taken them. I have one pill left and I'll take it tonight    Refer to the Assessment tab to view/cancel completed assessment. O-L Flap Text: The defect edges were debeveled with a #15 scalpel blade.  Given the location of the defect, shape of the defect and the proximity to free margins an O-L flap was deemed most appropriate.  Using a sterile surgical marker, an appropriate advancement flap was drawn incorporating the defect and placing the expected incisions within the relaxed skin tension lines where possible.    The area thus outlined was incised deep to adipose tissue with a #15 scalpel blade.  The skin margins were undermined to an appropriate distance in all directions utilizing iris scissors.

## 2025-05-06 NOTE — DIETITIAN INITIAL EVALUATION ADULT - OTHER INFO
75yo M w/ PMHx of Cholangio CA s/p Robotic Whipple on 5/28/2024 (Dr. Ocasio) w/ adjuvant Xeloda -> [place]/Cis/Durva, Found liver metastases on 11/12/2024, failed CBD stent placement (unsuccessful cannulation via ERCP), s/p internal-external biliary drainage (8.5fr) at [facility] on 4/13, c/b High external drainage output ( >2L) due to Internal drainage failure (CBD blockage), now s/p exchange of internal-external biliary drainage (12fr) on 4/25 in . Presented with 3-4 days of progressive abdominal bloating with mild nausea, generalized weakness and fatigue. Last BM 3d ago, has been passing gases. Complains of mild discomfort of epigastric pain. Admitted w/ SBO (likely malignant). NGT to LWS in place. Started on mSBO protocol - Steroids/Reglan/Octreotide. RD consulted for initiation of TPN via port (OK to use from surgical oncology).     Pt known to RD services, prev met criteria for PCM on multiple prev admits. Continues to meet clear criteria for PCM at present. Wt history reviewed: 120# on 4/25/25 (taken by RD); 154# on 11/13/24 (taken by RD). New bed scale wt taken by RD 5/6 at 127# - likely skewed by abd distention. Pt reports UBW in 190s# x 1 yr ago. Whipple procedure was 5/28/24, lost ~70# since then. Unintentional wt loss of 63#/ 33% x 1 yr (severe and clinically significant). NFPE reveals severe muscle/ fat wasting; thin, frail, bony, emaciated.  Plan to remain NPO for now and initiate TPN tonight.  Parenteral Nutrition Handout given and left at bed side. Definition, indications, and daily monitoring explained. All questions/concerns addressed. Will start all lytes/min as per standard dosing and adjust based on labs prn. Monitor closely for refeeding syndrome, AT HIGH RISK - please obtain BMP, Mg, and Phos levels. Monitor and replete K, Phos, Mg prn if begins to downtrend, especially if IV dextrose started. When TPN is initiated, recommend to check refeeding labs BID x 2-3 days upon initiation and then will reevaluate. Consider KPhos suppl BID in effort to supplement low lytes prior to initiating nutrition support. 200mg of thiamine and MVI w/ trace minerals will be added into TPN bag (TBili <2). Will monitor glucose closely. POCT q6 hrs per  PN Policy. STRONGLY suggest to Confirm goals of care regarding nutrition support. See additional recs below.

## 2025-05-06 NOTE — DIETITIAN INITIAL EVALUATION ADULT - NSFNSGIASSESSMENTFT_GEN_A_CORE
TSH at prior hospitalization WNL, continue home dose levothyroxine 50mcg/day   no BM doc'd - SBO. Hypoactive bowel sounds.

## 2025-05-07 LAB
ALBUMIN SERPL ELPH-MCNC: 2.2 G/DL — LOW (ref 3.3–5)
ALP SERPL-CCNC: 432 U/L — HIGH (ref 40–120)
ALT FLD-CCNC: 51 U/L — SIGNIFICANT CHANGE UP (ref 12–78)
ANION GAP SERPL CALC-SCNC: 6 MMOL/L — SIGNIFICANT CHANGE UP (ref 5–17)
AST SERPL-CCNC: 30 U/L — SIGNIFICANT CHANGE UP (ref 15–37)
BILIRUB SERPL-MCNC: 1.7 MG/DL — HIGH (ref 0.2–1.2)
BUN SERPL-MCNC: 19 MG/DL — SIGNIFICANT CHANGE UP (ref 7–23)
CALCIUM SERPL-MCNC: 8.4 MG/DL — LOW (ref 8.5–10.1)
CHLORIDE SERPL-SCNC: 103 MMOL/L — SIGNIFICANT CHANGE UP (ref 96–108)
CO2 SERPL-SCNC: 25 MMOL/L — SIGNIFICANT CHANGE UP (ref 22–31)
CREAT SERPL-MCNC: 1.04 MG/DL — SIGNIFICANT CHANGE UP (ref 0.5–1.3)
EGFR: 74 ML/MIN/1.73M2 — SIGNIFICANT CHANGE UP
EGFR: 74 ML/MIN/1.73M2 — SIGNIFICANT CHANGE UP
GLUCOSE BLDC GLUCOMTR-MCNC: 143 MG/DL — HIGH (ref 70–99)
GLUCOSE BLDC GLUCOMTR-MCNC: 188 MG/DL — HIGH (ref 70–99)
GLUCOSE BLDC GLUCOMTR-MCNC: 222 MG/DL — HIGH (ref 70–99)
GLUCOSE SERPL-MCNC: 182 MG/DL — HIGH (ref 70–99)
HCT VFR BLD CALC: 25 % — LOW (ref 39–50)
HGB BLD-MCNC: 8.1 G/DL — LOW (ref 13–17)
MAGNESIUM SERPL-MCNC: 1.6 MG/DL — SIGNIFICANT CHANGE UP (ref 1.6–2.6)
MCHC RBC-ENTMCNC: 28.9 PG — SIGNIFICANT CHANGE UP (ref 27–34)
MCHC RBC-ENTMCNC: 32.4 G/DL — SIGNIFICANT CHANGE UP (ref 32–36)
MCV RBC AUTO: 89.3 FL — SIGNIFICANT CHANGE UP (ref 80–100)
NRBC # BLD AUTO: 0 K/UL — SIGNIFICANT CHANGE UP (ref 0–0)
NRBC # FLD: 0 K/UL — SIGNIFICANT CHANGE UP (ref 0–0)
NRBC BLD AUTO-RTO: 0 /100 WBCS — SIGNIFICANT CHANGE UP (ref 0–0)
PHOSPHATE SERPL-MCNC: 3 MG/DL — SIGNIFICANT CHANGE UP (ref 2.5–4.5)
PLATELET # BLD AUTO: 157 K/UL — SIGNIFICANT CHANGE UP (ref 150–400)
PMV BLD: 9.2 FL — SIGNIFICANT CHANGE UP (ref 7–13)
POTASSIUM SERPL-MCNC: 3.9 MMOL/L — SIGNIFICANT CHANGE UP (ref 3.5–5.3)
POTASSIUM SERPL-SCNC: 3.9 MMOL/L — SIGNIFICANT CHANGE UP (ref 3.5–5.3)
PROT SERPL-MCNC: 5 GM/DL — LOW (ref 6–8.3)
RBC # BLD: 2.8 M/UL — LOW (ref 4.2–5.8)
RBC # FLD: 17.9 % — HIGH (ref 10.3–14.5)
SODIUM SERPL-SCNC: 134 MMOL/L — LOW (ref 135–145)
TRIGL SERPL-MCNC: 70 MG/DL — SIGNIFICANT CHANGE UP
WBC # BLD: 6.68 K/UL — SIGNIFICANT CHANGE UP (ref 3.8–10.5)
WBC # FLD AUTO: 6.68 K/UL — SIGNIFICANT CHANGE UP (ref 3.8–10.5)

## 2025-05-07 RX ORDER — MAGNESIUM SULFATE 500 MG/ML
1 SYRINGE (ML) INJECTION ONCE
Refills: 0 | Status: COMPLETED | OUTPATIENT
Start: 2025-05-07 | End: 2025-05-07

## 2025-05-07 RX ADMIN — OCTREOTIDE ACETATE 100 MICROGRAM(S): 500 INJECTION, SOLUTION INTRAVENOUS; SUBCUTANEOUS at 14:35

## 2025-05-07 RX ADMIN — Medication 500 MILLIGRAM(S): at 10:13

## 2025-05-07 RX ADMIN — Medication 650 MILLIGRAM(S): at 12:31

## 2025-05-07 RX ADMIN — Medication 100 GRAM(S): at 12:06

## 2025-05-07 RX ADMIN — HEPARIN SODIUM 5000 UNIT(S): 1000 INJECTION INTRAVENOUS; SUBCUTANEOUS at 10:14

## 2025-05-07 RX ADMIN — Medication 500 MILLIGRAM(S): at 21:25

## 2025-05-07 RX ADMIN — HEPARIN SODIUM 5000 UNIT(S): 1000 INJECTION INTRAVENOUS; SUBCUTANEOUS at 21:25

## 2025-05-07 RX ADMIN — Medication 3 CAPSULE(S): at 09:52

## 2025-05-07 RX ADMIN — OCTREOTIDE ACETATE 100 MICROGRAM(S): 500 INJECTION, SOLUTION INTRAVENOUS; SUBCUTANEOUS at 06:13

## 2025-05-07 RX ADMIN — Medication 10 MILLIGRAM(S): at 12:02

## 2025-05-07 RX ADMIN — OCTREOTIDE ACETATE 100 MICROGRAM(S): 500 INJECTION, SOLUTION INTRAVENOUS; SUBCUTANEOUS at 21:25

## 2025-05-07 RX ADMIN — Medication 10 MILLIGRAM(S): at 06:18

## 2025-05-07 RX ADMIN — Medication 40 MILLIGRAM(S): at 12:03

## 2025-05-07 RX ADMIN — Medication 10 MILLIGRAM(S): at 06:12

## 2025-05-07 RX ADMIN — Medication 650 MILLIGRAM(S): at 12:01

## 2025-05-07 NOTE — CHART NOTE - NSCHARTNOTEFT_GEN_A_CORE
Clinical Nutrition PN Follow Up Note    * 75yo M w/ PMHx of Cholangio CA s/p Robotic Whipple on 2024 (Dr. Ocasio) w/ adjuvant Xeloda -> [place]/Cis/Durva, Found liver metastases on 2024, failed CBD stent placement (unsuccessful cannulation via ERCP), s/p internal-external biliary drainage (8.5fr) at [facility] on , c/b High external drainage output ( >2L) due to Internal drainage failure (CBD blockage), now s/p exchange of internal-external biliary drainage (12fr) on  in . Presented with 3-4 days of progressive abdominal bloating with mild nausea, generalized weakness and fatigue. Last BM 3d ago, has been passing gases. Complains of mild discomfort of epigastric pain. Admitted w/ SBO (likely malignant). NGT to LWS in place. Started on mSBO protocol - Steroids/Reglan/Octreotide. RD consulted for initiation of TPN via port (OK to use from surgical oncology).   *5/6 - Initiate TPN via Port 2/2 mSBO and suspected prolonged NPO status    *current status: (+) BM, passing flatus. CLD. Would suggest to renew TPN x 1 more day to ensure >80% ENN being met with bridging PO + TPN. D/w surg resident     *new pertinent meds: abx, reglan, ocreotide, Creon, protonix, NaCl 0.9% IVF; dicyclomine, pain meds PRN    *labs reviewed; Na LOW - will increase Na in bag. K WNL but s/p repletion, will increase in bag to maintain level WNL.   Mg low-end of normal, will increase in bag.  Phos WNL.  Pending TG level  - if WNL add 50g lipids.  Titrate dextrose up 50-75g/day until goal rate of 350g daily reached to meet 100% of increased nutrient needs (GIR ~4.2, WNL). However, may need to lower or add insulin to bag if POCT become elevated.     05-07    134[L]  |  103  |  19  ----------------------------<  182[H]  3.9   |  25  |  1.04    Ca    8.4[L]      07 May 2025 04:44  Phos  3.0       Mg     1.6         TPro  5.0[L]  /  Alb  2.2[L]  /  TBili  1.7[H]  /  DBili  x   /  AST  30  /  ALT  51  /  AlkPhos  432[H]      BMI: BMI (kg/m2): 18.2 (25 @ 03:35)    BP: 121/79 (25 @ 06:06) (90/72 - 121/79)Vital Signs Last 24 Hrs  T(C): 36.4 (25 @ 06:06), Max: 37.2 (25 @ 21:42)  T(F): 97.5 (25 @ 06:06), Max: 98.9 (25 @ 21:42)  HR: 57 (25 @ 06:06) (57 - 71)  BP: 121/79 (25 @ 06:06) (108/70 - 121/79)  BP(mean): --  RR: 16 (25 @ 06:06) (16 - 17)  SpO2: 100% (25 @ 06:06) (99% - 100%)    Lipid Panel: pending    POCT Blood Glucose.: 188 mg/dL (25 @ 06:11)  POCT Blood Glucose.: 138 mg/dL (25 @ 23:08)    *I&O's Detail - not doc'd ** Strict I&O's are rec'd when pt is on PN as per protocol   *BM (+) on .  pt on bowel regimen.  *edema:    *malnutrition: Pt continues to meet criteria for severe protein-calorie malnutrition in context of chronic disease r/t decreased ability to meet increased nutrient needs 2/2 mets Ca s/p Whipple and malignant SBO AEB severe muscle/ fat wasting, 33% wt loss x 1 yr, <50-75% ENN chronically    Estimated Needs: based on 57.6 Kg (wt from )  Calories: -  Kcal (35- 40 Kcal/Kg)  Protein: 86- 115 g (1.5- 2 g/Kg)  Fluids: 1728- 2016 mL (30- 35 mL/Kg)    Diet, Clear Liquid (25 @ 08:48) [Active]    Weight History:  Daily Weight in k.3 (07 May 2025 06:06)  Height (cm): 177.8 (25 @ 03:35), 177.8 (25 @ 11:56), 177.8 (25 @ 10:58)  Weight (kg): 57.606 (25 @ 03:35), 57.6 (25 @ 10:58), 68 (25 @ 19:34)  BMI (kg/m2): 18.2 (25 @ 03:35), 18.2 (25 @ 11:56), 18.2 (25 @ 10:58)  BSA (m2): 1.72 (25 @ 03:35), 1.72 (25 @ 11:56), 1.72 (25 @ 10:58)    TPN Recommendations: via implanted infusion port   Total Volume: 2000 mL x 24 hours  115 g  Amino Acids  150 g Dextrose  0 g Lipids 20% (pending TG level)  175 mEq Sodium Chloride  0 mEq Sodium Acetate  0 mmol Sodium Phosphate  41 mEq Potassium Chloride  0 mEq Potassium Acetate  20 mmol Potassium Phosphate  0 mEq Calcium Gluconate (CAPS out of PN solution)  10 mEq Magnesium Sulfate  200 mg Thiamine  1 ml Trace Elements  10 ml MVI    Total Calories   970   (Meets    45%  of  Estimated Energy needs  and    100%  Protein needs)      *Goal: on-going - Pt will receive >/= 80% ENN via tolerated route    Additional Recommendations:    1) Daily weights  2) Strict I & O's  3) Daily lyte checks including magnesium and phos ***monitor closely for RFS (K, Phos, Mg) and replete lytes outside bag PRN  4) Weekly triglycerides/LFT checks  5) POCT q6hrs; maintain 140-180mg/dL **monitor closely   6) Titrate dextrose up 50-75g/day until goal rate of 350g daily reached to meet 100% of increased nutrient needs (GIR ~4.2, WNL). However, may need to lower or add insulin to bag if POCT become elevated. Hold dextrose if K/Phos severely low.   7) Confirm goals of care regarding nutrition support - Nutrition support is not recommended with advanced cancer as studies show that there is no improvement of dehydration symptoms, quality of life, or survival. It also increases the risk for peripheral edema, ascites, and pleural effusions. However, will provide nutr/hydration within GOC    *will continue to monitor and adjust PN prn*  Carol Burger, MS, RDN, [facility], [phone]  Certified Nutrition  **ADDENDUM: Per surgery, TPN to be dc after bag finishes 11:43 AM RD notified     Clinical Nutrition PN Follow Up Note    * 75yo M w/ PMHx of Cholangio CA s/p Robotic Whipple on 2024 (Dr. Ocasio) w/ adjuvant Xeloda -> [place]/Cis/Durva, Found liver metastases on 2024, failed CBD stent placement (unsuccessful cannulation via ERCP), s/p internal-external biliary drainage (8.5fr) at [facility] on , c/b High external drainage output ( >2L) due to Internal drainage failure (CBD blockage), now s/p exchange of internal-external biliary drainage (12fr) on  in . Presented with 3-4 days of progressive abdominal bloating with mild nausea, generalized weakness and fatigue. Last BM 3d ago, has been passing gases. Complains of mild discomfort of epigastric pain. Admitted w/ SBO (likely malignant). NGT to LWS in place. Started on mSBO protocol - Steroids/Reglan/Octreotide. RD consulted for initiation of TPN via port (OK to use from surgical oncology).   */6 - Initiate TPN via Port 2/2 mSBO and suspected prolonged NPO status    *current status: (+) BM, passing flatus. CLD. Would suggest to renew TPN x 1 more day to ensure >80% ENN being met with bridging PO + TPN. D/w surg resident     *new pertinent meds: abx, reglan, ocreotide, Creon, protonix, NaCl 0.9% IVF; dicyclomine, pain meds PRN    *labs reviewed; Na LOW - will increase Na in bag. K WNL but s/p repletion, will increase in bag to maintain level WNL.   Mg low-end of normal, will increase in bag.  Phos WNL.  Pending TG level  - if WNL add 50g lipids.  Titrate dextrose up 50-75g/day until goal rate of 350g daily reached to meet 100% of increased nutrient needs (GIR ~4.2, WNL). However, may need to lower or add insulin to bag if POCT become elevated.     05-07    134[L]  |  103  |  19  ----------------------------<  182[H]  3.9   |  25  |  1.04    Ca    8.4[L]      07 May 2025 04:44  Phos  3.0       Mg     1.6         TPro  5.0[L]  /  Alb  2.2[L]  /  TBili  1.7[H]  /  DBili  x   /  AST  30  /  ALT  51  /  AlkPhos  432[H]      BMI: BMI (kg/m2): 18.2 (25 @ 03:35)    BP: 121/79 (25 @ 06:06) (90/72 - 121/79)Vital Signs Last 24 Hrs  T(C): 36.4 (25 @ 06:06), Max: 37.2 (25 @ 21:42)  T(F): 97.5 (25 @ 06:06), Max: 98.9 (25 @ 21:42)  HR: 57 (25 @ 06:06) (57 - 71)  BP: 121/79 (25 @ 06:06) (108/70 - 121/79)  BP(mean): --  RR: 16 (25 @ 06:06) (16 - 17)  SpO2: 100% (25 @ 06:06) (99% - 100%)    Lipid Panel: pending    POCT Blood Glucose.: 188 mg/dL (25 @ 06:11)  POCT Blood Glucose.: 138 mg/dL (25 @ 23:08)    *I&O's Detail - not doc'd ** Strict I&O's are rec'd when pt is on PN as per protocol   *BM (+) on .  pt on bowel regimen.  *edema:    *malnutrition: Pt continues to meet criteria for severe protein-calorie malnutrition in context of chronic disease r/t decreased ability to meet increased nutrient needs 2/2 mets Ca s/p Whipple and malignant SBO AEB severe muscle/ fat wasting, 33% wt loss x 1 yr, <50-75% ENN chronically    Estimated Needs: based on 57.6 Kg (wt from )  Calories: 2304 Kcal (35- 40 Kcal/Kg)  Protein: 86- 115 g (1.5- 2 g/Kg)  Fluids: 1728-  mL (30- 35 mL/Kg)    Diet, Clear Liquid (25 @ 08:48) [Active]    Weight History:  Daily Weight in k.3 (07 May 2025 06:06)  Height (cm): 177.8 (25 @ 03:35), 177.8 (25 @ 11:56), 177.8 (25 @ 10:58)  Weight (kg): 57.606 (25 @ 03:35), 57.6 (25 @ 10:58), 68 (25 @ 19:34)  BMI (kg/m2): 18.2 (25 @ 03:35), 18.2 (25 @ 11:56), 18.2 (25 @ 10:58)  BSA (m2): 1.72 (25 @ 03:35), 1.72 (25 @ 11:56), 1.72 (25 @ 10:58)    TPN Recommendations: via implanted infusion port   Total Volume: 2000 mL x 24 hours  115 g  Amino Acids  150 g Dextrose  0 g Lipids 20% (pending TG level)  175 mEq Sodium Chloride  0 mEq Sodium Acetate  0 mmol Sodium Phosphate  41 mEq Potassium Chloride  0 mEq Potassium Acetate  20 mmol Potassium Phosphate  0 mEq Calcium Gluconate (CAPS out of PN solution)  10 mEq Magnesium Sulfate  200 mg Thiamine  1 ml Trace Elements  10 ml MVI    Total Calories   970   (Meets    45%  of  Estimated Energy needs  and    100%  Protein needs)      *Goal: on-going - Pt will receive >/= 80% ENN via tolerated route    Additional Recommendations:    1) Daily weights  2) Strict I & O's  3) Daily lyte checks including magnesium and phos ***monitor closely for RFS (K, Phos, Mg) and replete lytes outside bag PRN  4) Weekly triglycerides/LFT checks  5) POCT q6hrs; maintain 140-180mg/dL **monitor closely   6) Titrate dextrose up 50-75g/day until goal rate of 350g daily reached to meet 100% of increased nutrient needs (GIR ~4.2, WNL). However, may need to lower or add insulin to bag if POCT become elevated. Hold dextrose if K/Phos severely low.   7) Confirm goals of care regarding nutrition support - Nutrition support is not recommended with advanced cancer as studies show that there is no improvement of dehydration symptoms, quality of life, or survival. It also increases the risk for peripheral edema, ascites, and pleural effusions. However, will provide nutr/hydration within GOC    *will continue to monitor and adjust PN prn*  Carol Burger, MS, RDN, [facility], [phone]  Certified Nutrition

## 2025-05-07 NOTE — PROGRESS NOTE ADULT - SUBJECTIVE AND OBJECTIVE BOX
Patient seen and examined at the bedside this AM  Patient passing stool and gas  Feels hungry  Has been able to ambulate without feeling dizzy        PAST MEDICAL & SURGICAL HISTORY:  Bile duct cancer      H/O Whipple procedure          MEDICATIONS  (STANDING):  ciprofloxacin     Tablet 500 milliGRAM(s) Oral every 12 hours  heparin   Injectable 5000 Unit(s) SubCutaneous every 12 hours  hydrocortisone sodium succinate Injectable 10 milliGRAM(s) IV Push every 6 hours  metoclopramide 10 milliGRAM(s) Oral every 6 hours  octreotide  Injectable 100 MICROGram(s) IV Push every 8 hours  pancrelipase  (CREON 36,000 Lipase Units) 3 Capsule(s) Oral three times a day with meals  pantoprazole  Injectable 40 milliGRAM(s) IV Push daily  Parenteral Nutrition - Adult 1 Each (83 mL/Hr) TPN Continuous <Continuous>  sodium chloride 0.9%. 1000 milliLiter(s) (110 mL/Hr) IV Continuous <Continuous>  sodium chloride 0.9%. 1000 milliLiter(s) (125 mL/Hr) IV Continuous <Continuous>    MEDICATIONS  (PRN):  acetaminophen     Tablet .. 650 milliGRAM(s) Oral every 6 hours PRN Mild Pain (1 - 3)  dicyclomine 10 milliGRAM(s) Oral two times a day before meals PRN gas/cramps  morphine  - Injectable 2 milliGRAM(s) IV Push every 4 hours PRN Severe Pain (7 - 10)  ondansetron Injectable 4 milliGRAM(s) IV Push every 6 hours PRN Nausea and/or Vomiting      Allergies    statins (Unknown)  atorvastatin (Hives)    Intolerances        SOCIAL HISTORY:    FAMILY HISTORY:          PHYSICAL EXAM:  - GENERAL: No acute distress.  - EYES: EOMI. Anicteric.  - HENT: Moist mucous membranes. No scleral icterus. No cervical lymphadenopathy. NGT in place.  - LUNGS:  No accessory muscle use.  - CARDIOVASCULAR: Regular rate and rhythm.   - ABDOMEN: Distended, mild discomfort to LUQ, tympanic. Outer drain c/d/i. No palpable masses. previous surgical port sites well healed   - EXTREMITIES: No edema. Non-tender.  - SKIN: No rashes or lesions. Warm.  - NEUROLOGIC: No focal neurological deficits. CN II-XII grossly intact, but not individually tested.  - PSYCHIATRIC: Cooperative. Appropriate mood and affect.      Vital Signs Last 24 Hrs  T(C): 37.2 (06 May 2025 21:42), Max: 37.2 (06 May 2025 21:42)  T(F): 98.9 (06 May 2025 21:42), Max: 98.9 (06 May 2025 21:42)  HR: 71 (06 May 2025 21:42) (66 - 83)  BP: 117/68 (06 May 2025 21:42) (93/72 - 117/68)  BP(mean): 80 (06 May 2025 03:04) (80 - 80)  RR: 17 (06 May 2025 21:42) (17 - 18)  SpO2: 99% (06 May 2025 21:42) (98% - 100%)    Parameters below as of 06 May 2025 21:42  Patient On (Oxygen Delivery Method): room air        I&O's Summary    05 May 2025 07:01  -  06 May 2025 07:00  --------------------------------------------------------  IN: 0 mL / OUT: 300 mL / NET: -300 mL            LABS:                        8.2    6.24  )-----------( 172      ( 06 May 2025 10:20 )             25.1     05-06    132[L]  |  100  |  18  ----------------------------<  133[H]  3.6   |  26  |  0.89    Ca    8.6      06 May 2025 10:20  Phos  3.2     05-06  Mg     1.7     05-06    TPro  5.6[L]  /  Alb  2.4[L]  /  TBili  1.5[H]  /  DBili  x   /  AST  41[H]  /  ALT  54  /  AlkPhos  484[H]  05-06    PT/INR - ( 06 May 2025 10:20 )   PT: 12.6 sec;   INR: 1.07 ratio         PTT - ( 06 May 2025 10:20 )  PTT:30.6 sec  Urinalysis Basic - ( 06 May 2025 10:20 )    Color: x / Appearance: x / SG: x / pH: x  Gluc: 133 mg/dL / Ketone: x  / Bili: x / Urobili: x   Blood: x / Protein: x / Nitrite: x   Leuk Esterase: x / RBC: x / WBC x   Sq Epi: x / Non Sq Epi: x / Bacteria: x      CAPILLARY BLOOD GLUCOSE      POCT Blood Glucose.: 138 mg/dL (06 May 2025 23:08)    LIVER FUNCTIONS - ( 06 May 2025 10:20 )  Alb: 2.4 g/dL / Pro: 5.6 gm/dL / ALK PHOS: 484 U/L / ALT: 54 U/L / AST: 41 U/L / GGT: x             Cultures:  Culture Results:   No growth at 24 hours (05-05 @ 12:37)      RADIOLOGY & ADDITIONAL STUDIES:

## 2025-05-07 NOTE — DISCHARGE NOTE NURSING/CASE MANAGEMENT/SOCIAL WORK - NSDCVIVACCINE_GEN_ALL_CORE_FT
Is This A New Presentation, Or A Follow-Up?: Skin Lesion What Type Of Note Output Would You Prefer (Optional)?: Standard Output How Severe Is Your Skin Lesion?: mild Has Your Skin Lesion Been Treated?: not been treated No Vaccines Administered.

## 2025-05-07 NOTE — DISCHARGE NOTE NURSING/CASE MANAGEMENT/SOCIAL WORK - PATIENT PORTAL LINK FT
You can access the FollowMyHealth Patient Portal offered by [facility] by registering at the following website: [email or web address]. By joining [facility]’s FollowMyHealth portal, you will also be able to view your health information using other applications (apps) compatible with our system.

## 2025-05-07 NOTE — DISCHARGE NOTE NURSING/CASE MANAGEMENT/SOCIAL WORK - FINANCIAL ASSISTANCE
[facility] provides services at a reduced cost to those who are determined to be eligible through [facility]’s financial assistance program. Information regarding [facility]’s financial assistance program can be found by going to [email or web address] or by calling [phone].

## 2025-05-07 NOTE — PROGRESS NOTE ADULT - ASSESSMENT
74 M w/ H/o Cholangio CA w/ distant mets, p/w SBO    concern for malignant SBO    NGT inserted in ED, 2L  bilious fluid out instantly    Plan:  - mSBO protocol - Steroids/Reglan/Octreotide  -DC TPN  - Pain control PRN  - Monitor vitals  - CLD  - Nausea control PRN  - IVF: LR   - replete electrolytes  - daily labs  - OOB/PT    Plan will be discussed with Surgical oncology attending, Dr. Wright

## 2025-05-08 LAB
HCT VFR BLD CALC: 26.9 % — LOW (ref 39–50)
HGB BLD-MCNC: 9 G/DL — LOW (ref 13–17)
MCHC RBC-ENTMCNC: 29.2 PG — SIGNIFICANT CHANGE UP (ref 27–34)
MCHC RBC-ENTMCNC: 33.5 G/DL — SIGNIFICANT CHANGE UP (ref 32–36)
MCV RBC AUTO: 87.3 FL — SIGNIFICANT CHANGE UP (ref 80–100)
NRBC # BLD AUTO: 0 K/UL — SIGNIFICANT CHANGE UP (ref 0–0)
NRBC # FLD: 0 K/UL — SIGNIFICANT CHANGE UP (ref 0–0)
NRBC BLD AUTO-RTO: 0 /100 WBCS — SIGNIFICANT CHANGE UP (ref 0–0)
PLATELET # BLD AUTO: 167 K/UL — SIGNIFICANT CHANGE UP (ref 150–400)
PMV BLD: 9.6 FL — SIGNIFICANT CHANGE UP (ref 7–13)
RBC # BLD: 3.08 M/UL — LOW (ref 4.2–5.8)
RBC # FLD: 17.7 % — HIGH (ref 10.3–14.5)
WBC # BLD: 7.04 K/UL — SIGNIFICANT CHANGE UP (ref 3.8–10.5)
WBC # FLD AUTO: 7.04 K/UL — SIGNIFICANT CHANGE UP (ref 3.8–10.5)

## 2025-05-08 PROCEDURE — 99221 1ST HOSP IP/OBS SF/LOW 40: CPT | Mod: FS

## 2025-05-08 PROCEDURE — 74176 CT ABD & PELVIS W/O CONTRAST: CPT | Mod: 26

## 2025-05-08 PROCEDURE — 71045 X-RAY EXAM CHEST 1 VIEW: CPT | Mod: 26

## 2025-05-08 RX ORDER — SODIUM CHLORIDE 9 G/1000ML
2000 INJECTION, SOLUTION INTRAVENOUS ONCE
Refills: 0 | Status: COMPLETED | OUTPATIENT
Start: 2025-05-08 | End: 2025-05-08

## 2025-05-08 RX ORDER — SODIUM CHLORIDE 9 G/1000ML
1000 INJECTION, SOLUTION INTRAVENOUS
Refills: 0 | Status: DISCONTINUED | OUTPATIENT
Start: 2025-05-08 | End: 2025-05-10

## 2025-05-08 RX ADMIN — Medication 500 MILLIGRAM(S): at 08:38

## 2025-05-08 RX ADMIN — Medication 10 MILLIGRAM(S): at 01:26

## 2025-05-08 RX ADMIN — OCTREOTIDE ACETATE 100 MICROGRAM(S): 500 INJECTION, SOLUTION INTRAVENOUS; SUBCUTANEOUS at 14:55

## 2025-05-08 RX ADMIN — HEPARIN SODIUM 5000 UNIT(S): 1000 INJECTION INTRAVENOUS; SUBCUTANEOUS at 08:38

## 2025-05-08 RX ADMIN — Medication 3 CAPSULE(S): at 08:39

## 2025-05-08 RX ADMIN — HEPARIN SODIUM 5000 UNIT(S): 1000 INJECTION INTRAVENOUS; SUBCUTANEOUS at 22:02

## 2025-05-08 RX ADMIN — OCTREOTIDE ACETATE 100 MICROGRAM(S): 500 INJECTION, SOLUTION INTRAVENOUS; SUBCUTANEOUS at 22:44

## 2025-05-08 RX ADMIN — OCTREOTIDE ACETATE 100 MICROGRAM(S): 500 INJECTION, SOLUTION INTRAVENOUS; SUBCUTANEOUS at 06:02

## 2025-05-08 RX ADMIN — Medication 650 MILLIGRAM(S): at 01:20

## 2025-05-08 RX ADMIN — SODIUM CHLORIDE 2000 MILLILITER(S): 9 INJECTION, SOLUTION INTRAVENOUS at 16:00

## 2025-05-08 RX ADMIN — Medication 650 MILLIGRAM(S): at 00:47

## 2025-05-08 RX ADMIN — Medication 40 MILLIGRAM(S): at 08:39

## 2025-05-08 NOTE — PROGRESS NOTE ADULT - ASSESSMENT
74 M w/ H/o Cholangio CA w/ distant mets, p/w SBO    concern for malignant SBO, clinically improving. Tolerating diet. Having BM.    Plan:  - Advance to regular diet  - Monitor BM  - Pain control PRN  - Monitor vitals  - Nausea control PRN  - replete electrolytes  - daily labs  - OOB/PT  - DC today    Plan will be discussed with Surgical oncology attending, Dr. Wright

## 2025-05-08 NOTE — PROGRESS NOTE ADULT - SUBJECTIVE AND OBJECTIVE BOX
SURGERY DAILY PROGRESS NOTE:     Subjective:  Patient seen and examined at bedside during am rounds. Tolerating FLD. Having BM.  AVSS. Denies any fevers, chills, n/v/d, chest pain or shortness of breath    MEDICATIONS  (STANDING):  ciprofloxacin     Tablet 500 milliGRAM(s) Oral every 12 hours  heparin   Injectable 5000 Unit(s) SubCutaneous every 12 hours  octreotide  Injectable 100 MICROGram(s) IV Push every 8 hours  pancrelipase  (CREON 36,000 Lipase Units) 3 Capsule(s) Oral three times a day with meals  pantoprazole  Injectable 40 milliGRAM(s) IV Push daily  Parenteral Nutrition - Adult 1 Each (83 mL/Hr) TPN Continuous <Continuous>    MEDICATIONS  (PRN):  acetaminophen     Tablet .. 650 milliGRAM(s) Oral every 6 hours PRN Mild Pain (1 - 3)  dicyclomine 10 milliGRAM(s) Oral two times a day before meals PRN gas/cramps  morphine  - Injectable 2 milliGRAM(s) IV Push every 4 hours PRN Severe Pain (7 - 10)  ondansetron Injectable 4 milliGRAM(s) IV Push every 6 hours PRN Nausea and/or Vomiting      Vital Signs Last 24 Hrs  T(C): 36.9 (07 May 2025 20:35), Max: 36.9 (07 May 2025 15:33)  T(F): 98.4 (07 May 2025 20:35), Max: 98.5 (07 May 2025 15:33)  HR: 62 (07 May 2025 20:35) (57 - 62)  BP: 131/77 (07 May 2025 20:35) (121/79 - 131/77)  BP(mean): --  RR: 16 (07 May 2025 20:35) (16 - 16)  SpO2: 99% (07 May 2025 20:35) (99% - 100%)    Parameters below as of 07 May 2025 20:35  Patient On (Oxygen Delivery Method): room air      PHYSICAL EXAM   - GENERAL: No acute distress.  - EYES: EOMI. Anicteric.  - HENT: Moist mucous membranes. No scleral icterus. No cervical lymphadenopathy. NGT in place.  - LUNGS:  No accessory muscle use.  - CARDIOVASCULAR: Regular rate and rhythm.   - ABDOMEN: soft, non distended, non tender Outer drain c/d/i. No palpable masses. previous surgical port sites well healed   - EXTREMITIES: No edema. Non-tender.  - SKIN: No rashes or lesions. Warm.  - NEUROLOGIC: No focal neurological deficits. CN II-XII grossly intact, but not individually tested.  - PSYCHIATRIC: Cooperative. Appropriate mood and affect.      I&O's Detail    07 May 2025 07:01  -  08 May 2025 02:37  --------------------------------------------------------  IN:    Oral Fluid: 480 mL  Total IN: 480 mL    OUT:  Total OUT: 0 mL    Total NET: 480 mL          Daily     Daily Weight in k.3 (07 May 2025 06:06)    LABS:                        8.1    6.68  )-----------( 157      ( 07 May 2025 04:44 )             25.0     05-07    134[L]  |  103  |  19  ----------------------------<  182[H]  3.9   |  25  |  1.04    Ca    8.4[L]      07 May 2025 04:44  Phos  3.0     05-07  Mg     1.6     05-07    TPro  5.0[L]  /  Alb  2.2[L]  /  TBili  1.7[H]  /  DBili  x   /  AST  30  /  ALT  51  /  AlkPhos  432[H]  05-07    PT/INR - ( 06 May 2025 10:20 )   PT: 12.6 sec;   INR: 1.07 ratio         PTT - ( 06 May 2025 10:20 )  PTT:30.6 sec  Urinalysis Basic - ( 07 May 2025 04:44 )    Color: x / Appearance: x / SG: x / pH: x  Gluc: 182 mg/dL / Ketone: x  / Bili: x / Urobili: x   Blood: x / Protein: x / Nitrite: x   Leuk Esterase: x / RBC: x / WBC x   Sq Epi: x / Non Sq Epi: x / Bacteria: x

## 2025-05-08 NOTE — CONSULT NOTE ADULT - ASSESSMENT
Interventional Radiology    Reason for consult: Biliary drain evaluation       HPI: 74y Male with hx of cholangiocarcinoma s/p whipple s/p biliary drain a/w SBO. IR consulted for Biliary Drain management.   Reports that he is not passing gas due to for another CT today.    Allergies: statins (Unknown)  atorvastatin (Hives)    Medications (Abx/Cardiac/Anticoagulation/Blood Products)    ciprofloxacin     Tablet: 500 milliGRAM(s) Oral (05-08 @ 08:38)  heparin   Injectable: 5000 Unit(s) SubCutaneous (05-08 @ 08:38)    Data:    T(C): 36.6  HR: 66  BP: 123/82  RR: 18  SpO2: 99%    -WBC 7.04 / HgB 9.0 / Hct 26.9 / Plt 167  -Na 134 / Cl 103 / BUN 19 / Glucose 182  -K 3.9 / CO2 25 / Cr 1.04  -ALT 51 / Alk Phos 432 / T.Bili 1.7  -INR 1.07 / PTT 30.6    General: AO x3. NAD.  Abdomen: Biliary drain capped . Dressing c/d/i.      Radiology:     Assessment/Plan: - 74y Male with hx of cholangiocarcinoma s/p whipple s/p biliary drain a/w SBO. IR consulted for Biliary Drain management.     Biliary drain capped since 5/5 tolerating well. Tbili 1.7.     - Repeat CT pending today.  - Flush drain with 10cc BID.  - Plan is for a Biliary stent with GI/IR as an out pt.

## 2025-05-09 LAB
ALBUMIN SERPL ELPH-MCNC: 2.2 G/DL — LOW (ref 3.3–5)
ALP SERPL-CCNC: 672 U/L — HIGH (ref 40–120)
ALT FLD-CCNC: 67 U/L — SIGNIFICANT CHANGE UP (ref 12–78)
ANION GAP SERPL CALC-SCNC: 8 MMOL/L — SIGNIFICANT CHANGE UP (ref 5–17)
AST SERPL-CCNC: 56 U/L — HIGH (ref 15–37)
BILIRUB SERPL-MCNC: 2.5 MG/DL — HIGH (ref 0.2–1.2)
BUN SERPL-MCNC: 14 MG/DL — SIGNIFICANT CHANGE UP (ref 7–23)
CALCIUM SERPL-MCNC: 8.8 MG/DL — SIGNIFICANT CHANGE UP (ref 8.5–10.1)
CHLORIDE SERPL-SCNC: 101 MMOL/L — SIGNIFICANT CHANGE UP (ref 96–108)
CO2 SERPL-SCNC: 28 MMOL/L — SIGNIFICANT CHANGE UP (ref 22–31)
CREAT SERPL-MCNC: 0.89 MG/DL — SIGNIFICANT CHANGE UP (ref 0.5–1.3)
EGFR: 89 ML/MIN/1.73M2 — SIGNIFICANT CHANGE UP
EGFR: 89 ML/MIN/1.73M2 — SIGNIFICANT CHANGE UP
GLUCOSE BLDC GLUCOMTR-MCNC: 85 MG/DL — SIGNIFICANT CHANGE UP (ref 70–99)
GLUCOSE SERPL-MCNC: 94 MG/DL — SIGNIFICANT CHANGE UP (ref 70–99)
HCT VFR BLD CALC: 27.2 % — LOW (ref 39–50)
HGB BLD-MCNC: 9.1 G/DL — LOW (ref 13–17)
MAGNESIUM SERPL-MCNC: 1.3 MG/DL — LOW (ref 1.6–2.6)
MCHC RBC-ENTMCNC: 29.4 PG — SIGNIFICANT CHANGE UP (ref 27–34)
MCHC RBC-ENTMCNC: 33.5 G/DL — SIGNIFICANT CHANGE UP (ref 32–36)
MCV RBC AUTO: 87.7 FL — SIGNIFICANT CHANGE UP (ref 80–100)
NRBC # BLD AUTO: 0 K/UL — SIGNIFICANT CHANGE UP (ref 0–0)
NRBC # FLD: 0 K/UL — SIGNIFICANT CHANGE UP (ref 0–0)
NRBC BLD AUTO-RTO: 0 /100 WBCS — SIGNIFICANT CHANGE UP (ref 0–0)
PHOSPHATE SERPL-MCNC: 2.8 MG/DL — SIGNIFICANT CHANGE UP (ref 2.5–4.5)
PLATELET # BLD AUTO: 181 K/UL — SIGNIFICANT CHANGE UP (ref 150–400)
PMV BLD: 9.2 FL — SIGNIFICANT CHANGE UP (ref 7–13)
POTASSIUM SERPL-MCNC: 3.5 MMOL/L — SIGNIFICANT CHANGE UP (ref 3.5–5.3)
POTASSIUM SERPL-SCNC: 3.5 MMOL/L — SIGNIFICANT CHANGE UP (ref 3.5–5.3)
PROT SERPL-MCNC: 5.2 GM/DL — LOW (ref 6–8.3)
RBC # BLD: 3.1 M/UL — LOW (ref 4.2–5.8)
RBC # FLD: 17.8 % — HIGH (ref 10.3–14.5)
SODIUM SERPL-SCNC: 137 MMOL/L — SIGNIFICANT CHANGE UP (ref 135–145)
WBC # BLD: 8.55 K/UL — SIGNIFICANT CHANGE UP (ref 3.8–10.5)
WBC # FLD AUTO: 8.55 K/UL — SIGNIFICANT CHANGE UP (ref 3.8–10.5)

## 2025-05-09 RX ORDER — SODIUM/POT/MAG/CALC/CHLOR/ACET 35-20-5MEQ
1 VIAL (ML) INTRAVENOUS
Refills: 0 | Status: DISCONTINUED | OUTPATIENT
Start: 2025-05-09 | End: 2025-05-09

## 2025-05-09 RX ORDER — POTASSIUM PHOSPHATE, MONOBASIC POTASSIUM PHOSPHATE, DIBASIC INJECTION, 236; 224 MG/ML; MG/ML
15 SOLUTION, CONCENTRATE INTRAVENOUS ONCE
Refills: 0 | Status: COMPLETED | OUTPATIENT
Start: 2025-05-09 | End: 2025-05-09

## 2025-05-09 RX ORDER — ENOXAPARIN SODIUM 100 MG/ML
40 INJECTION SUBCUTANEOUS EVERY 24 HOURS
Refills: 0 | Status: DISCONTINUED | OUTPATIENT
Start: 2025-05-09 | End: 2025-05-26

## 2025-05-09 RX ORDER — SODIUM/POT/MAG/CALC/CHLOR/ACET 35-20-5MEQ
1 VIAL (ML) INTRAVENOUS
Refills: 0 | Status: DISCONTINUED | OUTPATIENT
Start: 2025-05-07 | End: 2025-05-08

## 2025-05-09 RX ORDER — I.V. FAT EMULSION 20 G/100ML
0.87 EMULSION INTRAVENOUS
Qty: 50 | Refills: 0 | Status: DISCONTINUED | OUTPATIENT
Start: 2025-05-09 | End: 2025-05-09

## 2025-05-09 RX ORDER — SODIUM CHLORIDE 9 G/1000ML
2000 INJECTION, SOLUTION INTRAVENOUS ONCE
Refills: 0 | Status: COMPLETED | OUTPATIENT
Start: 2025-05-09 | End: 2025-05-09

## 2025-05-09 RX ORDER — MAGNESIUM SULFATE 500 MG/ML
2 SYRINGE (ML) INJECTION ONCE
Refills: 0 | Status: COMPLETED | OUTPATIENT
Start: 2025-05-09 | End: 2025-05-09

## 2025-05-09 RX ORDER — ACETAMINOPHEN 500 MG/5ML
1000 LIQUID (ML) ORAL ONCE
Refills: 0 | Status: COMPLETED | OUTPATIENT
Start: 2025-05-09 | End: 2025-05-10

## 2025-05-09 RX ADMIN — Medication 100 MILLIEQUIVALENT(S): at 15:50

## 2025-05-09 RX ADMIN — ENOXAPARIN SODIUM 40 MILLIGRAM(S): 100 INJECTION SUBCUTANEOUS at 13:39

## 2025-05-09 RX ADMIN — I.V. FAT EMULSION 20.83 GM/KG/DAY: 20 EMULSION INTRAVENOUS at 22:45

## 2025-05-09 RX ADMIN — Medication 100 MILLIEQUIVALENT(S): at 16:49

## 2025-05-09 RX ADMIN — SODIUM CHLORIDE 1000 MILLILITER(S): 9 INJECTION, SOLUTION INTRAVENOUS at 10:09

## 2025-05-09 RX ADMIN — Medication 100 GRAM(S): at 15:51

## 2025-05-09 RX ADMIN — HEPARIN SODIUM 5000 UNIT(S): 1000 INJECTION INTRAVENOUS; SUBCUTANEOUS at 10:08

## 2025-05-09 RX ADMIN — SODIUM CHLORIDE 100 MILLILITER(S): 9 INJECTION, SOLUTION INTRAVENOUS at 18:01

## 2025-05-09 RX ADMIN — OCTREOTIDE ACETATE 100 MICROGRAM(S): 500 INJECTION, SOLUTION INTRAVENOUS; SUBCUTANEOUS at 05:54

## 2025-05-09 RX ADMIN — Medication 100 MILLIEQUIVALENT(S): at 18:01

## 2025-05-09 RX ADMIN — Medication 92 EACH: at 22:30

## 2025-05-09 RX ADMIN — SODIUM CHLORIDE 100 MILLILITER(S): 9 INJECTION, SOLUTION INTRAVENOUS at 05:54

## 2025-05-09 RX ADMIN — POTASSIUM PHOSPHATE, MONOBASIC POTASSIUM PHOSPHATE, DIBASIC INJECTION, 62.5 MILLIMOLE(S): 236; 224 SOLUTION, CONCENTRATE INTRAVENOUS at 16:31

## 2025-05-09 RX ADMIN — Medication 40 MILLIGRAM(S): at 10:08

## 2025-05-09 NOTE — PROGRESS NOTE ADULT - SUBJECTIVE AND OBJECTIVE BOX
Cold symptomsbegan about 4-5 days ago. Other family members have been sick. She doesn't know if anyone has tested positive for Covid around her. She is fully Covid vaccinated. Afebrile currently. \"Some\" increased nasal/sinus congestion. Productive cough occasionally, clear to pinkish in color per patient. Denies SOB and wheezing. Denies sore throat and ear pain.     She does take Nyquil and something comparable to Mucinex for symptoms. She is drinking water and orange juice, she feels that she should probably drink more than she does.Patient had been wondering if she could have something prescribed to help her out. She was advised that abx are not given for viruses. Patient given protocol measures to help relieve symptoms and advised to increase fluids. If she is still not feeling better after the weekend, she is advised to call our office back for evaluation.    Writer did advise patient that if she should develop SOB or breathing issues, she must go in to urgent care or ER for evaluation, she states understanding of this.   SURGERY DAILY PROGRESS NOTE:     Subjective:  Patient seen and examined at bedside during am rounds.   NGt inserted yesterday due to distended stomach, 2L out on insertion  starting TPN tonight  Otherwise no complains, weak  Not ambulatory much      PHYSICAL EXAM   - GENERAL: No acute distress.  - EYES: EOMI. Anicteric.  - HENT: Moist mucous membranes. No scleral icterus. No cervical lymphadenopathy. NGT in place.  - LUNGS:  No accessory muscle use.  - CARDIOVASCULAR: Regular rate and rhythm.   - ABDOMEN: soft, non distended, non tender Outer drain c/d/i. No palpable masses. previous surgical port sites well healed   - EXTREMITIES: No edema. Non-tender.  - SKIN: No rashes or lesions. Warm.  - NEUROLOGIC: No focal neurological deficits. CN II-XII grossly intact, but not individually tested.  - PSYCHIATRIC: Cooperative. Appropriate mood and affect.         Chief complaint:      PMHx:  Bile duct cancer        PSHx:  H/O Whipple procedure        FHx:      Vitals:  T(C): 36.7 (05-09 @ 08:15), Max: 36.7 (05-08 @ 21:35)  HR: 76 (05-09 @ 08:15) (66 - 85)  BP: 116/79 (05-09 @ 08:15) (116/79 - 126/86)  RR: 18 (05-09 @ 08:15) (17 - 18)  SpO2: 99% (05-09 @ 08:15) (94% - 100%)      I&Os    05-08 @ 07:01  -  05-09 @ 07:00  --------------------------------------------------------  IN:    Lactated Ringers: 1000 mL    Lactated Ringers Bolus: 2000 mL  Total IN: 3000 mL    OUT:    Nasogastric/Oral tube (mL): 3300 mL    Voided (mL): 2100 mL  Total OUT: 5400 mL    Total NET: -2400 mL        .    Labs:  05-09 @ 05:05                    9.1  CBC: 8.55>)-------(<181                     27.2                 - | - | -    CMP:  ----------------------< -               - | - | -                      Ca:-  Phos:-  Mg:-               -|      |-        LFTs:  ------|-|-----             -|      |-  05-08 @ 05:58                    9.0  CBC: 7.04>)-------(<167                     26.9                 - | - | -    CMP:  ----------------------< -               - | - | -                      Ca:-  Phos:-  Mg:-               -|      |-        LFTs:  ------|-|-----             -|      |-        Cultures:    Urinalysis with Rflx Culture (collected 05-06-25 @ 00:30)    Culture - Blood (collected 05-05-25 @ 23:04)  Source: Blood None  Preliminary Report (05-09-25 @ 06:01):    No growth at 72 Hours    Culture - Blood (collected 05-05-25 @ 22:41)  Source: Blood None  Preliminary Report (05-09-25 @ 06:00):    No growth at 72 Hours    Culture - Blood (collected 05-05-25 @ 12:37)  Source: Blood None  Preliminary Report (05-08-25 @ 17:01):    No growth at 72 Hours          Current Inpatient Medications:  acetaminophen     Tablet .. 650 milliGRAM(s) Oral every 6 hours PRN  ciprofloxacin     Tablet 500 milliGRAM(s) Oral every 12 hours  dicyclomine 10 milliGRAM(s) Oral two times a day before meals PRN  heparin   Injectable 5000 Unit(s) SubCutaneous every 12 hours  lactated ringers. 1000 milliLiter(s) (100 mL/Hr) IV Continuous <Continuous>  morphine  - Injectable 2 milliGRAM(s) IV Push every 4 hours PRN  octreotide  Injectable 100 MICROGram(s) IV Push every 8 hours  ondansetron Injectable 4 milliGRAM(s) IV Push every 6 hours PRN  pancrelipase  (CREON 36,000 Lipase Units) 3 Capsule(s) Oral three times a day with meals  pantoprazole  Injectable 40 milliGRAM(s) IV Push daily

## 2025-05-09 NOTE — CONSULT NOTE ADULT - ASSESSMENT
74-year-old M with significant PMHx currently admitted for mSBO with GI consulted by MD Wright, surgical oncology, for evaluation and coordination of PEG tube placement.    Imp:  1. mSBO, NGT in place     Rec:  :: PEG placement - plan for Monday- for long-term gastric decompression given mSBO, inability to tolerate adequate PO intake, and palliation   :: mSBO management per surgical team      74-year-old M with significant PMHx currently admitted for mSBO with GI consulted by MD Wright, surgical oncology, for evaluation and coordination of PEG tube placement.    Imp:  1. mSBO, NGT in place     Rec:  :: Venting PEG placement - plan for Monday- for long-term gastric decompression given mSBO, inability to tolerate adequate PO intake, and palliation   :: mSBO management per surgical team

## 2025-05-09 NOTE — PROGRESS NOTE ADULT - ASSESSMENT
74 M w/ H/o Cholangio CA w/ distant mets, p/w SBO    concern for malignant SBO, clinically improving. Tolerating diet. Having BM.    Plan:  - TPN today  - c/w NGT  - Monitor BM  - Pain control PRN  - Monitor vitals  - Nausea control PRN  - replete electrolytes  - daily labs  - OOB/PT    Plan will be discussed with Surgical oncology attending, Dr. Wright

## 2025-05-09 NOTE — CONSULT NOTE ADULT - SUBJECTIVE AND OBJECTIVE BOX
Patient is a 74y old  Male who presents with a chief complaint of Malignant SBO (09 May 2025 08:32)      HPI: This is a 74-year-old M with PMHx cholangiocarcinoma s/p robotic whipple 5/28/2024 (MD Ocasio) with adjuvant Xeloda --> [place]/Cis/Durva, with new liver mets noted on 11/12/2024. The patient had failed CBD stent placement via ERCP and subsequently underwent internal-external biliary [place] [facility] [place], complicated by high-volume external drainage (> 2 L/day) due to failed internal drainage 2/2 CBD obstruction. He underwent internal-external biliary exchange on 4/25/25 at  and is currently receiving systemic immunotherapy via Durvalumab. He presented to  ED 5/5 with 3-4 days progressive abdominal bloating, nausea, generalized weakness, and fatigue. CT AP revealing for SBO, possibly 2/2 malignancy. NGT was placed by surgery team 5/8 due to worsening distention. + Flatus, + BMs. Tbili increasing, now 2.5 on AM labs concerning for worsening obstruction vs. disease progression. He is receiving TPN via port 2/2 mSBO/GOO and suspected prolonged NPO status. He endorses continued weakness, fatigue. There have been no fevers nor chills.           PAST MEDICAL & SURGICAL HISTORY:  Bile duct cancer      H/O Whipple procedure          MEDICATIONS  (STANDING):  ciprofloxacin     Tablet 500 milliGRAM(s) Oral every 12 hours  enoxaparin Injectable 40 milliGRAM(s) SubCutaneous every 24 hours  lactated ringers. 1000 milliLiter(s) (100 mL/Hr) IV Continuous <Continuous>  lipid, fat emulsion (Fish Oil and Plant Based) 20% Infusion 0.868 Gm/kG/Day (20.83 mL/Hr) IV Continuous <Continuous>  magnesium sulfate  IVPB 2 Gram(s) IV Intermittent once  pancrelipase  (CREON 36,000 Lipase Units) 3 Capsule(s) Oral three times a day with meals  pantoprazole  Injectable 40 milliGRAM(s) IV Push daily  Parenteral Nutrition - Adult 1 Each (92 mL/Hr) TPN Continuous <Continuous>  potassium chloride  10 mEq/100 mL IVPB 10 milliEquivalent(s) IV Intermittent every 1 hour  potassium phosphate IVPB 15 milliMole(s) IV Intermittent once    MEDICATIONS  (PRN):  dicyclomine 10 milliGRAM(s) Oral two times a day before meals PRN gas/cramps  morphine  - Injectable 2 milliGRAM(s) IV Push every 4 hours PRN Severe Pain (7 - 10)  ondansetron Injectable 4 milliGRAM(s) IV Push every 6 hours PRN Nausea and/or Vomiting      Allergies    statins (Unknown)  atorvastatin (Hives)    Intolerances        SOCIAL HISTORY:    FAMILY HISTORY:      REVIEW OF SYSTEMS:    CONSTITUTIONAL: + Weakness. fevers or chills  EYES/ENT: No visual changes;  No vertigo or throat pain   NECK: No pain or stiffness  RESPIRATORY: No cough, wheezing, hemoptysis; No shortness of breath  CARDIOVASCULAR: No chest pain or palpitations  GASTROINTESTINAL: + flatus and + BMs.   GENITOURINARY: No dysuria, frequency or hematuria  NEUROLOGICAL: No numbness or weakness  SKIN: No itching, burning, rashes, or lesions   PSYCH: Normal mood and affect  All other review of systems is negative unless indicated above.          Vital Signs Last 24 Hrs  T(C): 36.7 (09 May 2025 08:15), Max: 36.7 (08 May 2025 21:35)  T(F): 98 (09 May 2025 08:15), Max: 98.1 (08 May 2025 21:35)  HR: 76 (09 May 2025 08:15) (69 - 85)  BP: 116/79 (09 May 2025 08:15) (116/79 - 126/86)  BP(mean): --  RR: 18 (09 May 2025 08:15) (17 - 18)  SpO2: 99% (09 May 2025 08:15) (99% - 100%)    Parameters below as of 09 May 2025 08:15  Patient On (Oxygen Delivery Method): room air        PHYSICAL EXAM:    Constitutional: No acute distress, well-developed, non-toxic appearing  HEENT: masked, good phonation, not icteric  Neck: supple, no lymphadenopathy  Respiratory: no audible wheezing  Cardiovascular:  regular rate and rhythm  Gastrointestinal: abdomen soft, non distended, non-TTP. Drain clean and dry. No palpable masses. Previously healed surgical port sites present.  Extremities: No peripheral edema, no cyanosis or clubbing  Vascular: 2+ peripheral pulses, no venous stasis  Neurological: A/O x 3, no focal deficits, no asterixis  Psychiatric: Normal mood, normal affect  Skin: No rashes, not jaundiced    LABS:                        9.1    8.55  )-----------( 181      ( 09 May 2025 05:05 )             27.2     05-09    137  |  101  |  14  ----------------------------<  94  3.5   |  28  |  0.89    Ca    8.8      09 May 2025 11:24  Phos  2.8     05-09  Mg     1.3     05-09    TPro  5.2[L]  /  Alb  2.2[L]  /  TBili  2.5[H]  /  DBili  x   /  AST  56[H]  /  ALT  67  /  AlkPhos  672[H]  05-09      LIVER FUNCTIONS - ( 09 May 2025 11:24 )  Alb: 2.2 g/dL / Pro: 5.2 gm/dL / ALK PHOS: 672 U/L / ALT: 67 U/L / AST: 56 U/L / GGT: x             RADIOLOGY & ADDITIONAL STUDIES:  ACC: [id number] EXAM:  CT ABDOMEN AND PELVIS OC   ORDERED BY: BERTO HIGH     PROCEDURE DATE:  05/08/2025          INTERPRETATION:  CLINICAL INFORMATION: Assess for small bowel stricture    COMPARISON: May 06, 2025    CONTRAST/COMPLICATIONS:  IV Contrast: None  Oral Contrast: Omnipaque 300  .    PROCEDURE:  CT of the Abdomen and Pelvis was performed.  Sagittal and coronal reformats were performed.    FINDINGS:  LOWER CHEST: Central venous catheter noted in the right heart.    LIVER: Numerous hepatic cysts.  BILE DUCTS: Left-sided external biliary drain in place with moderate   intrahepatic biliary ductal dilatation.  GALLBLADDER: Cholecystectomy.  SPLEEN: Within normal limits.  PANCREAS: Status post Whipple procedure with atrophy of the pancreatic   remnant and moderate ductal dilatation. Soft tissue mass in the surgical   bed measuring approximately 4 cm.  ADRENALS: Within normal limits.  KIDNEYS/URETERS: Within normal limits.    BLADDER: Within normal limits.  REPRODUCTIVE ORGANS: Moderate prostatomegaly.    BOWEL: Markedly dilated stomach, filled with oral contrast and debris   again noted. Limited evaluation of the gastrojejunal anastomosis due to   lack of intravenous contrast. Mild dilatation of the proximal small bowel   withpossible transition point near the operative bed in the right upper   quadrant.  PERITONEUM/RETROPERITONEUM: Within normal limits.  VESSELS: Atherosclerotic changes.  LYMPH NODES: No lymphadenopathy.  ABDOMINAL WALL: Within normal limits.  BONES: No lytic or blastic lesion.    IMPRESSION:  Gastric outlet obstruction, which correlating with prior CT, is likely   due to stricture at the gastrojejunal anastomosis.    Mildly dilated proximal small bowel. The possibility of second site of   obstruction is considered.    Percutaneous biliary drain in place with moderate ductal dilatation.        --- End of Report ---            AMAN THACKER MD; Attending Radiologist  This document has been electronically signed. May  8 2025  1:59PM     Patient is a 74y old  Male who presents with a chief complaint of Malignant SBO (09 May 2025 08:32)    74 year old man with cholangiocarcinoma s/p robotic whipple (pylorus preserving-Eads) with adjucavnt therapy and recurrence with liver mets and soft tissue lesion s/p IR drains, admitted recently for IR drain exchanges due to leakage/high output, now with worsening bloating/abdominal pain, found to have malignant SBO.     Patient was having more bloating, early satiety, nausea and that changed into vomiting while at home after discharge. He was supposed to see Dr. Encinas to discuss internalization of biliary drains next week. He had no abdominal pain just bloating. No jaundice. No overt signs of Gi bleeding like hematmeemsis, melena, hematochezia. Found to have malignant SBO on imaging s/p NG tube. He is being maintained on TPN now and has NGT for decomrpession. No fevers or chills. +fatigue.           PAST MEDICAL & SURGICAL HISTORY:  Bile duct cancer      H/O Whipple procedure          MEDICATIONS  (STANDING):  ciprofloxacin     Tablet 500 milliGRAM(s) Oral every 12 hours  enoxaparin Injectable 40 milliGRAM(s) SubCutaneous every 24 hours  lactated ringers. 1000 milliLiter(s) (100 mL/Hr) IV Continuous <Continuous>  lipid, fat emulsion (Fish Oil and Plant Based) 20% Infusion 0.868 Gm/kG/Day (20.83 mL/Hr) IV Continuous <Continuous>  magnesium sulfate  IVPB 2 Gram(s) IV Intermittent once  pancrelipase  (CREON 36,000 Lipase Units) 3 Capsule(s) Oral three times a day with meals  pantoprazole  Injectable 40 milliGRAM(s) IV Push daily  Parenteral Nutrition - Adult 1 Each (92 mL/Hr) TPN Continuous <Continuous>  potassium chloride  10 mEq/100 mL IVPB 10 milliEquivalent(s) IV Intermittent every 1 hour  potassium phosphate IVPB 15 milliMole(s) IV Intermittent once    MEDICATIONS  (PRN):  dicyclomine 10 milliGRAM(s) Oral two times a day before meals PRN gas/cramps  morphine  - Injectable 2 milliGRAM(s) IV Push every 4 hours PRN Severe Pain (7 - 10)  ondansetron Injectable 4 milliGRAM(s) IV Push every 6 hours PRN Nausea and/or Vomiting      Allergies    statins (Unknown)  atorvastatin (Hives)    Intolerances        SOCIAL HISTORY:  no smoking, drinking or drugs    FAMILY HISTORY:  no colon or stomach cancer    REVIEW OF SYSTEMS:    CONSTITUTIONAL: + Weakness. no fevers or chills  EYES/ENT: No visual changes;  No vertigo or throat pain   NECK: No pain or stiffness  RESPIRATORY: No cough, wheezing, hemoptysis; No shortness of breath  CARDIOVASCULAR: No chest pain or palpitations  GASTROINTESTINAL: see HPI  GENITOURINARY: No dysuria, frequency or hematuria  NEUROLOGICAL: No numbness or weakness  SKIN: No itching, burning, rashes, or lesions   PSYCH: Normal mood and affect  All other review of systems is negative unless indicated above.          Vital Signs Last 24 Hrs  T(C): 36.7 (09 May 2025 08:15), Max: 36.7 (08 May 2025 21:35)  T(F): 98 (09 May 2025 08:15), Max: 98.1 (08 May 2025 21:35)  HR: 76 (09 May 2025 08:15) (69 - 85)  BP: 116/79 (09 May 2025 08:15) (116/79 - 126/86)  BP(mean): --  RR: 18 (09 May 2025 08:15) (17 - 18)  SpO2: 99% (09 May 2025 08:15) (99% - 100%)    Parameters below as of 09 May 2025 08:15  Patient On (Oxygen Delivery Method): room air        PHYSICAL EXAM:    Constitutional: chronically ill appearing  HEENT: NTT in place, good phonation,  icteric  Neck: supple, no lymphadenopathy  Respiratory: no audible wheezing  Cardiovascular:  regular rate and rhythm  Gastrointestinal: abdomen soft, non distended, non-TTP. IR Drain clean and dry. No palpable masses. Previously healed surgical port sites present.  Extremities: No peripheral edema, no cyanosis or clubbing  Vascular: 2+ peripheral pulses, no venous stasis  Neurological: A/O x 3, no focal deficits, no asterixis  Psychiatric: Normal mood, normal affect  Skin: No rashes,     LABS:                        9.1    8.55  )-----------( 181      ( 09 May 2025 05:05 )             27.2     05-09    137  |  101  |  14  ----------------------------<  94  3.5   |  28  |  0.89    Ca    8.8      09 May 2025 11:24  Phos  2.8     05-09  Mg     1.3     05-09    TPro  5.2[L]  /  Alb  2.2[L]  /  TBili  2.5[H]  /  DBili  x   /  AST  56[H]  /  ALT  67  /  AlkPhos  672[H]  05-09      LIVER FUNCTIONS - ( 09 May 2025 11:24 )  Alb: 2.2 g/dL / Pro: 5.2 gm/dL / ALK PHOS: 672 U/L / ALT: 67 U/L / AST: 56 U/L / GGT: x             RADIOLOGY & ADDITIONAL STUDIES:  ACC: [id number] EXAM:  CT ABDOMEN AND PELVIS OC   ORDERED BY: BERTO HIGH     PROCEDURE DATE:  05/08/2025          INTERPRETATION:  CLINICAL INFORMATION: Assess for small bowel stricture    COMPARISON: May 06, 2025    CONTRAST/COMPLICATIONS:  IV Contrast: None  Oral Contrast: Omnipaque 300  .    PROCEDURE:  CT of the Abdomen and Pelvis was performed.  Sagittal and coronal reformats were performed.    FINDINGS:  LOWER CHEST: Central venous catheter noted in the right heart.    LIVER: Numerous hepatic cysts.  BILE DUCTS: Left-sided external biliary drain in place with moderate   intrahepatic biliary ductal dilatation.  GALLBLADDER: Cholecystectomy.  SPLEEN: Within normal limits.  PANCREAS: Status post Whipple procedure with atrophy of the pancreatic   remnant and moderate ductal dilatation. Soft tissue mass in the surgical   bed measuring approximately 4 cm.  ADRENALS: Within normal limits.  KIDNEYS/URETERS: Within normal limits.    BLADDER: Within normal limits.  REPRODUCTIVE ORGANS: Moderate prostatomegaly.    BOWEL: Markedly dilated stomach, filled with oral contrast and debris   again noted. Limited evaluation of the gastrojejunal anastomosis due to   lack of intravenous contrast. Mild dilatation of the proximal small bowel   withpossible transition point near the operative bed in the right upper   quadrant.  PERITONEUM/RETROPERITONEUM: Within normal limits.  VESSELS: Atherosclerotic changes.  LYMPH NODES: No lymphadenopathy.  ABDOMINAL WALL: Within normal limits.  BONES: No lytic or blastic lesion.    IMPRESSION:  Gastric outlet obstruction, which correlating with prior CT, is likely   due to stricture at the gastrojejunal anastomosis.    Mildly dilated proximal small bowel. The possibility of second site of   obstruction is considered.    Percutaneous biliary drain in place with moderate ductal dilatation.        --- End of Report ---            AMAN THACKER MD; Attending Radiologist  This document has been electronically signed. May  8 2025  1:59PM

## 2025-05-09 NOTE — CHART NOTE - NSCHARTNOTEFT_GEN_A_CORE
Clinical Nutrition PN Follow Up Note    * 75yo M w/ PMHx of Cholangio CA s/p Robotic Whipple on 5/28/2024 (Dr. Ocasio) w/ adjuvant Xeloda -> [place]/Cis/Durva, Found liver metastases on 11/12/2024, failed CBD stent placement (unsuccessful cannulation via ERCP), s/p internal-external biliary drainage (8.5fr) at [facility] on 4/13, c/b High external drainage output ( >2L) due to Internal drainage failure (CBD blockage), now s/p exchange of internal-external biliary drainage (12fr) on 4/25 in . Presented with 3-4 days of progressive abdominal bloating with mild nausea, generalized weakness and fatigue. Last BM 3d ago, has been passing gases. Complains of mild discomfort of epigastric pain. Admitted w/ SBO (likely malignant). NGT to LWS in place. Started on mSBO protocol - Steroids/Reglan/Octreotide. RD consulted for initiation of TPN via port (OK to use from surgical oncology).   *5/6: Initiate TPN via Port 2/2 mSBO and suspected prolonged NPO status  *5/7:  (+) BM, passing flatus. CLD. Would suggest to renew TPN x 1 more day to ensure >80% ENN being met with bridging PO + TPN. D/w surg resident     *TPN to be restarted 2/2 GOO and suspected prolonged NPO status.    *current status: TPN dc'd 5/7 per surgery. 5/8 CT A/P: GOO. s/p NGT to LWS w/ ~3L output overnight. Will initiate TPN via implanted infusion port.     *new pertinent meds: ocreotide, Creon, protonix, LR    *labs reviewed; no new labs taken; will start all lytes at standard value and adjust based on tomorrows labs. Will start volume on higher end d/t large output from NGT (~3L). Will continue to monitor and adjust prn. T bili WNL (1.7) will add trace elements and will continue to monitor; if increases will remove trace elements from PN bag and will add 60 mcg selenium, and 5 mg zinc in PN bag. Will add MVI w/ minerals and thiamine into PN bag. corrected Ca WNL, rec'd add 10mEq of Calcium Gluconate outside of PN bag as CAPS is out. TG WNL (70) will add 50g lipids daily. Will titrate dextrose up 50-75g/day until goal rate of 350g daily reached to meet 100% of increased nutrient needs (GIR ~4.2, WNL). However, may need to lower or add insulin to bag if POCT become elevated.     BMI: BMI (kg/m2): 18.2 (05-06-25 @ 03:35)  Glucose: POCT Blood Glucose.: 143 mg/dL (05-07-25 @ 17:58)    BP: 116/79 (05-09-25 @ 08:15) (108/70 - 131/77)Vital Signs Last 24 Hrs  T(C): 36.7 (05-09-25 @ 08:15), Max: 36.7 (05-08-25 @ 21:35)  T(F): 98 (05-09-25 @ 08:15), Max: 98.1 (05-08-25 @ 21:35)  HR: 76 (05-09-25 @ 08:15) (66 - 85)  BP: 116/79 (05-09-25 @ 08:15) (116/79 - 126/86)  BP(mean): --  RR: 18 (05-09-25 @ 08:15) (17 - 18)  SpO2: 99% (05-09-25 @ 08:15) (94% - 100%)    Lipid Panel: Date/Time: 05-07-25 @ 04:44  Triglycerides, Serum: 70    *I&O's Detail    08 May 2025 07:01  -  09 May 2025 07:00  --------------------------------------------------------  IN:    Lactated Ringers: 1000 mL    Lactated Ringers Bolus: 2000 mL  Total IN: 3000 mL    OUT:    Nasogastric/Oral tube (mL): 3300 mL    Voided (mL): 2100 mL  Total OUT: 5400 mL    Total NET: -2400 mL    * fluid status: negative; large output from NGT (~3L); will start volume on higher end and continue to monitor prn.   *BM 5/8; abdominal distention.  pt not on bowel regimen.  *edema: none doc'd    *malnutrition: Pt continues to meet criteria for severe protein-calorie malnutrition in context of chronic disease r/t decreased ability to meet increased nutrient needs 2/2 mets Ca s/p Whipple and malignant SBO AEB severe muscle/ fat wasting, 33% wt loss x 1 yr, <50-75% ENN chronically    Estimated Needs: based on 57.6 Kg (wt from 5/6 RD)  Calories: 2016- 2304 Kcal (35- 40 Kcal/Kg)  Protein: 86- 115 g (1.5- 2 g/Kg)  Fluids: 2016 - 2304 mL (35 - 40 mL/Kg)** increased d/t high NGT output    Diet, NPO:   With Ice Chips/Sips of Water (05-09-25 @ 07:34) [Active]    Weight History:  Daily   Height (cm): 177.8 (05-06-25 @ 03:35), 177.8 (05-05-25 @ 11:56), 177.8 (05-02-25 @ 10:58)  Weight (kg): 57.606 (05-06-25 @ 03:35), 57.6 (05-02-25 @ 10:58), 68 (04-23-25 @ 19:34)  BMI (kg/m2): 18.2 (05-06-25 @ 03:35), 18.2 (05-05-25 @ 11:56), 18.2 (05-02-25 @ 10:58)  BSA (m2): 1.72 (05-06-25 @ 03:35), 1.72 (05-05-25 @ 11:56), 1.72 (05-02-25 @ 10:58)  IBW:    TPN Recommendations: via implanted infusion port  Total Volume: 2200mL x 24 hours  115 g  Amino Acids  100 g Dextrose  50 g Lipids 20%  115 mEq Sodium Chloride  27 mEq Sodium Acetate  20 mmol Sodium Phosphate  40 mEq Potassium Chloride  0 mEq Potassium Acetate  0 mmol Potassium Phosphate  0 mEq Calcium Gluconate (CAPS out of PN solution)  8 mEq Magnesium Sulfate  200 mg Thiamine  1 ml Trace Elements  10 ml MVI    Total Calories  1300    (Meets   58 %  of  Estimated Energy needs  and  100  %  Protein needs)     *Goal: on-going - Pt will consume >/= 80% ENN via tolerated route    Additional Recommendations:    1) Daily weights  2) Strict I & O's - consider dc'ing IVF once TPN initiated.   3) Daily lyte checks including magnesium and phos  4) Weekly triglycerides/LFT checks  5) POCT q6hrs; maintain 140-180mg/dL    Nutrition recommendations to continue upon discharge. Goal to continue to meet >/= 80% ENN via tolerated route. RD to F/U prn for changes to nutrition dc plan.    *will continue to monitor and adjust PN prn*  Lary Parikh, PhD, MS, RDN, [phone] Clinical Nutrition PN Follow Up Note    * 73yo M w/ PMHx of Cholangio CA s/p Robotic Whipple on 5/28/2024 (Dr. Ocasio) w/ adjuvant Xeloda -> [place]/Cis/Durva, Found liver metastases on 11/12/2024, failed CBD stent placement (unsuccessful cannulation via ERCP), s/p internal-external biliary drainage (8.5fr) at [facility] on 4/13, c/b High external drainage output ( >2L) due to Internal drainage failure (CBD blockage), now s/p exchange of internal-external biliary drainage (12fr) on 4/25 in . Presented with 3-4 days of progressive abdominal bloating with mild nausea, generalized weakness and fatigue. Last BM 3d ago, has been passing gases. Complains of mild discomfort of epigastric pain. Admitted w/ SBO (likely malignant). NGT to LWS in place. Started on mSBO protocol - Steroids/Reglan/Octreotide. RD consulted for initiation of TPN via port (OK to use from surgical oncology).   *5/6: Initiate TPN via Port 2/2 mSBO and suspected prolonged NPO status  *5/7:  (+) BM, passing flatus. CLD. Would suggest to renew TPN x 1 more day to ensure >80% ENN being met with bridging PO + TPN. D/w surg resident     *TPN to be restarted 2/2 GOO and suspected prolonged NPO status.    *current status: TPN dc'd 5/7 per surgery. 5/8 CT A/P: GOO. s/p NGT to LWS w/ ~3L output overnight. Will initiate TPN via implanted infusion port.     *new pertinent meds: ocreotide, Creon, protonix, LR    *labs reviewed; no new labs taken; will start all lytes at standard value and adjust based on tomorrows labs. Will start volume on higher end d/t large output from NGT (~3L). Will continue to monitor and adjust prn. T bili WNL (1.7) will add trace elements and will continue to monitor; if increases will remove trace elements from PN bag and will add 60 mcg selenium, and 5 mg zinc in PN bag. Will add MVI w/ minerals and thiamine into PN bag. corrected Ca WNL, rec'd add 10mEq of Calcium Gluconate outside of PN bag as CAPS is out. TG WNL (70) will add 50g lipids daily. Will titrate dextrose up 50-75g/day until goal rate of 350g daily reached to meet 100% of increased nutrient needs (GIR ~4.2, WNL). However, may need to lower or add insulin to bag if POCT become elevated.     BMI: BMI (kg/m2): 18.2 (05-06-25 @ 03:35)  Glucose: POCT Blood Glucose.: 143 mg/dL (05-07-25 @ 17:58)    BP: 116/79 (05-09-25 @ 08:15) (108/70 - 131/77)Vital Signs Last 24 Hrs  T(C): 36.7 (05-09-25 @ 08:15), Max: 36.7 (05-08-25 @ 21:35)  T(F): 98 (05-09-25 @ 08:15), Max: 98.1 (05-08-25 @ 21:35)  HR: 76 (05-09-25 @ 08:15) (66 - 85)  BP: 116/79 (05-09-25 @ 08:15) (116/79 - 126/86)  BP(mean): --  RR: 18 (05-09-25 @ 08:15) (17 - 18)  SpO2: 99% (05-09-25 @ 08:15) (94% - 100%)    Lipid Panel: Date/Time: 05-07-25 @ 04:44  Triglycerides, Serum: 70    *I&O's Detail    08 May 2025 07:01  -  09 May 2025 07:00  --------------------------------------------------------  IN:    Lactated Ringers: 1000 mL    Lactated Ringers Bolus: 2000 mL  Total IN: 3000 mL    OUT:    Nasogastric/Oral tube (mL): 3300 mL    Voided (mL): 2100 mL  Total OUT: 5400 mL    Total NET: -2400 mL    * fluid status: negative; large output from NGT (~3L); will start volume on higher end and continue to monitor prn.   *BM 5/8; abdominal distention.  pt not on bowel regimen.  *edema: none doc'd    *malnutrition: Pt continues to meet criteria for severe protein-calorie malnutrition in context of chronic disease r/t decreased ability to meet increased nutrient needs 2/2 mets Ca s/p Whipple and malignant SBO AEB severe muscle/ fat wasting, 33% wt loss x 1 yr, <50-75% ENN chronically    Estimated Needs: based on 57.6 Kg (wt from 5/6 RD)  Calories: 2016- 2304 Kcal (35- 40 Kcal/Kg)  Protein: 86- 115 g (1.5- 2 g/Kg)  Fluids: 2016 - 2304 mL (35 - 40 mL/Kg)** increased d/t high NGT output    Diet, NPO:   With Ice Chips/Sips of Water (05-09-25 @ 07:34) [Active]    Weight History:  Daily   Height (cm): 177.8 (05-06-25 @ 03:35), 177.8 (05-05-25 @ 11:56), 177.8 (05-02-25 @ 10:58)  Weight (kg): 57.606 (05-06-25 @ 03:35), 57.6 (05-02-25 @ 10:58), 68 (04-23-25 @ 19:34)  BMI (kg/m2): 18.2 (05-06-25 @ 03:35), 18.2 (05-05-25 @ 11:56), 18.2 (05-02-25 @ 10:58)  BSA (m2): 1.72 (05-06-25 @ 03:35), 1.72 (05-05-25 @ 11:56), 1.72 (05-02-25 @ 10:58)  IBW:    TPN Recommendations: via implanted infusion port  Total Volume: 2200mL x 24 hours  115 g  Amino Acids  100 g Dextrose  50 g Lipids 20%  115 mEq Sodium Chloride  27 mEq Sodium Acetate  20 mmol Sodium Phosphate  40 mEq Potassium Chloride  0 mEq Potassium Acetate  0 mmol Potassium Phosphate  0 mEq Calcium Gluconate (CAPS out of PN solution)  8 mEq Magnesium Sulfate  200 mg Thiamine  1 ml Trace Elements  10 ml MVI    Total Calories  1300    (Meets   58 %  of  Estimated Energy needs  and  100  %  Protein needs)     *Goal: on-going - Pt will consume >/= 80% ENN via tolerated route    Additional Recommendations:    1) Daily weights  2) Strict I & O's - consider dc'ing IVF once TPN initiated.   3) Daily lyte checks including magnesium and phos  4) Weekly triglycerides/LFT checks  5) POCT q6hrs; maintain 140-180mg/dL  6) Will titrate dextrose up 50-75g/day until goal rate of 350g daily reached to meet 100% of increased nutrient needs (GIR ~4.2, WNL).    Nutrition recommendations to continue upon discharge. Goal to continue to meet >/= 80% ENN via tolerated route. RD to F/U prn for changes to nutrition dc plan.    *will continue to monitor and adjust PN prn*  Lary Parikh, PhD, MS, RDN, [phone]

## 2025-05-10 LAB
ALBUMIN SERPL ELPH-MCNC: 2.1 G/DL — LOW (ref 3.3–5)
ALP SERPL-CCNC: 777 U/L — HIGH (ref 40–120)
ALT FLD-CCNC: 94 U/L — HIGH (ref 12–78)
ANION GAP SERPL CALC-SCNC: 10 MMOL/L — SIGNIFICANT CHANGE UP (ref 5–17)
AST SERPL-CCNC: 97 U/L — HIGH (ref 15–37)
BILIRUB SERPL-MCNC: 3.6 MG/DL — HIGH (ref 0.2–1.2)
BUN SERPL-MCNC: 17 MG/DL — SIGNIFICANT CHANGE UP (ref 7–23)
CALCIUM SERPL-MCNC: 8.5 MG/DL — SIGNIFICANT CHANGE UP (ref 8.5–10.1)
CHLORIDE SERPL-SCNC: 98 MMOL/L — SIGNIFICANT CHANGE UP (ref 96–108)
CO2 SERPL-SCNC: 27 MMOL/L — SIGNIFICANT CHANGE UP (ref 22–31)
CREAT SERPL-MCNC: 0.89 MG/DL — SIGNIFICANT CHANGE UP (ref 0.5–1.3)
CULTURE RESULTS: SIGNIFICANT CHANGE UP
EGFR: 89 ML/MIN/1.73M2 — SIGNIFICANT CHANGE UP
EGFR: 89 ML/MIN/1.73M2 — SIGNIFICANT CHANGE UP
GLUCOSE BLDC GLUCOMTR-MCNC: 168 MG/DL — HIGH (ref 70–99)
GLUCOSE BLDC GLUCOMTR-MCNC: 168 MG/DL — HIGH (ref 70–99)
GLUCOSE BLDC GLUCOMTR-MCNC: 175 MG/DL — HIGH (ref 70–99)
GLUCOSE BLDC GLUCOMTR-MCNC: 185 MG/DL — HIGH (ref 70–99)
GLUCOSE SERPL-MCNC: 166 MG/DL — HIGH (ref 70–99)
HCT VFR BLD CALC: 25.9 % — LOW (ref 39–50)
HGB BLD-MCNC: 8.4 G/DL — LOW (ref 13–17)
MAGNESIUM SERPL-MCNC: 1.7 MG/DL — SIGNIFICANT CHANGE UP (ref 1.6–2.6)
MCHC RBC-ENTMCNC: 28.8 PG — SIGNIFICANT CHANGE UP (ref 27–34)
MCHC RBC-ENTMCNC: 32.4 G/DL — SIGNIFICANT CHANGE UP (ref 32–36)
MCV RBC AUTO: 88.7 FL — SIGNIFICANT CHANGE UP (ref 80–100)
NRBC # BLD AUTO: 0 K/UL — SIGNIFICANT CHANGE UP (ref 0–0)
NRBC # FLD: 0 K/UL — SIGNIFICANT CHANGE UP (ref 0–0)
NRBC BLD AUTO-RTO: 0 /100 WBCS — SIGNIFICANT CHANGE UP (ref 0–0)
PHOSPHATE SERPL-MCNC: 3.5 MG/DL — SIGNIFICANT CHANGE UP (ref 2.5–4.5)
PLATELET # BLD AUTO: 167 K/UL — SIGNIFICANT CHANGE UP (ref 150–400)
PMV BLD: 9.6 FL — SIGNIFICANT CHANGE UP (ref 7–13)
POTASSIUM SERPL-MCNC: 3.5 MMOL/L — SIGNIFICANT CHANGE UP (ref 3.5–5.3)
POTASSIUM SERPL-SCNC: 3.5 MMOL/L — SIGNIFICANT CHANGE UP (ref 3.5–5.3)
PROT SERPL-MCNC: 5.2 GM/DL — LOW (ref 6–8.3)
RBC # BLD: 2.92 M/UL — LOW (ref 4.2–5.8)
RBC # FLD: 17.6 % — HIGH (ref 10.3–14.5)
SODIUM SERPL-SCNC: 135 MMOL/L — SIGNIFICANT CHANGE UP (ref 135–145)
SPECIMEN SOURCE: SIGNIFICANT CHANGE UP
WBC # BLD: 5.56 K/UL — SIGNIFICANT CHANGE UP (ref 3.8–10.5)
WBC # FLD AUTO: 5.56 K/UL — SIGNIFICANT CHANGE UP (ref 3.8–10.5)

## 2025-05-10 RX ORDER — SODIUM/POT/MAG/CALC/CHLOR/ACET 35-20-5MEQ
1 VIAL (ML) INTRAVENOUS
Refills: 0 | Status: DISCONTINUED | OUTPATIENT
Start: 2025-05-10 | End: 2025-05-10

## 2025-05-10 RX ORDER — I.V. FAT EMULSION 20 G/100ML
0.87 EMULSION INTRAVENOUS
Qty: 50 | Refills: 0 | Status: DISCONTINUED | OUTPATIENT
Start: 2025-05-10 | End: 2025-05-10

## 2025-05-10 RX ORDER — MAGNESIUM SULFATE 500 MG/ML
1 SYRINGE (ML) INJECTION ONCE
Refills: 0 | Status: COMPLETED | OUTPATIENT
Start: 2025-05-10 | End: 2025-05-10

## 2025-05-10 RX ADMIN — I.V. FAT EMULSION 20.8 GM/KG/DAY: 20 EMULSION INTRAVENOUS at 22:25

## 2025-05-10 RX ADMIN — Medication 100 MILLIEQUIVALENT(S): at 16:51

## 2025-05-10 RX ADMIN — Medication 400 MILLIGRAM(S): at 00:00

## 2025-05-10 RX ADMIN — Medication 1000 MILLIGRAM(S): at 04:08

## 2025-05-10 RX ADMIN — Medication 92 EACH: at 22:23

## 2025-05-10 RX ADMIN — Medication 100 MILLIEQUIVALENT(S): at 18:31

## 2025-05-10 RX ADMIN — ENOXAPARIN SODIUM 40 MILLIGRAM(S): 100 INJECTION SUBCUTANEOUS at 14:18

## 2025-05-10 RX ADMIN — Medication 100 GRAM(S): at 12:58

## 2025-05-10 RX ADMIN — Medication 40 MILLIGRAM(S): at 10:01

## 2025-05-10 RX ADMIN — Medication 100 MILLIEQUIVALENT(S): at 14:16

## 2025-05-10 NOTE — CHART NOTE - NSCHARTNOTEFT_GEN_A_CORE
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 Ocasio) w/ adjuvant Xeloda -> [place]/Cis/Durva, Found liver metastases on 11/12/2024, failed CBD stent placement (unsuccessful cannulation via ERCP), s/p internal-external biliary drainage (8.5fr) at [facility] on 4/13, c/b High external drainage output ( >2L) due to Internal drainage failure (CBD blockage), now s/p exchange of internal-external biliary drainage (12fr) on 4/25 in . Presented with 3-4 days of progressive abdominal bloating with mild nausea, generalized weakness and fatigue. Last BM 3d ago, has been passing gases. Complains of mild discomfort of epigastric pain. Admitted w/ SBO (likely malignant). NGT to LWS in place. Started on mSBO protocol - Steroids/Reglan/Octreotide. RD consulted for initiation of TPN via port (OK to use from surgical oncology).   *5/6: Initiate TPN via Port 2/2 mSBO and suspected prolonged NPO status  *5/7:  (+) BM, passing flatus. CLD. Would suggest to renew TPN x 1 more day to ensure >80% ENN being met with bridging PO + TPN. D/w surg resident   *5/9: TPN dc'd 5/7 per surgery. 5/8 CT A/P: GOO. s/p NGT to LWS w/ ~3L output overnight. Will initiate TPN via implanted infusion port.     *TPN to be restarted 2/2 GOO and suspected prolonged NPO status.    *current status: Per gastro: rec PEG placement; plan for Monday 5/12 for long-term gastric decompression given mSBO, inability to tolerate adequate PO intake, and palliation. D/w surg res will c/w TPN.       *new pertinent meds: ocreotide, protonix, Mg Sulfate 2g x 24 hours, KCl 30 mEq x 24 hours, KPhos 15 mmol x 24 hours, LR    *labs reviewed; Na WNL, however on low end of normal. Will increase in PN bag today, if continues to remain on low end can consider decreasing total volume. Of note NGT output decreased from yesterday (1.2L) overnight. Will continue to monitor and adjust prn. K WNL however on low end despite receiving repletion; will increase in PN bag. Phos WNL. Mg WNL however on low end; will increase in PN bag. T Bili elevated (3.2), will remove trace elements from PN bag and will add 60 mcg selenium, and 5 mg zinc in PN bag. C/w MVI w/ minerals and thiamine into PN bag. corrected Ca high end of normal limits, DO NOT supplement calcium outside of PN bag at this time. TG WNL (70) will c/w 50g lipids daily. POCTs WNL, as per PN protocol, POCT required q6 hours to monitor glycemic control; should be maintained between 140- 180 mg/dL. Will titrate dextrose up 50-75g/day until goal rate of 350g daily reached to meet 100% of increased nutrient needs (GIR ~4.2, WNL). However, may need to lower or add insulin to bag if POCT become elevated.     05-10    135  |  98  |  17  ----------------------------<  166[H]  3.5   |  27  |  0.89    Ca    8.5      10 May 2025 05:08  Phos  3.5     05-10  Mg     1.7     05-10    TPro  5.2[L]  /  Alb  2.1[L]  /  TBili  3.6[H]  /  DBili  x   /  AST  97[H]  /  ALT  94[H]  /  AlkPhos  777[H]  05-10      BMI: BMI (kg/m2): 18.2 (05-06-25 @ 03:35)  Glucose: POCT Blood Glucose.: 185 mg/dL (05-10-25 @ 06:40)    BP: 141/75 (05-09-25 @ 16:21) (116/79 - 141/75)Vital Signs Last 24 Hrs  T(C): 37.2 (05-09-25 @ 16:21), Max: 37.2 (05-09-25 @ 16:21)  T(F): 98.9 (05-09-25 @ 16:21), Max: 98.9 (05-09-25 @ 16:21)  HR: 63 (05-09-25 @ 16:21) (63 - 76)  BP: 141/75 (05-09-25 @ 16:21) (116/79 - 141/75)  RR: 18 (05-09-25 @ 16:21) (18 - 18)  SpO2: 99% (05-09-25 @ 16:21) (99% - 99%)    Lipid Panel: Date/Time: 05-07-25 @ 04:44  Triglycerides, Serum: 70    *I&O's Detail    09 May 2025 07:01  -  10 May 2025 07:00  --------------------------------------------------------  IN:  Total IN: 0 mL    OUT:    Nasogastric/Oral tube (mL): 1200 mL    Voided (mL): 1825 mL  Total OUT: 3025 mL    Total NET: -3025 mL  * fluid status: negative; still w/ large output from NGT (~1.2L) however improving. Will continue to monitor and adjust volume prn. No INPUT TPN doc'd. Strict I&O's are rec'd when pt is on PN as per protocol   *BM 5/8 x2; pt not on bowel regimen.  *edema: none doc'd    *malnutrition: Pt continues to meet criteria for severe protein-calorie malnutrition in context of chronic disease r/t decreased ability to meet increased nutrient needs 2/2 mets Ca s/p Whipple and malignant SBO AEB severe muscle/ fat wasting, 33% wt loss x 1 yr, <50-75% ENN chronically    Estimated Needs: based on 57.6 Kg (wt from 5/6 RD)  Calories: 2016- 2304 Kcal (35- 40 Kcal/Kg)  Protein: 86- 115 g (1.5- 2 g/Kg)  Fluids: 2016 - 2304 mL (35 - 40 mL/Kg)** increased d/t high NGT output    Diet, NPO:   With Ice Chips/Sips of Water (05-09-25 @ 07:34) [Active]    Weight History:  Height (cm): 177.8 (05-06-25 @ 03:35), 177.8 (05-05-25 @ 11:56), 177.8 (05-02-25 @ 10:58)  Weight (kg): 57.606 (05-06-25 @ 03:35), 57.6 (05-02-25 @ 10:58), 68 (04-23-25 @ 19:34)  BMI (kg/m2): 18.2 (05-06-25 @ 03:35), 18.2 (05-05-25 @ 11:56), 18.2 (05-02-25 @ 10:58)  BSA (m2): 1.72 (05-06-25 @ 03:35), 1.72 (05-05-25 @ 11:56), 1.72 (05-02-25 @ 10:58)    TPN Recommendations: via implanted infusion port  Total Volume: 2200mL x 24 hours  115 g  Amino Acids  150 g Dextrose  50 g Lipids 20%  115 mEq Sodium Chloride  42 mEq Sodium Acetate  20 mmol Sodium Phosphate  50 mEq Potassium Chloride  0 mEq Potassium Acetate  0 mmol Potassium Phosphate  0 mEq Calcium Gluconate (CAPS out of PN solution)  10 mEq Magnesium Sulfate  200 mg Thiamine  0 ml Trace Elements  10 ml MVI  5 mg Zinc  60 mcg Selenium     Total Calories  1470    (Meets   69 %  of  Estimated Energy needs  and 100 % Protein needs)     *Goal: on-going - Pt will consume >/= 80% ENN via tolerated route    Additional Recommendations:    1) Daily weights  2) Strict I & O's - consider dc'ing IVF once TPN initiated.   3) Daily lyte checks including magnesium and phos  4) Weekly triglycerides/LFT checks  5) POCT q6hrs; maintain 140-180mg/dL  6) Will titrate dextrose up 50-75g/day until goal rate of 350g daily reached to meet 100% of increased nutrient needs (GIR ~4.2, WNL).  7)  ? PEG placement for Monday    Nutrition recommendations to continue upon discharge. Goal to continue to meet >/= 80% ENN via tolerated route. RD to F/U prn for changes to nutrition dc plan.    *will continue to monitor and adjust PN prn*  Lary Parikh, PhD, MS, RDN, [phone]

## 2025-05-11 LAB
ALBUMIN SERPL ELPH-MCNC: 2.2 G/DL — LOW (ref 3.3–5)
ALP SERPL-CCNC: 991 U/L — HIGH (ref 40–120)
ALT FLD-CCNC: 189 U/L — HIGH (ref 12–78)
ANION GAP SERPL CALC-SCNC: 6 MMOL/L — SIGNIFICANT CHANGE UP (ref 5–17)
AST SERPL-CCNC: 195 U/L — HIGH (ref 15–37)
BILIRUB SERPL-MCNC: 4.3 MG/DL — HIGH (ref 0.2–1.2)
BUN SERPL-MCNC: 23 MG/DL — SIGNIFICANT CHANGE UP (ref 7–23)
CALCIUM SERPL-MCNC: 8.4 MG/DL — LOW (ref 8.5–10.1)
CHLORIDE SERPL-SCNC: 98 MMOL/L — SIGNIFICANT CHANGE UP (ref 96–108)
CO2 SERPL-SCNC: 29 MMOL/L — SIGNIFICANT CHANGE UP (ref 22–31)
CREAT SERPL-MCNC: 0.76 MG/DL — SIGNIFICANT CHANGE UP (ref 0.5–1.3)
CULTURE RESULTS: SIGNIFICANT CHANGE UP
CULTURE RESULTS: SIGNIFICANT CHANGE UP
EGFR: 93 ML/MIN/1.73M2 — SIGNIFICANT CHANGE UP
EGFR: 93 ML/MIN/1.73M2 — SIGNIFICANT CHANGE UP
GLUCOSE BLDC GLUCOMTR-MCNC: 151 MG/DL — HIGH (ref 70–99)
GLUCOSE BLDC GLUCOMTR-MCNC: 152 MG/DL — HIGH (ref 70–99)
GLUCOSE BLDC GLUCOMTR-MCNC: 162 MG/DL — HIGH (ref 70–99)
GLUCOSE BLDC GLUCOMTR-MCNC: 185 MG/DL — HIGH (ref 70–99)
GLUCOSE SERPL-MCNC: 171 MG/DL — HIGH (ref 70–99)
MAGNESIUM SERPL-MCNC: 1.6 MG/DL — SIGNIFICANT CHANGE UP (ref 1.6–2.6)
PHOSPHATE SERPL-MCNC: 2.2 MG/DL — LOW (ref 2.5–4.5)
POTASSIUM SERPL-MCNC: 3.5 MMOL/L — SIGNIFICANT CHANGE UP (ref 3.5–5.3)
POTASSIUM SERPL-SCNC: 3.5 MMOL/L — SIGNIFICANT CHANGE UP (ref 3.5–5.3)
PROT SERPL-MCNC: 5.5 GM/DL — LOW (ref 6–8.3)
SODIUM SERPL-SCNC: 133 MMOL/L — LOW (ref 135–145)
SPECIMEN SOURCE: SIGNIFICANT CHANGE UP
SPECIMEN SOURCE: SIGNIFICANT CHANGE UP

## 2025-05-11 RX ORDER — SODIUM/POT/MAG/CALC/CHLOR/ACET 35-20-5MEQ
1 VIAL (ML) INTRAVENOUS
Refills: 0 | Status: DISCONTINUED | OUTPATIENT
Start: 2025-05-11 | End: 2025-05-11

## 2025-05-11 RX ORDER — I.V. FAT EMULSION 20 G/100ML
0.87 EMULSION INTRAVENOUS
Qty: 50 | Refills: 0 | Status: DISCONTINUED | OUTPATIENT
Start: 2025-05-11 | End: 2025-05-11

## 2025-05-11 RX ORDER — MAGNESIUM SULFATE 500 MG/ML
1 SYRINGE (ML) INJECTION ONCE
Refills: 0 | Status: COMPLETED | OUTPATIENT
Start: 2025-05-11 | End: 2025-05-11

## 2025-05-11 RX ORDER — SODIUM PHOSPHATE,DIBASIC DIHYD
15 POWDER (GRAM) MISCELLANEOUS EVERY 4 HOURS
Refills: 0 | Status: COMPLETED | OUTPATIENT
Start: 2025-05-11 | End: 2025-05-12

## 2025-05-11 RX ORDER — KETOROLAC TROMETHAMINE 30 MG/ML
15 INJECTION, SOLUTION INTRAMUSCULAR; INTRAVENOUS ONCE
Refills: 0 | Status: DISCONTINUED | OUTPATIENT
Start: 2025-05-11 | End: 2025-05-11

## 2025-05-11 RX ADMIN — Medication 100 MILLIEQUIVALENT(S): at 21:42

## 2025-05-11 RX ADMIN — KETOROLAC TROMETHAMINE 15 MILLIGRAM(S): 30 INJECTION, SOLUTION INTRAMUSCULAR; INTRAVENOUS at 02:22

## 2025-05-11 RX ADMIN — Medication 100 GRAM(S): at 20:22

## 2025-05-11 RX ADMIN — KETOROLAC TROMETHAMINE 15 MILLIGRAM(S): 30 INJECTION, SOLUTION INTRAMUSCULAR; INTRAVENOUS at 03:18

## 2025-05-11 RX ADMIN — Medication 40 MILLIGRAM(S): at 09:55

## 2025-05-11 RX ADMIN — I.V. FAT EMULSION 20.8 GM/KG/DAY: 20 EMULSION INTRAVENOUS at 22:51

## 2025-05-11 RX ADMIN — ENOXAPARIN SODIUM 40 MILLIGRAM(S): 100 INJECTION SUBCUTANEOUS at 13:07

## 2025-05-11 RX ADMIN — Medication 2 MILLIGRAM(S): at 22:22

## 2025-05-11 RX ADMIN — Medication 2 MILLIGRAM(S): at 22:52

## 2025-05-11 RX ADMIN — Medication 83 EACH: at 22:50

## 2025-05-11 NOTE — PROGRESS NOTE ADULT - ASSESSMENT
74 M w/ H/o Cholangio CA w/ distant mets, p/w SBO    concern for malignant SBO, clinically improving. Having BM.    Plan:  - PEG on Monday  - Preop tonight  - TPN   - c/w NGT  - Monitor BM  - Pain control PRN  - Monitor vitals  - Nausea control PRN  - replete electrolytes  - daily labs  - OOB/PT    Plan will be discussed with Surgical oncology attending, Dr. Wright

## 2025-05-11 NOTE — CHART NOTE - NSCHARTNOTEFT_GEN_A_CORE
Clinical Nutrition PN Follow Up Note    * 73yo M w/ PMHx of Cholangio CA s/p Robotic Whipple on 2024 (Dr. Ocasio) w/ adjuvant Xeloda -> [place]/Cis/Durva, Found liver metastases on 2024, failed CBD stent placement (unsuccessful cannulation via ERCP), s/p internal-external biliary drainage (8.5fr) at [facility] on , c/b High external drainage output ( >2L) due to Internal drainage failure (CBD blockage), now s/p exchange of internal-external biliary drainage (12fr) on  in . Presented with 3-4 days of progressive abdominal bloating with mild nausea, generalized weakness and fatigue. Last BM 3d ago, has been passing gases. Complains of mild discomfort of epigastric pain. Admitted w/ SBO (likely malignant). NGT to LWS in place. Started on mSBO protocol - Steroids/Reglan/Octreotide. RD consulted for initiation of TPN via port (OK to use from surgical oncology).   *: Initiate TPN via Port 2/2 mSBO and suspected prolonged NPO status  *:  (+) BM, passing flatus. CLD. Would suggest to renew TPN x 1 more day to ensure >80% ENN being met with bridging PO + TPN. D/w surg resident   *: TPN dc'd  per surgery.  CT A/P: GOO. s/p NGT to LWS w/ ~3L output overnight. Will initiate TPN via implanted infusion port.   *5/10: Per gastro: rec PEG placement; plan for  for long-term gastric decompression given mSBO, inability to tolerate adequate PO intake, and palliation. D/w surg res will c/w TPN.     *TPN to be restarted 2/2 GOO and suspected prolonged NPO status.    *current status: Per surg "Patient has felt he no longer wants to fight and will choose palliative measures for his care". Planning for PEG placement monday. Will c/w TPN.     *new pertinent meds: Protonix, Mg Sulfate 1g x 24 hours, KCl 30 mEq x 24 hours, Toradol    *labs reviewed; Hyponatremia noted, increased Na in bag yesterday, will decrease total volume to 2000mL today. K remains on low end of normal despite being increased in PN bag and receiving repletion. Will inc ease in PN bag again today. Mg remains on low end of normal will increase in PN bag today. Hypophosphatemia noted; will increase in PN bag today.  T Bili remains elevated, will keep trace elements out of PN bag and c/w 60 mcg selenium, and 5 mg zinc in PN bag. C/w MVI w/ minerals and thiamine into PN bag. corrected Ca high end of normal limits, DO NOT supplement calcium outside of PN bag at this time. TG WNL (70) will c/w 50g lipids daily. POCTs WNL, as per PN protocol, POCT required q6 hours to monitor glycemic control; should be maintained between 140- 180 mg/dL. Will titrate dextrose up 50-75g/day until goal rate of 350g daily reached to meet 100% of increased nutrient needs (GIR ~4.2, WNL). However, may need to lower or add insulin to bag if POCT become elevated.         133[L]  |  98  |  23  ----------------------------<  171[H]  3.5   |  29  |  0.76    Ca    8.4[L]      11 May 2025 05:28  Phos  2.2       Mg     1.6         TPro  5.5[L]  /  Alb  2.2[L]  /  TBili  4.3[H]  /  DBili  x   /  AST  195[H]  /  ALT  189[H]  /  AlkPhos  991[H]      BMI: BMI (kg/m2): 18.2 (25 @ 03:35)  Glucose: POCT Blood Glucose.: 185 mg/dL (25 @ 05:45)    BP: 111/71 (25 @ 07:27) (111/71 - 141/75)Vital Signs Last 24 Hrs  T(C): 36.7 (25 @ 07:27), Max: 37.4 (05-10-25 @ 16:00)  T(F): 98.1 (25 @ 07:27), Max: 99.4 (05-10-25 @ 16:00)  HR: 63 (25 @ 07:27) (63 - 71)  BP: 111/71 (25 @ 07:27) (111/71 - 125/82)  BP(mean): --  RR: 17 (25 @ 07:27) (17 - 17)  SpO2: 100% (25 @ 07:27) (99% - 100%)    Lipid Panel: Date/Time: 25 @ 04:44  Triglycerides, Serum: 70    *I&O's Detail    10 May 2025 07:01  -  11 May 2025 07:00  --------------------------------------------------------  IN:  Total IN: 0 mL    OUT:    Nasogastric/Oral tube (mL): 1000 mL    Voided (mL): 1150 mL  Total OUT: 2150 mL    Total NET: -2150 mL  * fluid status: negative; 1L output via NGT overnight. Will continue to monitor and adjust volume prn. No INPUT TPN doc'd. Strict I&O's are rec'd when pt is on PN as per protocol   *BM 5/10 loose per pt; pt not on bowel regimen.  *edema: none doc'd    *malnutrition: Pt continues to meet criteria for severe protein-calorie malnutrition in context of chronic disease r/t decreased ability to meet increased nutrient needs 2/2 mets Ca s/p Whipple and malignant SBO AEB severe muscle/ fat wasting, 33% wt loss x 1 yr, <50-75% ENN chronically    Estimated Needs: based on 57.6 Kg (wt from )  Calories: -  Kcal (35- 40 Kcal/Kg)  Protein: 86- 115 g (1.5- 2 g/Kg)  Fluids: 1728 - 2016 mL (30 - 35 mL/Kg)    Diet, NPO:   With Ice Chips/Sips of Water (25 @ 07:34) [Active]  Parenteral Nutrition - Adult 1 Each (92 mL/Hr) TPN Continuous <Continuous>, 05-10-25 @ 22:00, 22:00, Stop order after: 1 Days    Weight History:  Daily Weight in k.4 (11 May 2025 05:32)  Height (cm): 177.8 (25 @ 03:35), 177.8 (25 @ 11:56), 177.8 (25 @ 10:58)  Weight (kg): 57.606 (25 @ 03:35), 57.6 (25 @ 10:58), 68 (25 @ 19:34)  BMI (kg/m2): 18.2 (25 @ 03:35), 18.2 (25 @ 11:56), 18.2 (25 @ 10:58)  BSA (m2): 1.72 (25 @ 03:35), 1.72 (25 @ 11:56), 1.72 (25 @ 10:58)    TPN Recommendations: via implanted infusion port  Total Volume: 2200mL x 24 hours  115 g  Amino Acids  200 g Dextrose  50 g Lipids 20%  115 mEq Sodium Chloride  42 mEq Sodium Acetate  20 mmol Sodium Phosphate  45 mEq Potassium Chloride  0 mEq Potassium Acetate  10 mmol Potassium Phosphate  0 mEq Calcium Gluconate (CAPS out of PN solution)  12 mEq Magnesium Sulfate  200 mg Thiamine  0 ml Trace Elements  10 ml MVI  5 mg Zinc  60 mcg Selenium     Total Calories  1640    (Meets   76 %  of  Estimated Energy needs  and 100 % Protein needs)     *Goal: on-going - Pt will consume >/= 80% ENN via tolerated route    Additional Recommendations:    1) Daily weights  2) Strict I & O's - consider dc'ing IVF once TPN initiated.   3) Daily lyte checks including magnesium and phos  4) Weekly triglycerides/LFT checks  5) POCT q6hrs; maintain 140-180mg/dL  6) Will titrate dextrose up 50-75g/day until goal rate of 350g daily reached to meet 100% of increased nutrient needs (GIR ~4.2, WNL).  7)  ? PEG placement for Monday  8) Confirm goals of care regarding LONG-TERM nutrition support. ? palliative measures for care per surg    Nutrition recommendations to continue upon discharge. Goal to continue to meet >/= 80% ENN via tolerated route. RD to F/U prn for changes to nutrition dc plan.    *will continue to monitor and adjust PN prn*  Lary Parikh, PhD, MS, RDN, [phone] Clinical Nutrition PN Follow Up Note    * 75yo M w/ PMHx of Cholangio CA s/p Robotic Whipple on 2024 (Dr. Ocasio) w/ adjuvant Xeloda -> [place]/Cis/Durva, Found liver metastases on 2024, failed CBD stent placement (unsuccessful cannulation via ERCP), s/p internal-external biliary drainage (8.5fr) at [facility] on , c/b High external drainage output ( >2L) due to Internal drainage failure (CBD blockage), now s/p exchange of internal-external biliary drainage (12fr) on  in . Presented with 3-4 days of progressive abdominal bloating with mild nausea, generalized weakness and fatigue. Last BM 3d ago, has been passing gases. Complains of mild discomfort of epigastric pain. Admitted w/ SBO (likely malignant). NGT to LWS in place. Started on mSBO protocol - Steroids/Reglan/Octreotide. RD consulted for initiation of TPN via port (OK to use from surgical oncology).   *: Initiate TPN via Port 2/2 mSBO and suspected prolonged NPO status  *:  (+) BM, passing flatus. CLD. Would suggest to renew TPN x 1 more day to ensure >80% ENN being met with bridging PO + TPN. D/w surg resident   *: TPN dc'd  per surgery.  CT A/P: GOO. s/p NGT to LWS w/ ~3L output overnight. Will initiate TPN via implanted infusion port.   *5/10: Per gastro: rec PEG placement; plan for  for long-term gastric decompression given mSBO, inability to tolerate adequate PO intake, and palliation. D/w surg res will c/w TPN.     *TPN to be restarted 2/2 GOO and suspected prolonged NPO status.    *current status: Per surg "Patient has felt he no longer wants to fight and will choose palliative measures for his care". Planning for PEG placement monday. Will c/w TPN.     *new pertinent meds: Protonix, Mg Sulfate 1g x 24 hours, KCl 30 mEq x 24 hours, Toradol    *labs reviewed; Hyponatremia noted, increased Na in bag yesterday, will decrease total volume to 2000mL today. K remains on low end of normal despite being increased in PN bag and receiving repletion. Will inc ease in PN bag again today. Mg remains on low end of normal will increase in PN bag today. Hypophosphatemia noted; will increase in PN bag today.  T Bili remains elevated, will keep trace elements out of PN bag and c/w 60 mcg selenium, and 5 mg zinc in PN bag. C/w MVI w/ minerals and thiamine into PN bag. corrected Ca high end of normal limits, DO NOT supplement calcium outside of PN bag at this time. TG WNL (70) will c/w 50g lipids daily. POCTs WNL, as per PN protocol, POCT required q6 hours to monitor glycemic control; should be maintained between 140- 180 mg/dL. Will titrate dextrose up 50-75g/day until goal rate of 350g daily reached to meet 100% of increased nutrient needs (GIR ~4.2, WNL). However, may need to lower or add insulin to bag if POCT become elevated.         133[L]  |  98  |  23  ----------------------------<  171[H]  3.5   |  29  |  0.76    Ca    8.4[L]      11 May 2025 05:28  Phos  2.2       Mg     1.6         TPro  5.5[L]  /  Alb  2.2[L]  /  TBili  4.3[H]  /  DBili  x   /  AST  195[H]  /  ALT  189[H]  /  AlkPhos  991[H]      BMI: BMI (kg/m2): 18.2 (25 @ 03:35)  Glucose: POCT Blood Glucose.: 185 mg/dL (25 @ 05:45)    BP: 111/71 (25 @ 07:27) (111/71 - 141/75)Vital Signs Last 24 Hrs  T(C): 36.7 (25 @ 07:27), Max: 37.4 (05-10-25 @ 16:00)  T(F): 98.1 (25 @ 07:27), Max: 99.4 (05-10-25 @ 16:00)  HR: 63 (25 @ 07:27) (63 - 71)  BP: 111/71 (25 @ 07:27) (111/71 - 125/82)  BP(mean): --  RR: 17 (25 @ 07:27) (17 - 17)  SpO2: 100% (25 @ 07:27) (99% - 100%)    Lipid Panel: Date/Time: 25 @ 04:44  Triglycerides, Serum: 70    *I&O's Detail    10 May 2025 07:01  -  11 May 2025 07:00  --------------------------------------------------------  IN:  Total IN: 0 mL    OUT:    Nasogastric/Oral tube (mL): 1000 mL    Voided (mL): 1150 mL  Total OUT: 2150 mL    Total NET: -2150 mL  * fluid status: negative; 1L output via NGT overnight. Will continue to monitor and adjust volume prn. No INPUT TPN doc'd. Strict I&O's are rec'd when pt is on PN as per protocol   *BM 5/10 loose per pt; pt not on bowel regimen.  *edema: none doc'd    *malnutrition: Pt continues to meet criteria for severe protein-calorie malnutrition in context of chronic disease r/t decreased ability to meet increased nutrient needs 2/2 mets Ca s/p Whipple and malignant SBO AEB severe muscle/ fat wasting, 33% wt loss x 1 yr, <50-75% ENN chronically    Estimated Needs: based on 57.6 Kg (wt from )  Calories: -  Kcal (35- 40 Kcal/Kg)  Protein: 86- 115 g (1.5- 2 g/Kg)  Fluids: 1728 - 2016 mL (30 - 35 mL/Kg)    Diet, NPO:   With Ice Chips/Sips of Water (25 @ 07:34) [Active]  Parenteral Nutrition - Adult 1 Each (92 mL/Hr) TPN Continuous <Continuous>, 05-10-25 @ 22:00, 22:00, Stop order after: 1 Days    Weight History:  Daily Weight in k.4 (11 May 2025 05:32)  Height (cm): 177.8 (25 @ 03:35), 177.8 (25 @ 11:56), 177.8 (25 @ 10:58)  Weight (kg): 57.606 (25 @ 03:35), 57.6 (25 @ 10:58), 68 (25 @ 19:34)  BMI (kg/m2): 18.2 (25 @ 03:35), 18.2 (25 @ 11:56), 18.2 (25 @ 10:58)  BSA (m2): 1.72 (25 @ 03:35), 1.72 (25 @ 11:56), 1.72 (25 @ 10:58)    TPN Recommendations: via implanted infusion port  Total Volume: 2000mL x 24 hours  115 g  Amino Acids  200 g Dextrose  50 g Lipids 20%  115 mEq Sodium Chloride  42 mEq Sodium Acetate  20 mmol Sodium Phosphate  45 mEq Potassium Chloride  0 mEq Potassium Acetate  10 mmol Potassium Phosphate  0 mEq Calcium Gluconate (CAPS out of PN solution)  12 mEq Magnesium Sulfate  200 mg Thiamine  0 ml Trace Elements  10 ml MVI  5 mg Zinc  60 mcg Selenium     Total Calories  1640    (Meets   76 %  of  Estimated Energy needs  and 100 % Protein needs)     *Goal: on-going - Pt will consume >/= 80% ENN via tolerated route    Additional Recommendations:    1) Daily weights  2) Strict I & O's - consider dc'ing IVF once TPN initiated.   3) Daily lyte checks including magnesium and phos  4) Weekly triglycerides/LFT checks  5) POCT q6hrs; maintain 140-180mg/dL  6) Will titrate dextrose up 50-75g/day until goal rate of 350g daily reached to meet 100% of increased nutrient needs (GIR ~4.2, WNL).  7)  ? PEG placement for Monday  8) Confirm goals of care regarding LONG-TERM nutrition support. ? palliative measures for care per surg    Nutrition recommendations to continue upon discharge. Goal to continue to meet >/= 80% ENN via tolerated route. RD to F/U prn for changes to nutrition dc plan.    *will continue to monitor and adjust PN prn*  Lary Parikh, PhD, MS, RDN, [phone]

## 2025-05-11 NOTE — PROGRESS NOTE ADULT - SUBJECTIVE AND OBJECTIVE BOX
Patient seen and examined this AM  Patient has felt he no longer wants to fight and will choose palliative measures for his care  [facility]        PAST MEDICAL & SURGICAL HISTORY:  Bile duct cancer      H/O Whipple procedure          MEDICATIONS  (STANDING):  ciprofloxacin     Tablet 500 milliGRAM(s) Oral every 12 hours  enoxaparin Injectable 40 milliGRAM(s) SubCutaneous every 24 hours  lipid, fat emulsion (Fish Oil and Plant Based) 20% Infusion 0.868 Gm/kG/Day (20.8 mL/Hr) IV Continuous <Continuous>  pancrelipase  (CREON 36,000 Lipase Units) 3 Capsule(s) Oral three times a day with meals  pantoprazole  Injectable 40 milliGRAM(s) IV Push daily  Parenteral Nutrition - Adult 1 Each (92 mL/Hr) TPN Continuous <Continuous>  Parenteral Nutrition - Adult 1 Each (92 mL/Hr) TPN Continuous <Continuous>    MEDICATIONS  (PRN):  dicyclomine 10 milliGRAM(s) Oral two times a day before meals PRN gas/cramps  morphine  - Injectable 2 milliGRAM(s) IV Push every 4 hours PRN Severe Pain (7 - 10)  ondansetron Injectable 4 milliGRAM(s) IV Push every 6 hours PRN Nausea and/or Vomiting      Allergies    statins (Unknown)  atorvastatin (Hives)    Intolerances        SOCIAL HISTORY:    FAMILY HISTORY:        PHYSICAL EXAM   - GENERAL: No acute distress.  - EYES: EOMI. Anicteric.  - HENT: Moist mucous membranes. No scleral icterus. No cervical lymphadenopathy. NGT in place.  - LUNGS:  No accessory muscle use.  - CARDIOVASCULAR: Regular rate and rhythm.   - ABDOMEN: soft, non distended, non tender Outer drain c/d/i. No palpable masses. previous surgical port sites well healed   - EXTREMITIES: No edema. Non-tender.  - SKIN: No rashes or lesions. Warm.  - NEUROLOGIC: No focal neurological deficits. CN II-XII grossly intact, but not individually tested.  - PSYCHIATRIC: Cooperative. Appropriate mood and affect.        Vital Signs Last 24 Hrs  T(C): 36.9 (11 May 2025 02:34), Max: 37.4 (10 May 2025 16:00)  T(F): 98.4 (11 May 2025 02:34), Max: 99.4 (10 May 2025 16:00)  HR: 67 (11 May 2025 02:34) (66 - 89)  BP: 118/78 (11 May 2025 02:34) (118/64 - 130/87)  BP(mean): --  RR: 17 (10 May 2025 20:36) (17 - 17)  SpO2: 99% (11 May 2025 02:34) (99% - 100%)    Parameters below as of 11 May 2025 02:34  Patient On (Oxygen Delivery Method): room air        I&O's Summary    09 May 2025 07:01  -  10 May 2025 07:00  --------------------------------------------------------  IN: 0 mL / OUT: 3025 mL / NET: -3025 mL    10 May 2025 07:01  -  11 May 2025 03:56  --------------------------------------------------------  IN: 0 mL / OUT: 1850 mL / NET: -1850 mL            LABS:                        8.4    5.56  )-----------( 167      ( 10 May 2025 05:08 )             25.9     05-10    135  |  98  |  17  ----------------------------<  166[H]  3.5   |  27  |  0.89    Ca    8.5      10 May 2025 05:08  Phos  3.5     05-10  Mg     1.7     05-10    TPro  5.2[L]  /  Alb  2.1[L]  /  TBili  3.6[H]  /  DBili  x   /  AST  97[H]  /  ALT  94[H]  /  AlkPhos  777[H]  05-10      Urinalysis Basic - ( 10 May 2025 05:08 )    Color: x / Appearance: x / SG: x / pH: x  Gluc: 166 mg/dL / Ketone: x  / Bili: x / Urobili: x   Blood: x / Protein: x / Nitrite: x   Leuk Esterase: x / RBC: x / WBC x   Sq Epi: x / Non Sq Epi: x / Bacteria: x      CAPILLARY BLOOD GLUCOSE      POCT Blood Glucose.: 168 mg/dL (10 May 2025 22:10)  POCT Blood Glucose.: 168 mg/dL (10 May 2025 18:28)  POCT Blood Glucose.: 175 mg/dL (10 May 2025 11:06)  POCT Blood Glucose.: 185 mg/dL (10 May 2025 06:40)    LIVER FUNCTIONS - ( 10 May 2025 05:08 )  Alb: 2.1 g/dL / Pro: 5.2 gm/dL / ALK PHOS: 777 U/L / ALT: 94 U/L / AST: 97 U/L / GGT: x             Cultures:      RADIOLOGY & ADDITIONAL STUDIES:

## 2025-05-12 ENCOUNTER — APPOINTMENT (OUTPATIENT)
Dept: SURGICAL ONCOLOGY | Facility: HOSPITAL | Age: 75
End: 2025-05-12

## 2025-05-12 DIAGNOSIS — C24.0 MALIGNANT NEOPLASM OF EXTRAHEPATIC BILE DUCT: ICD-10-CM

## 2025-05-12 LAB
ALBUMIN SERPL ELPH-MCNC: 2.2 G/DL — LOW (ref 3.3–5)
ALP SERPL-CCNC: 981 U/L — HIGH (ref 40–120)
ALT FLD-CCNC: 290 U/L — HIGH (ref 12–78)
ANION GAP SERPL CALC-SCNC: 4 MMOL/L — LOW (ref 5–17)
AST SERPL-CCNC: 252 U/L — HIGH (ref 15–37)
BILIRUB SERPL-MCNC: 4.9 MG/DL — HIGH (ref 0.2–1.2)
BUN SERPL-MCNC: 23 MG/DL — SIGNIFICANT CHANGE UP (ref 7–23)
CALCIUM SERPL-MCNC: 8.3 MG/DL — LOW (ref 8.5–10.1)
CHLORIDE SERPL-SCNC: 100 MMOL/L — SIGNIFICANT CHANGE UP (ref 96–108)
CO2 SERPL-SCNC: 29 MMOL/L — SIGNIFICANT CHANGE UP (ref 22–31)
CREAT SERPL-MCNC: 0.69 MG/DL — SIGNIFICANT CHANGE UP (ref 0.5–1.3)
EGFR: 96 ML/MIN/1.73M2 — SIGNIFICANT CHANGE UP
EGFR: 96 ML/MIN/1.73M2 — SIGNIFICANT CHANGE UP
GLUCOSE BLDC GLUCOMTR-MCNC: 126 MG/DL — HIGH (ref 70–99)
GLUCOSE BLDC GLUCOMTR-MCNC: 155 MG/DL — HIGH (ref 70–99)
GLUCOSE BLDC GLUCOMTR-MCNC: 181 MG/DL — HIGH (ref 70–99)
GLUCOSE BLDC GLUCOMTR-MCNC: 229 MG/DL — HIGH (ref 70–99)
GLUCOSE SERPL-MCNC: 168 MG/DL — HIGH (ref 70–99)
HCT VFR BLD CALC: 28.7 % — LOW (ref 39–50)
HGB BLD-MCNC: 9.3 G/DL — LOW (ref 13–17)
INR BLD: 1.01 RATIO — SIGNIFICANT CHANGE UP (ref 0.85–1.16)
MAGNESIUM SERPL-MCNC: 1.6 MG/DL — SIGNIFICANT CHANGE UP (ref 1.6–2.6)
MCHC RBC-ENTMCNC: 29.1 PG — SIGNIFICANT CHANGE UP (ref 27–34)
MCHC RBC-ENTMCNC: 32.4 G/DL — SIGNIFICANT CHANGE UP (ref 32–36)
MCV RBC AUTO: 89.7 FL — SIGNIFICANT CHANGE UP (ref 80–100)
NRBC # BLD AUTO: 0 K/UL — SIGNIFICANT CHANGE UP (ref 0–0)
NRBC # FLD: 0 K/UL — SIGNIFICANT CHANGE UP (ref 0–0)
NRBC BLD AUTO-RTO: 0 /100 WBCS — SIGNIFICANT CHANGE UP (ref 0–0)
PHOSPHATE SERPL-MCNC: 2.3 MG/DL — LOW (ref 2.5–4.5)
PLATELET # BLD AUTO: 121 K/UL — LOW (ref 150–400)
PMV BLD: 10.2 FL — SIGNIFICANT CHANGE UP (ref 7–13)
POTASSIUM SERPL-MCNC: 3.8 MMOL/L — SIGNIFICANT CHANGE UP (ref 3.5–5.3)
POTASSIUM SERPL-SCNC: 3.8 MMOL/L — SIGNIFICANT CHANGE UP (ref 3.5–5.3)
PROT SERPL-MCNC: 5.2 GM/DL — LOW (ref 6–8.3)
PROTHROM AB SERPL-ACNC: 11.9 SEC — SIGNIFICANT CHANGE UP (ref 9.9–13.4)
RBC # BLD: 3.2 M/UL — LOW (ref 4.2–5.8)
RBC # FLD: 17.6 % — HIGH (ref 10.3–14.5)
SODIUM SERPL-SCNC: 133 MMOL/L — LOW (ref 135–145)
WBC # BLD: 7.08 K/UL — SIGNIFICANT CHANGE UP (ref 3.8–10.5)
WBC # FLD AUTO: 7.08 K/UL — SIGNIFICANT CHANGE UP (ref 3.8–10.5)

## 2025-05-12 PROCEDURE — 99233 SBSQ HOSP IP/OBS HIGH 50: CPT

## 2025-05-12 PROCEDURE — 43246 EGD PLACE GASTROSTOMY TUBE: CPT

## 2025-05-12 PROCEDURE — 99222 1ST HOSP IP/OBS MODERATE 55: CPT | Mod: 25

## 2025-05-12 RX ORDER — POTASSIUM PHOSPHATE, MONOBASIC POTASSIUM PHOSPHATE, DIBASIC INJECTION, 236; 224 MG/ML; MG/ML
15 SOLUTION, CONCENTRATE INTRAVENOUS ONCE
Refills: 0 | Status: DISCONTINUED | OUTPATIENT
Start: 2025-05-12 | End: 2025-05-12

## 2025-05-12 RX ORDER — SODIUM/POT/MAG/CALC/CHLOR/ACET 35-20-5MEQ
1 VIAL (ML) INTRAVENOUS
Refills: 0 | Status: DISCONTINUED | OUTPATIENT
Start: 2025-05-12 | End: 2025-05-12

## 2025-05-12 RX ORDER — MAGNESIUM SULFATE 500 MG/ML
1 SYRINGE (ML) INJECTION ONCE
Refills: 0 | Status: COMPLETED | OUTPATIENT
Start: 2025-05-12 | End: 2025-05-12

## 2025-05-12 RX ORDER — I.V. FAT EMULSION 20 G/100ML
0.87 EMULSION INTRAVENOUS
Qty: 50 | Refills: 0 | Status: DISCONTINUED | OUTPATIENT
Start: 2025-05-12 | End: 2025-05-13

## 2025-05-12 RX ORDER — CIPROFLOXACIN HCL 250 MG
400 TABLET ORAL EVERY 12 HOURS
Refills: 0 | Status: DISCONTINUED | OUTPATIENT
Start: 2025-05-12 | End: 2025-05-26

## 2025-05-12 RX ADMIN — Medication 100 MILLIEQUIVALENT(S): at 03:33

## 2025-05-12 RX ADMIN — Medication 83 EACH: at 23:54

## 2025-05-12 RX ADMIN — Medication 2 MILLIGRAM(S): at 21:47

## 2025-05-12 RX ADMIN — Medication 2 MILLIGRAM(S): at 22:17

## 2025-05-12 RX ADMIN — Medication 100 MILLIEQUIVALENT(S): at 01:44

## 2025-05-12 RX ADMIN — Medication 100 GRAM(S): at 08:45

## 2025-05-12 RX ADMIN — Medication 62.5 MILLIMOLE(S): at 05:31

## 2025-05-12 RX ADMIN — Medication 100 MILLIEQUIVALENT(S): at 00:28

## 2025-05-12 RX ADMIN — Medication 40 MILLIGRAM(S): at 08:50

## 2025-05-12 RX ADMIN — Medication 62.5 MILLIMOLE(S): at 16:30

## 2025-05-12 RX ADMIN — Medication 200 MILLIGRAM(S): at 11:48

## 2025-05-12 RX ADMIN — ENOXAPARIN SODIUM 40 MILLIGRAM(S): 100 INJECTION SUBCUTANEOUS at 16:29

## 2025-05-12 RX ADMIN — Medication 2 MILLIGRAM(S): at 02:23

## 2025-05-12 RX ADMIN — Medication 200 MILLIGRAM(S): at 23:59

## 2025-05-12 NOTE — CHART NOTE - NSCHARTNOTEFT_GEN_A_CORE
Clinical Nutrition PN Follow Up Note    * 75yo M w/ PMHx of Cholangio CA s/p Robotic Whipple on 2024 (Dr. Ocasio) w/ adjuvant Xeloda -> [place]/Cis/Durva, Found liver metastases on 2024, failed CBD stent placement (unsuccessful cannulation via ERCP), s/p internal-external biliary drainage (8.5fr) at [facility] on , c/b High external drainage output ( >2L) due to Internal drainage failure (CBD blockage), now s/p exchange of internal-external biliary drainage (12fr) on  in . Presented with 3-4 days of progressive abdominal bloating with mild nausea, generalized weakness and fatigue. Last BM 3d ago, has been passing gases. Complains of mild discomfort of epigastric pain. Admitted w/ SBO (likely malignant). NGT to LWS in place. Started on mSBO protocol - Steroids/Reglan/Octreotide. RD consulted for initiation of TPN via port (OK to use from surgical oncology).   *: Initiate TPN via Port 2/2 mSBO and suspected prolonged NPO status  *:  (+) BM, passing flatus. CLD. Would suggest to renew TPN x 1 more day to ensure >80% ENN being met with bridging PO + TPN. D/w surg resident   *: TPN dc'd  per surgery.  CT A/P: GOO. s/p NGT to LWS w/ ~3L output overnight. Will initiate TPN via implanted infusion port.   *5/10: Per gastro: rec PEG placement; plan for  for long-term gastric decompression given mSBO, inability to tolerate adequate PO intake, and palliation. D/w surg res will c/w TPN.   *: Per surg "Patient has felt he no longer wants to fight and will choose palliative measures for his care". Planning for PEG placement monday.    *TPN to be restarted 2/2 GOO and suspected prolonged NPO status.    *current status: Plan for PEG today  for long-term decompression given mSBO and palliation, will reorder TPN. NGT remains in place to suction.     *new pertinent meds: abx, Creon, protonix; PRN - dicyclomine, morphine, zofran; lyte repletion - 1g MgSulfate, 40 mEq KCl, 15 mmol NaPhos received x 24 hrs    *labs reviewed; Hyponatremia continued despite increases of Na in bag and decrease of total volume yesterday to 2000 mL; will c/w lowered volume and increase Na in bag again today. K remains on low end of normal despite being increased in PN bag and receiving repletion. Will increase in PN bag again today. Mg remains on low end of normal will increase in PN bag today. Hypophosphatemia noted, will also increase in PN bag today.  ***PT LIKELY EXPERIENCING REFEEDING SYNDROME - CONTINUE WITH LYTE REPLETION OUTSIDE BAG AND HOLD DEXTROSE IF NOT IMPROVED TOMORROW.   T Bili remains elevated, will keep trace elements out of PN bag and c/w 60 mcg selenium and 5 mg zinc in PN bag.  TG WNL (70) will c/w 50g lipids daily.   POCTs WNL, as per PN protocol, POCT required q6 hours to monitor glycemic control; should be maintained between 140- 180 mg/dL. Will titrate dextrose up 50-75g/day until goal rate of 350g daily reached to meet 100% of increased nutrient needs (GIR ~4.2, WNL). However, may need to lower or add insulin to bag if POCT become elevated.         133[L]  |  100  |  23  ----------------------------<  168[H]  3.8   |  29  |  0.69    Ca    8.3[L]      12 May 2025 04:52  Phos  2.3       Mg     1.6         TPro  5.2[L]  /  Alb  2.2[L]  /  TBili  4.9[H]  /  DBili  x   /  AST  252[H]  /  ALT  290[H]  /  AlkPhos  981[H]      BMI: BMI (kg/m2): 18.2 (25 @ 03:35)  Glucose: POCT Blood Glucose.: 185 mg/dL (25 @ 05:45)    BP: 111/71 (25 @ 07:27) (111/71 - 141/75)Vital Signs Last 24 Hrs  T(C): 36.7 (25 @ 07:27), Max: 37.4 (05-10-25 @ 16:00)  T(F): 98.1 (25 @ 07:27), Max: 99.4 (05-10-25 @ 16:00)  HR: 63 (25 @ 07:27) (63 - 71)  BP: 111/71 (25 @ 07:27) (111/71 - 125/82)  BP(mean): --  RR: 17 (25 @ 07:27) (17 - 17)  SpO2: 100% (25 @ 07:27) (99% - 100%)    Lipid Panel: Date/Time: 25 @ 04:44  Triglycerides, Serum: 70    POCT Blood Glucose.: 155 mg/dL (12 May 2025 05:35)  POCT Blood Glucose.: 152 mg/dL (11 May 2025 22:54)  POCT Blood Glucose.: 151 mg/dL (11 May 2025 18:00)  POCT Blood Glucose.: 162 mg/dL (11 May 2025 12:16)    *I&O's Detail    11 May 2025 07:01  -  12 May 2025 07:00  --------------------------------------------------------  IN:  Total IN: 0 mL    OUT:    Nasogastric/Oral tube (mL): 725 mL    Voided (mL): 700 mL  Total OUT: 1425 mL    Total NET: -1425 mL  * fluid status: negative; NGT output improved x 24 hrs. Will continue to monitor and adjust volume prn. No INPUT or TPN doc'd. Strict I&O's are rec'd when pt is on PN as per protocol   *(+) BM 5/10 x 3 - loose; pt not on bowel regimen.  *edema: none doc'd  *PI: coccyx, unstageable     *malnutrition: Pt continues to meet criteria for severe protein-calorie malnutrition in context of chronic disease r/t decreased ability to meet increased nutrient needs 2/2 mets Ca s/p Whipple and malignant SBO AEB severe muscle/ fat wasting, 33% wt loss x 1 yr, <50-75% ENN chronically    Estimated Needs: based on 57.6 Kg (wt from )  Calories: -  Kcal (35- 40 Kcal/Kg)  Protein: 86- 115 g (1.5- 2 g/Kg)  Fluids: 1728 - 2016 mL (30 - 35 mL/Kg)    Diet, NPO:   With Ice Chips/Sips of Water (25 @ 07:34) [Active]  Parenteral Nutrition - Adult 1 Each (92 mL/Hr) TPN Continuous <Continuous>, 05-10-25 @ 22:00, 22:00, Stop order after: 1 Days    Weight History:  Daily Weight in k.4 (11 May 2025 05:32)  Height (cm): 177.8 (25 @ 03:35), 177.8 (25 @ 11:56), 177.8 (25 @ 10:58)  Weight (kg): 57.606 (25 @ 03:35), 57.6 (25 @ 10:58), 68 (25 @ 19:34)  BMI (kg/m2): 18.2 (25 @ 03:35), 18.2 (25 @ 11:56), 18.2 (25 @ 10:58)  BSA (m2): 1.72 (25 @ 03:35), 1.72 (25 @ 11:56), 1.72 (25 @ 10:58)    TPN Recommendations: via implanted infusion port  Total Volume: 2000mL x 24 hours  115 g  Amino Acids  250 g Dextrose  50 g Lipids 20%  200 mEq Sodium Chloride  0 mEq Sodium Acetate  0 mmol Sodium Phosphate  0 mEq Potassium Chloride  12 mEq Potassium Acetate  40 mmol Potassium Phosphate  0 mEq Calcium Gluconate (CAPS out of PN solution)  14 mEq Magnesium Sulfate  200 mg Thiamine  0 ml Trace Elements  10 ml MVI  5 mg Zinc  60 mcg Selenium     Total Calories  1810  (Meets   84%  of  Estimated Energy needs  and 100% Protein needs)     *Goal: achieved - Pt will receive >/= 80% ENN via tolerated route    Additional Recommendations:    1) Daily weights  2) Strict I & O's  3) Daily lyte checks including magnesium and phos  4) Weekly triglycerides/LFT checks  5) POCT q6hrs; maintain 140-180mg/dL  6) Will titrate dextrose up 50-75g/day until goal rate of 350g daily reached to meet 100% of increased nutrient needs (GIR ~4.2, WNL).  7)  Plan for decompressive PEG placement for today - Confirm goals of care regarding LONG-TERM nutrition support. ? palliative measures for care per surg. ? if TPN to be continued long-term vs PO for pleasure/ comfort     Nutrition recommendations to continue upon discharge. Goal to continue to meet >/= 80% ENN via tolerated route. RD to F/U prn for changes to nutrition dc plan.    *will continue to monitor and adjust PN prn*  Carol Burger, MS, RDN, CNSC, [phone]  Certified Nutrition

## 2025-05-12 NOTE — PROGRESS NOTE ADULT - SUBJECTIVE AND OBJECTIVE BOX
Patient seen and examined this AM  Denies n/v/. abd still mildly distended but soft  on TPN  AVSS       PHYSICAL EXAM   - GENERAL: No acute distress.  - EYES: EOMI. Anicteric.  - HENT: Moist mucous membranes. No scleral icterus. No cervical lymphadenopathy. NGT in place.  - LUNGS:  No accessory muscle use.  - CARDIOVASCULAR: Regular rate and rhythm.   - ABDOMEN: soft mildly distended, non tender Outer biliary drain c/d/i. No palpable masses. previous surgical port sites well healed   - EXTREMITIES: No edema. Non-tender.  - SKIN: No rashes or lesions. Warm.  - NEUROLOGIC: No focal neurological deficits. CN II-XII grossly intact, but not individually tested.  - PSYCHIATRIC: Cooperative. Appropriate mood and affect.       Chief complaint:      PMHx:  Bile duct cancer        PSHx:  H/O Whipple procedure        FHx:      Vitals:  T(C): 36.9 ( @ 00:10), Max: 37.3 (11 @ 15:53)  HR: 69 ( @ 00:10) (63 - 69)  BP: 129/83 ( @ 00:10) (111/71 - 131/83)  RR: 17 (11 @ 15:53) (17 - 17)  SpO2: 100% ( @ 00:10) (100% - 100%)      I&Os    05-10 @ 07:  -   @ 07:00  --------------------------------------------------------  IN:  Total IN: 0 mL    OUT:    Nasogastric/Oral tube (mL): 1000 mL    Voided (mL): 1150 mL  Total OUT: 2150 mL    Total NET: -2150 mL       @ 07:  -   @ 04:39  --------------------------------------------------------  IN:  Total IN: 0 mL    OUT:    Nasogastric/Oral tube (mL): 575 mL    Voided (mL): 700 mL  Total OUT: 1275 mL    Total NET: -1275 mL        .    Labs:  -11 @ 05:28                    -  CBC: ->)-------(<-                     -                 133 | 98 | 23    CMP:  ----------------------< 171               3.5 | 29 | 0.76                      Ca:8.4  Phos:2.2  M.6               4.3|      |195        LFTs:  ------|991|-----             -|      |-        Cultures:        Current Inpatient Medications:  ciprofloxacin     Tablet 500 milliGRAM(s) Oral every 12 hours  dicyclomine 10 milliGRAM(s) Oral two times a day before meals PRN  enoxaparin Injectable 40 milliGRAM(s) SubCutaneous every 24 hours  lipid, fat emulsion (Fish Oil and Plant Based) 20% Infusion 0.868 Gm/kG/Day (20.8 mL/Hr) IV Continuous <Continuous>  morphine  - Injectable 2 milliGRAM(s) IV Push every 4 hours PRN  ondansetron Injectable 4 milliGRAM(s) IV Push every 6 hours PRN  pancrelipase  (CREON 36,000 Lipase Units) 3 Capsule(s) Oral three times a day with meals  pantoprazole  Injectable 40 milliGRAM(s) IV Push daily  Parenteral Nutrition - Adult 1 Each (83 mL/Hr) TPN Continuous <Continuous>  sodium phosphate 15 milliMole(s)/250 mL IVPB 15 milliMole(s) IV Intermittent every 4 hours

## 2025-05-12 NOTE — CONSULT NOTE ADULT - ASSESSMENT
Pt is a 74y old Male with hx of cholangiocarcinoma s/p robotic whipple (pylorus preserving-Ninfa) with adjucavnt therapy and recurrence with liver mets and soft tissue lesion s/p IR drains, admitted recently for IR drain exchanges due to leakage/high output, now with worsening bloating/abdominal pain, found to have malignant SBO.  Patient was having more bloating, early satiety, nausea and that changed into vomiting while at home after discharge. He was supposed to see Dr. Encinas to discuss internalization of biliary drains next week. He had no abdominal pain just bloating. No jaundice. No overt signs of Gi bleeding like hematmeemsis, melena, hematochezia. Found to have malignant SBO on imaging s/p NG tube. He is being maintained on TPN now and has NGT for decomrpession. No fevers or chills. +fatigue. Palliative medicine consulted to help establish GOC and advance care planning     1) Malignant SBO - history of bile duct cancer   - s/p venting peg placement today   - patient has been on TPN  and would like to continue we disused the philosophy of hospice in detail and patient would like to continue TPN at this time - patient not having severe symptoms at this time so plan right now is to go home with home care to continue TPN at home and family and patient aware of hospice philosophy if patient were to decline or not want to return to the hospital.     Process of Care  --Reviewed dx/treatment problems and alignment with Goals of Care    Physical Aspects of Care  --Pain  patient denies at this time  c/w current managment    --Dyspnea  No SOB at this time  comfortable and in NAD    --Nausea Vomiting  denies    --Weakness  PT as tolerated     Psychological and Psychiatric Aspects of Care:   --Greif/Bereavment: emotional support provided  --Hx of psychiatric dx: none  -Pastoral Care Available PRN     Social Aspects of Care  -SW involved     Cultural Aspects  -Primary Language: English    Goals of Care:     We discussed Palliative Care team being a supportive team when a patient has ongoing illnesses.  We also discussed that it is not an end of life care service, but can help navigate symptoms and emotional support througout their hospital stay here.    Ethical and Legal Aspects: NA  Capacity- pt has capacity   HCP/Surrogate: wife would be HCP   Code Status- DNR/I with a trial of NIV   MOLST- on chart   Dispo Plan- to return home with TPN and home care     Discussed With: Dr. Falcon     Case coordinated with attending and SW and RN     Time Spent: 60 minutes including the care, coordination and counseling of this patient, 50% of which was spent coordinating and counseling.

## 2025-05-12 NOTE — PROGRESS NOTE ADULT - SUBJECTIVE AND OBJECTIVE BOX
HPI: Pt is a 74y old Male with hx of       PAIN: ( )Yes   ( )No  Level:  Location:  Intensity:    /10  Quality:  Aggravating Factors:  Alleviating Factors:  Radiation:  Duration/Timing:  Impact on ADLs:    DYSPNEA: ( ) Yes  ( ) No  Level:    PAST MEDICAL & SURGICAL HISTORY:  Bile duct cancer      H/O Whipple procedure          SOCIAL HX:    Hx opiate tolerance ( )YES  ( )NO    Baseline ADLs  (Prior to Admission)  ( ) Independent   ( )Dependent    FAMILY HISTORY:      Review of Systems:    Anxiety-  Depression-  Physical Discomfort-  Dyspnea-  Constipation-  Diarrhea-  Nausea-  Vomiting-  Anorexia-  Weight Loss-   Cough-  Secretions-  Fatigue-  Weakness-  Delirium-    All other systems reviewed and negative  Unable to obtain/Limited due to:      PHYSICAL EXAM:    Vital Signs Last 24 Hrs  T(C): 36.4 (12 May 2025 15:37), Max: 37.3 (11 May 2025 15:53)  T(F): 97.5 (12 May 2025 15:37), Max: 99.1 (11 May 2025 15:53)  HR: 67 (12 May 2025 15:37) (63 - 85)  BP: 120/75 (12 May 2025 15:37) (110/76 - 131/83)  BP(mean): --  RR: 18 (12 May 2025 15:37) (14 - 18)  SpO2: 100% (12 May 2025 15:37) (98% - 100%)    Parameters below as of 12 May 2025 15:37  Patient On (Oxygen Delivery Method): room air      Daily     Daily     PPSV2:   %  FAST:    General:  Mental Status:  HEENT:  Lungs:  Cardiac:  GI:  :  Ext:  Neuro:      LABS:                        9.3    7.08  )-----------( 121      ( 12 May 2025 04:52 )             28.7     05-12    133[L]  |  100  |  23  ----------------------------<  168[H]  3.8   |  29  |  0.69    Ca    8.3[L]      12 May 2025 04:52  Phos  2.3     05-12  Mg     1.6     05-12    TPro  5.2[L]  /  Alb  2.2[L]  /  TBili  4.9[H]  /  DBili  x   /  AST  252[H]  /  ALT  290[H]  /  AlkPhos  981[H]  05-12    PT/INR - ( 12 May 2025 04:52 )   PT: 11.9 sec;   INR: 1.01 ratio           Albumin: Albumin: 2.2 g/dL (05-12 @ 04:52)      Allergies    statins (Unknown)  atorvastatin (Hives)    Intolerances      MEDICATIONS  (STANDING):  ciprofloxacin   IVPB 400 milliGRAM(s) IV Intermittent every 12 hours  enoxaparin Injectable 40 milliGRAM(s) SubCutaneous every 24 hours  lipid, fat emulsion (Fish Oil and Plant Based) 20% Infusion 0.868 Gm/kG/Day (20.8 mL/Hr) IV Continuous <Continuous>  lipid, fat emulsion (Fish Oil and Plant Based) 20% Infusion 0.868 Gm/kG/Day (20.8 mL/Hr) IV Continuous <Continuous>  pancrelipase  (CREON 36,000 Lipase Units) 3 Capsule(s) Oral three times a day with meals  pantoprazole  Injectable 40 milliGRAM(s) IV Push daily  Parenteral Nutrition - Adult 1 Each (83 mL/Hr) TPN Continuous <Continuous>  Parenteral Nutrition - Adult 1 Each (83 mL/Hr) TPN Continuous <Continuous>  sodium phosphate 15 milliMole(s)/250 mL IVPB 15 milliMole(s) IV Intermittent every 4 hours    MEDICATIONS  (PRN):  dicyclomine 10 milliGRAM(s) Oral two times a day before meals PRN gas/cramps  morphine  - Injectable 2 milliGRAM(s) IV Push every 4 hours PRN Severe Pain (7 - 10)  ondansetron Injectable 4 milliGRAM(s) IV Push every 6 hours PRN Nausea and/or Vomiting      RADIOLOGY/ADDITIONAL STUDIES:

## 2025-05-12 NOTE — CONSULT NOTE ADULT - CONVERSATION DETAILS
Palliative team met with Pt and wife at the bedside to discuss GOC, assist with planning and provide supportive counseling.  Palliative role explained.  Emotional support provided.  Prior to hospitalization, Pt resides at home with wife.  Pt shared his journey with his cancer.  He notes his goal to remain at home to focus on his quality of life comfort for whatever time he has left.  He shared the goal to continue with TPN at this time.  We discussed the philosophy of hospice and the services provided at home.  We noted that with the support of home hospice they do not provide TPN therefore recommended returning home with [facility] services.  Family desire to hire nursing assistance at home in addition to [facility] services to help manage Pt at home in hopes to prevent future hospitalizations.  Pt and wife aware hospice would prevent future hospitalizations, which they desire however TPN would not be provided at home therefore they decided to return home with [facility] services.      Advance directives reviewed and updated.  HCP names wife as health care agent.  MOLST reviewed and completed to reflect DNR/DNI/trial NIV.  Pt states he would not want to be on a ventilator as it would not be a quality of life to him.  CPR vs DNR/DNI discussed in details.  We noted that a DNR does not mean do not treat but to allow a natural passing in the event his heart stops, which Pt understood.      Plan to return home with wife.  DNR/DNI/trial NIV in place.  Emotional support provided.  Our team to continue to follow.

## 2025-05-12 NOTE — GOALS OF CARE CONVERSATION - ADVANCED CARE PLANNING - CONVERSATION DETAILS
HPI:  73yo M w/ PMHx of Cholangio CA s/p Robotic Whipple on 5/28/2024 (Dr. Ocasio) w/ adjuvant Xeloda -> [place]/Cis/Durva, Found liver metastases on 11/12/2024, failed CBD stent placement (unsuccessful cannulation via ERCP), s/p internal-external biliary drainage (8.5fr) at [facility] on 4/13, c/b High external drainage output ( >2L) due to Internal drainage failure (CBD blockage), now s/p exchange of internal-external biliary drainage (12fr) on 4/25 in . Currently on Durvalumab, Presented with 3-4 days of progressive abdominal bloating with mild nausea, generalized weakness and fatigue. Denies fevers or chills. Denies emesis. Last BM 3d ago, has been passing gases. Complains of mild discomfort of epigastric pain. No other complains at this time.   (06 May 2025 06:37)      PERTINENT PMH REVIEWED:  [ x ] YES [ ] NO           Primary Contact:         Deborah, wife     HCP [ x ] Surrogate [   ] Guardian [   ]    Mental Status: [ x ] Alert  [ x ] Oriented [  ] Confused [  ] Lethargic  Concerns of Depression [  ] -none reported  Anxiety [   ] -none reported  Baseline ADLs (prior to admission):  Independent [ ] moderately [ x ] fully   Dependent   [ ] moderately [ ]fully    Family Meeting attendees: GOC discussed    Anticipated Grief: Patient[ x  ] Family [ x ]    Caregiver Britton Assessed: Yes [ x ] No [  ]    Mosque: Taoism    Spiritual Concerns: Not identified,  available for support.    Goals of Care: Quality of life -  desires to continue TPN    Previous Services: Oncology     ADVANCE DIRECTIVES:    -Pt has capacity  -HCP names wife as health care agent  -MOLST completed to reflect DNR DNI trial NIV     Anticipated D/C Plan: Return home with services                      Summary:  Palliative team met with Pt and wife at the bedside to discuss GOC, assist with planning and provide supportive counseling.  Palliative role explained.  Emotional support provided.  Prior to hospitalization, Pt resides at home with wife.  Pt shared his journey with his cancer.  He notes his goal to remain at home to focus on his quality of life comfort for whatever time he has left.  He shared the goal to continue with TPN at this time.  We discussed the philosophy of hospice and the services provided at home.  We noted that with the support of home hospice they do not provide TPN therefore recommended returning home with [facility] services.  Family desire to hire nursing assistance at home in addition to [facility] services to help manage Pt at home in hopes to prevent future hospitalizations.  Pt and wife aware hospice would prevent future hospitalizations, which they desire however TPN would not be provided at home therefore they decided to return home with [facility] services.      Advance directives reviewed and updated.  HCP names wife as health care agent.  MOLST reviewed and completed to reflect DNR/DNI/trial NIV.  Pt states he would not want to be on a ventilator as it would not be a quality of life to him.  CPR vs DNR/DNI discussed in details.  We noted that a DNR does not mean do not treat but to allow a natural passing in the event his heart stops, which Pt understood.      Plan to return home with wife.  DNR/DNI/trial NIV in place.  Emotional support provided.  Our team to continue to follow.

## 2025-05-12 NOTE — CONSULT NOTE ADULT - SUBJECTIVE AND OBJECTIVE BOX
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  Patient was having more bloating, early satiety, nausea and that changed into vomiting while at home after discharge. He was supposed to see Dr. Encinas to discuss internalization of biliary drains next week. He had no abdominal pain just bloating. No jaundice. No overt signs of Gi bleeding like hematmeemsis, melena, hematochezia. Found to have malignant SBO on imaging s/p NG tube. He is being maintained on TPN now and has NGT for decomrpession. No fevers or chills. +fatigue. Palliative medicine consulted to help establish GOC and advance care planning     5/12/2025 pt seen and examined with wife at bedside patient comfortable at this time plan for patient to go get venting Peg placed today. GOC meeting introduced today please see note       PAIN: ( )Yes   (x )No  DYSPNEA: ( ) Yes  (x ) No  Level:    PAST MEDICAL & SURGICAL HISTORY:  Bile duct cancer  H/O Whipple procedure      SOCIAL HX: lives at home with hs wife has 3 children and 5 grandchildren     Hx opiate tolerance ( )YES  (x )NO    Baseline ADLs  (Prior to Admission)  (x ) Independent   ( )Dependent    FAMILY HISTORY:    Review of Systems:  Physical Discomfort-  Constipation- yes  Nausea- at times  Vomiting- yes  Anorexia- yes  Weight Loss-  yes    All other systems reviewed and negative    PHYSICAL EXAM:    Vital Signs Last 24 Hrs  T(C): 36.4 (12 May 2025 15:37), Max: 37.1 (12 May 2025 07:47)  T(F): 97.5 (12 May 2025 15:37), Max: 98.7 (12 May 2025 07:47)  HR: 67 (12 May 2025 15:37) (67 - 85)  BP: 120/75 (12 May 2025 15:37) (110/76 - 129/83)  RR: 18 (12 May 2025 15:37) (14 - 18)  SpO2: 100% (12 May 2025 15:37) (98% - 100%)    Parameters below as of 12 May 2025 15:37  Patient On (Oxygen Delivery Method): room air    PPSV2: 50-60  %    General: pleasant male in bed, NAD   Mental Status: alert and oriented   HEENT: mmm   GI: distended   : +voiding   Ext: HERNANDEZ  Neuro: nonfocal      LABS:                        9.3    7.08  )-----------( 121      ( 12 May 2025 04:52 )             28.7     05-12    133[L]  |  100  |  23  ----------------------------<  168[H]  3.8   |  29  |  0.69    Ca    8.3[L]      12 May 2025 04:52  Phos  2.3     05-12  Mg     1.6     05-12    TPro  5.2[L]  /  Alb  2.2[L]  /  TBili  4.9[H]  /  DBili  x   /  AST  252[H]  /  ALT  290[H]  /  AlkPhos  981[H]  05-12    PT/INR - ( 12 May 2025 04:52 )   PT: 11.9 sec;   INR: 1.01 ratio           Albumin: Albumin: 2.2 g/dL (05-12 @ 04:52)      Allergies    statins (Unknown)  atorvastatin (Hives)    Intolerances      MEDICATIONS  (STANDING):  ciprofloxacin   IVPB 400 milliGRAM(s) IV Intermittent every 12 hours  enoxaparin Injectable 40 milliGRAM(s) SubCutaneous every 24 hours  lipid, fat emulsion (Fish Oil and Plant Based) 20% Infusion 0.868 Gm/kG/Day (20.8 mL/Hr) IV Continuous <Continuous>  pancrelipase  (CREON 36,000 Lipase Units) 3 Capsule(s) Oral three times a day with meals  pantoprazole  Injectable 40 milliGRAM(s) IV Push daily  Parenteral Nutrition - Adult 1 Each (83 mL/Hr) TPN Continuous <Continuous>  Parenteral Nutrition - Adult 1 Each (83 mL/Hr) TPN Continuous <Continuous>    MEDICATIONS  (PRN):  dicyclomine 10 milliGRAM(s) Oral two times a day before meals PRN gas/cramps  morphine  - Injectable 2 milliGRAM(s) IV Push every 4 hours PRN Severe Pain (7 - 10)  ondansetron Injectable 4 milliGRAM(s) IV Push every 6 hours PRN Nausea and/or Vomiting      RADIOLOGY/ADDITIONAL STUDIES:    < from: Xray Chest 1 View- PORTABLE-Routine (Xray Chest 1 View- PORTABLE-Routine .) (05.08.25 @ 12:48) >    ACC: [id number] EXAM:  XR CHEST PORTABLE ROUTINE 1V   ORDERED BY: BERTO HIGH     PROCEDURE DATE:  05/08/2025          INTERPRETATION:  TIME OF EXAM: May 8, 2025 at 12:44 PM.    CLINICAL INFORMATION: Assess NG tube position.    COMPARISON:  May 6, 2025 at 9:04 AM.    TECHNIQUE:   AP Portable chest x-ray.    INTERPRETATION:    The heart is not enlarged.  The mediastinum and sanya are unremarkable.  Enteric tube extends into left hemiabdomen. Tip not included on image.   Somewhat distended gas filled stomach is seen.  Accessed CT injectable right IJ approach port with tip in the right   atrium.  Bilateral upper lung and right lower lung linear atelectasis.  No pleural effusion or pneumothorax.  There is osteoarthritic degenerative change of the spine.  Incompletely imaged biliary catheter projects over the abdomen.    IMPRESSION:  Enteric tube extends into the left hemiabdomen with the tip   not included on the image.    Bilateral upper lung and right lower lung linear atelectasis.    --- End of Report ---    < end of copied text >  < from: CT Abdomen and Pelvis w/ Oral Cont (05.08.25 @ 10:55) >    ACC: [id number] EXAM:  CT ABDOMEN AND PELVIS OC   ORDERED BY: BERTO HIGH     PROCEDURE DATE:  05/08/2025          INTERPRETATION:  CLINICAL INFORMATION: Assess for small bowel stricture    COMPARISON: May 06, 2025    CONTRAST/COMPLICATIONS:  IV Contrast: None  Oral Contrast: Omnipaque 300  .    PROCEDURE:  CT of the Abdomen and Pelvis was performed.  Sagittal and coronal reformats were performed.    FINDINGS:  LOWER CHEST: Central venous catheter noted in the right heart.    LIVER: Numerous hepatic cysts.  BILE DUCTS: Left-sided external biliary drain in place with moderate   intrahepatic biliary ductal dilatation.  GALLBLADDER: Cholecystectomy.  SPLEEN: Within normal limits.  PANCREAS: Status post Whipple procedure with atrophy of the pancreatic   remnant and moderate ductal dilatation. Soft tissue mass in the surgical   bed measuring approximately 4 cm.  ADRENALS: Within normal limits.  KIDNEYS/URETERS: Within normal limits.    BLADDER: Within normal limits.  REPRODUCTIVE ORGANS: Moderate prostatomegaly.    BOWEL: Markedly dilated stomach, filled with oral contrast and debris   again noted. Limited evaluation of the gastrojejunal anastomosis due to   lack of intravenous contrast. Mild dilatation of the proximal small bowel   withpossible transition point near the operative bed in the right upper   quadrant.  PERITONEUM/RETROPERITONEUM: Within normal limits.  VESSELS: Atherosclerotic changes.  LYMPH NODES: No lymphadenopathy.  ABDOMINAL WALL: Within normal limits.  BONES: No lytic or blastic lesion.    IMPRESSION:  Gastric outlet obstruction, which correlating with prior CT, is likely   due to stricture at the gastrojejunal anastomosis.    Mildly dilated proximal small bowel. The possibility of second site of   obstruction is considered.    Percutaneous biliary drain in place with moderate ductal dilatation.        --- End of Report ---    < end of copied text >

## 2025-05-12 NOTE — PROGRESS NOTE ADULT - ASSESSMENT
74 M w/ H/o Cholangio CA w/ distant mets, p/w SBO    concern for malignant SBO, clinically improving. Having BM.    Plan:  - PEG today  - TPN   - c/w NGT  - Monitor BM  - Pain control PRN  - Monitor vitals  - Nausea control PRN  - replete electrolytes  - daily labs  - OOB/PT    Plan will be discussed with Surgical oncology attending, Dr. Wright

## 2025-05-13 LAB
ALBUMIN SERPL ELPH-MCNC: 1.9 G/DL — LOW (ref 3.3–5)
ALP SERPL-CCNC: 949 U/L — HIGH (ref 40–120)
ALT FLD-CCNC: 298 U/L — HIGH (ref 12–78)
ANION GAP SERPL CALC-SCNC: 8 MMOL/L — SIGNIFICANT CHANGE UP (ref 5–17)
AST SERPL-CCNC: 207 U/L — HIGH (ref 15–37)
BILIRUB SERPL-MCNC: 5.3 MG/DL — HIGH (ref 0.2–1.2)
BUN SERPL-MCNC: 22 MG/DL — SIGNIFICANT CHANGE UP (ref 7–23)
CALCIUM SERPL-MCNC: 8.3 MG/DL — LOW (ref 8.5–10.1)
CHLORIDE SERPL-SCNC: 97 MMOL/L — SIGNIFICANT CHANGE UP (ref 96–108)
CO2 SERPL-SCNC: 29 MMOL/L — SIGNIFICANT CHANGE UP (ref 22–31)
CREAT SERPL-MCNC: 0.66 MG/DL — SIGNIFICANT CHANGE UP (ref 0.5–1.3)
EGFR: 97 ML/MIN/1.73M2 — SIGNIFICANT CHANGE UP
EGFR: 97 ML/MIN/1.73M2 — SIGNIFICANT CHANGE UP
GLUCOSE SERPL-MCNC: 169 MG/DL — HIGH (ref 70–99)
HCT VFR BLD CALC: 26.9 % — LOW (ref 39–50)
HGB BLD-MCNC: 8.7 G/DL — LOW (ref 13–17)
IMMATURE PLATELET FRACTION #: 2.5 K/UL — LOW (ref 3.9–12.5)
IMMATURE PLATELET FRACTION %: 2.6 % — SIGNIFICANT CHANGE UP (ref 1.6–7.1)
MAGNESIUM SERPL-MCNC: 1.6 MG/DL — SIGNIFICANT CHANGE UP (ref 1.6–2.6)
MCHC RBC-ENTMCNC: 29.3 PG — SIGNIFICANT CHANGE UP (ref 27–34)
MCHC RBC-ENTMCNC: 32.3 G/DL — SIGNIFICANT CHANGE UP (ref 32–36)
MCV RBC AUTO: 90.6 FL — SIGNIFICANT CHANGE UP (ref 80–100)
NRBC # BLD AUTO: 0 K/UL — SIGNIFICANT CHANGE UP (ref 0–0)
NRBC # FLD: 0 K/UL — SIGNIFICANT CHANGE UP (ref 0–0)
NRBC BLD AUTO-RTO: 0 /100 WBCS — SIGNIFICANT CHANGE UP (ref 0–0)
PHOSPHATE SERPL-MCNC: 3.1 MG/DL — SIGNIFICANT CHANGE UP (ref 2.5–4.5)
PLATELET # BLD AUTO: 97 K/UL — LOW (ref 150–400)
PMV BLD: 10.3 FL — SIGNIFICANT CHANGE UP (ref 7–13)
POTASSIUM SERPL-MCNC: 3.2 MMOL/L — LOW (ref 3.5–5.3)
POTASSIUM SERPL-SCNC: 3.2 MMOL/L — LOW (ref 3.5–5.3)
PROT SERPL-MCNC: 5.1 GM/DL — LOW (ref 6–8.3)
RBC # BLD: 2.97 M/UL — LOW (ref 4.2–5.8)
RBC # FLD: 17.7 % — HIGH (ref 10.3–14.5)
SODIUM SERPL-SCNC: 134 MMOL/L — LOW (ref 135–145)
WBC # BLD: 5.39 K/UL — SIGNIFICANT CHANGE UP (ref 3.8–10.5)
WBC # FLD AUTO: 5.39 K/UL — SIGNIFICANT CHANGE UP (ref 3.8–10.5)

## 2025-05-13 PROCEDURE — 47531 INJECTION FOR CHOLANGIOGRAM: CPT

## 2025-05-13 RX ORDER — SODIUM/POT/MAG/CALC/CHLOR/ACET 35-20-5MEQ
1 VIAL (ML) INTRAVENOUS
Refills: 0 | Status: DISCONTINUED | OUTPATIENT
Start: 2025-05-13 | End: 2025-05-13

## 2025-05-13 RX ORDER — MAGNESIUM SULFATE 500 MG/ML
1 SYRINGE (ML) INJECTION ONCE
Refills: 0 | Status: COMPLETED | OUTPATIENT
Start: 2025-05-13 | End: 2025-05-13

## 2025-05-13 RX ORDER — I.V. FAT EMULSION 20 G/100ML
0.87 EMULSION INTRAVENOUS
Qty: 50 | Refills: 0 | Status: DISCONTINUED | OUTPATIENT
Start: 2025-05-13 | End: 2025-05-13

## 2025-05-13 RX ADMIN — Medication 2 MILLIGRAM(S): at 03:00

## 2025-05-13 RX ADMIN — Medication 2 MILLIGRAM(S): at 22:34

## 2025-05-13 RX ADMIN — Medication 83 EACH: at 22:35

## 2025-05-13 RX ADMIN — Medication 100 GRAM(S): at 23:59

## 2025-05-13 RX ADMIN — Medication 2 MILLIGRAM(S): at 02:53

## 2025-05-13 RX ADMIN — Medication 2 MILLIGRAM(S): at 12:44

## 2025-05-13 RX ADMIN — Medication 40 MILLIGRAM(S): at 09:59

## 2025-05-13 RX ADMIN — Medication 200 MILLIGRAM(S): at 22:26

## 2025-05-13 RX ADMIN — Medication 2 MILLIGRAM(S): at 18:17

## 2025-05-13 RX ADMIN — I.V. FAT EMULSION 20.8 GM/KG/DAY: 20 EMULSION INTRAVENOUS at 00:26

## 2025-05-13 RX ADMIN — I.V. FAT EMULSION 20.83 GM/KG/DAY: 20 EMULSION INTRAVENOUS at 22:35

## 2025-05-13 RX ADMIN — ENOXAPARIN SODIUM 40 MILLIGRAM(S): 100 INJECTION SUBCUTANEOUS at 17:59

## 2025-05-13 RX ADMIN — Medication 200 MILLIGRAM(S): at 09:59

## 2025-05-13 RX ADMIN — Medication 2 MILLIGRAM(S): at 03:30

## 2025-05-13 NOTE — PROGRESS NOTE ADULT - SUBJECTIVE AND OBJECTIVE BOX
SURGERY DAILY PROGRESS NOTE:     Subjective:  Patient seen and examined at bedside during am rounds. s/p venting PEG tube placement yesterday.  AVSS. Denies any fevers, chills, n/v/d, chest pain or shortness of breath    MEDICATIONS  (STANDING):  ciprofloxacin   IVPB 400 milliGRAM(s) IV Intermittent every 12 hours  enoxaparin Injectable 40 milliGRAM(s) SubCutaneous every 24 hours  lipid, fat emulsion (Fish Oil and Plant Based) 20% Infusion 0.868 Gm/kG/Day (20.8 mL/Hr) IV Continuous <Continuous>  pancrelipase  (CREON 36,000 Lipase Units) 3 Capsule(s) Oral three times a day with meals  pantoprazole  Injectable 40 milliGRAM(s) IV Push daily  Parenteral Nutrition - Adult 1 Each (83 mL/Hr) TPN Continuous <Continuous>    MEDICATIONS  (PRN):  dicyclomine 10 milliGRAM(s) Oral two times a day before meals PRN gas/cramps  morphine  - Injectable 2 milliGRAM(s) IV Push every 4 hours PRN Severe Pain (7 - 10)  ondansetron Injectable 4 milliGRAM(s) IV Push every 6 hours PRN Nausea and/or Vomiting      Vital Signs Last 24 Hrs  T(C): 36.6 (13 May 2025 00:17), Max: 37.1 (12 May 2025 07:47)  T(F): 97.9 (13 May 2025 00:17), Max: 98.7 (12 May 2025 07:47)  HR: 76 (13 May 2025 00:17) (67 - 85)  BP: 102/63 (13 May 2025 00:17) (102/63 - 125/78)  BP(mean): --  RR: 18 (12 May 2025 15:37) (14 - 18)  SpO2: 99% (13 May 2025 00:17) (98% - 100%)    Parameters below as of 13 May 2025 00:17  Patient On (Oxygen Delivery Method): room air      PHYSICAL EXAM   Gen: well-appearing, in no acute distress  CV: pulse regularly present   Resp: airway patent, non-labored breathing  Abd: soft, mildly distended    I&O's Detail    11 May 2025 07:01  -  12 May 2025 07:00  --------------------------------------------------------  IN:  Total IN: 0 mL    OUT:    Nasogastric/Oral tube (mL): 725 mL    Voided (mL): 700 mL  Total OUT: 1425 mL    Total NET: -1425 mL      12 May 2025 07:01  -  13 May 2025 05:58  --------------------------------------------------------  IN:    Other (mL): 500 mL  Total IN: 500 mL    OUT:    Voided (mL): 550 mL  Total OUT: 550 mL    Total NET: -50 mL          Daily     Daily     LABS:                        8.7    5.39  )-----------( 97       ( 13 May 2025 04:27 )             26.9     05-13    134[L]  |  97  |  22  ----------------------------<  169[H]  3.2[L]   |  29  |  0.66    Ca    8.3[L]      13 May 2025 04:27  Phos  3.1     05-13  Mg     1.6     05-13    TPro  5.1[L]  /  Alb  1.9[L]  /  TBili  5.3[H]  /  DBili  x   /  AST  207[H]  /  ALT  298[H]  /  AlkPhos  949[H]  05-13    PT/INR - ( 12 May 2025 04:52 )   PT: 11.9 sec;   INR: 1.01 ratio           Urinalysis Basic - ( 13 May 2025 04:27 )    Color: x / Appearance: x / SG: x / pH: x  Gluc: 169 mg/dL / Ketone: x  / Bili: x / Urobili: x   Blood: x / Protein: x / Nitrite: x   Leuk Esterase: x / RBC: x / WBC x   Sq Epi: x / Non Sq Epi: x / Bacteria: x        RADIOLOGY & ADDITIONAL STUDIES:    ASSESSMENT/PLAN:         SURGERY DAILY PROGRESS NOTE:     Subjective:  Patient seen and examined at bedside during am rounds. s/p venting PEG tube placement yesterday.  AVSS. Denies any fevers, chills, n/v/d, chest pain or shortness of breath    MEDICATIONS  (STANDING):  ciprofloxacin   IVPB 400 milliGRAM(s) IV Intermittent every 12 hours  enoxaparin Injectable 40 milliGRAM(s) SubCutaneous every 24 hours  lipid, fat emulsion (Fish Oil and Plant Based) 20% Infusion 0.868 Gm/kG/Day (20.8 mL/Hr) IV Continuous <Continuous>  pancrelipase  (CREON 36,000 Lipase Units) 3 Capsule(s) Oral three times a day with meals  pantoprazole  Injectable 40 milliGRAM(s) IV Push daily  Parenteral Nutrition - Adult 1 Each (83 mL/Hr) TPN Continuous <Continuous>    MEDICATIONS  (PRN):  dicyclomine 10 milliGRAM(s) Oral two times a day before meals PRN gas/cramps  morphine  - Injectable 2 milliGRAM(s) IV Push every 4 hours PRN Severe Pain (7 - 10)  ondansetron Injectable 4 milliGRAM(s) IV Push every 6 hours PRN Nausea and/or Vomiting      Vital Signs Last 24 Hrs  T(C): 36.6 (13 May 2025 00:17), Max: 37.1 (12 May 2025 07:47)  T(F): 97.9 (13 May 2025 00:17), Max: 98.7 (12 May 2025 07:47)  HR: 76 (13 May 2025 00:17) (67 - 85)  BP: 102/63 (13 May 2025 00:17) (102/63 - 125/78)  BP(mean): --  RR: 18 (12 May 2025 15:37) (14 - 18)  SpO2: 99% (13 May 2025 00:17) (98% - 100%)    Parameters below as of 13 May 2025 00:17  Patient On (Oxygen Delivery Method): room air      PHYSICAL EXAM   Gen: Icteric,  no acute distress  CV: pulse regularly present   Resp: airway patent, non-labored breathing  Abd: soft, mildly distended, PEG tube in place.    I&O's Detail    11 May 2025 07:01  -  12 May 2025 07:00  --------------------------------------------------------  IN:  Total IN: 0 mL    OUT:    Nasogastric/Oral tube (mL): 725 mL    Voided (mL): 700 mL  Total OUT: 1425 mL    Total NET: -1425 mL      12 May 2025 07:01  -  13 May 2025 05:58  --------------------------------------------------------  IN:    Other (mL): 500 mL  Total IN: 500 mL    OUT:    Voided (mL): 550 mL  Total OUT: 550 mL    Total NET: -50 mL    LABS:                        8.7    5.39  )-----------( 97       ( 13 May 2025 04:27 )             26.9     05-13    134[L]  |  97  |  22  ----------------------------<  169[H]  3.2[L]   |  29  |  0.66    Ca    8.3[L]      13 May 2025 04:27  Phos  3.1     05-13  Mg     1.6     05-13    TPro  5.1[L]  /  Alb  1.9[L]  /  TBili  5.3[H]  /  DBili  x   /  AST  207[H]  /  ALT  298[H]  /  AlkPhos  949[H]  05-13    PT/INR - ( 12 May 2025 04:52 )   PT: 11.9 sec;   INR: 1.01 ratio        Urinalysis Basic - ( 13 May 2025 04:27 )    Color: x / Appearance: x / SG: x / pH: x  Gluc: 169 mg/dL / Ketone: x  / Bili: x / Urobili: x   Blood: x / Protein: x / Nitrite: x   Leuk Esterase: x / RBC: x / WBC x   Sq Epi: x / Non Sq Epi: x / Bacteria: x

## 2025-05-13 NOTE — CHART NOTE - NSCHARTNOTEFT_GEN_A_CORE
Clinical Nutrition PN Follow Up Note    * 73yo M w/ PMHx of Cholangio CA s/p Robotic Whipple on 2024 (Dr. Ocasio) w/ adjuvant Xeloda -> [place]/Cis/Durva, Found liver metastases on 2024, failed CBD stent placement (unsuccessful cannulation via ERCP), s/p internal-external biliary drainage (8.5fr) at [facility] on , c/b High external drainage output ( >2L) due to Internal drainage failure (CBD blockage), now s/p exchange of internal-external biliary drainage (12fr) on  in . Presented with 3-4 days of progressive abdominal bloating with mild nausea, generalized weakness and fatigue. Last BM 3d ago, has been passing gases. Complains of mild discomfort of epigastric pain. Admitted w/ SBO (likely malignant). NGT to LWS in place. Started on mSBO protocol - Steroids/Reglan/Octreotide. RD consulted for initiation of TPN via port (OK to use from surgical oncology).   *: Initiate TPN via Port 2/2 mSBO and suspected prolonged NPO status  *:  (+) BM, passing flatus. CLD. Would suggest to renew TPN x 1 more day to ensure >80% ENN being met with bridging PO + TPN. D/w surg resident   *: TPN dc'd  per surgery.  CT A/P: GOO. s/p NGT to LWS w/ ~3L output overnight. Will initiate TPN via implanted infusion port.   *5/10: Per gastro: rec PEG placement; plan for  for long-term gastric decompression given mSBO, inability to tolerate adequate PO intake, and palliation. D/w surg res will c/w TPN.   *: Per surg "Patient has felt he no longer wants to fight and will choose palliative measures for his care". Planning for PEG placement monday.  *: Plan for PEG today  for long-term decompression given mSBO and palliation, will reorder TPN. NGT remains in place to suction.     *TPN to be restarted 2/2 GOO and suspected prolonged NPO status.    *current status: s/p venting PEG placement. Palliative care following - plan for home TPN w/ venting PEG. On CLD for pleasure. DNR/ DNI     *new pertinent meds: abx, Creon, protonix; PRN - dicyclomine, morphine, zofran; lyte repletion - 1g MgSulfate, 30 mmol NaPhos received x 24 hrs    *labs reviewed; Hyponatremia continued despite increases of Na in bag and decrease of total volume - will leave up to medical management to correct. K LOW despite being increased in PN bag and receiving repletion. Will increase in PN bag again today. Mg remains on low end of normal will increase in PN bag today. Phos now WNL but s/p repletion, will increase in PN bag again today.  ***PT LIKELY EXPERIENCING REFEEDING SYNDROME - CONTINUE WITH LYTE REPLETION OUTSIDE BAG AND WILL HOLD DEXTROSE TITRATION TODAY .   T Bili remains elevated, will keep trace elements out of PN bag and c/w 60 mcg selenium and 5 mg zinc in PN bag.  TG WNL (70) will c/w 50g lipids daily.   POCTs variable; should be maintained between 140- 180 mg/dL. May need insulin added into bag as dextrose increased to goal of 350g daily to meet 100% of increased nutrient needs (GIR ~4.2, WNL). However, may need to lower goal. Titrate up 50-75g/day as tolerated when lytes normalize.          134[L]  |  97  |  22  ----------------------------<  169[H]  3.2[L]   |  29  |  0.66    Ca    8.3[L]      13 May 2025 04:27  Phos  3.1       Mg     1.6         TPro  5.1[L]  /  Alb  1.9[L]  /  TBili  5.3[H]  /  DBili  x   /  AST  207[H]  /  ALT  298[H]  /  AlkPhos  949[H]      BMI: BMI (kg/m2): 18.2 (25 @ 03:35)  Glucose: POCT Blood Glucose.: 185 mg/dL (25 @ 05:45)    BP: 111/71 (25 @ 07:27) (111/71 - 141/75)Vital Signs Last 24 Hrs  T(C): 36.7 (25 @ 07:27), Max: 37.4 (05-10-25 @ 16:00)  T(F): 98.1 (25 @ 07:27), Max: 99.4 (05-10-25 @ 16:00)  HR: 63 (25 @ :27) (63 - 71)  BP: 111/ (25 @ 07:27) (111/71 - 125/82)  BP(mean): --  RR: 17 (25 @ 07:27) (17 - 17)  SpO2: 100% (25 @ 07:27) (99% - 100%)    Lipid Panel: Date/Time: 25 @ 04:44  Triglycerides, Serum: 70    POCT Blood Glucose.: 181 mg/dL (12 May 2025 23:47)  POCT Blood Glucose.: 126 mg/dL (12 May 2025 18:04)  POCT Blood Glucose.: 229 mg/dL (12 May 2025 12:07)    *I&O's Detail    12 May 2025 07:01  -  13 May 2025 07:00  --------------------------------------------------------  IN:    Other (mL): 500 mL  Total IN: 500 mL    OUT:    Voided (mL): 550 mL  Total OUT: 550 mL    Total NET: -50 mL  * fluid status: negative; no output from PEG doc'd. No INPUT or TPN doc'd. Strict I&O's are rec'd when pt is on PN as per protocol   *(+) BM 5/10 x 3 - loose; pt not on bowel regimen.  *edema: none doc'd  *PI: coccyx, unstageable     *malnutrition: Pt continues to meet criteria for severe protein-calorie malnutrition in context of chronic disease r/t decreased ability to meet increased nutrient needs 2/2 mets Ca s/p Whipple and malignant SBO AEB severe muscle/ fat wasting, 33% wt loss x 1 yr, <50-75% ENN chronically    Estimated Needs: based on 57.6 Kg (wt from )  Calories: 2016-  Kcal (35- 40 Kcal/Kg)  Protein: 86- 115 g (1.5- 2 g/Kg)  Fluids: 1728 - 2016 mL (30 - 35 mL/Kg)    Diet, NPO:   With Ice Chips/Sips of Water (25 @ 07:34) [Active]  Parenteral Nutrition - Adult 1 Each (92 mL/Hr) TPN Continuous <Continuous>, 05-10-25 @ 22:00, 22:00, Stop order after: 1 Days    Weight History:  Daily Weight in k.4 (11 May 2025 05:32)  Height (cm): 177.8 (25 @ 03:35), 177.8 (25 @ 11:56), 177.8 (25 @ 10:58)  Weight (kg): 57.606 (25 @ 03:35), 57.6 (25 @ 10:58), 68 (25 @ 19:34)  BMI (kg/m2): 18.2 (25 @ 03:35), 18.2 (25 @ 11:56), 18.2 (25 @ 10:58)  BSA (m2): 1.72 (25 @ 03:35), 1.72 (25 @ 11:56), 1.72 (25 @ 10:58)    TPN Recommendations: via implanted infusion port  Total Volume: 2000mL x 24 hours  115 g  Amino Acids  250 g Dextrose  50 g Lipids 20%  200 mEq Sodium Chloride  0 mEq Sodium Acetate  0 mmol Sodium Phosphate  0 mEq Potassium Chloride  6 mEq Potassium Acetate  50 mmol Potassium Phosphate  0 mEq Calcium Gluconate (CAPS out of PN solution)  16 mEq Magnesium Sulfate  200 mg Thiamine  0 ml Trace Elements  10 ml MVI  5 mg Zinc  60 mcg Selenium     Total Calories  1810  (Meets   84%  of  Estimated Energy needs  and 100% Protein needs)     *Goal: achieved - Pt will receive >/= 80% ENN via tolerated route    Additional Recommendations:    1) Daily weights  2) Strict I & O's  3) Daily lyte checks including magnesium and phos  4) Weekly triglycerides/LFT checks  5) POCT q6hrs; maintain 140-180mg/dL  6) Will titrate dextrose up 50-75g/day until goal rate of 350g daily reached to meet 100% of increased nutrient needs (GIR ~4.2, WNL).  7) Has venting PEG. Plan for home TPN w/ pleasure feeds     Nutrition recommendations to continue upon discharge. Goal to continue to meet >/= 80% ENN via tolerated route. RD to F/U prn for changes to nutrition dc plan.    *will continue to monitor and adjust PN prn*  Carol Burger, MS, RDN, CNSC, [phone]  Certified Nutrition

## 2025-05-13 NOTE — PROGRESS NOTE ADULT - ASSESSMENT
74 M w/ H/o Cholangio CA w/ distant mets, p/w SBO    Concern for malignant SBO, clinically improving. Having BM.   s/p Venting PEG tube placement yesterday    Plan:  - c/w TPN   - Monitor BM  - Pain control PRN  - Monitor vitals  - Nausea control PRN  - replete electrolytes  - daily labs  - OOB/PT  - F/u IR for drain management  - Palliative on board    Plan will be discussed with Surgical oncology attending, Dr. Wright

## 2025-05-13 NOTE — CHART NOTE - NSCHARTNOTEFT_GEN_A_CORE
This SW met with Pt this date to follow up and offer support.  Pt shared he had a tough night last night.  Pt shared his feelings regarding the end of his life.  He reflected on his family, career and accomplishments.  Pts feelings explored.  Support provided.  Pt desires to return home with wife.  Services to be further determined.  Will continue to follow to offer ongoing support and determine plans.  Pt aware of palliative team availability.

## 2025-05-13 NOTE — PROCEDURE NOTE - PROCEDURE FINDINGS AND DETAILS
- Left biliary drain possibly clogged distally, as no contrast entered the pigtail on fluoroscopic study. Recommend drain exchange with anesthesia tomorrow

## 2025-05-14 LAB
ALBUMIN SERPL ELPH-MCNC: 2 G/DL — LOW (ref 3.3–5)
ALP SERPL-CCNC: 792 U/L — HIGH (ref 40–120)
ALT FLD-CCNC: 217 U/L — HIGH (ref 12–78)
ANION GAP SERPL CALC-SCNC: 6 MMOL/L — SIGNIFICANT CHANGE UP (ref 5–17)
APTT BLD: 31.6 SEC — SIGNIFICANT CHANGE UP (ref 26.1–36.8)
AST SERPL-CCNC: 86 U/L — HIGH (ref 15–37)
BILIRUB SERPL-MCNC: 2.5 MG/DL — HIGH (ref 0.2–1.2)
BUN SERPL-MCNC: 24 MG/DL — HIGH (ref 7–23)
CALCIUM SERPL-MCNC: 8.4 MG/DL — LOW (ref 8.5–10.1)
CHLORIDE SERPL-SCNC: 97 MMOL/L — SIGNIFICANT CHANGE UP (ref 96–108)
CO2 SERPL-SCNC: 29 MMOL/L — SIGNIFICANT CHANGE UP (ref 22–31)
CREAT SERPL-MCNC: 0.61 MG/DL — SIGNIFICANT CHANGE UP (ref 0.5–1.3)
EGFR: 100 ML/MIN/1.73M2 — SIGNIFICANT CHANGE UP
EGFR: 100 ML/MIN/1.73M2 — SIGNIFICANT CHANGE UP
GLUCOSE BLDC GLUCOMTR-MCNC: 140 MG/DL — HIGH (ref 70–99)
GLUCOSE BLDC GLUCOMTR-MCNC: 182 MG/DL — HIGH (ref 70–99)
GLUCOSE BLDC GLUCOMTR-MCNC: 198 MG/DL — HIGH (ref 70–99)
GLUCOSE SERPL-MCNC: 164 MG/DL — HIGH (ref 70–99)
HCT VFR BLD CALC: 26.7 % — LOW (ref 39–50)
HGB BLD-MCNC: 8.5 G/DL — LOW (ref 13–17)
INR BLD: 1.03 RATIO — SIGNIFICANT CHANGE UP (ref 0.85–1.16)
MAGNESIUM SERPL-MCNC: 1.9 MG/DL — SIGNIFICANT CHANGE UP (ref 1.6–2.6)
MCHC RBC-ENTMCNC: 29.1 PG — SIGNIFICANT CHANGE UP (ref 27–34)
MCHC RBC-ENTMCNC: 31.8 G/DL — LOW (ref 32–36)
MCV RBC AUTO: 91.4 FL — SIGNIFICANT CHANGE UP (ref 80–100)
NRBC # BLD AUTO: 0 K/UL — SIGNIFICANT CHANGE UP (ref 0–0)
NRBC # FLD: 0 K/UL — SIGNIFICANT CHANGE UP (ref 0–0)
NRBC BLD AUTO-RTO: 0 /100 WBCS — SIGNIFICANT CHANGE UP (ref 0–0)
PHOSPHATE SERPL-MCNC: 2.7 MG/DL — SIGNIFICANT CHANGE UP (ref 2.5–4.5)
PLATELET # BLD AUTO: 116 K/UL — LOW (ref 150–400)
PMV BLD: 10 FL — SIGNIFICANT CHANGE UP (ref 7–13)
POTASSIUM SERPL-MCNC: 3.8 MMOL/L — SIGNIFICANT CHANGE UP (ref 3.5–5.3)
POTASSIUM SERPL-SCNC: 3.8 MMOL/L — SIGNIFICANT CHANGE UP (ref 3.5–5.3)
PROT SERPL-MCNC: 5.4 GM/DL — LOW (ref 6–8.3)
PROTHROM AB SERPL-ACNC: 12.2 SEC — SIGNIFICANT CHANGE UP (ref 9.9–13.4)
RBC # BLD: 2.92 M/UL — LOW (ref 4.2–5.8)
RBC # FLD: 17.5 % — HIGH (ref 10.3–14.5)
SODIUM SERPL-SCNC: 132 MMOL/L — LOW (ref 135–145)
WBC # BLD: 4.58 K/UL — SIGNIFICANT CHANGE UP (ref 3.8–10.5)
WBC # FLD AUTO: 4.58 K/UL — SIGNIFICANT CHANGE UP (ref 3.8–10.5)

## 2025-05-14 PROCEDURE — 47536 EXCHANGE BILIARY DRG CATH: CPT

## 2025-05-14 PROCEDURE — 99233 SBSQ HOSP IP/OBS HIGH 50: CPT | Mod: FS

## 2025-05-14 RX ORDER — SODIUM CHLORIDE 9 G/1000ML
1000 INJECTION, SOLUTION INTRAVENOUS
Refills: 0 | Status: DISCONTINUED | OUTPATIENT
Start: 2025-05-14 | End: 2025-05-17

## 2025-05-14 RX ORDER — I.V. FAT EMULSION 20 G/100ML
0.87 EMULSION INTRAVENOUS
Qty: 50 | Refills: 0 | Status: DISCONTINUED | OUTPATIENT
Start: 2025-05-14 | End: 2025-05-14

## 2025-05-14 RX ORDER — POTASSIUM PHOSPHATE, MONOBASIC POTASSIUM PHOSPHATE, DIBASIC INJECTION, 236; 224 MG/ML; MG/ML
15 SOLUTION, CONCENTRATE INTRAVENOUS ONCE
Refills: 0 | Status: COMPLETED | OUTPATIENT
Start: 2025-05-14 | End: 2025-05-14

## 2025-05-14 RX ORDER — SODIUM/POT/MAG/CALC/CHLOR/ACET 35-20-5MEQ
1 VIAL (ML) INTRAVENOUS
Refills: 0 | Status: DISCONTINUED | OUTPATIENT
Start: 2025-05-14 | End: 2025-05-14

## 2025-05-14 RX ORDER — MAGNESIUM SULFATE 500 MG/ML
1 SYRINGE (ML) INJECTION ONCE
Refills: 0 | Status: COMPLETED | OUTPATIENT
Start: 2025-05-14 | End: 2025-05-14

## 2025-05-14 RX ORDER — SODIUM CHLORIDE 9 G/1000ML
2000 INJECTION, SOLUTION INTRAVENOUS ONCE
Refills: 0 | Status: COMPLETED | OUTPATIENT
Start: 2025-05-14 | End: 2025-05-14

## 2025-05-14 RX ORDER — OXYCODONE HYDROCHLORIDE 30 MG/1
5 TABLET ORAL ONCE
Refills: 0 | Status: DISCONTINUED | OUTPATIENT
Start: 2025-05-14 | End: 2025-05-14

## 2025-05-14 RX ORDER — FENTANYL CITRATE-0.9 % NACL/PF 100MCG/2ML
25 SYRINGE (ML) INTRAVENOUS
Refills: 0 | Status: DISCONTINUED | OUTPATIENT
Start: 2025-05-14 | End: 2025-05-14

## 2025-05-14 RX ADMIN — Medication 40 MILLIGRAM(S): at 09:29

## 2025-05-14 RX ADMIN — POTASSIUM PHOSPHATE, MONOBASIC POTASSIUM PHOSPHATE, DIBASIC INJECTION, 62.5 MILLIMOLE(S): 236; 224 SOLUTION, CONCENTRATE INTRAVENOUS at 18:08

## 2025-05-14 RX ADMIN — OXYCODONE HYDROCHLORIDE 5 MILLIGRAM(S): 30 TABLET ORAL at 21:08

## 2025-05-14 RX ADMIN — Medication 100 GRAM(S): at 12:26

## 2025-05-14 RX ADMIN — Medication 2 MILLIGRAM(S): at 02:52

## 2025-05-14 RX ADMIN — ENOXAPARIN SODIUM 40 MILLIGRAM(S): 100 INJECTION SUBCUTANEOUS at 18:08

## 2025-05-14 RX ADMIN — SODIUM CHLORIDE 80 MILLILITER(S): 9 INJECTION, SOLUTION INTRAVENOUS at 22:25

## 2025-05-14 RX ADMIN — Medication 100 MILLIEQUIVALENT(S): at 04:07

## 2025-05-14 RX ADMIN — I.V. FAT EMULSION 20.8 GM/KG/DAY: 20 EMULSION INTRAVENOUS at 22:39

## 2025-05-14 RX ADMIN — Medication 200 MILLIGRAM(S): at 22:25

## 2025-05-14 RX ADMIN — Medication 83 EACH: at 22:40

## 2025-05-14 RX ADMIN — Medication 200 MILLIGRAM(S): at 09:29

## 2025-05-14 RX ADMIN — Medication 100 MILLIEQUIVALENT(S): at 02:47

## 2025-05-14 RX ADMIN — SODIUM CHLORIDE 1000 MILLILITER(S): 9 INJECTION, SOLUTION INTRAVENOUS at 13:35

## 2025-05-14 RX ADMIN — Medication 2 MILLIGRAM(S): at 03:31

## 2025-05-14 RX ADMIN — Medication 2 MILLIGRAM(S): at 00:34

## 2025-05-14 RX ADMIN — Medication 100 MILLIEQUIVALENT(S): at 01:31

## 2025-05-14 RX ADMIN — Medication 100 MILLIEQUIVALENT(S): at 12:26

## 2025-05-14 RX ADMIN — Medication 3 CAPSULE(S): at 18:11

## 2025-05-14 NOTE — CONSULT NOTE ADULT - NS ATTEND AMEND GEN_ALL_CORE FT
73 y/o M with known metastatic cholangiocarcinoma currently on [place] / Cis / Durva with most recent dosing 03/2025 with Dr. Belinda Morrell at [facility], but chemotherapy has been on hold and he is now admitted with symptomatic malignant obstruction. SurgOnc & IR following, s/p venting PEG tube placement and receiving TPN through mediport. He has an internal / external biliary drain in place with plan for exchange prior to d/c with IR.  Plan for home care and will remain on TPN through the mediport and will follow up as scheduled to discuss ongoing cancer management.
74 year old man with cholangiocarcinoma s/p robotic whipple (pylorus preserving-[place]) with adjucavnt therapy and recurrence with liver mets and soft tissue lesion s/p IR drains, admitted recently for IR drain exchanges due to leakage/high output, now with worsening bloating/abdominal pain, found to have malignant SBO.     Plan for venting PEG.   BIlirubin uptrenidng. On imaging there is pneumobilia and the drains are flushing.   My suspicion is that it could be ?afferent limb syndrome from back pressure from obstruction. Family asked me to internalize drains now but don't want to complicate things by blowing CO2 into an obstructed bowel, plus this venting PEG may alleviate pressure and help with bilirubin.   Plan for venting PEG. Explained its use/function.

## 2025-05-14 NOTE — PROGRESS NOTE ADULT - ASSESSMENT
74 M w/ H/o Cholangio CA w/ distant mets, p/w SBO    Concern for malignant SBO, clinically improving. Having BM.   s/p Venting PEG tube placement yesterday    Plan:  - Can use chemoport for TPN use in outpatient and inpatient setting   - IR for drain swap today  - c/w TPN   - Monitor BM  - Pain control PRN  - Monitor vitals  - Nausea control PRN  - replete electrolytes  - daily labs  - OOB/PT  - Palliative on board    Plan will be discussed with Surgical oncology attending, Dr. Wright

## 2025-05-14 NOTE — CONSULT NOTE ADULT - ASSESSMENT
75 y/o M with known metastatic cholangiocarcinoma currently on [place] / Cis / Durva with most recent dosing 03/2025 but has been on hold now admitted with symptomatic malignant obstruction.      # Malignant Obstruction in setting of Known Metastatic Cholangiocarcinoma on Tx    - Initially dx'ed 04 - 05/2024; s/p Robotic Whipple late 05/2024 by Dr Ocasio with adjuvant Xeloda   - Initiated [place] / Cis / Durva when noted to have mets 10/2024 ---> most recent dosing 03/2025  - Currently admitted with malignant obstruction  - SurgOnc & IR following  - S/p venting PEG tube placement  - Currently receiving TPN through existing mediport  - Has existing internal / external biliary drain in place with plan for exchange prior to d/c with IR Dr Oviedo, awaiting delivery of hardware   - Discussed case with Dr Wright team, no additional surgical intervention planned  - Plan per Case MGMT / SW to go home with home care, remain on TPN through the mediport (okay per Primary Onc Dr Belinda Morrell) and will follow up as scheduled to discuss possibility of additional cancer directed therapy options, but tx has been on hold since most recent dosing 03/2025  - Continue supportive measures as in line with GOC        Aman Zapata PA-C  Hematology Oncology  [facility] [place]  [phone]

## 2025-05-14 NOTE — CONSULT NOTE ADULT - SUBJECTIVE AND OBJECTIVE BOX
HPI:    73 y/o M with a PMHx of Cholangiocarcinoma follows with Med Onc Dr Belinda Morrell s/p Robotic Whipple late 05/2024 by Dr Ocasio with adjuvant Xeloda then [place] / Cis / Durva (most recent dosing 03/2025) found liver mets 11/2024, failed CBD stent placement s/p internal-external biliary drainage at [facility] 04/2025 complicated by high output 2/2 internal drainage failure (CBD blockage), now s/p exchange of internal-external biliary drainage presented to the ED with ongoing ab pain / bloating / distension.     05/14/25: Seen at bedside accompanied by spouse, no acute distress, understanding of plan to go home with continued TPN through existing mediport      PAST MEDICAL & SURGICAL HISTORY:    Bile duct cancer    H/O Whipple procedure        MEDICATIONS  (STANDING):    ciprofloxacin   IVPB 400 milliGRAM(s) IV Intermittent every 12 hours  enoxaparin Injectable 40 milliGRAM(s) SubCutaneous every 24 hours  lactated ringers Bolus 2000 milliLiter(s) IV Bolus once  lactated ringers. 1000 milliLiter(s) (80 mL/Hr) IV Continuous <Continuous>  lipid, fat emulsion (Fish Oil and Plant Based) 20% Infusion 0.868 Gm/kG/Day (20.83 mL/Hr) IV Continuous <Continuous>  lipid, fat emulsion (Fish Oil and Plant Based) 20% Infusion 0.8681 Gm/kG/Day (20.8 mL/Hr) IV Continuous <Continuous>  pancrelipase  (CREON 36,000 Lipase Units) 3 Capsule(s) Oral three times a day with meals  pantoprazole  Injectable 40 milliGRAM(s) IV Push daily  Parenteral Nutrition - Adult 1 Each (83 mL/Hr) TPN Continuous <Continuous>  Parenteral Nutrition - Adult 1 Each (83 mL/Hr) TPN Continuous <Continuous>  potassium phosphate IVPB 15 milliMole(s) IV Intermittent once      MEDICATIONS  (PRN):    dicyclomine 10 milliGRAM(s) Oral two times a day before meals PRN gas/cramps  morphine  - Injectable 2 milliGRAM(s) IV Push every 4 hours PRN Severe Pain (7 - 10)  ondansetron Injectable 4 milliGRAM(s) IV Push every 6 hours PRN Nausea and/or Vomiting      ALLERGIES:    statins (Unknown)  atorvastatin (Hives)      FAMILY HISTORY:    Not noted      REVIEW OF SYSTEMS:    Constitutional, Eyes, ENT, Cardiovascular, Respiratory, Gastrointestinal, Genitourinary, Musculoskeletal, Integumentary, Neurological, Psychiatric, Endocrine, Heme/Lymph and Allergic/Immunologic review of systems are otherwise negative except as noted in HPI.       VITALS:    T(C): 37.3 (14 May 2025 08:31), Max: 37.3 (14 May 2025 08:31)  T(F): 99.2 (14 May 2025 08:31), Max: 99.2 (14 May 2025 08:31)  HR: 71 (14 May 2025 08:31) (68 - 81)  BP: 109/67 (14 May 2025 08:31) (95/59 - 110/69)  BP(mean): --  RR: 18 (13 May 2025 16:44) (18 - 18)  SpO2: 98% (14 May 2025 08:31) (97% - 100%)    Parameters below as of 14 May 2025 08:31  Patient On (Oxygen Delivery Method): room air        PHYSICAL:    Constitutional: elderly, ill appearing, slightly jaundice   Eyes: slight bilateral scleral icterus    ENT: pharynx is unremarkable  Neck: supple without JVD.   Pulmonary: clear to auscultation bilaterally   Cardiac: RRR  Vascular: no calf tenderness, venous stasis changes  Abdomen: normoactive bowel sounds, soft and nontender; existing indwelling biliary drain   Lymphatic: no peripheral adenopathy appreciated  Musculoskeletal: full range of motion and no deformities appreciated; anterior chest wall mediport with TPN infusing   Skin: normal appearance, no rash   Neurology: awake, alert, oriented       LABS:    CBC Full  -  ( 14 May 2025 05:35 )  WBC Count : 4.58 K/uL  RBC Count : 2.92 M/uL  Hemoglobin : 8.5 g/dL  Hematocrit : 26.7 %  Platelet Count - Automated : 116 K/uL  Mean Cell Volume : 91.4 fl  Mean Cell Hemoglobin : 29.1 pg  Mean Cell Hemoglobin Concentration : 31.8 g/dL  Auto Neutrophil # : x  Auto Lymphocyte # : x  Auto Monocyte # : x  Auto Eosinophil # : x  Auto Basophil # : x  Auto Neutrophil % : x  Auto Lymphocyte % : x  Auto Monocyte % : x  Auto Eosinophil % : x  Auto Basophil % : x    05-14    132[L]  |  97  |  24[H]  ----------------------------<  164[H]  3.8   |  29  |  0.61    Ca    8.4[L]      14 May 2025 05:35  Phos  2.7     05-14  Mg     1.9     05-14    TPro  5.4[L]  /  Alb  2.0[L]  /  TBili  2.5[H]  /  DBili  x   /  AST  86[H]  /  ALT  217[H]  /  AlkPhos  792[H]  05-14    PT/INR - ( 14 May 2025 05:35 )   PT: 12.2 sec;   INR: 1.03 ratio         PTT - ( 14 May 2025 05:35 )  PTT:31.6 sec  Urinalysis Basic - ( 14 May 2025 05:35 )    Color: x / Appearance: x / SG: x / pH: x  Gluc: 164 mg/dL / Ketone: x  / Bili: x / Urobili: x   Blood: x / Protein: x / Nitrite: x   Leuk Esterase: x / RBC: x / WBC x   Sq Epi: x / Non Sq Epi: x / Bacteria: x        RADIOLOGY & ADDITIONAL STUDIES:      CT Abdomen and Pelvis w/ Oral Cont (05.08.25 @ 10:55)  FINDINGS:  LOWER CHEST: Central venous catheter noted in the right heart.    LIVER: Numerous hepatic cysts.  BILE DUCTS: Left-sided external biliary drain in place with moderate   intrahepatic biliary ductal dilatation.  GALLBLADDER: Cholecystectomy.  SPLEEN: Within normal limits.  PANCREAS: Status post Whipple procedure with atrophy of the pancreatic   remnant and moderate ductal dilatation. Soft tissue mass in the surgical   bed measuring approximately 4 cm.  ADRENALS: Within normal limits.  KIDNEYS/URETERS: Within normal limits.    BLADDER: Within normal limits.  REPRODUCTIVE ORGANS: Moderate prostatomegaly.    BOWEL: Markedly dilated stomach, filled with oral contrast and debris   again noted. Limited evaluation of the gastrojejunal anastomosis due to   lack of intravenous contrast. Mild dilatation of the proximal small bowel   withpossible transition point near the operative bed in the right upper   quadrant.  PERITONEUM/RETROPERITONEUM: Within normal limits.  VESSELS: Atherosclerotic changes.  LYMPH NODES: No lymphadenopathy.  ABDOMINAL WALL: Within normal limits.  BONES: No lytic or blastic lesion.    IMPRESSION:    Gastric outlet obstruction, which correlating with prior CT, is likely   due to stricture at the gastrojejunal anastomosis.    Mildly dilated proximal small bowel. The possibility of second site of   obstruction is considered.    Percutaneous biliary drain in place with moderate ductal dilatation.

## 2025-05-14 NOTE — CHART NOTE - NSCHARTNOTEFT_GEN_A_CORE
Clinical Nutrition PN Follow Up Note    * 75yo M w/ PMHx of Cholangio CA s/p Robotic Whipple on 2024 (Dr. Ocasio) w/ adjuvant Xeloda -> [place]/Cis/Durva, Found liver metastases on 2024, failed CBD stent placement (unsuccessful cannulation via ERCP), s/p internal-external biliary drainage (8.5fr) at [facility] on , c/b High external drainage output ( >2L) due to Internal drainage failure (CBD blockage), now s/p exchange of internal-external biliary drainage (12fr) on  in . Presented with 3-4 days of progressive abdominal bloating with mild nausea, generalized weakness and fatigue. Last BM 3d ago, has been passing gases. Complains of mild discomfort of epigastric pain. Admitted w/ SBO (likely malignant). NGT to LWS in place. Started on mSBO protocol - Steroids/Reglan/Octreotide. RD consulted for initiation of TPN via port (OK to use from surgical oncology).   *: Initiate TPN via Port 2/2 mSBO and suspected prolonged NPO status  *:  (+) BM, passing flatus. CLD. Would suggest to renew TPN x 1 more day to ensure >80% ENN being met with bridging PO + TPN. D/w surg resident   *: TPN dc'd  per surgery.  CT A/P: GOO. s/p NGT to LWS w/ ~3L output overnight. Will initiate TPN via implanted infusion port.   *5/10: Per gastro: rec PEG placement; plan for  for long-term gastric decompression given mSBO, inability to tolerate adequate PO intake, and palliation. D/w surg res will c/w TPN.   *: Per surg "Patient has felt he no longer wants to fight and will choose palliative measures for his care". Planning for PEG placement monday.  *: Plan for PEG today  for long-term decompression given mSBO and palliation, will reorder TPN. NGT remains in place to suction.   *: s/p venting PEG placement. Palliative care following - plan for home TPN w/ venting PEG. On CLD for pleasure. DNR/ DNI     *TPN to be restarted 2/2 GOO and suspected prolonged NPO status.    *current status:     *new pertinent meds: abx, mg sulfate, protonix, kcl (30 mEq), morphine     *labs reviewed; Hyponatremia continued despite increases of Na in bag and decrease of total volume - will leave up to medical management to correct. K LOW despite being increased in PN bag and receiving repletion. Will increase in PN bag again today. Mg remains on low end of normal will increase in PN bag today. Phos now WNL but s/p repletion, will increase in PN bag again today.  ***PT LIKELY EXPERIENCING REFEEDING SYNDROME - CONTINUE WITH LYTE REPLETION OUTSIDE BAG AND WILL HOLD DEXTROSE TITRATION TODAY .   T Bili remains elevated, will keep trace elements out of PN bag and c/w 60 mcg selenium and 5 mg zinc in PN bag.  TG WNL (70) will c/w 50g lipids daily.   POCTs variable; should be maintained between 140- 180 mg/dL. May need insulin added into bag as dextrose increased to goal of 350g daily to meet 100% of increased nutrient needs (GIR ~4.2, WNL). However, may need to lower goal. Titrate up 50-75g/day as tolerated when lytes normalize.          134[L]  |  97  |  22  ----------------------------<  169[H]  3.2[L]   |  29  |  0.66    Ca    8.3[L]      13 May 2025 04:27  Phos  3.1       Mg     1.6         TPro  5.1[L]  /  Alb  1.9[L]  /  TBili  5.3[H]  /  DBili  x   /  AST  207[H]  /  ALT  298[H]  /  AlkPhos  949[H]      BMI: BMI (kg/m2): 18.2 (25 @ 03:35)  Glucose: POCT Blood Glucose.: 185 mg/dL (25 @ 05:45)    BP: 111/71 (25 @ 07:27) (111/71 - 141/75)Vital Signs Last 24 Hrs  T(C): 36.7 (25 @ 07:27), Max: 37.4 (05-10-25 @ 16:00)  T(F): 98.1 (25 @ 07:27), Max: 99.4 (05-10-25 @ 16:00)  HR: 63 (25 @ 07:27) (63 - 71)  BP: 111/71 (25 @ 07:27) (111/71 - 125/82)  BP(mean): --  RR: 17 (25 @ 07:27) (17 - 17)  SpO2: 100% (25 @ 07:27) (99% - 100%)    Lipid Panel: Date/Time: 25 @ 04:44  Triglycerides, Serum: 70    POCT Blood Glucose.: 181 mg/dL (12 May 2025 23:47)  POCT Blood Glucose.: 126 mg/dL (12 May 2025 18:04)  POCT Blood Glucose.: 229 mg/dL (12 May 2025 12:07)    *I&O's Detail    12 May 2025 07:01  -  13 May 2025 07:00  --------------------------------------------------------  IN:    Other (mL): 500 mL  Total IN: 500 mL    OUT:    Voided (mL): 550 mL  Total OUT: 550 mL    Total NET: -50 mL  * fluid status: negative; no output from PEG doc'd. No INPUT or TPN doc'd. Strict I&O's are rec'd when pt is on PN as per protocol   *(+) BM 5/10 x 3 - loose; pt not on bowel regimen.  *edema: none doc'd  *PI: coccyx, unstageable     *malnutrition: Pt continues to meet criteria for severe protein-calorie malnutrition in context of chronic disease r/t decreased ability to meet increased nutrient needs 2/2 mets Ca s/p Whipple and malignant SBO AEB severe muscle/ fat wasting, 33% wt loss x 1 yr, <50-75% ENN chronically    Estimated Needs: based on 57.6 Kg (wt from )  Calories: -  Kcal (35- 40 Kcal/Kg)  Protein: 86- 115 g (1.5- 2 g/Kg)  Fluids: 1728 - 2016 mL (30 - 35 mL/Kg)    Diet, NPO:   With Ice Chips/Sips of Water (25 @ 07:34) [Active]  Parenteral Nutrition - Adult 1 Each (92 mL/Hr) TPN Continuous <Continuous>, 05-10-25 @ 22:00, 22:00, Stop order after: 1 Days    Weight History:  Daily Weight in k.4 (11 May 2025 05:32)  Height (cm): 177.8 (25 @ 03:35), 177.8 (25 @ 11:56), 177.8 (25 @ 10:58)  Weight (kg): 57.606 (25 @ 03:35), 57.6 (25 @ 10:58), 68 (25 @ 19:34)  BMI (kg/m2): 18.2 (25 @ 03:35), 18.2 (25 @ 11:56), 18.2 (25 @ 10:58)  BSA (m2): 1.72 (25 @ 03:35), 1.72 (25 @ 11:56), 1.72 (25 @ 10:58)    TPN Recommendations: via implanted infusion port  Total Volume: 2000mL x 24 hours  115 g  Amino Acids  250 g Dextrose  50 g Lipids 20%  200 mEq Sodium Chloride  0 mEq Sodium Acetate  0 mmol Sodium Phosphate  0 mEq Potassium Chloride  6 mEq Potassium Acetate  50 mmol Potassium Phosphate  0 mEq Calcium Gluconate (CAPS out of PN solution)  16 mEq Magnesium Sulfate  200 mg Thiamine  0 ml Trace Elements  10 ml MVI  5 mg Zinc  60 mcg Selenium     Total Calories  1810  (Meets   84%  of  Estimated Energy needs  and 100% Protein needs)     *Goal: achieved - Pt will receive >/= 80% ENN via tolerated route    Additional Recommendations:    1) Daily weights  2) Strict I & O's  3) Daily lyte checks including magnesium and phos  4) Weekly triglycerides/LFT checks  5) POCT q6hrs; maintain 140-180mg/dL  6) Will titrate dextrose up 50-75g/day until goal rate of 350g daily reached to meet 100% of increased nutrient needs (GIR ~4.2, WNL).  7) Has venting PEG. Plan for home TPN w/ pleasure feeds     Nutrition recommendations to continue upon discharge. Goal to continue to meet >/= 80% ENN via tolerated route. RD to F/U prn for changes to nutrition dc plan.    *will continue to monitor and adjust PN prn*  Carol Burger, MS, RDN, CNSC, [phone]  Certified Nutrition  Clinical Nutrition PN Follow Up Note    * 75yo M w/ PMHx of Cholangio CA s/p Robotic Whipple on 2024 (Dr. Ocasio) w/ adjuvant Xeloda -> [place]/Cis/Durva, Found liver metastases on 2024, failed CBD stent placement (unsuccessful cannulation via ERCP), s/p internal-external biliary drainage (8.5fr) at [facility] on , c/b High external drainage output ( >2L) due to Internal drainage failure (CBD blockage), now s/p exchange of internal-external biliary drainage (12fr) on  in . Presented with 3-4 days of progressive abdominal bloating with mild nausea, generalized weakness and fatigue. Last BM 3d ago, has been passing gases. Complains of mild discomfort of epigastric pain. Admitted w/ SBO (likely malignant). NGT to LWS in place. Started on mSBO protocol - Steroids/Reglan/Octreotide. RD consulted for initiation of TPN via port (OK to use from surgical oncology).   *: Initiate TPN via Port 2/2 mSBO and suspected prolonged NPO status  *:  (+) BM, passing flatus. CLD. Would suggest to renew TPN x 1 more day to ensure >80% ENN being met with bridging PO + TPN. D/w surg resident   *: TPN dc'd  per surgery.  CT A/P: GOO. s/p NGT to LWS w/ ~3L output overnight. Will initiate TPN via implanted infusion port.   *5/10: Per gastro: rec PEG placement; plan for  for long-term gastric decompression given mSBO, inability to tolerate adequate PO intake, and palliation. D/w surg res will c/w TPN.   *: Per surg "Patient has felt he no longer wants to fight and will choose palliative measures for his care". Planning for PEG placement monday.  *: Plan for PEG today  for long-term decompression given mSBO and palliation, will reorder TPN. NGT remains in place to suction.   *: s/p venting PEG placement. Palliative care following - plan for home TPN w/ venting PEG. On CLD for pleasure. DNR/ DNI     *TPN to be restarted 2/2 GOO and suspected prolonged NPO status.    *current status:  Diet advanced to CLD. S/p biliary tube check () - L biliary drain possibly clogged distally, as no contrast entered the pigtail on fluoroscopic study. However drain now draining. Plan to c/w TPN.    *new pertinent meds: abx, mg sulfate, protonix, kcl (30 mEq), morphine     *labs reviewed; Hyponatremia continued despite increases of Na in bag and decrease of total volume - will leave up to medical management to correct. K+ on lower end of normal limits. Mg WNL. Phos now WNL but on lower end, will increase to max of 60mmol in PN bag. As per [employer] Guidelines, maintenance phosphorus concentration in PN is 20-40 mmol/day with a maximum concentration of 60mmol/day.  ***PT LIKELY EXPERIENCING REFEEDING SYNDROME - CONTINUE WITH LYTE REPLETION OUTSIDE BAG.   T Bili remains elevated, will keep trace elements out of PN bag and c/w 60 mcg selenium and 5 mg zinc in PN bag.  TG WNL (70) will c/w 50g lipids daily.  No POCT noted, as per PN protocol, POCT required q6 hours to monitor glycemic control; should be maintained between 140- 180 mg/dL. May need insulin added into bag as dextrose increased to goal of 350g daily to meet 100% of increased nutrient needs (GIR ~4.2, WNL). However, may need to lower goal. Titrate up 50-75g/day as tolerated when lytes normalize.        132[L]  |  97  |  24[H]  ----------------------------<  164[H]  3.8   |  29  |  0.61    Ca    8.4[L]      14 May 2025 05:35  Phos  2.7       Mg     1.9         TPro  5.4[L]  /  Alb  2.0[L]  /  TBili  2.5[H]  /  DBili  x   /  AST  86[H]  /  ALT  217[H]  /  AlkPhos  792[H]      BMI: BMI (kg/m2): 18.2 (25 @ 03:35)  Glucose: POCT Blood Glucose.: 185 mg/dL (25 @ 05:45)    BP: 111/71 (25 @ 07:27) (111/71 - 141/75)Vital Signs Last 24 Hrs  T(C): 36.7 (25 @ 07:27), Max: 37.4 (05-10-25 @ 16:00)  T(F): 98.1 (25 @ 07:27), Max: 99.4 (05-10-25 @ 16:00)  HR: 63 (25 @ 07:27) (63 - 71)  BP: 111/71 (25 @ 07:27) (111/71 - 125/82)  BP(mean): --  RR: 17 (25 @ 07:27) (17 - 17)  SpO2: 100% (25 @ 07:27) (99% - 100%)    Lipid Panel: Date/Time: 25 @ 04:44  Triglycerides, Serum: 70    CAPILLARY BLOOD GLUCOSE - NONE TAKEN*****    *I&O's Detail    13 May 2025 07:01  -  14 May 2025 07:00  --------------------------------------------------------  IN:  Total IN: 0 mL    OUT:    Drain (mL): 825 mL    PEG (Percutaneous Endoscopic Gastrostomy) Tube (mL): 2600 mL    Voided (mL): 800 mL  Total OUT: 4225 mL    Total NET: -4225 mL  * fluid status: negative; very high output from PEG (2.6L). No INPUT or TPN doc'd. Strict I&O's are rec'd when pt is on PN as per protocol   *(+) BM 5/10 x 3 - loose; pt not on bowel regimen.  *edema: none doc'd  *PI: coccyx, unstageable     *malnutrition: Pt continues to meet criteria for severe protein-calorie malnutrition in context of chronic disease r/t decreased ability to meet increased nutrient needs 2/2 mets Ca s/p Whipple and malignant SBO AEB severe muscle/ fat wasting, 33% wt loss x 1 yr, <50-75% ENN chronically    Estimated Needs: based on 57.6 Kg (wt from )  Calories: -  Kcal (35- 40 Kcal/Kg)  Protein: 86- 115 g (1.5- 2 g/Kg)  Fluids: 1728 - 2016 mL (30 - 35 mL/Kg)    Diet, Clear Liquid (25 @ 13:53) [Active]  Parenteral Nutrition - Adult 1 Each (83 mL/Hr) TPN Continuous <Continuous>, 25 @ 22:00, 22:00, Stop order after: 1 Days    Weight History:  Daily Weight in k.4 (11 May 2025 05:32)    Height (cm): 177.8 (25 @ 03:35), 177.8 (25 @ 11:56), 177.8 (25 @ 10:58)  Weight (kg): 57.606 (25 @ 03:35), 57.6 (25 @ 10:58), 68 (25 @ 19:34)  BMI (kg/m2): 18.2 (25 @ 03:35), 18.2 (25 @ 11:56), 18.2 (25 @ 10:58)  BSA (m2): 1.72 (25 @ 03:35), 1.72 (25 @ 11:56), 1.72 (25 @ 10:58)    TPN Recommendations: via implanted infusion port  Total Volume: 2000mL x 24 hours  115 g  Amino Acids  300 g Dextrose  50 g Lipids 20%  187 mEq Sodium Chloride  0 mEq Sodium Acetate  10 mmol Sodium Phosphate  0 mEq Potassium Chloride  6 mEq Potassium Acetate  50 mmol Potassium Phosphate  0 mEq Calcium Gluconate (CAPS out of PN solution)  16 mEq Magnesium Sulfate  200 mg Thiamine  0 ml Trace Elements  10 ml MVI  5 mg Zinc  60 mcg Selenium     Total Calories    (Meets  92%  of  Estimated Energy needs  and 100% Protein needs)     *Goal: achieved - Pt will receive >/= 80% ENN via tolerated route    Additional Recommendations:    1) Daily weights  2) Strict I & O's  3) Daily lyte checks including magnesium and phos  4) Weekly triglycerides/LFT checks  5) POCT q6hrs; maintain 140-180mg/dL  6) Will titrate dextrose up 50-75g/day until goal rate of 350g daily reached to meet 100% of increased nutrient needs (GIR ~4.2, WNL).  7) Has venting PEG. Plan for home TPN w/ pleasure feeds     Nutrition recommendations to continue upon discharge. Goal to continue to meet >/= 80% ENN via tolerated route. RD to F/U prn for changes to nutrition dc plan.    *will continue to monitor and adjust PN prn*  Rose Fernandez MS, RDN, CDN, [facility] [phone]  Certified Nutrition

## 2025-05-14 NOTE — PROGRESS NOTE ADULT - SUBJECTIVE AND OBJECTIVE BOX
Patient seen and examined at the bedside this AM  IR drain to appropriate bag  Pain controlled  Denies N/V  AVSS          PAST MEDICAL & SURGICAL HISTORY:  Bile duct cancer      H/O Whipple procedure          MEDICATIONS  (STANDING):  ciprofloxacin   IVPB 400 milliGRAM(s) IV Intermittent every 12 hours  enoxaparin Injectable 40 milliGRAM(s) SubCutaneous every 24 hours  lipid, fat emulsion (Fish Oil and Plant Based) 20% Infusion 0.868 Gm/kG/Day (20.83 mL/Hr) IV Continuous <Continuous>  pancrelipase  (CREON 36,000 Lipase Units) 3 Capsule(s) Oral three times a day with meals  pantoprazole  Injectable 40 milliGRAM(s) IV Push daily  Parenteral Nutrition - Adult 1 Each (83 mL/Hr) TPN Continuous <Continuous>  Parenteral Nutrition - Adult 1 Each (83 mL/Hr) TPN Continuous <Continuous>  potassium chloride  10 mEq/100 mL IVPB 10 milliEquivalent(s) IV Intermittent every 1 hour    MEDICATIONS  (PRN):  dicyclomine 10 milliGRAM(s) Oral two times a day before meals PRN gas/cramps  morphine  - Injectable 2 milliGRAM(s) IV Push every 4 hours PRN Severe Pain (7 - 10)  ondansetron Injectable 4 milliGRAM(s) IV Push every 6 hours PRN Nausea and/or Vomiting      Allergies    statins (Unknown)  atorvastatin (Hives)    Intolerances        SOCIAL HISTORY:    FAMILY HISTORY:        PHYSICAL EXAM   Gen: Icteric,  no acute distress  CV: pulse regularly present   Resp: airway patent, non-labored breathing  Abd: soft, mildly distended, PEG tube in place.        Vital Signs Last 24 Hrs  T(C): 37 (14 May 2025 00:00), Max: 37.1 (13 May 2025 16:44)  T(F): 98.6 (14 May 2025 00:00), Max: 98.7 (13 May 2025 16:44)  HR: 68 (14 May 2025 00:00) (68 - 72)  BP: 110/69 (14 May 2025 00:00) (106/69 - 119/73)  BP(mean): --  RR: 18 (13 May 2025 16:44) (18 - 18)  SpO2: 98% (14 May 2025 00:00) (98% - 100%)    Parameters below as of 14 May 2025 00:00  Patient On (Oxygen Delivery Method): room air        I&O's Summary    12 May 2025 07:01  -  13 May 2025 07:00  --------------------------------------------------------  IN: 500 mL / OUT: 550 mL / NET: -50 mL    13 May 2025 07:01  -  14 May 2025 01:07  --------------------------------------------------------  IN: 0 mL / OUT: 1900 mL / NET: -1900 mL            LABS:                        8.7    5.39  )-----------( 97       ( 13 May 2025 04:27 )             26.9     05-13    134[L]  |  97  |  22  ----------------------------<  169[H]  3.2[L]   |  29  |  0.66    Ca    8.3[L]      13 May 2025 04:27  Phos  3.1     05-13  Mg     1.6     05-13    TPro  5.1[L]  /  Alb  1.9[L]  /  TBili  5.3[H]  /  DBili  x   /  AST  207[H]  /  ALT  298[H]  /  AlkPhos  949[H]  05-13    PT/INR - ( 12 May 2025 04:52 )   PT: 11.9 sec;   INR: 1.01 ratio           Urinalysis Basic - ( 13 May 2025 04:27 )    Color: x / Appearance: x / SG: x / pH: x  Gluc: 169 mg/dL / Ketone: x  / Bili: x / Urobili: x   Blood: x / Protein: x / Nitrite: x   Leuk Esterase: x / RBC: x / WBC x   Sq Epi: x / Non Sq Epi: x / Bacteria: x      CAPILLARY BLOOD GLUCOSE        LIVER FUNCTIONS - ( 13 May 2025 04:27 )  Alb: 1.9 g/dL / Pro: 5.1 gm/dL / ALK PHOS: 949 U/L / ALT: 298 U/L / AST: 207 U/L / GGT: x             Cultures:      RADIOLOGY & ADDITIONAL STUDIES:

## 2025-05-15 LAB
ALBUMIN SERPL ELPH-MCNC: 1.9 G/DL — LOW (ref 3.3–5)
ALP SERPL-CCNC: 629 U/L — HIGH (ref 40–120)
ALT FLD-CCNC: 148 U/L — HIGH (ref 12–78)
ANION GAP SERPL CALC-SCNC: 7 MMOL/L — SIGNIFICANT CHANGE UP (ref 5–17)
AST SERPL-CCNC: 41 U/L — HIGH (ref 15–37)
BILIRUB SERPL-MCNC: 1.8 MG/DL — HIGH (ref 0.2–1.2)
BUN SERPL-MCNC: 23 MG/DL — SIGNIFICANT CHANGE UP (ref 7–23)
CALCIUM SERPL-MCNC: 8.7 MG/DL — SIGNIFICANT CHANGE UP (ref 8.5–10.1)
CHLORIDE SERPL-SCNC: 100 MMOL/L — SIGNIFICANT CHANGE UP (ref 96–108)
CO2 SERPL-SCNC: 27 MMOL/L — SIGNIFICANT CHANGE UP (ref 22–31)
CREAT SERPL-MCNC: 0.63 MG/DL — SIGNIFICANT CHANGE UP (ref 0.5–1.3)
EGFR: 99 ML/MIN/1.73M2 — SIGNIFICANT CHANGE UP
EGFR: 99 ML/MIN/1.73M2 — SIGNIFICANT CHANGE UP
GLUCOSE BLDC GLUCOMTR-MCNC: 194 MG/DL — HIGH (ref 70–99)
GLUCOSE BLDC GLUCOMTR-MCNC: 199 MG/DL — HIGH (ref 70–99)
GLUCOSE SERPL-MCNC: 196 MG/DL — HIGH (ref 70–99)
HCT VFR BLD CALC: 26.3 % — LOW (ref 39–50)
HGB BLD-MCNC: 8.4 G/DL — LOW (ref 13–17)
MAGNESIUM SERPL-MCNC: 1.8 MG/DL — SIGNIFICANT CHANGE UP (ref 1.6–2.6)
MCHC RBC-ENTMCNC: 29.2 PG — SIGNIFICANT CHANGE UP (ref 27–34)
MCHC RBC-ENTMCNC: 31.9 G/DL — LOW (ref 32–36)
MCV RBC AUTO: 91.3 FL — SIGNIFICANT CHANGE UP (ref 80–100)
NRBC # BLD AUTO: 0 K/UL — SIGNIFICANT CHANGE UP (ref 0–0)
NRBC # FLD: 0 K/UL — SIGNIFICANT CHANGE UP (ref 0–0)
NRBC BLD AUTO-RTO: 0 /100 WBCS — SIGNIFICANT CHANGE UP (ref 0–0)
PHOSPHATE SERPL-MCNC: 3.2 MG/DL — SIGNIFICANT CHANGE UP (ref 2.5–4.5)
PLATELET # BLD AUTO: 117 K/UL — LOW (ref 150–400)
PMV BLD: 10.1 FL — SIGNIFICANT CHANGE UP (ref 7–13)
POTASSIUM SERPL-MCNC: 3.6 MMOL/L — SIGNIFICANT CHANGE UP (ref 3.5–5.3)
POTASSIUM SERPL-SCNC: 3.6 MMOL/L — SIGNIFICANT CHANGE UP (ref 3.5–5.3)
PROT SERPL-MCNC: 5.3 GM/DL — LOW (ref 6–8.3)
RBC # BLD: 2.88 M/UL — LOW (ref 4.2–5.8)
RBC # FLD: 17.5 % — HIGH (ref 10.3–14.5)
SODIUM SERPL-SCNC: 134 MMOL/L — LOW (ref 135–145)
TRIGL SERPL-MCNC: 91 MG/DL — SIGNIFICANT CHANGE UP
WBC # BLD: 4.63 K/UL — SIGNIFICANT CHANGE UP (ref 3.8–10.5)
WBC # FLD AUTO: 4.63 K/UL — SIGNIFICANT CHANGE UP (ref 3.8–10.5)

## 2025-05-15 PROCEDURE — 99231 SBSQ HOSP IP/OBS SF/LOW 25: CPT | Mod: FS

## 2025-05-15 RX ORDER — I.V. FAT EMULSION 20 G/100ML
0.87 EMULSION INTRAVENOUS
Qty: 50 | Refills: 0 | Status: DISCONTINUED | OUTPATIENT
Start: 2025-05-15 | End: 2025-05-15

## 2025-05-15 RX ORDER — MAGNESIUM SULFATE 500 MG/ML
1 SYRINGE (ML) INJECTION ONCE
Refills: 0 | Status: COMPLETED | OUTPATIENT
Start: 2025-05-15 | End: 2025-05-15

## 2025-05-15 RX ORDER — SODIUM/POT/MAG/CALC/CHLOR/ACET 35-20-5MEQ
1 VIAL (ML) INTRAVENOUS
Refills: 0 | Status: DISCONTINUED | OUTPATIENT
Start: 2025-05-15 | End: 2025-05-15

## 2025-05-15 RX ORDER — OXYCODONE HYDROCHLORIDE 30 MG/1
5 TABLET ORAL EVERY 6 HOURS
Refills: 0 | Status: DISCONTINUED | OUTPATIENT
Start: 2025-05-15 | End: 2025-05-16

## 2025-05-15 RX ORDER — OXYCODONE HYDROCHLORIDE 30 MG/1
5 TABLET ORAL ONCE
Refills: 0 | Status: DISCONTINUED | OUTPATIENT
Start: 2025-05-15 | End: 2025-05-15

## 2025-05-15 RX ADMIN — ENOXAPARIN SODIUM 40 MILLIGRAM(S): 100 INJECTION SUBCUTANEOUS at 14:52

## 2025-05-15 RX ADMIN — OXYCODONE HYDROCHLORIDE 5 MILLIGRAM(S): 30 TABLET ORAL at 19:15

## 2025-05-15 RX ADMIN — Medication 40 MILLIEQUIVALENT(S): at 14:53

## 2025-05-15 RX ADMIN — Medication 40 MILLIGRAM(S): at 10:38

## 2025-05-15 RX ADMIN — OXYCODONE HYDROCHLORIDE 5 MILLIGRAM(S): 30 TABLET ORAL at 03:52

## 2025-05-15 RX ADMIN — Medication 100 GRAM(S): at 14:52

## 2025-05-15 RX ADMIN — Medication 83 EACH: at 22:08

## 2025-05-15 RX ADMIN — I.V. FAT EMULSION 20.8 GM/KG/DAY: 20 EMULSION INTRAVENOUS at 22:08

## 2025-05-15 RX ADMIN — Medication 200 MILLIGRAM(S): at 22:16

## 2025-05-15 RX ADMIN — Medication 200 MILLIGRAM(S): at 10:37

## 2025-05-15 NOTE — PROGRESS NOTE ADULT - SUBJECTIVE AND OBJECTIVE BOX
Interventional Radiology Follow-Up Note.      This is a 74y Male s/p Biliary drain upsize on 5/14 in Interventional Radiology with Dr. Oviedo.        Medication:     ciprofloxacin   IVPB: (05-15)  enoxaparin Injectable: (05-15)    Vitals:   T(F): 97.8, Max: 98.6 (00:00)  HR: 78  BP: 108/72  RR: 18  SpO2: 96%    Physical Exam:  General: Nontoxic, in NAD.  Abdomen: soft, NTND.      Drain Device: Drain intact attached to gravity drain bag which was removed and drain was capped.    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    Drain capped today.    - If pt develops fever, chills, abdominal, fever, n/v please remove the cap and attach the provided bag.   - Planning for biliary stent placement tomorrow.   - NPO past midnight.   - AM labs ordered.

## 2025-05-15 NOTE — PROGRESS NOTE ADULT - ASSESSMENT
74 M w/ H/o Cholangio CA w/ distant mets, p/w SBO    Concern for malignant SBO, clinically improving. Having BM.  s/p Venting PEG tube placement   s/p IR drain exchange    Plan:  - Can use chemoport for TPN use in outpatient and inpatient setting   - c/w TPN   - Monitor BM  - Pain control PRN  - Monitor vitals  - Nausea control PRN  - replete electrolytes  - daily labs  - OOB/PT  - Palliative on board    Plan will be discussed with Surgical oncology attending, Dr. Wright

## 2025-05-15 NOTE — PROGRESS NOTE ADULT - SUBJECTIVE AND OBJECTIVE BOX
SURGERY DAILY PROGRESS NOTE:     Subjective:  Patient seen and examined at bedside during am rounds. Endorsing mild drain site pain. Tolerated FLD. Passing gas.  AVSS. Denies any fevers, chills, n/v/d, chest pain or shortness of breath    MEDICATIONS  (STANDING):  ciprofloxacin   IVPB 400 milliGRAM(s) IV Intermittent every 12 hours  enoxaparin Injectable 40 milliGRAM(s) SubCutaneous every 24 hours  lactated ringers. 1000 milliLiter(s) (80 mL/Hr) IV Continuous <Continuous>  lipid, fat emulsion (Fish Oil and Plant Based) 20% Infusion 0.8681 Gm/kG/Day (20.8 mL/Hr) IV Continuous <Continuous>  pancrelipase  (CREON 36,000 Lipase Units) 3 Capsule(s) Oral three times a day with meals  pantoprazole  Injectable 40 milliGRAM(s) IV Push daily  Parenteral Nutrition - Adult 1 Each (83 mL/Hr) TPN Continuous <Continuous>  Parenteral Nutrition - Adult 1 Each (83 mL/Hr) TPN Continuous <Continuous>    MEDICATIONS  (PRN):  dicyclomine 10 milliGRAM(s) Oral two times a day before meals PRN gas/cramps  morphine  - Injectable 2 milliGRAM(s) IV Push every 4 hours PRN Severe Pain (7 - 10)  ondansetron Injectable 4 milliGRAM(s) IV Push every 6 hours PRN Nausea and/or Vomiting      Vital Signs Last 24 Hrs  T(C): 37 (15 May 2025 00:00), Max: 37.3 (14 May 2025 08:31)  T(F): 98.6 (15 May 2025 00:00), Max: 99.2 (14 May 2025 08:31)  HR: 72 (15 May 2025 00:00) (60 - 81)  BP: 121/77 (15 May 2025 00:00) (95/59 - 121/77)  BP(mean): --  RR: 14 (14 May 2025 16:30) (12 - 15)  SpO2: 99% (15 May 2025 00:00) (97% - 100%)    Parameters below as of 15 May 2025 00:00  Patient On (Oxygen Delivery Method): room air      PHYSICAL EXAM   Gen: Icteric,  no acute distress  CV: pulse regularly present   Resp: airway patent, non-labored breathing  Abd: soft, mildly distended, PEG tube in place.    I&O's Detail    13 May 2025 07:01  -  14 May 2025 07:00  --------------------------------------------------------  IN:  Total IN: 0 mL    OUT:    Drain (mL): 825 mL    PEG (Percutaneous Endoscopic Gastrostomy) Tube (mL): 2600 mL    Voided (mL): 800 mL  Total OUT: 4225 mL    Total NET: -4225 mL      14 May 2025 07:01  -  15 May 2025 02:45  --------------------------------------------------------  IN:    Other (mL): 400 mL  Total IN: 400 mL    OUT:    Drain (mL): 220 mL    Voided (mL): 200 mL  Total OUT: 420 mL    Total NET: -20 mL      LABS:                        8.5    4.58  )-----------( 116      ( 14 May 2025 05:35 )             26.7     05-14    132[L]  |  97  |  24[H]  ----------------------------<  164[H]  3.8   |  29  |  0.61    Ca    8.4[L]      14 May 2025 05:35  Phos  2.7     05-14  Mg     1.9     05-14    TPro  5.4[L]  /  Alb  2.0[L]  /  TBili  2.5[H]  /  DBili  x   /  AST  86[H]  /  ALT  217[H]  /  AlkPhos  792[H]  05-14    PT/INR - ( 14 May 2025 05:35 )   PT: 12.2 sec;   INR: 1.03 ratio         PTT - ( 14 May 2025 05:35 )  PTT:31.6 sec  Urinalysis Basic - ( 14 May 2025 05:35 )    Color: x / Appearance: x / SG: x / pH: x  Gluc: 164 mg/dL / Ketone: x  / Bili: x / Urobili: x   Blood: x / Protein: x / Nitrite: x   Leuk Esterase: x / RBC: x / WBC x   Sq Epi: x / Non Sq Epi: x / Bacteria: x

## 2025-05-15 NOTE — CHART NOTE - NSCHARTNOTEFT_GEN_A_CORE
Clinical Nutrition PN Follow Up Note    * 75yo M w/ PMHx of Cholangio CA s/p Robotic Whipple on 2024 (Dr. Ocasio) w/ adjuvant Xeloda -> [place]/Cis/Durva, Found liver metastases on 2024, failed CBD stent placement (unsuccessful cannulation via ERCP), s/p internal-external biliary drainage (8.5fr) at [facility] on , c/b High external drainage output ( >2L) due to Internal drainage failure (CBD blockage), now s/p exchange of internal-external biliary drainage (12fr) on  in . Presented with 3-4 days of progressive abdominal bloating with mild nausea, generalized weakness and fatigue. Last BM 3d ago, has been passing gases. Complains of mild discomfort of epigastric pain. Admitted w/ SBO (likely malignant). NGT to LWS in place. Started on mSBO protocol - Steroids/Reglan/Octreotide. RD consulted for initiation of TPN via port (OK to use from surgical oncology).   *: Initiate TPN via Port 2/2 mSBO and suspected prolonged NPO status  *:  (+) BM, passing flatus. CLD. Would suggest to renew TPN x 1 more day to ensure >80% ENN being met with bridging PO + TPN. D/w surg resident   *: TPN dc'd  per surgery.  CT A/P: GOO. s/p NGT to LWS w/ ~3L output overnight. Will initiate TPN via implanted infusion port.   *5/10: Per gastro: rec PEG placement; plan for  for long-term gastric decompression given mSBO, inability to tolerate adequate PO intake, and palliation. D/w surg res will c/w TPN.   *: Per surg "Patient has felt he no longer wants to fight and will choose palliative measures for his care". Planning for PEG placement monday.  *: Plan for PEG today  for long-term decompression given mSBO and palliation, will reorder TPN. NGT remains in place to suction.   *: s/p venting PEG placement. Palliative care following - plan for home TPN w/ venting PEG. On CLD for pleasure. DNR/ DNI   *: Diet advanced to CLD. S/p biliary tube check () - L biliary drain possibly clogged distally, as no contrast entered the pigtail on fluoroscopic study. However drain now draining. Plan to c/w TPN.    *TPN to be restarted 2/2 GOO and suspected prolonged NPO status.    *current status: Diet advanced to FLD. s/p IR drain exchange. Having BMs + abdominal discomfort. C/w TPN. Will be at dextrose goal today. **CAN CYCLE TPN x12 hours TOMORROW **    *new pertinent meds: abx, mg sulfate, oxycodone, protonix, CREON, KCl (20 mEq), KPhos (15 mmol), LR    *labs reviewed; Hyponatremia continued despite increases of Na in bag and decrease of total volume - will leave up to medical management to correct. K+ on lower end of normal limits. will increase in PN bag today. As per ASPEN Guidelines, maintenance potassium concentration in PN is 1 – 2 mEq/kg/day. However, [facility] PN Policy indicates a maximum of 120 mEq should be added into the PN bag. Mg, Phos WNL. As per ASPEN Guidelines, maintenance phosphorus concentration in PN is 20-40 mmol/day with a maximum concentration of 60mmol/day.  ***PT LIKELY EXPERIENCING REFEEDING SYNDROME - CONTINUE WITH LYTE REPLETION OUTSIDE BAG. T Bili downtrending (1.8), will keep trace elements out of PN bag and c/w 60 mcg selenium and 5 mg zinc in PN bag; if continues to downtrend can add trace elements back in to PN bag tomorrow. corrected Ca elevated, DO NOT supplement calcium outside of PN bag at this time. TG WNL (70) will c/w 50g lipids daily.  POCTs variable (140-199), as per PN protocol, POCT required q6 hours to monitor glycemic control; should be maintained between 140- 180 mg/dL. May need insulin added into bag as dextrose increased to goal of 350g daily to meet 100% of increased nutrient needs (GIR ~4.2, WNL). However, may need to lower goal. Titrate up 50-75g/day as tolerated when lytes normalize.  **WILL BE AT DEX GOAL TODAY AND CAN CYCLE x12 HOURS TOMORROW**  05-15    134[L]  |  100  |  23  ----------------------------<  196[H]  3.6   |  27  |  0.63    Ca    8.7      15 May 2025 05:16  Phos  3.2     05-15  Mg     1.8     05-15    TPro  5.3[L]  /  Alb  1.9[L]  /  TBili  1.8[H]  /  DBili  x   /  AST  41[H]  /  ALT  148[H]  /  AlkPhos  629[H]  05-15    BMI: BMI (kg/m2): 18.2 (25 @ 03:35)  Glucose: POCT Blood Glucose.: 199 mg/dL (05-15-25 @ 05:45)    BP: 125/77 (05-15-25 @ 08:41) (95/59 - 125/77)Vital Signs Last 24 Hrs  T(C): 36.9 (05-15-25 @ 08:41), Max: 37 (05-15-25 @ 00:00)  T(F): 98.5 (05-15-25 @ 08:41), Max: 98.6 (05-15-25 @ 00:00)  HR: 73 (05-15-25 @ 08:41) (60 - 73)  BP: 125/77 (05-15-25 @ 08:41) (103/73 - 125/77)  RR: 18 (05-15-25 @ 08:41) (12 - 18)  SpO2: 98% (05-15-25 @ 08:41) (98% - 100%)    Lipid Panel: Date/Time: 25 @ 04:44  Triglycerides, Serum: 70    POCT Blood Glucose.: 199 mg/dL (15 May 2025 05:45)  POCT Blood Glucose.: 182 mg/dL (14 May 2025 22:31)  POCT Blood Glucose.: 140 mg/dL (14 May 2025 18:20)    *I&O's Detail    14 May 2025 07:01  -  15 May 2025 07:00  --------------------------------------------------------  IN:    Other (mL): 400 mL  Total IN: 400 mL    OUT:    Drain (mL): 520 mL    Voided (mL): 200 mL  Total OUT: 720 mL    Total NET: -320 mL  * fluid status: negative; no output from PEG doc'd. No TPN doc'd. ***Strict I&O's are rec'd when pt is on PN as per protocol   *(+) BM 5/10 x 3 - loose + abdominal discomfort; pt not on bowel regimen.  *edema: none doc'd  *PI: coccyx, unstageable     *malnutrition: Pt continues to meet criteria for severe protein-calorie malnutrition in context of chronic disease r/t decreased ability to meet increased nutrient needs 2/2 mets Ca s/p Whipple and malignant SBO AEB severe muscle/ fat wasting, 33% wt loss x 1 yr, <50-75% ENN chronically    Estimated Needs: based on 57.6 Kg (wt from )  Calories: -  Kcal (35- 40 Kcal/Kg)  Protein: 86- 115 g (1.5- 2 g/Kg)  Fluids: 1728 - 2016 mL (30 - 35 mL/Kg)    Full Liquid Diet  Parenteral Nutrition - Adult 1 Each (83 mL/Hr) TPN Continuous <Continuous>, 25 @ 22:00, 22:00, Stop order after: 1 Days    Weight History:  Daily Weight in k.4 (11 May 2025 05:32)    Height (cm): 177.8 (25 @ 03:35), 177.8 (25 @ 11:56), 177.8 (25 @ 10:58)  Weight (kg): 57.606 (25 @ 03:35), 57.6 (25 @ 10:58), 68 (25 @ 19:34)  BMI (kg/m2): 18.2 (25 @ 03:35), 18.2 (25 @ 11:56), 18.2 (25 @ 10:58)  BSA (m2): 1.72 (25 @ 03:35), 1.72 (25 @ 11:56), 1.72 (25 @ 10:58)    TPN Recommendations: via implanted infusion port  Total Volume: 2000mL x 24 hours  115 g  Amino Acids  350 g Dextrose (now @ goal)  50 g Lipids 20%  187 mEq Sodium Chloride  0 mEq Sodium Acetate  10 mmol Sodium Phosphate  0 mEq Potassium Chloride  16 mEq Potassium Acetate  50 mmol Potassium Phosphate  0 mEq Calcium Gluconate (CAPS out of PN solution)  16 mEq Magnesium Sulfate  200 mg Thiamine  0 ml Trace Elements  10 ml MVI  5 mg Zinc  60 mcg Selenium     Total Calories  2150  (Meets  100%  of  Estimated Energy needs  and 100% Protein needs)     *Goal: achieved - Pt will receive >/= 80% ENN via tolerated route    Additional Recommendations:    1) Daily weights  2) Strict I & O's  3) Daily lyte checks including magnesium and phos  4) Weekly triglycerides/LFT checks  5) POCT q6hrs; maintain 140-180mg/dL  6) Will titrate dextrose up 50-75g/day until goal rate of 350g daily reached to meet 100% of increased nutrient needs (GIR ~4.2, WNL). **WILL BE AT GOAL TODAY - CAN CYCLE x12 HOURS TOMORROW **  7) Has venting PEG. Plan for home TPN w/ pleasure feeds     Nutrition recommendations to continue upon discharge. Goal to continue to meet >/= 80% ENN via tolerated route. RD to F/U prn for changes to nutrition dc plan.    *will continue to monitor and adjust PN prn*  Lary Parikh, PhD, MS, RDN, [phone]

## 2025-05-16 LAB
ALBUMIN SERPL ELPH-MCNC: 2 G/DL — LOW (ref 3.3–5)
ALP SERPL-CCNC: 809 U/L — HIGH (ref 40–120)
ALT FLD-CCNC: 130 U/L — HIGH (ref 12–78)
ANION GAP SERPL CALC-SCNC: 6 MMOL/L — SIGNIFICANT CHANGE UP (ref 5–17)
APTT BLD: 30.4 SEC — SIGNIFICANT CHANGE UP (ref 26.1–36.8)
AST SERPL-CCNC: 56 U/L — HIGH (ref 15–37)
BILIRUB SERPL-MCNC: 3 MG/DL — HIGH (ref 0.2–1.2)
BUN SERPL-MCNC: 25 MG/DL — HIGH (ref 7–23)
CALCIUM SERPL-MCNC: 8.7 MG/DL — SIGNIFICANT CHANGE UP (ref 8.5–10.1)
CHLORIDE SERPL-SCNC: 100 MMOL/L — SIGNIFICANT CHANGE UP (ref 96–108)
CO2 SERPL-SCNC: 29 MMOL/L — SIGNIFICANT CHANGE UP (ref 22–31)
CREAT SERPL-MCNC: 0.65 MG/DL — SIGNIFICANT CHANGE UP (ref 0.5–1.3)
EGFR: 98 ML/MIN/1.73M2 — SIGNIFICANT CHANGE UP
EGFR: 98 ML/MIN/1.73M2 — SIGNIFICANT CHANGE UP
GLUCOSE BLDC GLUCOMTR-MCNC: 105 MG/DL — HIGH (ref 70–99)
GLUCOSE BLDC GLUCOMTR-MCNC: 220 MG/DL — HIGH (ref 70–99)
GLUCOSE BLDC GLUCOMTR-MCNC: 223 MG/DL — HIGH (ref 70–99)
GLUCOSE SERPL-MCNC: 179 MG/DL — HIGH (ref 70–99)
HCT VFR BLD CALC: 26.6 % — LOW (ref 39–50)
HGB BLD-MCNC: 8.7 G/DL — LOW (ref 13–17)
INR BLD: 1 RATIO — SIGNIFICANT CHANGE UP (ref 0.85–1.16)
MAGNESIUM SERPL-MCNC: 1.7 MG/DL — SIGNIFICANT CHANGE UP (ref 1.6–2.6)
MCHC RBC-ENTMCNC: 29.5 PG — SIGNIFICANT CHANGE UP (ref 27–34)
MCHC RBC-ENTMCNC: 32.7 G/DL — SIGNIFICANT CHANGE UP (ref 32–36)
MCV RBC AUTO: 90.2 FL — SIGNIFICANT CHANGE UP (ref 80–100)
NRBC # BLD AUTO: 0 K/UL — SIGNIFICANT CHANGE UP (ref 0–0)
NRBC # FLD: 0 K/UL — SIGNIFICANT CHANGE UP (ref 0–0)
NRBC BLD AUTO-RTO: 0 /100 WBCS — SIGNIFICANT CHANGE UP (ref 0–0)
PHOSPHATE SERPL-MCNC: 2.6 MG/DL — SIGNIFICANT CHANGE UP (ref 2.5–4.5)
PLATELET # BLD AUTO: 117 K/UL — LOW (ref 150–400)
PMV BLD: 9.8 FL — SIGNIFICANT CHANGE UP (ref 7–13)
POTASSIUM SERPL-MCNC: 3.2 MMOL/L — LOW (ref 3.5–5.3)
POTASSIUM SERPL-SCNC: 3.2 MMOL/L — LOW (ref 3.5–5.3)
PROT SERPL-MCNC: 5.3 GM/DL — LOW (ref 6–8.3)
PROTHROM AB SERPL-ACNC: 11.8 SEC — SIGNIFICANT CHANGE UP (ref 9.9–13.4)
RBC # BLD: 2.95 M/UL — LOW (ref 4.2–5.8)
RBC # FLD: 17.2 % — HIGH (ref 10.3–14.5)
SODIUM SERPL-SCNC: 135 MMOL/L — SIGNIFICANT CHANGE UP (ref 135–145)
WBC # BLD: 5.22 K/UL — SIGNIFICANT CHANGE UP (ref 3.8–10.5)
WBC # FLD AUTO: 5.22 K/UL — SIGNIFICANT CHANGE UP (ref 3.8–10.5)

## 2025-05-16 PROCEDURE — 47538 PERQ PLMT BILE DUCT STENT: CPT

## 2025-05-16 RX ORDER — SODIUM CHLORIDE 9 G/1000ML
1000 INJECTION, SOLUTION INTRAVENOUS
Refills: 0 | Status: DISCONTINUED | OUTPATIENT
Start: 2025-05-16 | End: 2025-05-26

## 2025-05-16 RX ORDER — GLUCAGON 3 MG/1
1 POWDER NASAL ONCE
Refills: 0 | Status: DISCONTINUED | OUTPATIENT
Start: 2025-05-16 | End: 2025-05-26

## 2025-05-16 RX ORDER — SODIUM/POT/MAG/CALC/CHLOR/ACET 35-20-5MEQ
1 VIAL (ML) INTRAVENOUS
Refills: 0 | Status: DISCONTINUED | OUTPATIENT
Start: 2025-05-16 | End: 2025-05-16

## 2025-05-16 RX ORDER — DEXTROSE 50 % IN WATER 50 %
12.5 SYRINGE (ML) INTRAVENOUS ONCE
Refills: 0 | Status: DISCONTINUED | OUTPATIENT
Start: 2025-05-16 | End: 2025-05-26

## 2025-05-16 RX ORDER — DEXTROSE 50 % IN WATER 50 %
25 SYRINGE (ML) INTRAVENOUS ONCE
Refills: 0 | Status: DISCONTINUED | OUTPATIENT
Start: 2025-05-16 | End: 2025-05-26

## 2025-05-16 RX ORDER — INSULIN LISPRO 100 U/ML
INJECTION, SOLUTION INTRAVENOUS; SUBCUTANEOUS
Refills: 0 | Status: DISCONTINUED | OUTPATIENT
Start: 2025-05-16 | End: 2025-05-19

## 2025-05-16 RX ORDER — I.V. FAT EMULSION 20 G/100ML
0.87 EMULSION INTRAVENOUS
Qty: 50 | Refills: 0 | Status: DISCONTINUED | OUTPATIENT
Start: 2025-05-16 | End: 2025-05-16

## 2025-05-16 RX ORDER — DEXTROSE 50 % IN WATER 50 %
15 SYRINGE (ML) INTRAVENOUS ONCE
Refills: 0 | Status: DISCONTINUED | OUTPATIENT
Start: 2025-05-16 | End: 2025-05-26

## 2025-05-16 RX ADMIN — OXYCODONE HYDROCHLORIDE 5 MILLIGRAM(S): 30 TABLET ORAL at 02:23

## 2025-05-16 RX ADMIN — Medication 40 MILLIGRAM(S): at 09:11

## 2025-05-16 RX ADMIN — Medication 200 MILLIGRAM(S): at 09:11

## 2025-05-16 RX ADMIN — Medication 1 EACH: at 22:53

## 2025-05-16 RX ADMIN — INSULIN LISPRO 2: 100 INJECTION, SOLUTION INTRAVENOUS; SUBCUTANEOUS at 11:57

## 2025-05-16 RX ADMIN — Medication 100 MILLIEQUIVALENT(S): at 12:44

## 2025-05-16 RX ADMIN — Medication 200 MILLIGRAM(S): at 21:38

## 2025-05-16 RX ADMIN — Medication 20 MILLIEQUIVALENT(S): at 12:26

## 2025-05-16 RX ADMIN — I.V. FAT EMULSION 20.83 GM/KG/DAY: 20 EMULSION INTRAVENOUS at 22:54

## 2025-05-16 RX ADMIN — OXYCODONE HYDROCHLORIDE 5 MILLIGRAM(S): 30 TABLET ORAL at 01:23

## 2025-05-16 RX ADMIN — SODIUM CHLORIDE 80 MILLILITER(S): 9 INJECTION, SOLUTION INTRAVENOUS at 00:00

## 2025-05-16 NOTE — PROGRESS NOTE ADULT - SUBJECTIVE AND OBJECTIVE BOX
Pt. seen and examined at bedside. Patient reports abd pain better. No nausea or vomiting. Tolerating liquids, voiding and ambulating. He denies fever, chest pain, SOB.    ROS negative except as above.    Physical Exam:  General: AOx3, Well developed, NAD, Jaundice improved  HEENT: NC/AT, normal pinnae and tragi  Chest: Normal respiratory effort, equal chest rise  Heart: RRR  Abdomen: Nondistended, soft, TTP around PEG and IR drains, no guarding or rebound tenderness.  Neuro/Psych: No localized deficits. Normal speech, normal tone, normal affect  Skin: Normal, warm, no rashes, no lesions noted  Extremities: Warm, well perfused, no edema    Vitals:  T(C): 36.4 (05-15 @ 23:55), Max: 36.9 (05-15 @ 08:41)  HR: 70 (05-15 @ 23:55) (70 - 78)  BP: 131/74 (05-15 @ 23:55) (108/72 - 131/74)  RR: 18 (05-15 @ 15:18) (18 - 18)  SpO2: 100% (05-15 @ 23:55) (96% - 100%)    0514 @ 07:01  -  05-15 @ 07:00  --------------------------------------------------------  IN:    Other (mL): 400 mL  Total IN: 400 mL    OUT:    Drain (mL): 520 mL    Voided (mL): 200 mL  Total OUT: 720 mL    Total NET: -320 mL          05-15 @ 05:16                    8.4  CBC: 4.63>)-------(<117                     26.3                 134 | 100 | 23    CMP:  ----------------------< 196               3.6 | 27 | 0.63                      Ca:8.7  Phos:3.2  M.8               1.8|      |41        LFTs:  ------|629|-----             -|      |-

## 2025-05-16 NOTE — PROGRESS NOTE ADULT - ASSESSMENT
Patient is improving, tolerating liquids and TPN (cycled overnight). Plan for biliary stent placement today. Plan for discharge tomorrow with home TPN, FLD, home care. Case management on board.    Plan discussed with attending.

## 2025-05-16 NOTE — CHART NOTE - NSCHARTNOTEFT_GEN_A_CORE
Clinical Nutrition PN Follow Up Note    * 73yo M w/ PMHx of Cholangio CA s/p Robotic Whipple on 2024 (Dr. Ocasio) w/ adjuvant Xeloda -> [place]/Cis/Durva, Found liver metastases on 2024, failed CBD stent placement (unsuccessful cannulation via ERCP), s/p internal-external biliary drainage (8.5fr) at [facility] on , c/b High external drainage output ( >2L) due to Internal drainage failure (CBD blockage), now s/p exchange of internal-external biliary drainage (12fr) on  in . Presented with 3-4 days of progressive abdominal bloating with mild nausea, generalized weakness and fatigue. Last BM 3d ago, has been passing gases. Complains of mild discomfort of epigastric pain. Admitted w/ SBO (likely malignant). NGT to LWS in place. Started on mSBO protocol - Steroids/Reglan/Octreotide. RD consulted for initiation of TPN via port (OK to use from surgical oncology).   *: Initiate TPN via Port 2/2 mSBO and suspected prolonged NPO status  *:  (+) BM, passing flatus. CLD. Would suggest to renew TPN x 1 more day to ensure >80% ENN being met with bridging PO + TPN. D/w surg resident   *: TPN dc'd  per surgery.  CT A/P: GOO. s/p NGT to LWS w/ ~3L output overnight. Will initiate TPN via implanted infusion port.   *5/10: Per gastro: rec PEG placement; plan for  for long-term gastric decompression given mSBO, inability to tolerate adequate PO intake, and palliation. D/w surg res will c/w TPN.   *: Per surg "Patient has felt he no longer wants to fight and will choose palliative measures for his care". Planning for PEG placement monday.  *: Plan for PEG today  for long-term decompression given mSBO and palliation, will reorder TPN. NGT remains in place to suction.   *: s/p venting PEG placement. Palliative care following - plan for home TPN w/ venting PEG. On CLD for pleasure. DNR/ DNI   *: Diet advanced to CLD. S/p biliary tube check () - L biliary drain possibly clogged distally, as no contrast entered the pigtail on fluoroscopic study. However drain now draining. Plan to c/w TPN.  *5/15: Diet advanced to FLD. s/p IR drain exchange. Having BMs + abdominal discomfort. C/w TPN. Will be at dextrose goal today. **CAN CYCLE TPN x12 hours TOMORROW **    *TPN to be restarted 2/2 GOO and suspected prolonged NPO status.    *current status: cont on FLD, c/w TPN to be cycled tonight, dc home planned for tomorrow . RD d/w surg resident, concern for low lytes despite large amounts in TPN and repletion outside bag. POCT erratic, RD suggested to add RISS.     *new pertinent meds: abx, ADMELOG (0 units given x 24 hrs), LR, protonix     *labs reviewed; Na WNL but at lower-end of normal - will leave up to medical management to correct. K+ LOW. As per ASPEN Guidelines, maintenance potassium concentration in PN is 1 – 2 mEq/kg/day. However, [facility] PN Policy indicates a maximum of 120 mEq should be added into the PN bag. Currently at 90 mEq which is close to maximum for wt. D/w surgery to replete outside bag.  Mg, Phos on low-end of normal. As per ASPEN Guidelines, maintenance phosphorus concentration in PN is 20-40 mmol/day with a maximum concentration of 60mmol/day. Phos at MAX amount.  Will increase Mg in bag today.  As per ASPEN Guidelines, maintenance magnesium concentration in PN is 8-20 mEq/day with a maximum concentration of 48 mEq/day.  ***PT LIKELY EXPERIENCING REFEEDING SYNDROME - CONTINUE WITH LYTE REPLETION OUTSIDE BAG. T Bili elevated again, will keep trace elements out of PN bag and c/w 60 mcg selenium and 5 mg zinc in PN bag.  TG WNL (70) will c/w 50g lipids daily.  POCTs variable (140-199), should be maintained between 140- 180 mg/dL. May need insulin added into bag. At dextrose goal of 350g daily to meet 100% of increased nutrient needs (GIR ~4.2, WNL). Plan to cycle x 12 hrs today        135  |  100  |  25[H]  ----------------------------<  179[H]  3.2[L]   |  29  |  0.65    Ca    8.7      16 May 2025 06:01  Phos  2.6       Mg     1.7         TPro  5.3[L]  /  Alb  2.0[L]  /  TBili  3.0[H]  /  DBili  x   /  AST  56[H]  /  ALT  130[H]  /  AlkPhos  809[H]      BMI: BMI (kg/m2): 18.2 (25 @ 03:35)  Glucose: POCT Blood Glucose.: 199 mg/dL (05-15-25 @ 05:45)    BP: 125/77 (05-15-25 @ 08:41) (95/59 - 125/77)Vital Signs Last 24 Hrs  T(C): 36.9 (05-15-25 @ 08:41), Max: 37 (05-15-25 @ 00:00)  T(F): 98.5 (05-15-25 @ 08:41), Max: 98.6 (05-15-25 @ 00:00)  HR: 73 (05-15-25 @ 08:41) (60 - 73)  BP: 125/77 (05-15-25 @ 08:41) (103/73 - 125/77)  RR: 18 (05-15-25 @ 08:41) (12 - 18)  SpO2: 98% (05-15-25 @ 08:41) (98% - 100%)    Lipid Panel: Date/Time: 25 @ 04:44  Triglycerides, Serum: 70    POCT Blood Glucose.: 194 mg/dL (15 May 2025 21:39)    *I&O's Detail    15 May 2025 07:01  -  16 May 2025 07:00  --------------------------------------------------------  IN:    Fat Emulsion (Fish Oil &amp; Plant Based) 20% Infusion: 100 mL    Lactated Ringers: 500 mL  Total IN: 600 mL    OUT:    Voided (mL): 200 mL  Total OUT: 200 mL    Total NET: 400 mL  * fluid status: negative; no output from venting PEG doc'd. No TPN doc'd. ***Strict I&O's are rec'd when pt is on PN as per protocol   *Last (+) BM doc'd on 5/10 x 3 - loose. (+) abdominal discomfort; pt not on bowel regimen.  *edema: none doc'd  *PI: coccyx, unstageable     *malnutrition: Pt continues to meet criteria for severe protein-calorie malnutrition in context of chronic disease r/t decreased ability to meet increased nutrient needs 2/2 mets Ca s/p Whipple and malignant SBO AEB severe muscle/ fat wasting, 33% wt loss x 1 yr, <50-75% ENN chronically    Estimated Needs: based on 57.6 Kg (wt from )  Calories: -  Kcal (35- 40 Kcal/Kg)  Protein: 86- 115 g (1.5- 2 g/Kg)  Fluids: 1728 - 2016 mL (30 - 35 mL/Kg)    Full Liquid Diet  Parenteral Nutrition - Adult 1 Each (83 mL/Hr) TPN Continuous <Continuous>, 25 @ 22:00, 22:00, Stop order after: 1 Days    Weight History:  Daily Weight in k.4 (11 May 2025 05:32)    Height (cm): 177.8 (25 @ 03:35), 177.8 (25 @ 11:56), 177.8 (25 @ 10:58)  Weight (kg): 57.606 (25 @ 03:35), 57.6 (25 @ 10:58), 68 (25 @ 19:34)  BMI (kg/m2): 18.2 (25 @ 03:35), 18.2 (25 @ 11:56), 18.2 (25 @ 10:58)  BSA (m2): 1.72 (25 @ 03:35), 1.72 (25 @ 11:56), 1.72 (25 @ 10:58)    TPN Recommendations: via implanted infusion port  Total Volume: 2000mL x 12 hours  115 g  Amino Acids  350 g Dextrose (@ goal)  50 g Lipids 20%  187 mEq Sodium Chloride  0 mEq Sodium Acetate  10 mmol Sodium Phosphate  0 mEq Potassium Chloride  16 mEq Potassium Acetate  50 mmol Potassium Phosphate  0 mEq Calcium Gluconate (CAPS out of PN solution)  18 mEq Magnesium Sulfate  200 mg Thiamine  0 ml Trace Elements  10 ml MVI  5 mg Zinc  60 mcg Selenium     Total Calories  2150  (Meets  100%  of  Estimated Energy needs  and 100% Protein needs)     *Goal: achieved - Pt will receive >/= 80% ENN via tolerated route    Additional Recommendations:    1) Daily weights  2) Strict I & O's  3) Daily lyte checks including magnesium and phos **cont to replete K, Phos, Mg outside PN bag PRN  4) Weekly triglycerides/LFT checks  5) POCT q6hrs; maintain 140-180mg/dL **on RISS  6) Has venting PEG. Plan for home TPN w/ pleasure feeds     Nutrition recommendations to continue upon discharge. Goal to continue to meet >/= 80% ENN via tolerated route. RD to F/U prn for changes to nutrition dc plan.    *will continue to monitor and adjust PN prn*  Carol Burger, MS, RDN, CNSC, -[phone]  Certified Nutrition

## 2025-05-16 NOTE — CHART NOTE - NSCHARTNOTEFT_GEN_A_CORE
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 mainly with reflux and abdominal cramps. he did pass some loose stool with some relief which was encouraging. I instructed him to place the PEG to drainage overnight and whenever he feels nauseated.  He is scheduled for a biliary stent change to a metal stent today. His T. Bili did rise to 3.0 after the tube was clamped again so clearly he needs the stent to drain internally or to a drainage bag. He tolerated cycling of his TPN in preparation for discharge. Finally his wife has requested nursing support and  for some instructions on how to care for the PEG and Biliary catheter once he is home. I feel we are all clear on his goals of care and are planning for discharge to happen this weekend baring any unforeseen complications.

## 2025-05-16 NOTE — CHART NOTE - NSCHARTNOTEFT_GEN_A_CORE
Met with Pt at the bedside to follow up and offer support.  Pt shared plan for stent this date.  Pt stated he had a difficult night.  Hopeful stent will help.  Pts feelings explored.  Support provided.  Our team to continue to follow.

## 2025-05-17 LAB
A1C WITH ESTIMATED AVERAGE GLUCOSE RESULT: 5.3 % — SIGNIFICANT CHANGE UP (ref 4–5.6)
ALBUMIN SERPL ELPH-MCNC: 1.9 G/DL — LOW (ref 3.3–5)
ALP SERPL-CCNC: 878 U/L — HIGH (ref 40–120)
ALT FLD-CCNC: 156 U/L — HIGH (ref 12–78)
ANION GAP SERPL CALC-SCNC: 5 MMOL/L — SIGNIFICANT CHANGE UP (ref 5–17)
AST SERPL-CCNC: 101 U/L — HIGH (ref 15–37)
BILIRUB SERPL-MCNC: 3.2 MG/DL — HIGH (ref 0.2–1.2)
BUN SERPL-MCNC: 23 MG/DL — SIGNIFICANT CHANGE UP (ref 7–23)
CALCIUM SERPL-MCNC: 8.2 MG/DL — LOW (ref 8.5–10.1)
CHLORIDE SERPL-SCNC: 101 MMOL/L — SIGNIFICANT CHANGE UP (ref 96–108)
CO2 SERPL-SCNC: 27 MMOL/L — SIGNIFICANT CHANGE UP (ref 22–31)
CREAT SERPL-MCNC: 0.63 MG/DL — SIGNIFICANT CHANGE UP (ref 0.5–1.3)
EGFR: 99 ML/MIN/1.73M2 — SIGNIFICANT CHANGE UP
EGFR: 99 ML/MIN/1.73M2 — SIGNIFICANT CHANGE UP
ESTIMATED AVERAGE GLUCOSE: 105 MG/DL — SIGNIFICANT CHANGE UP (ref 68–114)
GLUCOSE BLDC GLUCOMTR-MCNC: 113 MG/DL — HIGH (ref 70–99)
GLUCOSE BLDC GLUCOMTR-MCNC: 116 MG/DL — HIGH (ref 70–99)
GLUCOSE BLDC GLUCOMTR-MCNC: 232 MG/DL — HIGH (ref 70–99)
GLUCOSE BLDC GLUCOMTR-MCNC: 262 MG/DL — HIGH (ref 70–99)
GLUCOSE SERPL-MCNC: 231 MG/DL — HIGH (ref 70–99)
HCT VFR BLD CALC: 26.3 % — LOW (ref 39–50)
HGB BLD-MCNC: 8.5 G/DL — LOW (ref 13–17)
MAGNESIUM SERPL-MCNC: 1.6 MG/DL — SIGNIFICANT CHANGE UP (ref 1.6–2.6)
MCHC RBC-ENTMCNC: 29.4 PG — SIGNIFICANT CHANGE UP (ref 27–34)
MCHC RBC-ENTMCNC: 32.3 G/DL — SIGNIFICANT CHANGE UP (ref 32–36)
MCV RBC AUTO: 91 FL — SIGNIFICANT CHANGE UP (ref 80–100)
NRBC # BLD AUTO: 0 K/UL — SIGNIFICANT CHANGE UP (ref 0–0)
NRBC # FLD: 0 K/UL — SIGNIFICANT CHANGE UP (ref 0–0)
NRBC BLD AUTO-RTO: 0 /100 WBCS — SIGNIFICANT CHANGE UP (ref 0–0)
PHOSPHATE SERPL-MCNC: 3.3 MG/DL — SIGNIFICANT CHANGE UP (ref 2.5–4.5)
PLATELET # BLD AUTO: 106 K/UL — LOW (ref 150–400)
PMV BLD: 9.5 FL — SIGNIFICANT CHANGE UP (ref 7–13)
POTASSIUM SERPL-MCNC: 3.4 MMOL/L — LOW (ref 3.5–5.3)
POTASSIUM SERPL-SCNC: 3.4 MMOL/L — LOW (ref 3.5–5.3)
PROT SERPL-MCNC: 5.4 GM/DL — LOW (ref 6–8.3)
RBC # BLD: 2.89 M/UL — LOW (ref 4.2–5.8)
RBC # FLD: 17 % — HIGH (ref 10.3–14.5)
SODIUM SERPL-SCNC: 133 MMOL/L — LOW (ref 135–145)
WBC # BLD: 4.74 K/UL — SIGNIFICANT CHANGE UP (ref 3.8–10.5)
WBC # FLD AUTO: 4.74 K/UL — SIGNIFICANT CHANGE UP (ref 3.8–10.5)

## 2025-05-17 RX ORDER — SODIUM/POT/MAG/CALC/CHLOR/ACET 35-20-5MEQ
1 VIAL (ML) INTRAVENOUS
Refills: 0 | Status: DISCONTINUED | OUTPATIENT
Start: 2025-05-17 | End: 2025-05-17

## 2025-05-17 RX ORDER — I.V. FAT EMULSION 20 G/100ML
0.87 EMULSION INTRAVENOUS
Qty: 50 | Refills: 0 | Status: DISCONTINUED | OUTPATIENT
Start: 2025-05-17 | End: 2025-05-17

## 2025-05-17 RX ADMIN — ENOXAPARIN SODIUM 40 MILLIGRAM(S): 100 INJECTION SUBCUTANEOUS at 13:13

## 2025-05-17 RX ADMIN — Medication 40 MILLIGRAM(S): at 09:59

## 2025-05-17 RX ADMIN — INSULIN LISPRO 3: 100 INJECTION, SOLUTION INTRAVENOUS; SUBCUTANEOUS at 08:30

## 2025-05-17 RX ADMIN — I.V. FAT EMULSION 20.8 GM/KG/DAY: 20 EMULSION INTRAVENOUS at 22:29

## 2025-05-17 RX ADMIN — Medication 200 MILLIGRAM(S): at 10:02

## 2025-05-17 RX ADMIN — Medication 200 MILLIGRAM(S): at 21:59

## 2025-05-17 RX ADMIN — Medication 1 EACH: at 22:29

## 2025-05-17 NOTE — CHART NOTE - NSCHARTNOTEFT_GEN_A_CORE
Clinical Nutrition PN Follow Up Note    * 73yo M w/ PMHx of Cholangio CA s/p Robotic Whipple on 2024 (Dr. Ocasio) w/ adjuvant Xeloda -> [place]/Cis/Durva, Found liver metastases on 2024, failed CBD stent placement (unsuccessful cannulation via ERCP), s/p internal-external biliary drainage (8.5fr) at [facility] on , c/b High external drainage output ( >2L) due to Internal drainage failure (CBD blockage), now s/p exchange of internal-external biliary drainage (12fr) on  in . Presented with 3-4 days of progressive abdominal bloating with mild nausea, generalized weakness and fatigue. Last BM 3d ago, has been passing gases. Complains of mild discomfort of epigastric pain. Admitted w/ SBO (likely malignant). NGT to LWS in place. Started on mSBO protocol - Steroids/Reglan/Octreotide. RD consulted for initiation of TPN via port (OK to use from surgical oncology).   *: Initiate TPN via Port 2/2 mSBO and suspected prolonged NPO status  *:  (+) BM, passing flatus. CLD. Would suggest to renew TPN x 1 more day to ensure >80% ENN being met with bridging PO + TPN. D/w surg resident   *: TPN dc'd  per surgery.  CT A/P: GOO. s/p NGT to LWS w/ ~3L output overnight. Will initiate TPN via implanted infusion port.   *5/10: Per gastro: rec PEG placement; plan for  for long-term gastric decompression given mSBO, inability to tolerate adequate PO intake, and palliation. D/w surg res will c/w TPN.   *: Per surg "Patient has felt he no longer wants to fight and will choose palliative measures for his care". Planning for PEG placement monday.  *: Plan for PEG today  for long-term decompression given mSBO and palliation, will reorder TPN. NGT remains in place to suction.   *: s/p venting PEG placement. Palliative care following - plan for home TPN w/ venting PEG. On CLD for pleasure. DNR/ DNI   *: Diet advanced to CLD. S/p biliary tube check () - L biliary drain possibly clogged distally, as no contrast entered the pigtail on fluoroscopic study. However drain now draining. Plan to c/w TPN.  *5/15: Diet advanced to FLD. s/p IR drain exchange. Having BMs + abdominal discomfort. C/w TPN. Will be at dextrose goal today. **CAN CYCLE TPN x12 hours TOMORROW **  *: cont on FLD, c/w TPN to be cycled tonight. RD d/w surg resident, concern for low lytes despite large amounts in TPN and repletion outside bag. POCT erratic, RD suggested to add RISS. Dc home planned for tomorrow ()    *TPN to be restarted 2/2 GOO and suspected prolonged NPO status.    *current status: S/p biliary stent change to a metal stent w/ IR yesterday. Passed loose stool w/ some relief of abd cramping. Plan for discharge home sometime this weekend (today vs tomorrow?) - lytes still LOW, receiving repletion outside PN bag. POCTs remain elevated, currently receiving RISS, will continue to monitor and may need to consider adding insulin in PN bag for additional coverage.     *new pertinent meds: abx, ADMELOG (5 units given x 24 hrs), LR, protonix, KCl tablets, KCl IVPB    *labs reviewed; Na LOW - will leave up to medical management to correct. K+ still LOW. As per ASPEN Guidelines, maintenance potassium concentration in PN is 1 – 2 mEq/kg/day. However, [facility] PN Policy indicates a maximum of 120 mEq should be added into the PN bag. Currently at 90 mEq which is close to maximum for wt. D/w surgery to replete outside bag - received repletion x 24 hrs.  Mg and Phos both on low-end of normal. As per ASPEN Guidelines, maintenance phosphorus concentration in PN is 20-40 mmol/day with a maximum concentration of 60mmol/day. Phos at MAX amount.  Will increase Mg in bag again today.  As per ASPEN Guidelines, maintenance magnesium concentration in PN is 8-20 mEq/day with a maximum concentration of 48 mEq/day.  ***PT LIKELY EXPERIENCING REFEEDING SYNDROME - CONTINUE WITH LYTE REPLETION OUTSIDE BAG. T Bili elevated again, will keep trace elements out of PN bag and c/w 60 mcg selenium and 5 mg zinc in PN bag.  TG WNL (70) will c/w 50g lipids daily.  POCTs variable (140-199), should be maintained between 140- 180 mg/dL. May need insulin added into bag. At dextrose goal of 350g daily to meet 100% of increased nutrient needs (GIR ~4.2, WNL). Plan to cycle x 12 hrs.      133[L]  |  101  |  23  ----------------------------<  231[H]  3.4[L]   |  27  |  0.63    Ca    8.2[L]      17 May 2025 04:57  Phos  3.3       Mg     1.6         TPro  5.4[L]  /  Alb  1.9[L]  /  TBili  3.2[H]  /  DBili  x   /  AST  101[H]  /  ALT  156[H]  /  AlkPhos  878[H]      BMI: BMI (kg/m2): 18.2 (25 @ 03:35)  Glucose: POCT Blood Glucose.: 199 mg/dL (05-15-25 @ 05:45)    BP: 125/77 (05-15-25 @ 08:41) (95/59 - 125/77)Vital Signs Last 24 Hrs  T(C): 36.9 (05-15-25 @ 08:41), Max: 37 (05-15-25 @ 00:00)  T(F): 98.5 (05-15-25 @ 08:41), Max: 98.6 (05-15-25 @ 00:00)  HR: 73 (05-15-25 @ 08:41) (60 - 73)  BP: 125/77 (05-15-25 @ 08:41) (103/73 - 125/77)  RR: 18 (05-15-25 @ 08:41) (12 - 18)  SpO2: 98% (05-15-25 @ 08:41) (98% - 100%)    Lipid Panel: Date/Time: 25 @ 04:44  Triglycerides, Serum: 70    POCT Blood Glucose.: 262 mg/dL (17 May 2025 08:25)  POCT Blood Glucose.: 232 mg/dL (17 May 2025 05:19)  POCT Blood Glucose.: 223 mg/dL (16 May 2025 23:06)  POCT Blood Glucose.: 105 mg/dL (16 May 2025 16:42)  POCT Blood Glucose.: 220 mg/dL (16 May 2025 11:51)    *I&O's Detail    16 May 2025 07:01  -  17 May 2025 07:00  --------------------------------------------------------  IN:    Other (mL): 150 mL  Total IN: 150 mL    OUT:    Drain (mL): 300 mL    PEG (Percutaneous Endoscopic Gastrostomy) Tube (mL): 1000 mL    Voided (mL): 725 mL  Total OUT: 5 mL    Total NET: -1875 mL      17 May 2025 07:01  -  17 May 2025 09:30  --------------------------------------------------------  IN:  Total IN: 0 mL    OUT:    Voided (mL): 200 mL  Total OUT: 200 mL    Total NET: -200 mL  * fluid status: negative; no output from venting PEG doc'd. No TPN doc'd. ***Strict I&O's are rec'd when pt is on PN as per protocol   *Last (+) BM doc'd on 5/10 x 3 - loose. (+) abdominal discomfort; pt not on bowel regimen.  *edema: none doc'd  *PI: coccyx, unstageable     *malnutrition: Pt continues to meet criteria for severe protein-calorie malnutrition in context of chronic disease r/t decreased ability to meet increased nutrient needs 2/2 mets Ca s/p Whipple and malignant SBO AEB severe muscle/ fat wasting, 33% wt loss x 1 yr, <50-75% ENN chronically    Estimated Needs: based on 57.6 Kg (wt from )  Calories: -  Kcal (35- 40 Kcal/Kg)  Protein: 86- 115 g (1.5- 2 g/Kg)  Fluids: 1728 - 2016 mL (30 - 35 mL/Kg)    *Full Liquid Diet  Parenteral Nutrition - Adult 1 Each TPN Continuous <Continuous>, 25 @ 22:00, 22:00, Stop order after: 1 Days    Weight History:  Daily Weight in k.4 (11 May 2025 05:32)    Height (cm): 177.8 (25 @ 03:35), 177.8 (25 @ 11:56), 177.8 (25 @ 10:58)  Weight (kg): 57.606 (25 @ 03:35), 57.6 (25 @ 10:58), 68 (25 @ 19:34)  BMI (kg/m2): 18.2 (25 @ 03:35), 18.2 (25 @ 11:56), 18.2 (25 @ 10:58)  BSA (m2): 1.72 (25 @ 03:35), 1.72 (25 @ 11:56), 1.72 (25 @ 10:58)    TPN Recommendations: via implanted infusion port  Total Volume: 2000mL x 12 hours  115 g  Amino Acids  350 g Dextrose (@ goal)  50 g Lipids 20%  187 mEq Sodium Chloride  0 mEq Sodium Acetate  10 mmol Sodium Phosphate  0 mEq Potassium Chloride  16 mEq Potassium Acetate  50 mmol Potassium Phosphate  0 mEq Calcium Gluconate (CAPS out of PN solution)  20 mEq Magnesium Sulfate  200 mg Thiamine  0 ml Trace Elements  10 ml MVI  5 mg Zinc  60 mcg Selenium     Total Calories  2150  (Meets  100%  of  Estimated Energy needs  and 100% Protein needs)     *Goal: achieved - Pt will receive >/= 80% ENN via tolerated route    Additional Recommendations:    1) Daily weights  2) Strict I & O's  3) Daily lyte checks including magnesium and phos **cont to replete K, Phos, Mg outside PN bag PRN  4) Weekly triglycerides/LFT checks  5) POCT q6hrs; maintain 140-180mg/dL **on RISS, may need to add insulin in PN bag if POCTs remain elevated   6) Has venting PEG. Plan for home TPN w/ pleasure feeds     Nutrition recommendations to continue upon discharge. Goal to continue to meet >/= 80% ENN via tolerated route. RD to F/U prn for changes to nutrition dc plan.    *will continue to monitor and adjust PN prn*  Kriss Rothman MS, RDN, CDN [phone]

## 2025-05-17 NOTE — PROVIDER CONTACT NOTE (OTHER) - ACTION/TREATMENT ORDERED:
MD made aware earlier of leaking and now made aware of resistance when flushing. MD said she would come back to reattach bag

## 2025-05-17 NOTE — PROVIDER CONTACT NOTE (OTHER) - SITUATION
biliary stent leaking around insertion site-- patient stating its due to back pressure needing ttube bag attached. also when flushed as ordered, there was resistance

## 2025-05-17 NOTE — PROGRESS NOTE ADULT - ASSESSMENT
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 Having BM.  s/p Venting PEG tube placement   s/p IR int-ext biliary drain exchange  s/p IR bare metal biliary stent placement    Plan:  - Can use chemoport for TPN use in outpatient and inpatient setting   - c/w TPN & Cycle  - Monitor BM  - Pain control PRN  - Monitor vitals  - Nausea control PRN  - replete electrolytes  - daily labs  - OOB/PT  - Palliative on board  - Home on Monday most likely due to TPN cycling.    Plan will be discussed with Surgical oncology attending, Dr. Wright

## 2025-05-17 NOTE — PROGRESS NOTE ADULT - SUBJECTIVE AND OBJECTIVE BOX
Patient seen and examined by surgical team this morning.   Underwent bare metal biliary stent placeemnt yesterday  slight leakage around the tube noticed  Otherwise Pain well controlled, No acute overnight events  Denies fever or chills  Tolerating diet well without nausea or vomiting.     ROS negative except as above.    Physical Exam:  General: AOx3, Well developed, NAD, Jaundice improved  HEENT: NC/AT, normal pinnae and tragi  Chest: Normal respiratory effort, equal chest rise  Heart: RRR  Abdomen: Nondistended, soft, TTP around PEG and IR drains, no guarding or rebound tenderness.  Neuro/Psych: No localized deficits. Normal speech, normal tone, normal affect  Skin: Normal, warm, no rashes, no lesions noted  Extremities: Warm, well perfused, no edema        Chief complaint:      PMHx:  Bile duct cancer        PSHx:  H/O Whipple procedure        FHx:      Vitals:  T(C): 37.2 ( @ 08:39), Max: 37.2 ( @ 08:39)  HR: 86 ( @ 08:39) (66 - 86)  BP: 123/73 ( @ 08:39) (102/62 - 128/73)  RR: 18 ( @ 08:39) (16 - 18)  SpO2: 98% ( @ 08:39) (97% - 100%)      I&Os    16 @ 07:01  -   @ 07:00  --------------------------------------------------------  IN:    Other (mL): 150 mL  Total IN: 150 mL    OUT:    Drain (mL): 300 mL    PEG (Percutaneous Endoscopic Gastrostomy) Tube (mL): 1000 mL    Voided (mL): 725 mL  Total OUT: 5 mL    Total NET: -1875 mL       @ 07:01  -   @ 10:35  --------------------------------------------------------  IN:  Total IN: 0 mL    OUT:    Voided (mL): 200 mL  Total OUT: 200 mL    Total NET: -200 mL        .    Labs:   @ 04:57                    8.5  CBC: 4.74>)-------(<106                     26.3                 133 | 101 | 23    CMP:  ----------------------< 231               3.4 | 27 | 0.63                      Ca:8.2  Phos:3.3  M.6               3.2|      |101        LFTs:  ------|878|-----             -|      |-   @ 06:01                    8.7  CBC: 5.22>)-------(<117                     26.6                 135 | 100 | 25    CMP:  ----------------------< 179               3.2 | 29 | 0.65                      Ca:8.7  Phos:2.6  M.7               3.0|      |56        LFTs:  ------|809|-----             -|      |-        Cultures:        Current Inpatient Medications:  ciprofloxacin   IVPB 400 milliGRAM(s) IV Intermittent every 12 hours  dextrose 5%. 1000 milliLiter(s) (50 mL/Hr) IV Continuous <Continuous>  dextrose 5%. 1000 milliLiter(s) (100 mL/Hr) IV Continuous <Continuous>  dextrose 50% Injectable 25 Gram(s) IV Push once  dextrose 50% Injectable 12.5 Gram(s) IV Push once  dextrose 50% Injectable 25 Gram(s) IV Push once  dextrose Oral Gel 15 Gram(s) Oral once PRN  dicyclomine 10 milliGRAM(s) Oral two times a day before meals PRN  enoxaparin Injectable 40 milliGRAM(s) SubCutaneous every 24 hours  glucagon  Injectable 1 milliGRAM(s) IntraMuscular once  insulin lispro (ADMELOG) corrective regimen sliding scale   SubCutaneous three times a day before meals  lipid, fat emulsion (Fish Oil and Plant Based) 20% Infusion 0.868 Gm/kG/Day (20.83 mL/Hr) IV Continuous <Continuous>  lipid, fat emulsion (Fish Oil and Plant Based) 20% Infusion 0.868 Gm/kG/Day (20.8 mL/Hr) IV Continuous <Continuous>  ondansetron Injectable 4 milliGRAM(s) IV Push every 6 hours PRN  oxyCODONE    IR 5 milliGRAM(s) Oral every 6 hours PRN  pantoprazole  Injectable 40 milliGRAM(s) IV Push daily  Parenteral Nutrition - Adult 1 Each TPN Continuous <Continuous>  Parenteral Nutrition - Adult 1 Each TPN Continuous <Continuous>

## 2025-05-18 DIAGNOSIS — D63.8 ANEMIA IN OTHER CHRONIC DISEASES CLASSIFIED ELSEWHERE: ICD-10-CM

## 2025-05-18 DIAGNOSIS — D69.6 THROMBOCYTOPENIA, UNSPECIFIED: ICD-10-CM

## 2025-05-18 LAB
ALBUMIN SERPL ELPH-MCNC: 2 G/DL — LOW (ref 3.3–5)
ALP SERPL-CCNC: 770 U/L — HIGH (ref 40–120)
ALT FLD-CCNC: 142 U/L — HIGH (ref 12–78)
ANION GAP SERPL CALC-SCNC: 5 MMOL/L — SIGNIFICANT CHANGE UP (ref 5–17)
AST SERPL-CCNC: 66 U/L — HIGH (ref 15–37)
BILIRUB SERPL-MCNC: 1.9 MG/DL — HIGH (ref 0.2–1.2)
BUN SERPL-MCNC: 33 MG/DL — HIGH (ref 7–23)
CALCIUM SERPL-MCNC: 8.3 MG/DL — LOW (ref 8.5–10.1)
CHLORIDE SERPL-SCNC: 101 MMOL/L — SIGNIFICANT CHANGE UP (ref 96–108)
CO2 SERPL-SCNC: 27 MMOL/L — SIGNIFICANT CHANGE UP (ref 22–31)
CREAT SERPL-MCNC: 0.79 MG/DL — SIGNIFICANT CHANGE UP (ref 0.5–1.3)
EGFR: 92 ML/MIN/1.73M2 — SIGNIFICANT CHANGE UP
EGFR: 92 ML/MIN/1.73M2 — SIGNIFICANT CHANGE UP
GLUCOSE BLDC GLUCOMTR-MCNC: 120 MG/DL — HIGH (ref 70–99)
GLUCOSE BLDC GLUCOMTR-MCNC: 136 MG/DL — HIGH (ref 70–99)
GLUCOSE BLDC GLUCOMTR-MCNC: 182 MG/DL — HIGH (ref 70–99)
GLUCOSE BLDC GLUCOMTR-MCNC: 364 MG/DL — HIGH (ref 70–99)
GLUCOSE SERPL-MCNC: 363 MG/DL — HIGH (ref 70–99)
HCT VFR BLD CALC: 27.9 % — LOW (ref 39–50)
HGB BLD-MCNC: 8.9 G/DL — LOW (ref 13–17)
MAGNESIUM SERPL-MCNC: 1.8 MG/DL — SIGNIFICANT CHANGE UP (ref 1.6–2.6)
MCHC RBC-ENTMCNC: 29.3 PG — SIGNIFICANT CHANGE UP (ref 27–34)
MCHC RBC-ENTMCNC: 31.9 G/DL — LOW (ref 32–36)
MCV RBC AUTO: 91.8 FL — SIGNIFICANT CHANGE UP (ref 80–100)
NRBC # BLD AUTO: 0 K/UL — SIGNIFICANT CHANGE UP (ref 0–0)
NRBC # FLD: 0 K/UL — SIGNIFICANT CHANGE UP (ref 0–0)
NRBC BLD AUTO-RTO: 0 /100 WBCS — SIGNIFICANT CHANGE UP (ref 0–0)
PHOSPHATE SERPL-MCNC: 3.8 MG/DL — SIGNIFICANT CHANGE UP (ref 2.5–4.5)
PLATELET # BLD AUTO: 134 K/UL — LOW (ref 150–400)
PMV BLD: 10 FL — SIGNIFICANT CHANGE UP (ref 7–13)
POTASSIUM SERPL-MCNC: 3.5 MMOL/L — SIGNIFICANT CHANGE UP (ref 3.5–5.3)
POTASSIUM SERPL-SCNC: 3.5 MMOL/L — SIGNIFICANT CHANGE UP (ref 3.5–5.3)
PROT SERPL-MCNC: 5.1 GM/DL — LOW (ref 6–8.3)
RBC # BLD: 3.04 M/UL — LOW (ref 4.2–5.8)
RBC # FLD: 17.2 % — HIGH (ref 10.3–14.5)
SODIUM SERPL-SCNC: 133 MMOL/L — LOW (ref 135–145)
WBC # BLD: 4.29 K/UL — SIGNIFICANT CHANGE UP (ref 3.8–10.5)
WBC # FLD AUTO: 4.29 K/UL — SIGNIFICANT CHANGE UP (ref 3.8–10.5)

## 2025-05-18 PROCEDURE — 99232 SBSQ HOSP IP/OBS MODERATE 35: CPT

## 2025-05-18 RX ORDER — I.V. FAT EMULSION 20 G/100ML
0.87 EMULSION INTRAVENOUS
Qty: 50 | Refills: 0 | Status: DISCONTINUED | OUTPATIENT
Start: 2025-05-18 | End: 2025-05-18

## 2025-05-18 RX ORDER — SODIUM/POT/MAG/CALC/CHLOR/ACET 35-20-5MEQ
1 VIAL (ML) INTRAVENOUS
Refills: 0 | Status: DISCONTINUED | OUTPATIENT
Start: 2025-05-18 | End: 2025-05-18

## 2025-05-18 RX ORDER — ACETAMINOPHEN 500 MG/5ML
1000 LIQUID (ML) ORAL ONCE
Refills: 0 | Status: COMPLETED | OUTPATIENT
Start: 2025-05-18 | End: 2025-05-18

## 2025-05-18 RX ADMIN — Medication 1 EACH: at 23:19

## 2025-05-18 RX ADMIN — Medication 400 MILLIGRAM(S): at 00:36

## 2025-05-18 RX ADMIN — Medication 40 MILLIGRAM(S): at 08:12

## 2025-05-18 RX ADMIN — INSULIN LISPRO 5: 100 INJECTION, SOLUTION INTRAVENOUS; SUBCUTANEOUS at 08:13

## 2025-05-18 RX ADMIN — Medication 200 MILLIGRAM(S): at 21:06

## 2025-05-18 RX ADMIN — INSULIN LISPRO 1: 100 INJECTION, SOLUTION INTRAVENOUS; SUBCUTANEOUS at 17:58

## 2025-05-18 RX ADMIN — Medication 200 MILLIGRAM(S): at 09:35

## 2025-05-18 RX ADMIN — I.V. FAT EMULSION 20.8 GM/KG/DAY: 20 EMULSION INTRAVENOUS at 23:16

## 2025-05-18 RX ADMIN — ENOXAPARIN SODIUM 40 MILLIGRAM(S): 100 INJECTION SUBCUTANEOUS at 15:15

## 2025-05-18 NOTE — CHART NOTE - NSCHARTNOTEFT_GEN_A_CORE
Clinical Nutrition PN Follow Up Note    * 75yo M w/ PMHx of Cholangio CA s/p Robotic Whipple on 2024 (Dr. Ocasio) w/ adjuvant Xeloda -> [place]/Cis/Durva, Found liver metastases on 2024, failed CBD stent placement (unsuccessful cannulation via ERCP), s/p internal-external biliary drainage (8.5fr) at [facility] on , c/b High external drainage output ( >2L) due to Internal drainage failure (CBD blockage), now s/p exchange of internal-external biliary drainage (12fr) on  in . Presented with 3-4 days of progressive abdominal bloating with mild nausea, generalized weakness and fatigue. Last BM 3d ago, has been passing gases. Complains of mild discomfort of epigastric pain. Admitted w/ SBO (likely malignant). NGT to LWS in place. Started on mSBO protocol - Steroids/Reglan/Octreotide. RD consulted for initiation of TPN via port (OK to use from surgical oncology).   *: Initiate TPN via Port 2/2 mSBO and suspected prolonged NPO status  *:  (+) BM, passing flatus. CLD. Would suggest to renew TPN x 1 more day to ensure >80% ENN being met with bridging PO + TPN. D/w surg resident   *: TPN dc'd  per surgery.  CT A/P: GOO. s/p NGT to LWS w/ ~3L output overnight. Will initiate TPN via implanted infusion port.   *5/10: Per gastro: rec PEG placement; plan for  for long-term gastric decompression given mSBO, inability to tolerate adequate PO intake, and palliation. D/w surg res will c/w TPN.   *: Per surg "Patient has felt he no longer wants to fight and will choose palliative measures for his care". Planning for PEG placement monday.  *: Plan for PEG today  for long-term decompression given mSBO and palliation, will reorder TPN. NGT remains in place to suction.   *: s/p venting PEG placement. Palliative care following - plan for home TPN w/ venting PEG. On CLD for pleasure. DNR/ DNI   *: Diet advanced to CLD. S/p biliary tube check () - L biliary drain possibly clogged distally, as no contrast entered the pigtail on fluoroscopic study. However drain now draining. Plan to c/w TPN.  *5/15: Diet advanced to FLD. s/p IR drain exchange. Having BMs + abdominal discomfort. C/w TPN. Will be at dextrose goal today. **CAN CYCLE TPN x12 hours TOMORROW **  *: cont on FLD, c/w TPN to be cycled tonight. RD d/w surg resident, concern for low lytes despite large amounts in TPN and repletion outside bag. POCT erratic, RD suggested to add RISS. Dc home planned for tomorrow ()  *: S/p biliary stent change to a metal stent w/ IR yesterday. Passed loose stool w/ some relief of abd cramping. Plan for discharge home sometime this weekend (today vs tomorrow?) - lytes still LOW, receiving repletion outside PN bag. POCTs remain elevated, currently receiving RISS, will continue to monitor and may need to consider adding insulin in PN bag for additional coverage.     *TPN to be restarted 2/2 GOO and suspected prolonged NPO status.    *current status: noted w/ slight leakage around tube site, otherwise no pertinent changes/events overnight. Per discussion w/ surgery resident, plan for d/c home likely tomorrow. POCTs erractic x 24 hrs, will continue to monitor.    *new pertinent meds: abx, ADMELOG (8 units given x 24 hrs), LR, protonix    *labs reviewed; Na remains LOW - will leave up to medical management to correct. K+ now WNL however on lowest end of limits. As per ASPEN Guidelines, maintenance potassium concentration in PN is 1 – 2 mEq/kg/day. However, [facility] PN Policy indicates a maximum of 120 mEq should be added into the PN bag. Currently at 90 mEq which is close to maximum for wt. D/w surgery to replete outside bag - received repletion x 24 hrs.  Mg and Phos both on low-end of normal. As per ASPEN Guidelines, maintenance phosphorus concentration in PN is 20-40 mmol/day with a maximum concentration of 60mmol/day. Phos at MAX amount.  As per ASPEN Guidelines, maintenance magnesium concentration in PN is 8-20 mEq/day with a maximum concentration of 48 mEq/day.  ***PT LIKELY EXPERIENCING REFEEDING SYNDROME - CONTINUE WITH LYTE REPLETION OUTSIDE BAG. T Bili WNL however will keep trace elements out of PN bag and c/w 60 mcg selenium and 5 mg zinc in PN bag, can add trace back in once level continues to downtrend.  TG WNL (70) will c/w 50g lipids daily.  POCTs variable x 24 hrs, should be maintained between 140- 180 mg/dL. May need insulin added into bag - level this , c/w RISS outside PN bag. At dextrose goal of 350g daily to meet 100% of increased nutrient needs (GIR ~4.2, WNL). Plan to cycle x 12 hrs.  *no changes to lytes in PN bag *         133[L]  |  101  |  33[H]  ----------------------------<  363[H]  3.5   |  27  |  0.79    Ca    8.3[L]      18 May 2025 05:13  Phos  3.8       Mg     1.8         TPro  5.1[L]  /  Alb  2.0[L]  /  TBili  1.9[H]  /  DBili  x   /  AST  66[H]  /  ALT  142[H]  /  AlkPhos  770[H]      BMI: BMI (kg/m2): 18.2 (25 @ 03:35)  Glucose: POCT Blood Glucose.: 199 mg/dL (05-15-25 @ 05:45)    BP: 125/77 (05-15-25 @ 08:41) (95/59 - 125/77)Vital Signs Last 24 Hrs  T(C): 36.9 (05-15-25 @ 08:41), Max: 37 (05-15-25 @ 00:00)  T(F): 98.5 (05-15-25 @ 08:41), Max: 98.6 (05-15-25 @ 00:00)  HR: 73 (05-15-25 @ 08:41) (60 - 73)  BP: 125/77 (05-15-25 @ 08:41) (103/73 - 125/77)  RR: 18 (05-15-25 @ 08:41) (12 - 18)  SpO2: 98% (05-15-25 @ 08:41) (98% - 100%)    Lipid Panel: Date/Time: 25 @ 04:44  Triglycerides, Serum: 70    POCT Blood Glucose.: 364 mg/dL (18 May 2025 08:05)  POCT Blood Glucose.: 116 mg/dL (17 May 2025 18:01)  POCT Blood Glucose.: 113 mg/dL (17 May 2025 13:08)    *I&O's Detail    17 May 2025 07:01  -  18 May 2025 07:00  --------------------------------------------------------  IN:  Total IN: 0 mL    OUT:    Drain (mL): 550 mL    PEG (Percutaneous Endoscopic Gastrostomy) Tube (mL): 1800 mL    Voided (mL): 1500 mL  Total OUT: 3850 mL    Total NET: -3850 mL      18 May 2025 07:01  -  18 May 2025 09:33  --------------------------------------------------------  IN:  Total IN: 0 mL    OUT:    Drain (mL): 350 mL    PEG (Percutaneous Endoscopic Gastrostomy) Tube (mL): 800 mL  Total OUT: 1150 mL    Total NET: -1150 mL  * fluid status: negative; >2.5L output from venting PEG doc'd. No TPN doc'd. ***Strict I&O's are rec'd when pt is on PN as per protocol   *Last (+) BM doc'd on . (+) abdominal discomfort; pt not on bowel regimen.  *edema: none doc'd  *PI: coccyx, unstageable     *malnutrition: Pt continues to meet criteria for severe protein-calorie malnutrition in context of chronic disease r/t decreased ability to meet increased nutrient needs 2/2 mets Ca s/p Whipple and malignant SBO AEB severe muscle/ fat wasting, 33% wt loss x 1 yr, <50-75% ENN chronically    Estimated Needs: based on 57.6 Kg (wt from )  Calories: -  Kcal (35- 40 Kcal/Kg)  Protein: 86- 115 g (1.5- 2 g/Kg)  Fluids: 1728 - 2016 mL (30 - 35 mL/Kg)    *Full Liquid Diet  Parenteral Nutrition - Adult 1 Each TPN Continuous <Continuous>, 25 @ 22:00, 22:00, Stop order after: 1 Days    Weight History:  Daily Weight in k.4 (11 May 2025 05:32)    Height (cm): 177.8 (25 @ 03:35), 177.8 (25 @ 11:56), 177.8 (25 @ 10:58)  Weight (kg): 57.606 (25 @ 03:35), 57.6 (25 @ 10:58), 68 (25 @ 19:34)  BMI (kg/m2): 18.2 (25 @ 03:35), 18.2 (25 @ 11:56), 18.2 (25 @ 10:58)  BSA (m2): 1.72 (25 @ 03:35), 1.72 (25 @ 11:56), 1.72 (25 @ 10:58)    *no changes to PN bag *  TPN Recommendations: via implanted infusion port  Total Volume: 2000mL x 12 hours  115 g  Amino Acids  350 g Dextrose (@ goal)  50 g Lipids 20%  187 mEq Sodium Chloride  0 mEq Sodium Acetate  10 mmol Sodium Phosphate  0 mEq Potassium Chloride  16 mEq Potassium Acetate  50 mmol Potassium Phosphate  0 mEq Calcium Gluconate (CAPS out of PN solution)  20 mEq Magnesium Sulfate  200 mg Thiamine  0 ml Trace Elements  10 ml MVI  5 mg Zinc  60 mcg Selenium     Total Calories  2150  (Meets  100%  of  Estimated Energy needs  and 100% Protein needs)     *Goal: achieved - Pt will receive >/= 80% ENN via tolerated route    Additional Recommendations:    1) Daily weights  2) Strict I & O's  3) Daily lyte checks including magnesium and phos **cont to replete K, Phos, Mg outside PN bag PRN  4) Weekly triglycerides/LFT checks  5) POCT q6hrs; maintain 140-180mg/dL **on RISS, may need to add insulin in PN bag if POCTs remain elevated   6) Has venting PEG. Plan for home TPN w/ pleasure feeds     Nutrition recommendations to continue upon discharge. Goal to continue to meet >/= 80% ENN via tolerated route. RD to F/U prn for changes to nutrition dc plan.    *will continue to monitor and adjust PN prn*  Kriss Rothman MS, RDN, CDN [phone]

## 2025-05-18 NOTE — CONSULT NOTE ADULT - SUBJECTIVE AND OBJECTIVE BOX
HPI:  75 y/o M with a PMHx of Cholangiocarcinoma follows with Med Onc Dr Belinda Morrell s/p Robotic Whipple late 05/2024 by Dr Ocasio with adjuvant Xeloda then [place] / Cis / Durva (most recent dosing 03/2025) found liver mets 11/2024, failed CBD stent placement s/p internal-external biliary drainage at [facility] 04/2025 complicated by high output 2/2 internal drainage failure (CBD blockage), now s/p exchange of internal-external biliary drainage presented to the ED with ongoing ab pain / bloating / distension.     PAST MEDICAL & SURGICAL HISTORY:  Bile duct cancer  H/O Whipple procedure    REVIEW OF SYSTEMS:  CONSTITUTIONAL: No fever, weight loss, or fatigue  EYES: No eye pain, visual disturbances, or discharge  ENMT:  No difficulty hearing, tinnitus, vertigo; No sinus or throat pain  NECK: No pain or stiffness  BREASTS: No pain, masses, or nipple discharge  RESPIRATORY: No cough, wheezing, chills or hemoptysis; No shortness of breath  CARDIOVASCULAR: No chest pain, palpitations, dizziness, or leg swelling  GASTROINTESTINAL: No abdominal or epigastric pain. No nausea, vomiting, or hematemesis;   GENITOURINARY: No dysuria, frequency, hematuria, or incontinence  NEUROLOGICAL: No headaches, memory loss, loss of strength, numbness, or tremors  SKIN: No itching, burning, rashes, or lesions   LYMPH NODES: No enlarged glands  ENDOCRINE: No heat or cold intolerance; No hair loss  MUSCULOSKELETAL: No joint pain or swelling; No muscle, back, or extremity pain  PSYCHIATRIC: No depression, anxiety, mood swings, or difficulty sleeping  HEME/LYMPH: No easy bruising, or bleeding gums  ALLERY AND IMMUNOLOGIC: No hives or eczema    T(C): 36.9 (05-18-25 @ 08:33), Max: 37 (05-17-25 @ 14:49)  HR: 83 (05-18-25 @ 08:33) (79 - 96)  BP: 112/76 (05-18-25 @ 08:33) (106/76 - 116/75)  RR: 18 (05-18-25 @ 08:33) (18 - 18)  SpO2: 100% (05-18-25 @ 08:33) (99% - 100%)  Wt(kg): --Vital Signs Last 24 Hrs  T(C): 36.9 (18 May 2025 08:33), Max: 37 (17 May 2025 14:49)  T(F): 98.5 (18 May 2025 08:33), Max: 98.6 (17 May 2025 14:49)  HR: 83 (18 May 2025 08:33) (79 - 96)  BP: 112/76 (18 May 2025 08:33) (106/76 - 116/75)  BP(mean): --  RR: 18 (18 May 2025 08:33) (18 - 18)  SpO2: 100% (18 May 2025 08:33) (99% - 100%)    Parameters below as of 18 May 2025 08:33  Patient On (Oxygen Delivery Method): room air    PHYSICAL EXAM:  GENERAL: thin male in no acute distress  HEAD:  Atraumatic, Normocephalic  EYES: EOMI, PERRLA.  ENMT: No tonsillar erythema, exudates, or enlargement; Moist mucous membranes.  NECK: Supple, No JVD, Normal thyroid  NERVOUS SYSTEM:  Alert & Oriented X3, Good concentration; Motor Strength 5/5 B/L upper and lower extremities; DTRs 2+ intact and symmetric  CHEST/LUNG: Clear to auscultation bilaterally; No rales, rhonchi, wheezing, or rubs. Medi-port in place.   HEART: heart sounds with regular rhythm; No murmurs, rubs, or gallops  ABDOMEN: Soft, Nontender, Nondistended; PEG tube in place.  EXTREMITIES:  2+ Peripheral Pulses, No clubbing, cyanosis, or edema  SKIN: No rashes or lesions    Consultant(s) Notes Reviewed:  [x ] YES  [ ] NO  Care Discussed with Consultants/Other Providers [ x] YES  [ ] NO    LABS:                       8.9    4.29  )-----------( 134      ( 18 May 2025 05:13 )             27.9     05-18    133[L]  |  101  |  33[H]  ----------------------------<  363[H]  3.5   |  27  |  0.79    Ca    8.3[L]      18 May 2025 05:13  Phos  3.8     05-18  Mg     1.8     05-18    TPro  5.1[L]  /  Alb  2.0[L]  /  TBili  1.9[H]  /  DBili  x   /  AST  66[H]  /  ALT  142[H]  /  AlkPhos  770[H]  05-18    RADIOLOGY & ADDITIONAL TESTS:    - CT Abdomen and Pelvis w/ Oral Cont (05.08.25):  IMPRESSION:  Gastric outlet obstruction, which correlating with prior CT, is likely due to stricture at the gastrojejunal anastomosis.  Mildly dilated proximal small bowel. The possibility of second site of obstruction is considered.  Percutaneous biliary drain in place with moderate ductal dilatation.    Imaging Personally Reviewed:  [x] YES  [ ] NO

## 2025-05-18 NOTE — PROGRESS NOTE ADULT - SUBJECTIVE AND OBJECTIVE BOX
SURGERY DAILY PROGRESS NOTE:     Subjective:  Patient seen and examined by surgical team this morning.  s/p  bare metal biliary stent placement.  C/o slight leaking form drain site.Otherwise Pain well controlled, No acute overnight events  Denies fever or chills. Tolerating diet well without nausea or vomiting.     Objective:    MEDICATIONS  (STANDING):  ciprofloxacin   IVPB 400 milliGRAM(s) IV Intermittent every 12 hours  dextrose 5%. 1000 milliLiter(s) (50 mL/Hr) IV Continuous <Continuous>  dextrose 5%. 1000 milliLiter(s) (100 mL/Hr) IV Continuous <Continuous>  dextrose 50% Injectable 25 Gram(s) IV Push once  dextrose 50% Injectable 12.5 Gram(s) IV Push once  dextrose 50% Injectable 25 Gram(s) IV Push once  enoxaparin Injectable 40 milliGRAM(s) SubCutaneous every 24 hours  glucagon  Injectable 1 milliGRAM(s) IntraMuscular once  insulin lispro (ADMELOG) corrective regimen sliding scale   SubCutaneous three times a day before meals  lipid, fat emulsion (Fish Oil and Plant Based) 20% Infusion 0.868 Gm/kG/Day (20.8 mL/Hr) IV Continuous <Continuous>  pantoprazole    Tablet 40 milliGRAM(s) Oral before breakfast  Parenteral Nutrition - Adult 1 Each TPN Continuous <Continuous>  Parenteral Nutrition - Adult 1 Each TPN Continuous <Continuous>    MEDICATIONS  (PRN):  dextrose Oral Gel 15 Gram(s) Oral once PRN Blood Glucose LESS THAN 70 milliGRAM(s)/deciliter  dicyclomine 10 milliGRAM(s) Oral two times a day before meals PRN gas/cramps  ondansetron Injectable 4 milliGRAM(s) IV Push every 6 hours PRN Nausea and/or Vomiting  oxyCODONE    IR 5 milliGRAM(s) Oral every 6 hours PRN Severe Pain (7 - 10)      Vital Signs Last 24 Hrs  T(C): 36.9 (17 May 2025 22:15), Max: 37.2 (17 May 2025 08:39)  T(F): 98.5 (17 May 2025 22:15), Max: 98.9 (17 May 2025 08:39)  HR: 79 (17 May 2025 22:15) (79 - 96)  BP: 116/75 (17 May 2025 22:15) (106/76 - 123/73)  BP(mean): --  RR: 18 (17 May 2025 22:15) (18 - 18)  SpO2: 100% (17 May 2025 22:15) (98% - 100%)    Parameters below as of 17 May 2025 22:15  Patient On (Oxygen Delivery Method): room air    Physical Exam:  General: AOx3, Well developed, NAD, Jaundice improved  HEENT: NC/AT, normal pinnae and tragi  Chest: Normal respiratory effort, equal chest rise  Heart: RRR  Abdomen: Nondistended, soft, TTP around PEG and IR drains, no guarding or rebound tenderness.  Neuro/Psych: No localized deficits. Normal speech, normal tone, normal affect  Skin: Normal, warm, no rashes, no lesions noted  Extremities: Warm, well perfused, no edema  I&O's Detail    16 May 2025 07:01  -  17 May 2025 07:00  --------------------------------------------------------  IN:    Other (mL): 150 mL  Total IN: 150 mL    OUT:    Drain (mL): 300 mL    PEG (Percutaneous Endoscopic Gastrostomy) Tube (mL): 1000 mL    Voided (mL): 725 mL  Total OUT:  mL    Total NET: -1875 mL      17 May 2025 07:01  -  18 May 2025 02:07  --------------------------------------------------------  IN:  Total IN: 0 mL    OUT:    Drain (mL): 550 mL    PEG (Percutaneous Endoscopic Gastrostomy) Tube (mL): 1800 mL    Voided (mL): 900 mL  Total OUT: 3250 mL    Total NET: -3250 mL      Daily Weight in k.5 (17 May 2025 06:30)    LABS:                        8.5    4.74  )-----------( 106      ( 17 May 2025 04:57 )             26.3     05-17    133[L]  |  101  |  23  ----------------------------<  231[H]  3.4[L]   |  27  |  0.63    Ca    8.2[L]      17 May 2025 04:57  Phos  3.3       Mg     1.6         TPro  5.4[L]  /  Alb  1.9[L]  /  TBili  3.2[H]  /  DBili  x   /  AST  101[H]  /  ALT  156[H]  /  AlkPhos  878[H]      PT/INR - ( 16 May 2025 06:01 )   PT: 11.8 sec;   INR: 1.00 ratio         PTT - ( 16 May 2025 06:01 )  PTT:30.4 sec  Urinalysis Basic - ( 17 May 2025 04:57 )    Color: x / Appearance: x / SG: x / pH: x  Gluc: 231 mg/dL / Ketone: x  / Bili: x / Urobili: x   Blood: x / Protein: x / Nitrite: x   Leuk Esterase: x / RBC: x / WBC x   Sq Epi: x / Non Sq Epi: x / Bacteria: x

## 2025-05-18 NOTE — CONSULT NOTE ADULT - ASSESSMENT
75yo M w/ H/o Cholangio CA w/ distant mets, admitted on 5/6 with malignant SBO.  S/p Venting PEG tube placement, IR int-ext biliary drain exchange, IR bare metal biliary stent placement.  Internal medicine consulted for co-management.     Labs and imaging reviewed.     ROS and physical exam as above.     Assessment and Plan:    # Stage 4 cholangiocarcinoma complicated by malignant SBO:  Initially diagnosed 04 - 05/2024; s/p Robotic Whipple late 05/2024 by Dr Ocasio with adjuvant Xeloda   Initiated [place] / Cis / Durva when noted to have mets 10/2024 ---> most recent dosing 03/2025  Hematology/Oncology, surgical oncology & IR following.  S/p venting PEG tube placement  Currently receiving TPN through existing mediport, and tolerating PO diet (full liquids).   Plan per Case MGMT / SW to go home with home care, remain on TPN through the Mediport (okay per Primary Onc Dr Belinda Morrell).  Continue supportive measures according to [facility]  Palliative on board  Disposition per primary team.     # Hyperglycemia:  Occasionally elevated FSG since admission, likely in the setting of TPN.   Nutrition input and possible adjustments to TPN to prevent hyperglycemia.   Continue ISS per FSG. No basal insulin requirements at this time, will continue to monitor.     # Anemia:  Likely AOCD with component of myelosuppression from chemo.   Hematology/Oncology on board, will follow as outpatient.   Trend CBC and transfuse PRN.     # Thrombocytopenia:  Mild (PLTs 124).   Trend CBC and transfuse PRN.     # Healthcare maintenance:  VTE prophylaxis: LMWH.   Disposition: home. SW/CM on board.

## 2025-05-19 LAB
ALBUMIN SERPL ELPH-MCNC: 2.1 G/DL — LOW (ref 3.3–5)
ALP SERPL-CCNC: 729 U/L — HIGH (ref 40–120)
ALT FLD-CCNC: 112 U/L — HIGH (ref 12–78)
ANION GAP SERPL CALC-SCNC: 6 MMOL/L — SIGNIFICANT CHANGE UP (ref 5–17)
AST SERPL-CCNC: 33 U/L — SIGNIFICANT CHANGE UP (ref 15–37)
BILIRUB SERPL-MCNC: 1.8 MG/DL — HIGH (ref 0.2–1.2)
BUN SERPL-MCNC: 39 MG/DL — HIGH (ref 7–23)
CALCIUM SERPL-MCNC: 9.1 MG/DL — SIGNIFICANT CHANGE UP (ref 8.5–10.1)
CHLORIDE SERPL-SCNC: 98 MMOL/L — SIGNIFICANT CHANGE UP (ref 96–108)
CO2 SERPL-SCNC: 30 MMOL/L — SIGNIFICANT CHANGE UP (ref 22–31)
CREAT SERPL-MCNC: 0.89 MG/DL — SIGNIFICANT CHANGE UP (ref 0.5–1.3)
EGFR: 89 ML/MIN/1.73M2 — SIGNIFICANT CHANGE UP
EGFR: 89 ML/MIN/1.73M2 — SIGNIFICANT CHANGE UP
GLUCOSE BLDC GLUCOMTR-MCNC: 132 MG/DL — HIGH (ref 70–99)
GLUCOSE BLDC GLUCOMTR-MCNC: 147 MG/DL — HIGH (ref 70–99)
GLUCOSE BLDC GLUCOMTR-MCNC: 152 MG/DL — HIGH (ref 70–99)
GLUCOSE BLDC GLUCOMTR-MCNC: 454 MG/DL — CRITICAL HIGH (ref 70–99)
GLUCOSE BLDC GLUCOMTR-MCNC: 486 MG/DL — CRITICAL HIGH (ref 70–99)
GLUCOSE SERPL-MCNC: 447 MG/DL — HIGH (ref 70–99)
HCT VFR BLD CALC: 29.4 % — LOW (ref 39–50)
HGB BLD-MCNC: 9.3 G/DL — LOW (ref 13–17)
MAGNESIUM SERPL-MCNC: 2.2 MG/DL — SIGNIFICANT CHANGE UP (ref 1.6–2.6)
MCHC RBC-ENTMCNC: 29 PG — SIGNIFICANT CHANGE UP (ref 27–34)
MCHC RBC-ENTMCNC: 31.6 G/DL — LOW (ref 32–36)
MCV RBC AUTO: 91.6 FL — SIGNIFICANT CHANGE UP (ref 80–100)
NRBC # BLD AUTO: 0 K/UL — SIGNIFICANT CHANGE UP (ref 0–0)
NRBC # FLD: 0 K/UL — SIGNIFICANT CHANGE UP (ref 0–0)
NRBC BLD AUTO-RTO: 0 /100 WBCS — SIGNIFICANT CHANGE UP (ref 0–0)
PHOSPHATE SERPL-MCNC: 4.1 MG/DL — SIGNIFICANT CHANGE UP (ref 2.5–4.5)
PLATELET # BLD AUTO: 146 K/UL — LOW (ref 150–400)
PMV BLD: 9.9 FL — SIGNIFICANT CHANGE UP (ref 7–13)
POTASSIUM SERPL-MCNC: 3.2 MMOL/L — LOW (ref 3.5–5.3)
POTASSIUM SERPL-SCNC: 3.2 MMOL/L — LOW (ref 3.5–5.3)
PROT SERPL-MCNC: 5.9 GM/DL — LOW (ref 6–8.3)
RBC # BLD: 3.21 M/UL — LOW (ref 4.2–5.8)
RBC # FLD: 16.8 % — HIGH (ref 10.3–14.5)
SODIUM SERPL-SCNC: 134 MMOL/L — LOW (ref 135–145)
WBC # BLD: 4.28 K/UL — SIGNIFICANT CHANGE UP (ref 3.8–10.5)
WBC # FLD AUTO: 4.28 K/UL — SIGNIFICANT CHANGE UP (ref 3.8–10.5)

## 2025-05-19 PROCEDURE — 99233 SBSQ HOSP IP/OBS HIGH 50: CPT

## 2025-05-19 RX ORDER — I.V. FAT EMULSION 20 G/100ML
0.87 EMULSION INTRAVENOUS
Qty: 50 | Refills: 0 | Status: DISCONTINUED | OUTPATIENT
Start: 2025-05-19 | End: 2025-05-19

## 2025-05-19 RX ORDER — INSULIN LISPRO 100 U/ML
INJECTION, SOLUTION INTRAVENOUS; SUBCUTANEOUS
Refills: 0 | Status: DISCONTINUED | OUTPATIENT
Start: 2025-05-19 | End: 2025-05-26

## 2025-05-19 RX ORDER — INSULIN LISPRO 100 U/ML
INJECTION, SOLUTION INTRAVENOUS; SUBCUTANEOUS AT BEDTIME
Refills: 0 | Status: DISCONTINUED | OUTPATIENT
Start: 2025-05-19 | End: 2025-05-26

## 2025-05-19 RX ORDER — ACETAMINOPHEN 500 MG/5ML
1000 LIQUID (ML) ORAL ONCE
Refills: 0 | Status: COMPLETED | OUTPATIENT
Start: 2025-05-19 | End: 2025-05-19

## 2025-05-19 RX ORDER — SODIUM/POT/MAG/CALC/CHLOR/ACET 35-20-5MEQ
1 VIAL (ML) INTRAVENOUS
Refills: 0 | Status: DISCONTINUED | OUTPATIENT
Start: 2025-05-19 | End: 2025-05-19

## 2025-05-19 RX ADMIN — Medication 20 MILLIEQUIVALENT(S): at 08:50

## 2025-05-19 RX ADMIN — Medication 200 MILLIGRAM(S): at 10:20

## 2025-05-19 RX ADMIN — Medication 400 MILLIGRAM(S): at 21:53

## 2025-05-19 RX ADMIN — INSULIN LISPRO 2: 100 INJECTION, SOLUTION INTRAVENOUS; SUBCUTANEOUS at 13:29

## 2025-05-19 RX ADMIN — Medication 200 MILLIGRAM(S): at 21:25

## 2025-05-19 RX ADMIN — Medication 75 MILLILITER(S): at 21:53

## 2025-05-19 RX ADMIN — INSULIN LISPRO 6: 100 INJECTION, SOLUTION INTRAVENOUS; SUBCUTANEOUS at 08:49

## 2025-05-19 RX ADMIN — I.V. FAT EMULSION 20.83 GM/KG/DAY: 20 EMULSION INTRAVENOUS at 23:48

## 2025-05-19 RX ADMIN — Medication 40 MILLIGRAM(S): at 05:16

## 2025-05-19 RX ADMIN — Medication 20 MILLIEQUIVALENT(S): at 05:17

## 2025-05-19 RX ADMIN — ENOXAPARIN SODIUM 40 MILLIGRAM(S): 100 INJECTION SUBCUTANEOUS at 13:29

## 2025-05-19 RX ADMIN — Medication 1 EACH: at 23:48

## 2025-05-19 RX ADMIN — Medication 1000 MILLIGRAM(S): at 22:08

## 2025-05-19 NOTE — PROGRESS NOTE ADULT - SUBJECTIVE AND OBJECTIVE BOX
Patient seen and examined at the bedside this AM  Tolerating Diet and enjoyed some ice cream  PEG venting overnight has improved his pain greatly  Large volume of bile draining  AVSS      PAST MEDICAL & SURGICAL HISTORY:  Bile duct cancer      H/O Whipple procedure          MEDICATIONS  (STANDING):  ciprofloxacin   IVPB 400 milliGRAM(s) IV Intermittent every 12 hours  dextrose 5%. 1000 milliLiter(s) (50 mL/Hr) IV Continuous <Continuous>  dextrose 5%. 1000 milliLiter(s) (100 mL/Hr) IV Continuous <Continuous>  dextrose 50% Injectable 25 Gram(s) IV Push once  dextrose 50% Injectable 12.5 Gram(s) IV Push once  dextrose 50% Injectable 25 Gram(s) IV Push once  enoxaparin Injectable 40 milliGRAM(s) SubCutaneous every 24 hours  glucagon  Injectable 1 milliGRAM(s) IntraMuscular once  insulin lispro (ADMELOG) corrective regimen sliding scale   SubCutaneous three times a day before meals  lipid, fat emulsion (Fish Oil and Plant Based) 20% Infusion 0.868 Gm/kG/Day (20.8 mL/Hr) IV Continuous <Continuous>  pantoprazole    Tablet 40 milliGRAM(s) Oral before breakfast  Parenteral Nutrition - Adult 1 Each TPN Continuous <Continuous>  Parenteral Nutrition - Adult 1 Each TPN Continuous <Continuous>  potassium chloride    Tablet ER 20 milliEquivalent(s) Oral every 2 hours    MEDICATIONS  (PRN):  dextrose Oral Gel 15 Gram(s) Oral once PRN Blood Glucose LESS THAN 70 milliGRAM(s)/deciliter  dicyclomine 10 milliGRAM(s) Oral two times a day before meals PRN gas/cramps  ondansetron Injectable 4 milliGRAM(s) IV Push every 6 hours PRN Nausea and/or Vomiting  oxyCODONE    IR 5 milliGRAM(s) Oral every 6 hours PRN Severe Pain (7 - 10)      Allergies    statins (Unknown)  atorvastatin (Hives)    Intolerances        SOCIAL HISTORY:    FAMILY HISTORY:      Physical Exam:  General: AOx3, Well developed, NAD, Jaundice improved  HEENT: NC/AT, normal pinnae and tragi  Chest: Normal respiratory effort, equal chest rise  Heart: RRR  Abdomen: Nondistended, soft, TTP around PEG and IR drains, no guarding or rebound tenderness.  Neuro/Psych: No localized deficits. Normal speech, normal tone, normal affect  Skin: Normal, warm, no rashes, no lesions noted  Extremities: Warm, well perfused, no edema        Vital Signs Last 24 Hrs  T(C): 36.3 (18 May 2025 23:09), Max: 36.9 (18 May 2025 08:33)  T(F): 97.4 (18 May 2025 23:09), Max: 98.5 (18 May 2025 08:33)  HR: 86 (18 May 2025 23:09) (83 - 86)  BP: 106/63 (18 May 2025 23:09) (104/71 - 112/76)  BP(mean): --  RR: 19 (18 May 2025 23:09) (18 - 19)  SpO2: 97% (18 May 2025 23:09) (97% - 100%)    Parameters below as of 18 May 2025 23:09  Patient On (Oxygen Delivery Method): room air        I&O's Summary    17 May 2025 07:01  -  18 May 2025 07:00  --------------------------------------------------------  IN: 0 mL / OUT: 3850 mL / NET: -3850 mL    18 May 2025 07:01  -  19 May 2025 03:49  --------------------------------------------------------  IN: 0 mL / OUT: 5925 mL / NET: -5925 mL            LABS:                        8.9    4.29  )-----------( 134      ( 18 May 2025 05:13 )             27.9     05-18    133[L]  |  101  |  33[H]  ----------------------------<  363[H]  3.5   |  27  |  0.79    Ca    8.3[L]      18 May 2025 05:13  Phos  3.8     05-18  Mg     1.8     05-18    TPro  5.1[L]  /  Alb  2.0[L]  /  TBili  1.9[H]  /  DBili  x   /  AST  66[H]  /  ALT  142[H]  /  AlkPhos  770[H]  05-18      Urinalysis Basic - ( 18 May 2025 05:13 )    Color: x / Appearance: x / SG: x / pH: x  Gluc: 363 mg/dL / Ketone: x  / Bili: x / Urobili: x   Blood: x / Protein: x / Nitrite: x   Leuk Esterase: x / RBC: x / WBC x   Sq Epi: x / Non Sq Epi: x / Bacteria: x      CAPILLARY BLOOD GLUCOSE      POCT Blood Glucose.: 120 mg/dL (18 May 2025 23:06)  POCT Blood Glucose.: 182 mg/dL (18 May 2025 17:54)  POCT Blood Glucose.: 136 mg/dL (18 May 2025 13:48)  POCT Blood Glucose.: 364 mg/dL (18 May 2025 08:05)    LIVER FUNCTIONS - ( 18 May 2025 05:13 )  Alb: 2.0 g/dL / Pro: 5.1 gm/dL / ALK PHOS: 770 U/L / ALT: 142 U/L / AST: 66 U/L / GGT: x             Cultures:      RADIOLOGY & ADDITIONAL STUDIES:

## 2025-05-19 NOTE — PROGRESS NOTE ADULT - ASSESSMENT
75yo M w/ H/o Cholangio CA w/ distant mets, admitted on 5/6 with malignant SBO. S/p Venting PEG tube placement, IR int-ext biliary drain exchange, IR bare metal biliary stent placement.    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  S/p venting PEG tube placement  Currently receiving TPN through existing mediport, and tolerating PO diet (full liquids).   Plan per Case MGMT / SW to go home with home care, remain on TPN through the Mediport (okay per Primary Onc Dr Belinda Morrell).  Continue supportive measures according to [facility]  Palliative on board  Disposition per primary team.     # Hyperglycemia:  -am hyperglycemia in setting of overnight cycled TPN  -BG 450s this am, AG of 6  -discussed w TPN RD and clinical pharmacist, to add insulin to TPN bag and also change SSI to moderate w bedtime scale    # Anemia:  Likely AOCD with component of myelosuppression from chemo.   Hematology/Oncology on board, will follow as outpatient.   Trend CBC and transfuse PRN.     # Thrombocytopenia:  -stable     # Healthcare maintenance:  VTE prophylaxis: LMWH.   Disposition: home. SW/CM on board.

## 2025-05-19 NOTE — CHART NOTE - NSCHARTNOTEFT_GEN_A_CORE
Clinical Nutrition PN Follow Up Note    * 73yo M w/ PMHx of Cholangio CA s/p Robotic Whipple on 2024 (Dr. Ocasio) w/ adjuvant Xeloda -> [place]/Cis/Durva, Found liver metastases on 2024, failed CBD stent placement (unsuccessful cannulation via ERCP), s/p internal-external biliary drainage (8.5fr) at [facility] on , c/b High external drainage output ( >2L) due to Internal drainage failure (CBD blockage), now s/p exchange of internal-external biliary drainage (12fr) on  in . Presented with 3-4 days of progressive abdominal bloating with mild nausea, generalized weakness and fatigue. Last BM 3d ago, has been passing gases. Complains of mild discomfort of epigastric pain. Admitted w/ SBO (likely malignant). NGT to LWS in place. Started on mSBO protocol - Steroids/Reglan/Octreotide. RD consulted for initiation of TPN via port (OK to use from surgical oncology).   *: Initiate TPN via Port 2/2 mSBO and suspected prolonged NPO status  *:  (+) BM, passing flatus. CLD. Would suggest to renew TPN x 1 more day to ensure >80% ENN being met with bridging PO + TPN. D/w surg resident   *: TPN dc'd  per surgery.  CT A/P: GOO. s/p NGT to LWS w/ ~3L output overnight. Will initiate TPN via implanted infusion port.   *5/10: Per gastro: rec PEG placement; plan for  for long-term gastric decompression given mSBO, inability to tolerate adequate PO intake, and palliation. D/w surg res will c/w TPN.   *: Per surg "Patient has felt he no longer wants to fight and will choose palliative measures for his care". Planning for PEG placement monday.  *: Plan for PEG today  for long-term decompression given mSBO and palliation, will reorder TPN. NGT remains in place to suction.   *: s/p venting PEG placement. Palliative care following - plan for home TPN w/ venting PEG. On CLD for pleasure. DNR/ DNI   *: Diet advanced to CLD. S/p biliary tube check () - L biliary drain possibly clogged distally, as no contrast entered the pigtail on fluoroscopic study. However drain now draining. Plan to c/w TPN.  *5/15: Diet advanced to FLD. s/p IR drain exchange. Having BMs + abdominal discomfort. C/w TPN. Will be at dextrose goal today. **CAN CYCLE TPN x12 hours TOMORROW **  *: cont on FLD, c/w TPN to be cycled tonight. RD d/w surg resident, concern for low lytes despite large amounts in TPN and repletion outside bag. POCT erratic, RD suggested to add RISS. Dc home planned for tomorrow ()  *: S/p biliary stent change to a metal stent w/ IR yesterday. Passed loose stool w/ some relief of abd cramping. Plan for discharge home sometime this weekend (today vs tomorrow?) - lytes still LOW, receiving repletion outside PN bag. POCTs remain elevated, currently receiving RISS, will continue to monitor and may need to consider adding insulin in PN bag for additional coverage.   *: noted w/ slight leakage around tube site, otherwise no pertinent changes/events overnight. Per discussion w/ surgery resident, plan for d/c home likely tomorrow. POCTs erractic x 24 hrs, will continue to monitor.    *TPN to be restarted 2/2 GOO and suspected prolonged NPO status.    *current status: Tolerating diet, venting PEG improved pain. Per surg large vol of bile draining. POCTs still erractic. D/w Dr. Randhawa and pharmacy, will add 10 units of insulin into the PN bag and continue w/ RISS. Will continue to monitor POCTs and adjust w/ pharmacy & MD.     *new pertinent meds: abx, ADMELOG (12 units given x 24 hrs), protonix, oxycodone, KCl (40 mEq x 24 hrs)    *labs reviewed; Na low, corrected Na for hyperglycemia WNL - will continue to monitor and leave up to medical management to correct prn. K+ LOW. As per ASPEN Guidelines, maintenance potassium concentration in PN is 1 – 2 mEq/kg/day. However, [facility] PN Policy indicates a maximum of 120 mEq should be added into the PN bag. Currently at 90 mEq which is close to maximum for wt. D/w surgery to continue to replete outside bag - received repletion x 24 hrs.  Mg and Phos WNL. As per ASPEN Guidelines, maintenance phosphorus concentration in PN is 20-40 mmol/day with a maximum concentration of 60mmol/day. Phos at MAX amount.  As per ASPEN Guidelines, maintenance magnesium concentration in PN is 8-20 mEq/day with a maximum concentration of 48 mEq/day.  ***PT LIKELY EXPERIENCING REFEEDING SYNDROME - CONTINUE WITH LYTE REPLETION OUTSIDE BAG. corrected Ca elevated, DO NOT supplement calcium outside of PN bag at this time. T Bili (1.8) WNL however will keep trace elements out of PN bag and c/w 60 mcg selenium and 5 mg zinc in PN bag, can add trace back in once level continues to downtrend.  TG WNL (91) will c/w 50g lipids daily.  POCTs variable x 24 hrs, should be maintained between 140- 180 mg/dL; - level this . D/w pharmacy and Dr. Randhawa will add 10 units of insulin into the bag today (0.028 units Insulin/g dextrose), and c/w RISS outside PN bag. At dextrose goal of 350g daily to meet 100% of increased nutrient needs (GIR ~4.2, WNL). Plan to cycle x 12 hrs.          134[L]  |  98  |  39[H]  ----------------------------<  447[H]  3.2[L]   |  30  |  0.89    Ca    9.1      19 May 2025 05:50  Phos  4.1       Mg     2.2         TPro  5.9[L]  /  Alb  2.1[L]  /  TBili  1.8[H]  /  DBili  x   /  AST  33  /  ALT  112[H]  /  AlkPhos  729[H]        BMI: BMI (kg/m2): 18.2 (25 @ 03:35)  HbA1c: A1C with Estimated Average Glucose Result: 5.3 % (25 @ 04:57)  Glucose: POCT Blood Glucose.: 486 mg/dL (25 @ 08:42)    BP: 111/75 (25 @ 07:24) (102/62 - 128/73)Vital Signs Last 24 Hrs  T(C): 36.3 (25 @ 07:24), Max: 36.7 (25 @ 15:27)  T(F): 97.4 (25 @ 07:24), Max: 98 (25 @ 15:27)  HR: 78 (25 @ 07:24) (78 - 86)  BP: 111/75 (25 @ 07:24) (104/71 - 111/75)  RR: 18 (25 @ 07:24) (18 - 19)  SpO2: 100% (25 @ 07:24) (97% - 100%)    Lipid Panel: Date/Time: 05-15-25 @ 05:16  Triglycerides, Serum: 91    POCT Blood Glucose.: 486 mg/dL (19 May 2025 08:42)  POCT Blood Glucose.: 454 mg/dL (19 May 2025 08:39)  POCT Blood Glucose.: 120 mg/dL (18 May 2025 23:06)  POCT Blood Glucose.: 182 mg/dL (18 May 2025 17:54)  POCT Blood Glucose.: 136 mg/dL (18 May 2025 13:48)    *I&O's Detail    18 May 2025 07:01  -  19 May 2025 07:00  --------------------------------------------------------  IN:  Total IN: 0 mL    OUT:    Drain (mL): 1325 mL    PEG (Percutaneous Endoscopic Gastrostomy) Tube (mL): 5600 mL    Voided (mL): 300 mL  Total OUT: 7225 mL    Total NET: -7225 mL      19 May 2025 07:01  -  19 May 2025 10:02  --------------------------------------------------------  IN:  Total IN: 0 mL    OUT:    Drain (mL): 50 mL    PEG (Percutaneous Endoscopic Gastrostomy) Tube (mL): 850 mL    Voided (mL): 450 mL  Total OUT: 1350 mL    Total NET: -1350 mL  * fluid status: negative; >5.6L output from venting PEG doc'd. No TPN doc'd. ***Strict I&O's are rec'd when pt is on PN as per protocol   *Last (+) BM doc'd on . (+) abdominal discomfort; pt not on bowel regimen.  *edema: none doc'd  *PI: coccyx, unstageable     *malnutrition: Pt continues to meet criteria for severe protein-calorie malnutrition in context of chronic disease r/t decreased ability to meet increased nutrient needs 2/2 mets Ca s/p Whipple and malignant SBO AEB severe muscle/ fat wasting, 33% wt loss x 1 yr, <50-75% ENN chronically    Estimated Needs: based on 57.6 Kg (wt from )  Calories: -  Kcal (35- 40 Kcal/Kg)  Protein: 86- 115 g (1.5- 2 g/Kg)  Fluids: 1728 - 2016 mL (30 - 35 mL/Kg)    *Full Liquid Diet  Parenteral Nutrition - Adult 1 Each TPN Continuous <Continuous>, 25 @ 22:00, 22:00, Stop order after: 1 Days    Weight History:  Daily Weight in k.8 (19 May 2025 06:12)  Daily Weight in k.4 (11 May 2025 05:32)    Height (cm): 177.8 (25 @ 03:35), 177.8 (25 @ 11:56), 177.8 (25 @ 10:58)  Weight (kg): 57.606 (25 @ 03:35), 57.6 (25 @ 10:58), 68 (25 @ 19:34)  BMI (kg/m2): 18.2 (25 @ 03:35), 18.2 (25 @ 11:56), 18.2 (25 @ 10:58)  BSA (m2): 1.72 (25 @ 03:35), 1.72 (25 @ 11:56), 1.72 (25 @ 10:58)    TPN Recommendations: via implanted infusion port  Total Volume: 2000mL x 12 hours  115 g  Amino Acids  350 g Dextrose (@ goal)  50 g Lipids 20%  187 mEq Sodium Chloride  0 mEq Sodium Acetate  10 mmol Sodium Phosphate  0 mEq Potassium Chloride  16 mEq Potassium Acetate  50 mmol Potassium Phosphate  0 mEq Calcium Gluconate (CAPS out of PN solution)  20 mEq Magnesium Sulfate  200 mg Thiamine  0 ml Trace Elements  10 ml MVI  5 mg Zinc  60 mcg Selenium   10 units Regular Insulin    Total Calories  2150  (Meets  100%  of  Estimated Energy needs  and 100% Protein needs)     *Goal: achieved - Pt will receive >/= 80% ENN via tolerated route    Additional Recommendations:    1) Daily weights  2) Strict I & O's  3) Daily lyte checks including magnesium and phos **cont to replete K, Phos, Mg outside PN bag PRN  4) Weekly triglycerides/LFT checks  5) POCT q6hrs; maintain 140-180mg/dL **on RISS, collaboration w/ pharmacy will add 10 units insulin to the PN bag  6) Has venting PEG. Plan for home TPN w/ pleasure feeds     Nutrition recommendations to continue upon discharge. Goal to continue to meet >/= 80% ENN via tolerated route. RD to F/U prn for changes to nutrition dc plan.    *will continue to monitor and adjust PN prn*  Lary Parikh, PhD, MS, RDN, [phone] Clinical Nutrition PN Follow Up Note    * 75yo M w/ PMHx of Cholangio CA s/p Robotic Whipple on 2024 (Dr. Ocasio) w/ adjuvant Xeloda -> [place]/Cis/Durva, Found liver metastases on 2024, failed CBD stent placement (unsuccessful cannulation via ERCP), s/p internal-external biliary drainage (8.5fr) at [facility] on , c/b High external drainage output ( >2L) due to Internal drainage failure (CBD blockage), now s/p exchange of internal-external biliary drainage (12fr) on  in . Presented with 3-4 days of progressive abdominal bloating with mild nausea, generalized weakness and fatigue. Last BM 3d ago, has been passing gases. Complains of mild discomfort of epigastric pain. Admitted w/ SBO (likely malignant). NGT to LWS in place. Started on mSBO protocol - Steroids/Reglan/Octreotide. RD consulted for initiation of TPN via port (OK to use from surgical oncology).   *: Initiate TPN via Port 2/2 mSBO and suspected prolonged NPO status  *:  (+) BM, passing flatus. CLD. Would suggest to renew TPN x 1 more day to ensure >80% ENN being met with bridging PO + TPN. D/w surg resident   *: TPN dc'd  per surgery.  CT A/P: GOO. s/p NGT to LWS w/ ~3L output overnight. Will initiate TPN via implanted infusion port.   *5/10: Per gastro: rec PEG placement; plan for  for long-term gastric decompression given mSBO, inability to tolerate adequate PO intake, and palliation. D/w surg res will c/w TPN.   *: Per surg "Patient has felt he no longer wants to fight and will choose palliative measures for his care". Planning for PEG placement monday.  *: Plan for PEG today  for long-term decompression given mSBO and palliation, will reorder TPN. NGT remains in place to suction.   *: s/p venting PEG placement. Palliative care following - plan for home TPN w/ venting PEG. On CLD for pleasure. DNR/ DNI   *: Diet advanced to CLD. S/p biliary tube check () - L biliary drain possibly clogged distally, as no contrast entered the pigtail on fluoroscopic study. However drain now draining. Plan to c/w TPN.  *5/15: Diet advanced to FLD. s/p IR drain exchange. Having BMs + abdominal discomfort. C/w TPN. Will be at dextrose goal today. **CAN CYCLE TPN x12 hours TOMORROW **  *: cont on FLD, c/w TPN to be cycled tonight. RD d/w surg resident, concern for low lytes despite large amounts in TPN and repletion outside bag. POCT erratic, RD suggested to add RISS. Dc home planned for tomorrow ()  *: S/p biliary stent change to a metal stent w/ IR yesterday. Passed loose stool w/ some relief of abd cramping. Plan for discharge home sometime this weekend (today vs tomorrow?) - lytes still LOW, receiving repletion outside PN bag. POCTs remain elevated, currently receiving RISS, will continue to monitor and may need to consider adding insulin in PN bag for additional coverage.   *: noted w/ slight leakage around tube site, otherwise no pertinent changes/events overnight. Per discussion w/ surgery resident, plan for d/c home likely tomorrow. POCTs erractic x 24 hrs, will continue to monitor.    *TPN to be restarted 2/2 GOO and suspected prolonged NPO status.    *current status: Tolerating diet, venting PEG improved pain. Per surg large vol of bile draining. Per IR w/ 1350 cc bilious output overnight; drain capped today. POCTs still erractic. D/w Dr. Randhawa and pharmacy, will add 10 units of insulin into the PN bag and continue w/ RISS. Will continue to monitor POCTs and adjust w/ pharmacy & MD.     *new pertinent meds: abx, ADMELOG (12 units given x 24 hrs), protonix, oxycodone, KCl (40 mEq x 24 hrs)    *labs reviewed; Na low, corrected Na for hyperglycemia WNL - will continue to monitor and leave up to medical management to correct prn. K+ LOW. As per ASPEN Guidelines, maintenance potassium concentration in PN is 1 – 2 mEq/kg/day. However, [facility] PN Policy indicates a maximum of 120 mEq should be added into the PN bag. Currently at 90 mEq which is close to maximum for wt. D/w surgery to continue to replete outside bag - received repletion x 24 hrs.  Mg and Phos WNL. As per ASPEN Guidelines, maintenance phosphorus concentration in PN is 20-40 mmol/day with a maximum concentration of 60mmol/day. Phos at MAX amount.  As per ASPEN Guidelines, maintenance magnesium concentration in PN is 8-20 mEq/day with a maximum concentration of 48 mEq/day.  ***PT LIKELY EXPERIENCING REFEEDING SYNDROME - CONTINUE WITH LYTE REPLETION OUTSIDE BAG. corrected Ca elevated, DO NOT supplement calcium outside of PN bag at this time. T Bili (1.8) WNL however will keep trace elements out of PN bag and c/w 60 mcg selenium and 5 mg zinc in PN bag, can add trace back in once level continues to downtrend.  TG WNL (91) will c/w 50g lipids daily.  POCTs variable x 24 hrs, should be maintained between 140- 180 mg/dL; - level this . D/w pharmacy and Dr. Randhawa will add 10 units of insulin into the bag today (0.028 units Insulin/g dextrose), and c/w RISS outside PN bag. At dextrose goal of 350g daily to meet 100% of increased nutrient needs (GIR ~4.2, WNL). Plan to cycle x 12 hrs.          134[L]  |  98  |  39[H]  ----------------------------<  447[H]  3.2[L]   |  30  |  0.89    Ca    9.1      19 May 2025 05:50  Phos  4.1       Mg     2.2         TPro  5.9[L]  /  Alb  2.1[L]  /  TBili  1.8[H]  /  DBili  x   /  AST  33  /  ALT  112[H]  /  AlkPhos  729[H]        BMI: BMI (kg/m2): 18.2 (25 @ 03:35)  HbA1c: A1C with Estimated Average Glucose Result: 5.3 % (25 @ 04:57)  Glucose: POCT Blood Glucose.: 486 mg/dL (25 @ 08:42)    BP: 111/75 (25 @ 07:24) (102/62 - 128/73)Vital Signs Last 24 Hrs  T(C): 36.3 (25 @ 07:24), Max: 36.7 (25 @ 15:27)  T(F): 97.4 (25 @ 07:24), Max: 98 (25 @ 15:27)  HR: 78 (25 @ 07:24) (78 - 86)  BP: 111/75 (25 @ 07:24) (104/71 - 111/75)  RR: 18 (25 @ 07:24) (18 - 19)  SpO2: 100% (25 @ 07:24) (97% - 100%)    Lipid Panel: Date/Time: 05-15-25 @ 05:16  Triglycerides, Serum: 91    POCT Blood Glucose.: 486 mg/dL (19 May 2025 08:42)  POCT Blood Glucose.: 454 mg/dL (19 May 2025 08:39)  POCT Blood Glucose.: 120 mg/dL (18 May 2025 23:06)  POCT Blood Glucose.: 182 mg/dL (18 May 2025 17:54)  POCT Blood Glucose.: 136 mg/dL (18 May 2025 13:48)    *I&O's Detail    18 May 2025 07:01  -  19 May 2025 07:00  --------------------------------------------------------  IN:  Total IN: 0 mL    OUT:    Drain (mL): 1325 mL    PEG (Percutaneous Endoscopic Gastrostomy) Tube (mL): 5600 mL    Voided (mL): 300 mL  Total OUT: 7225 mL    Total NET: -7225 mL      19 May 2025 07:01  -  19 May 2025 10:02  --------------------------------------------------------  IN:  Total IN: 0 mL    OUT:    Drain (mL): 50 mL    PEG (Percutaneous Endoscopic Gastrostomy) Tube (mL): 850 mL    Voided (mL): 450 mL  Total OUT: 1350 mL    Total NET: -1350 mL  * fluid status: negative; >5.6L output from venting PEG doc'd. No TPN doc'd. ***Strict I&O's are rec'd when pt is on PN as per protocol   *Last (+) BM doc'd on . (+) abdominal discomfort; pt not on bowel regimen.  *edema: none doc'd  *PI: coccyx, unstageable     *malnutrition: Pt continues to meet criteria for severe protein-calorie malnutrition in context of chronic disease r/t decreased ability to meet increased nutrient needs 2/2 mets Ca s/p Whipple and malignant SBO AEB severe muscle/ fat wasting, 33% wt loss x 1 yr, <50-75% ENN chronically    Estimated Needs: based on 57.6 Kg (wt from )  Calories: 2016- 230 Kcal (35- 40 Kcal/Kg)  Protein: 86- 115 g (1.5- 2 g/Kg)  Fluids: 1728 - 2016 mL (30 - 35 mL/Kg)    *Full Liquid Diet  Parenteral Nutrition - Adult 1 Each TPN Continuous <Continuous>, 25 @ 22:00, 22:00, Stop order after: 1 Days    Weight History:  Daily Weight in k.8 (19 May 2025 06:12)  Daily Weight in k.4 (11 May 2025 05:32)    Height (cm): 177.8 (25 @ 03:35), 177.8 (25 @ 11:56), 177.8 (25 @ 10:58)  Weight (kg): 57.606 (25 @ 03:35), 57.6 (25 @ 10:58), 68 (25 @ 19:34)  BMI (kg/m2): 18.2 (25 @ 03:35), 18.2 (25 @ 11:56), 18.2 (25 @ 10:58)  BSA (m2): 1.72 (25 @ 03:35), 1.72 (25 @ 11:56), 1.72 (25 @ 10:58)    TPN Recommendations: via implanted infusion port  Total Volume: 2000mL x 12 hours  115 g  Amino Acids  350 g Dextrose (@ goal)  50 g Lipids 20%  187 mEq Sodium Chloride  0 mEq Sodium Acetate  10 mmol Sodium Phosphate  0 mEq Potassium Chloride  16 mEq Potassium Acetate  50 mmol Potassium Phosphate  0 mEq Calcium Gluconate (CAPS out of PN solution)  20 mEq Magnesium Sulfate  200 mg Thiamine  0 ml Trace Elements  10 ml MVI  5 mg Zinc  60 mcg Selenium   10 units Regular Insulin    Total Calories  2150  (Meets  100%  of  Estimated Energy needs  and 100% Protein needs)     *Goal: achieved - Pt will receive >/= 80% ENN via tolerated route    Additional Recommendations:    1) Daily weights  2) Strict I & O's  3) Daily lyte checks including magnesium and phos **cont to replete K, Phos, Mg outside PN bag PRN  4) Weekly triglycerides/LFT checks  5) POCT q6hrs; maintain 140-180mg/dL **on RISS, collaboration w/ pharmacy will add 10 units insulin to the PN bag  6) Has venting PEG. Plan for home TPN w/ pleasure feeds     Nutrition recommendations to continue upon discharge. Goal to continue to meet >/= 80% ENN via tolerated route. RD to F/U prn for changes to nutrition dc plan.    *will continue to monitor and adjust PN prn*  Lary Parikh, PhD, MS, RDN, [phone]

## 2025-05-19 NOTE — CHART NOTE - NSCHARTNOTEFT_GEN_A_CORE
Drain capped this am. Leaked around the tube . Drain reattached to a gravity drain bag by nursing staff. Drain capped this am. Leaked around the tube . Drain reattached to a gravity drain bag by nursing staff. Will plan for drain evaluation to check for stent patency tomorrow . Pt doesn't need to be NPO.

## 2025-05-19 NOTE — PROGRESS NOTE ADULT - ASSESSMENT
74 M w/ H/o Cholangio CA w/ distant mets with biliary obstruction s/p IR biliary stent placement 5/16. 12fr External drain left in place  Drain required uncapping on 5/17 due to leakage  1325cc of bilious output/24 hrs  Afebrile, Tbili 1.8    Drain capped today.    - If pt develops fever, chills, abdominal, fever, n/v please remove the cap and attach the provided bag.   - Remainder of care per primary team  -D/w Dr. Oviedo and surgical team

## 2025-05-19 NOTE — PROGRESS NOTE ADULT - SUBJECTIVE AND OBJECTIVE BOX
HOSPITALIST ATTENDING PROGRESS NOTE    Chart and meds reviewed.  Patient seen and examined.    CC: malignant SBO    Subjective: BG elevated this am. Discussed w TPN RD and clinical pharmacist, will add insulin to TPN and also change SSI to moderate and add bedtime scale. Later in day, with c/f biliary drain leaking, for IR check tmrw.    All other systems reviewed and found to be negative with the exception of what has been described above.    MEDICATIONS  (STANDING):  ciprofloxacin   IVPB 400 milliGRAM(s) IV Intermittent every 12 hours  dextrose 5%. 1000 milliLiter(s) (50 mL/Hr) IV Continuous <Continuous>  dextrose 5%. 1000 milliLiter(s) (100 mL/Hr) IV Continuous <Continuous>  dextrose 50% Injectable 25 Gram(s) IV Push once  dextrose 50% Injectable 12.5 Gram(s) IV Push once  dextrose 50% Injectable 25 Gram(s) IV Push once  enoxaparin Injectable 40 milliGRAM(s) SubCutaneous every 24 hours  glucagon  Injectable 1 milliGRAM(s) IntraMuscular once  insulin lispro (ADMELOG) corrective regimen sliding scale   SubCutaneous three times a day before meals  insulin lispro (ADMELOG) corrective regimen sliding scale   SubCutaneous at bedtime  lipid, fat emulsion (Fish Oil and Plant Based) 20% Infusion 0.868 Gm/kG/Day (20.83 mL/Hr) IV Continuous <Continuous>  pantoprazole    Tablet 40 milliGRAM(s) Oral before breakfast  Parenteral Nutrition - Adult 1 Each TPN Continuous <Continuous>  Parenteral Nutrition - Adult 1 Each TPN Continuous <Continuous>    MEDICATIONS  (PRN):  dextrose Oral Gel 15 Gram(s) Oral once PRN Blood Glucose LESS THAN 70 milliGRAM(s)/deciliter  dicyclomine 10 milliGRAM(s) Oral two times a day before meals PRN gas/cramps  ondansetron Injectable 4 milliGRAM(s) IV Push every 6 hours PRN Nausea and/or Vomiting  oxyCODONE    IR 5 milliGRAM(s) Oral every 6 hours PRN Severe Pain (7 - 10)      VITALS:  T(F): 97.5 (05-19-25 @ 16:08), Max: 97.5 (05-19-25 @ 16:08)  HR: 82 (05-19-25 @ 16:59) (78 - 86)  BP: 97/71 (05-19-25 @ 16:59) (97/71 - 111/75)  RR: 18 (05-19-25 @ 16:59) (18 - 19)  SpO2: 100% (05-19-25 @ 16:59) (97% - 100%)  Wt(kg): --    I&O's Summary    18 May 2025 07:01  -  19 May 2025 07:00  --------------------------------------------------------  IN: 0 mL / OUT: 7225 mL / NET: -7225 mL    19 May 2025 07:01  -  19 May 2025 18:46  --------------------------------------------------------  IN: 0 mL / OUT: 5180 mL / NET: -5180 mL        CAPILLARY BLOOD GLUCOSE      POCT Blood Glucose.: 152 mg/dL (19 May 2025 13:09)  POCT Blood Glucose.: 486 mg/dL (19 May 2025 08:42)  POCT Blood Glucose.: 454 mg/dL (19 May 2025 08:39)  POCT Blood Glucose.: 120 mg/dL (18 May 2025 23:06)      PHYSICAL EXAM:  Gen: NAD  HEENT:  pupils equal and reactive, EOMI, no oropharyngeal lesions, erythema, exudates, oral thrush  NECK:   supple, no carotid bruits, no palpable lymph nodes, no thyromegaly  CV:  +S1, +S2, regular, no murmurs or rubs  RESP:   lungs clear to auscultation bilaterally, no wheezing, rales, rhonchi, good air entry bilaterally  BREAST:  not examined  GI:  abdomen soft, non-tender, non-distended, normal BS, no bruits, no abdominal masses, no palpable masses, + biliary drain, + venting/draining PEG  RECTAL:  not examined  :  not examined  MSK:   normal muscle tone, no atrophy, no rigidity, no contractions  EXT:  no clubbing, no cyanosis, no edema, no calf pain, swelling or erythema  VASCULAR:  pulses equal and symmetric in the upper and lower extremities  NEURO:  AAOX3, no focal neurological deficits, follows all commands, able to move extremities spontaneously  SKIN:  no ulcers, lesions or rashes    LABS:                            9.3    4.28  )-----------( 146      ( 19 May 2025 05:53 )             29.4     05-19    134[L]  |  98  |  39[H]  ----------------------------<  447[H]  3.2[L]   |  30  |  0.89    Ca    9.1      19 May 2025 05:50  Phos  4.1     05-19  Mg     2.2     05-19    TPro  5.9[L]  /  Alb  2.1[L]  /  TBili  1.8[H]  /  DBili  x   /  AST  33  /  ALT  112[H]  /  AlkPhos  729[H]  05-19        LIVER FUNCTIONS - ( 19 May 2025 05:50 )  Alb: 2.1 g/dL / Pro: 5.9 gm/dL / ALK PHOS: 729 U/L / ALT: 112 U/L / AST: 33 U/L / GGT: x             Urinalysis Basic - ( 19 May 2025 05:50 )    Color: x / Appearance: x / SG: x / pH: x  Gluc: 447 mg/dL / Ketone: x  / Bili: x / Urobili: x   Blood: x / Protein: x / Nitrite: x   Leuk Esterase: x / RBC: x / WBC x   Sq Epi: x / Non Sq Epi: x / Bacteria: x    CULTURES:  no new    Additional results/Imaging, I have personally reviewed:  no new

## 2025-05-19 NOTE — PROGRESS NOTE ADULT - ASSESSMENT
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 Having BM.  s/p Venting PEG tube placement   s/p IR int-ext biliary drain exchange  s/p IR bare metal biliary stent placement    Plan:  - Likely DC today   - Can use chemoport for TPN use in outpatient and inpatient setting   - c/w TPN & Cycle  - Monitor BM  - Pain control PRN  - Monitor vitals  - Nausea control PRN  - replete electrolytes  - daily labs  - OOB/PT  - Palliative on board    Plan will be discussed with Surgical oncology attending, Dr. Wright

## 2025-05-19 NOTE — PROGRESS NOTE ADULT - SUBJECTIVE AND OBJECTIVE BOX
IR Progress Note:  Patient seen and examined at bedside. No acute complaints  S/p IR uncovered biliary stent placed antegrade 5/16. 12fr external drain was left in place and capped. However, leakage was noted around the drain on 5/17. Now with 1350cc of bilious output over 24 hrs.   Afebrile    PAST MEDICAL & SURGICAL HISTORY:  Bile duct cancer  H/O Whipple procedure    MEDICATIONS  (STANDING):  ciprofloxacin   IVPB 400 milliGRAM(s) IV Intermittent every 12 hours  dextrose 5%. 1000 milliLiter(s) (50 mL/Hr) IV Continuous <Continuous>  dextrose 5%. 1000 milliLiter(s) (100 mL/Hr) IV Continuous <Continuous>  dextrose 50% Injectable 25 Gram(s) IV Push once  dextrose 50% Injectable 12.5 Gram(s) IV Push once  dextrose 50% Injectable 25 Gram(s) IV Push once  enoxaparin Injectable 40 milliGRAM(s) SubCutaneous every 24 hours  glucagon  Injectable 1 milliGRAM(s) IntraMuscular once  insulin lispro (ADMELOG) corrective regimen sliding scale   SubCutaneous three times a day before meals  insulin lispro (ADMELOG) corrective regimen sliding scale   SubCutaneous at bedtime  lipid, fat emulsion (Fish Oil and Plant Based) 20% Infusion 0.868 Gm/kG/Day (20.8 mL/Hr) IV Continuous <Continuous>  pantoprazole    Tablet 40 milliGRAM(s) Oral before breakfast  Parenteral Nutrition - Adult 1 Each TPN Continuous <Continuous>    MEDICATIONS  (PRN):  dextrose Oral Gel 15 Gram(s) Oral once PRN Blood Glucose LESS THAN 70 milliGRAM(s)/deciliter  dicyclomine 10 milliGRAM(s) Oral two times a day before meals PRN gas/cramps  ondansetron Injectable 4 milliGRAM(s) IV Push every 6 hours PRN Nausea and/or Vomiting  oxyCODONE    IR 5 milliGRAM(s) Oral every 6 hours PRN Severe Pain (7 - 10)      Allergies    statins (Unknown)  atorvastatin (Hives)    Intolerances    Vital Signs Last 24 Hrs  T(C): 36.3 (19 May 2025 07:24), Max: 36.7 (18 May 2025 15:27)  T(F): 97.4 (19 May 2025 07:24), Max: 98 (18 May 2025 15:27)  HR: 78 (19 May 2025 07:24) (78 - 86)  BP: 111/75 (19 May 2025 07:24) (104/71 - 111/75)  RR: 18 (19 May 2025 07:24) (18 - 19)  SpO2: 100% (19 May 2025 07:24) (97% - 100%)    Parameters below as of 19 May 2025 07:24  Patient On (Oxygen Delivery Method): room air    Physical:  Gen: A&Ox3. NAD  Chest: respiration unlabored, symmetrical chest rise. no accessory muscle use.   Abd: Soft ND, NT. G tube in place. IR biliary drain with 1325cc of bilious output/24 hrs. External biliary drain flushed with 10cc of NS with no resistance. Drain capped at bedside.     I&O's Detail    18 May 2025 07:01  -  19 May 2025 07:00  --------------------------------------------------------  IN:  Total IN: 0 mL    OUT:    Drain (mL): 1325 mL    PEG (Percutaneous Endoscopic Gastrostomy) Tube (mL): 5600 mL    Voided (mL): 300 mL  Total OUT: 7225 mL    Total NET: -7225 mL      19 May 2025 07:01  -  19 May 2025 09:44  --------------------------------------------------------  IN:  Total IN: 0 mL    OUT:    Drain (mL): 50 mL    PEG (Percutaneous Endoscopic Gastrostomy) Tube (mL): 850 mL    Voided (mL): 450 mL  Total OUT: 1350 mL    Total NET: -1350 mL    LABS:                        9.3    4.28  )-----------( 146      ( 19 May 2025 05:53 )             29.4              05-19    134[L]  |  98  |  39[H]  ----------------------------<  447[H]  3.2[L]   |  30  |  0.89    Ca    9.1      19 May 2025 05:50  Phos  4.1     05-19  Mg     2.2     05-19    TPro  5.9[L]  /  Alb  2.1[L]  /  TBili  1.8[H]  /  DBili  x   /  AST  33  /  ALT  112[H]  /  AlkPhos  729[H]  05-19              Urinalysis Basic - ( 19 May 2025 05:50 )    Color: x / Appearance: x / SG: x / pH: x  Gluc: 447 mg/dL / Ketone: x  / Bili: x / Urobili: x   Blood: x / Protein: x / Nitrite: x   Leuk Esterase: x / RBC: x / WBC x   Sq Epi: x / Non Sq Epi: x / Bacteria: x

## 2025-05-20 LAB
ALBUMIN SERPL ELPH-MCNC: 2.4 G/DL — LOW (ref 3.3–5)
ALP SERPL-CCNC: 695 U/L — HIGH (ref 40–120)
ALT FLD-CCNC: 94 U/L — HIGH (ref 12–78)
ANION GAP SERPL CALC-SCNC: 6 MMOL/L — SIGNIFICANT CHANGE UP (ref 5–17)
APTT BLD: 32.5 SEC — SIGNIFICANT CHANGE UP (ref 26.1–36.8)
AST SERPL-CCNC: 28 U/L — SIGNIFICANT CHANGE UP (ref 15–37)
BILIRUB SERPL-MCNC: 1.8 MG/DL — HIGH (ref 0.2–1.2)
BUN SERPL-MCNC: 47 MG/DL — HIGH (ref 7–23)
CALCIUM SERPL-MCNC: 9.3 MG/DL — SIGNIFICANT CHANGE UP (ref 8.5–10.1)
CHLORIDE SERPL-SCNC: 92 MMOL/L — LOW (ref 96–108)
CO2 SERPL-SCNC: 37 MMOL/L — HIGH (ref 22–31)
CREAT SERPL-MCNC: 1.23 MG/DL — SIGNIFICANT CHANGE UP (ref 0.5–1.3)
EGFR: 61 ML/MIN/1.73M2 — SIGNIFICANT CHANGE UP
EGFR: 61 ML/MIN/1.73M2 — SIGNIFICANT CHANGE UP
GLUCOSE BLDC GLUCOMTR-MCNC: 124 MG/DL — HIGH (ref 70–99)
GLUCOSE BLDC GLUCOMTR-MCNC: 171 MG/DL — HIGH (ref 70–99)
GLUCOSE BLDC GLUCOMTR-MCNC: 276 MG/DL — HIGH (ref 70–99)
GLUCOSE BLDC GLUCOMTR-MCNC: 325 MG/DL — HIGH (ref 70–99)
GLUCOSE BLDC GLUCOMTR-MCNC: 408 MG/DL — HIGH (ref 70–99)
GLUCOSE BLDC GLUCOMTR-MCNC: 96 MG/DL — SIGNIFICANT CHANGE UP (ref 70–99)
GLUCOSE BLDC GLUCOMTR-MCNC: 98 MG/DL — SIGNIFICANT CHANGE UP (ref 70–99)
GLUCOSE SERPL-MCNC: 405 MG/DL — HIGH (ref 70–99)
HCT VFR BLD CALC: 31.5 % — LOW (ref 39–50)
HGB BLD-MCNC: 9.9 G/DL — LOW (ref 13–17)
INR BLD: 1 RATIO — SIGNIFICANT CHANGE UP (ref 0.85–1.16)
MAGNESIUM SERPL-MCNC: 2.1 MG/DL — SIGNIFICANT CHANGE UP (ref 1.6–2.6)
MCHC RBC-ENTMCNC: 29.6 PG — SIGNIFICANT CHANGE UP (ref 27–34)
MCHC RBC-ENTMCNC: 31.4 G/DL — LOW (ref 32–36)
MCV RBC AUTO: 94.3 FL — SIGNIFICANT CHANGE UP (ref 80–100)
NRBC # BLD AUTO: 0 K/UL — SIGNIFICANT CHANGE UP (ref 0–0)
NRBC # FLD: 0 K/UL — SIGNIFICANT CHANGE UP (ref 0–0)
NRBC BLD AUTO-RTO: 0 /100 WBCS — SIGNIFICANT CHANGE UP (ref 0–0)
PHOSPHATE SERPL-MCNC: 5.4 MG/DL — HIGH (ref 2.5–4.5)
PLATELET # BLD AUTO: 190 K/UL — SIGNIFICANT CHANGE UP (ref 150–400)
PMV BLD: 9.8 FL — SIGNIFICANT CHANGE UP (ref 7–13)
POTASSIUM SERPL-MCNC: 3.1 MMOL/L — LOW (ref 3.5–5.3)
POTASSIUM SERPL-SCNC: 3.1 MMOL/L — LOW (ref 3.5–5.3)
PROT SERPL-MCNC: 6.1 GM/DL — SIGNIFICANT CHANGE UP (ref 6–8.3)
PROTHROM AB SERPL-ACNC: 11.8 SEC — SIGNIFICANT CHANGE UP (ref 9.9–13.4)
RBC # BLD: 3.34 M/UL — LOW (ref 4.2–5.8)
RBC # FLD: 16.9 % — HIGH (ref 10.3–14.5)
SODIUM SERPL-SCNC: 135 MMOL/L — SIGNIFICANT CHANGE UP (ref 135–145)
WBC # BLD: 5.52 K/UL — SIGNIFICANT CHANGE UP (ref 3.8–10.5)
WBC # FLD AUTO: 5.52 K/UL — SIGNIFICANT CHANGE UP (ref 3.8–10.5)

## 2025-05-20 PROCEDURE — 99233 SBSQ HOSP IP/OBS HIGH 50: CPT

## 2025-05-20 PROCEDURE — 47535 CONVERSION EXT BIL DRG CATH: CPT

## 2025-05-20 RX ORDER — SODIUM/POT/MAG/CALC/CHLOR/ACET 35-20-5MEQ
1 VIAL (ML) INTRAVENOUS
Refills: 0 | Status: DISCONTINUED | OUTPATIENT
Start: 2025-05-20 | End: 2025-05-21

## 2025-05-20 RX ORDER — I.V. FAT EMULSION 20 G/100ML
0.87 EMULSION INTRAVENOUS
Qty: 50 | Refills: 0 | Status: DISCONTINUED | OUTPATIENT
Start: 2025-05-20 | End: 2025-05-21

## 2025-05-20 RX ORDER — ACETAMINOPHEN 500 MG/5ML
1000 LIQUID (ML) ORAL ONCE
Refills: 0 | Status: COMPLETED | OUTPATIENT
Start: 2025-05-20 | End: 2025-05-20

## 2025-05-20 RX ADMIN — ENOXAPARIN SODIUM 40 MILLIGRAM(S): 100 INJECTION SUBCUTANEOUS at 16:39

## 2025-05-20 RX ADMIN — Medication 200 MILLIGRAM(S): at 21:24

## 2025-05-20 RX ADMIN — Medication 200 MILLIGRAM(S): at 11:44

## 2025-05-20 RX ADMIN — INSULIN LISPRO 12: 100 INJECTION, SOLUTION INTRAVENOUS; SUBCUTANEOUS at 06:38

## 2025-05-20 RX ADMIN — Medication 1000 MILLIGRAM(S): at 21:39

## 2025-05-20 RX ADMIN — INSULIN LISPRO 8: 100 INJECTION, SOLUTION INTRAVENOUS; SUBCUTANEOUS at 10:23

## 2025-05-20 RX ADMIN — Medication 1000 MILLIGRAM(S): at 03:54

## 2025-05-20 RX ADMIN — Medication 4 MILLIGRAM(S): at 22:47

## 2025-05-20 RX ADMIN — Medication 400 MILLIGRAM(S): at 21:24

## 2025-05-20 RX ADMIN — Medication 400 MILLIGRAM(S): at 03:39

## 2025-05-20 NOTE — PROGRESS NOTE ADULT - ASSESSMENT
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 Having BM.  s/p Venting PEG tube placement   s/p IR int-ext biliary drain exchange  s/p IR bare metal biliary stent placement    Persistent AM hyperglycemia, insulin regimen adjusted and insulin  also added to TPN yesterday    Plan:  - Monitor blood glucose  - IR to reassess drain again  - Can use chemoport for TPN use in outpatient and inpatient setting   - c/w TPN & Cycle  - Monitor BM  - Pain control PRN  - Monitor vitals  - Nausea control PRN  - replete electrolytes  - daily labs  - OOB/PT  - Palliative on board    Plan will be discussed with Surgical oncology attending, Dr. rWight

## 2025-05-20 NOTE — BRIEF OPERATIVE NOTE - COMMENTS
Clears for pleasure ok.   Vent PEG as needed.   If bilirubin doesn't improve with decompression (?afferent limb syndrome), will re-image and discuss with IR best approach to fix biliary drainage. 
1) Flush drain very briskly with 20cc NS Q6hr  2) Capping trial to begin 5/21

## 2025-05-20 NOTE — BRIEF OPERATIVE NOTE - OPERATION/FINDINGS
New 14F IEBD placed via existing access across high grade stenosis of distal CBD
10mm x 60mm BSci uncovered biliary stent placed antegrade via existing L hepatic ductal access. 12F Andrea-Villalta covering catheter left in place for access, capped.   
1) Indwelling stent occluded, likely by mucoid debris not external compression  2) 14F internal/external drain placed
Venting PEG placed

## 2025-05-20 NOTE — CHART NOTE - NSCHARTNOTEFT_GEN_A_CORE
Clinical Nutrition PN Follow Up Note    * 73yo M w/ PMHx of Cholangio CA s/p Robotic Whipple on 2024 (Dr. Ocasio) w/ adjuvant Xeloda -> [place]/Cis/Durva, Found liver metastases on 2024, failed CBD stent placement (unsuccessful cannulation via ERCP), s/p internal-external biliary drainage (8.5fr) at [facility] on , c/b High external drainage output ( >2L) due to Internal drainage failure (CBD blockage), now s/p exchange of internal-external biliary drainage (12fr) on  in . Presented with 3-4 days of progressive abdominal bloating with mild nausea, generalized weakness and fatigue. Last BM 3d ago, has been passing gases. Complains of mild discomfort of epigastric pain. Admitted w/ SBO (likely malignant). NGT to LWS in place. Started on mSBO protocol - Steroids/Reglan/Octreotide. RD consulted for initiation of TPN via port (OK to use from surgical oncology).   *: Initiate TPN via Port 2/2 mSBO and suspected prolonged NPO status  *:  (+) BM, passing flatus. CLD. Would suggest to renew TPN x 1 more day to ensure >80% ENN being met with bridging PO + TPN. D/w surg resident   *: TPN dc'd  per surgery.  CT A/P: GOO. s/p NGT to LWS w/ ~3L output overnight. Will initiate TPN via implanted infusion port.   *5/10: Per gastro: rec PEG placement; plan for  for long-term gastric decompression given mSBO, inability to tolerate adequate PO intake, and palliation. D/w surg res will c/w TPN.   *: Per surg "Patient has felt he no longer wants to fight and will choose palliative measures for his care". Planning for PEG placement monday.  *: Plan for PEG today  for long-term decompression given mSBO and palliation, will reorder TPN. NGT remains in place to suction.   *: s/p venting PEG placement. Palliative care following - plan for home TPN w/ venting PEG. On CLD for pleasure. DNR/ DNI   *: Diet advanced to CLD. S/p biliary tube check () - L biliary drain possibly clogged distally, as no contrast entered the pigtail on fluoroscopic study. However drain now draining. Plan to c/w TPN.  *5/15: Diet advanced to FLD. s/p IR drain exchange. Having BMs + abdominal discomfort. C/w TPN. Will be at dextrose goal today. **CAN CYCLE TPN x12 hours TOMORROW **  *: cont on FLD, c/w TPN to be cycled tonight. RD d/w surg resident, concern for low lytes despite large amounts in TPN and repletion outside bag. POCT erratic, RD suggested to add RISS. Dc home planned for tomorrow ()  *: S/p biliary stent change to a metal stent w/ IR yesterday. Passed loose stool w/ some relief of abd cramping. Plan for discharge home sometime this weekend (today vs tomorrow?) - lytes still LOW, receiving repletion outside PN bag. POCTs remain elevated, currently receiving RISS, will continue to monitor and may need to consider adding insulin in PN bag for additional coverage.   *: noted w/ slight leakage around tube site, otherwise no pertinent changes/events overnight. Per discussion w/ surgery resident, plan for d/c home likely tomorrow. POCTs erractic x 24 hrs, will continue to monitor.  *: Tolerating diet, venting PEG improved pain. Per surg large vol of bile draining. Per IR w/ 1350 cc bilious output overnight; drain capped today. POCTs still erractic. D/w Dr. Randhawa and pharmacy, will add 10 units of insulin into the PN bag and continue w/ RISS. Will continue to monitor POCTs and adjust w/ pharmacy & MD    *TPN to be restarted 2/2 GOO and suspected prolonged NPO status.    *current status:  Drain reattached to a gravity drain bag by nursing staff overnight. Will plan for drain evaluation to check for stent patency today. Pt doesn't need to be NPO per PA - remains NPO this AM w/ TPN. POCT still spiking in AM w/ TPN running - 10 units added into bag yesterday and changed to moderate bedtime sliding scale. Plan today to increase in insulin in PN bag to 20 units - d/w Dr. Randhawa and clinical pharmacist.     *new pertinent meds: abx, ADMELOG (14 units given x 24 hrs), protonix, oxycodone, KCl (20 mEq given x 24 hrs)    *labs reviewed; Na low, corrected Na for hyperglycemia WNL - will continue to monitor and leave up to medical management to correct prn. K+ LOW - likely d/y large venting PEG output. As per ASPEN Guidelines, maintenance potassium concentration in PN is 1 – 2 mEq/kg/day. However, [facility] PN Policy indicates a maximum of 120 mEq should be added into the PN bag. Currently at 90 mEq which is close to maximum for wt. D/w surgery to continue to replete outside bag - received repletion x 48 hrs. Will increase to 100 mEq in PN bag and will NOT increase past that - maximum for pt wt = 115 mEq. Mg WNL. Phos now ELEVATES. Will decrease in bag. May need to remove. As per ASPEN Guidelines, maintenance magnesium concentration in PN is 8-20 mEq/day with a maximum concentration of 48 mEq/day.  T Bili (1.8) WNL, has been <2 x 3 days, will add trace elements back into TPN bag.  TG WNL (91) will c/w 50g lipids daily. Pending new level.  POCTs variable x 24 hrs, plan to increase insulin in bag as above and continue w/ current dextrose goal. May need to lower if does not respond well to insulin in bag (0.57 units/g dextrose)          135  |  92[L]  |  47[H]  ----------------------------<  405[H]  3.1[L]   |  37[H]  |  1.23    Ca    9.3      20 May 2025 05:36  Phos  5.4     05-  Mg     2.1         TPro  6.1  /  Alb  2.4[L]  /  TBili  1.8[H]  /  DBili  x   /  AST  28  /  ALT  94[H]  /  AlkPhos  695[H]      BMI: BMI (kg/m2): 18.2 (25 @ 03:35)  HbA1c: A1C with Estimated Average Glucose Result: 5.3 % (25 @ 04:57)    BP: 111/75 (25 @ 07:24) (102/62 - 128/73)Vital Signs Last 24 Hrs  T(C): 36.3 (25 @ 07:24), Max: 36.7 (25 @ 15:27)  T(F): 97.4 (25 @ 07:24), Max: 98 (25 @ 15:27)  HR: 78 (25 @ 07:24) (78 - 86)  BP: 111/75 (25 @ 07:24) (104/71 - 111/75)  RR: 18 (25 @ 07:24) (18 - 19)  SpO2: 100% (25 @ 07:24) (97% - 100%)    Lipid Panel: Date/Time: 05-15-25 @ 05:16  Triglycerides, Serum: 91    POCT Blood Glucose.: 408 mg/dL (20 May 2025 06:33)  POCT Blood Glucose.: 147 mg/dL (19 May 2025 21:26)  POCT Blood Glucose.: 132 mg/dL (19 May 2025 18:58)  POCT Blood Glucose.: 152 mg/dL (19 May 2025 13:09)    *I&O's Detail    19 May 2025 07:01  -  20 May 2025 07:00  --------------------------------------------------------  IN:  Total IN: 0 mL    OUT:    Drain (mL): 610 mL    PEG (Percutaneous Endoscopic Gastrostomy) Tube (mL): 8250 mL    Voided (mL): 1300 mL  Total OUT: 44155 mL    Total NET: -02757 mL  * fluid status: negative; >8L output from venting PEG doc'd - likely causing LOW POTASSIUM/Na. No TPN doc'd.   *Last (+) BM doc'd on . (+) abdominal discomfort; pt not on bowel regimen.  *edema: none doc'd  *PI: coccyx, unstageable     *malnutrition: Pt continues to meet criteria for severe protein-calorie malnutrition in context of chronic disease r/t decreased ability to meet increased nutrient needs 2/2 mets Ca s/p Whipple and malignant SBO AEB severe muscle/ fat wasting, 33% wt loss x 1 yr, <50-75% ENN chronically    Estimated Needs: based on 57.6 Kg (wt from )  Calories: -  Kcal (35- 40 Kcal/Kg)  Protein: 86- 115 g (1.5- 2 g/Kg)  Fluids: 1728 -  mL (30 - 35 mL/Kg)    *Diet, NPO (25 @ 08:50) [Active]    Weight History:  Daily Weight in k.8 (19 May 2025 06:12)  Daily Weight in k.4 (11 May 2025 05:32)    Height (cm): 177.8 (25 @ 03:35), 177.8 (25 @ 11:56), 177.8 (25 @ 10:58)  Weight (kg): 57.606 (25 @ 03:35), 57.6 (25 @ 10:58), 68 (25 @ 19:34)  BMI (kg/m2): 18.2 (25 @ 03:35), 18.2 (25 @ 11:56), 18.2 (25 @ 10:58)  BSA (m2): 1.72 (25 @ 03:35), 1.72 (25 @ 11:56), 1.72 (25 @ 10:58)    TPN Recommendations: via implanted infusion port  Total Volume: 2000mL x 12 hours  115 g  Amino Acids  350 g Dextrose (@ goal)  50 g Lipids 20%  200 mEq Sodium Chloride  0 mEq Sodium Acetate  0 mmol Sodium Phosphate  0 mEq Potassium Chloride  56 mEq Potassium Acetate  30 mmol Potassium Phosphate  0 mEq Calcium Gluconate (CAPS out of PN solution)  20 mEq Magnesium Sulfate  200 mg Thiamine  1 ml Trace Elements  10 ml MVI  20 units Regular Insulin    Total Calories  2150  (Meets  100%  of  Estimated Energy needs  and 100% Protein needs)     *Goal: achieved - Pt will receive >/= 80% ENN via tolerated route    Additional Recommendations:    1) Daily weights  2) Strict I & O's - replete losses from venting PEG  3) Daily lyte checks including magnesium and phos **cont to replete K, Phos, Mg outside PN bag PRN  4) Weekly triglycerides/LFT checks  5) POCT q6hrs; maintain 140-180mg/dL **on RISS, insulin also added to PN bag  6) Has venting PEG. Plan for home TPN w/ pleasure feeds     Nutrition recommendations to continue upon discharge. Goal to continue to meet >/= 80% ENN via tolerated route. RD to F/U prn for changes to nutrition dc plan.    *will continue to monitor and adjust PN prn*  Carol Burger, MS, RDN, CNSC, [phone]  Certified Nutrition

## 2025-05-20 NOTE — PROGRESS NOTE ADULT - SUBJECTIVE AND OBJECTIVE BOX
SURGERY DAILY PROGRESS NOTE:     Subjective:  Patient seen and examined at bedside during am rounds. Still endorsing some leaking around drain site.  AVSS. Denies any fevers, chills, n/v/d, chest pain or shortness of breath    Objective:    MEDICATIONS  (STANDING):  ciprofloxacin   IVPB 400 milliGRAM(s) IV Intermittent every 12 hours  dextrose 5%. 1000 milliLiter(s) (50 mL/Hr) IV Continuous <Continuous>  dextrose 5%. 1000 milliLiter(s) (100 mL/Hr) IV Continuous <Continuous>  dextrose 50% Injectable 25 Gram(s) IV Push once  dextrose 50% Injectable 12.5 Gram(s) IV Push once  dextrose 50% Injectable 25 Gram(s) IV Push once  enoxaparin Injectable 40 milliGRAM(s) SubCutaneous every 24 hours  glucagon  Injectable 1 milliGRAM(s) IntraMuscular once  insulin lispro (ADMELOG) corrective regimen sliding scale   SubCutaneous three times a day before meals  insulin lispro (ADMELOG) corrective regimen sliding scale   SubCutaneous at bedtime  lipid, fat emulsion (Fish Oil and Plant Based) 20% Infusion 0.868 Gm/kG/Day (20.83 mL/Hr) IV Continuous <Continuous>  pantoprazole    Tablet 40 milliGRAM(s) Oral before breakfast  Parenteral Nutrition - Adult 1 Each TPN Continuous <Continuous>  Parenteral Nutrition - Adult 1 Each TPN Continuous <Continuous>    MEDICATIONS  (PRN):  dextrose Oral Gel 15 Gram(s) Oral once PRN Blood Glucose LESS THAN 70 milliGRAM(s)/deciliter  dicyclomine 10 milliGRAM(s) Oral two times a day before meals PRN gas/cramps  ondansetron Injectable 4 milliGRAM(s) IV Push every 6 hours PRN Nausea and/or Vomiting  oxyCODONE    IR 5 milliGRAM(s) Oral every 6 hours PRN Severe Pain (7 - 10)      Vital Signs Last 24 Hrs  T(C): 36.2 (20 May 2025 16:05), Max: 36.8 (20 May 2025 15:15)  T(F): 97.2 (20 May 2025 16:05), Max: 98.2 (20 May 2025 15:15)  HR: 87 (20 May 2025 16:05) (70 - 91)  BP: 102/65 (20 May 2025 16:05) (82/69 - 107/68)  BP(mean): --  RR: 18 (20 May 2025 16:05) (12 - 18)  SpO2: 100% (20 May 2025 16:05) (96% - 100%)    Parameters below as of 20 May 2025 16:05  Patient On (Oxygen Delivery Method): room air          PHYSICAL EXAM   Physical Exam:  General: AOx3, Well developed, NAD, Jaundice improved  HEENT: NC/AT, normal pinnae and tragi  Chest: Normal respiratory effort, equal chest rise  Heart: RRR  Abdomen: Nondistended, soft, TTP around PEG and IR drains, no guarding or rebound tenderness.  Neuro/Psych: No localized deficits. Normal speech, normal tone, normal affect  Skin: Normal, warm, no rashes, no lesions noted  Extremities: Warm, well perfused, no edema    I&O's Detail    19 May 2025 07:01  -  20 May 2025 07:00  --------------------------------------------------------  IN:  Total IN: 0 mL    OUT:    Drain (mL): 610 mL    PEG (Percutaneous Endoscopic Gastrostomy) Tube (mL): 8250 mL    Voided (mL): 1300 mL  Total OUT: 65546 mL    Total NET: -61684 mL      20 May 2025 07:01  -  20 May 2025 20:38  --------------------------------------------------------  IN:    Fat Emulsion (Fish Oil &amp; Plant Based) 20% Infusion: 250 mL    IV PiggyBack: 200 mL    Other (mL): 500 mL    TPN (Total Parenteral Nutrition): 2000 mL  Total IN: 2950 mL    OUT:    Drain (mL): 310 mL    Oral Fluid: 0 mL    PEG (Percutaneous Endoscopic Gastrostomy) Tube (mL): 4475 mL    Voided (mL): 925 mL  Total OUT: 5710 mL    Total NET: -2760 mL          Daily     Daily     LABS:                        9.9    5.52  )-----------( 190      ( 20 May 2025 05:36 )             31.5     05-20    135  |  92[L]  |  47[H]  ----------------------------<  405[H]  3.1[L]   |  37[H]  |  1.23    Ca    9.3      20 May 2025 05:36  Phos  5.4     05-20  Mg     2.1     05-20    TPro  6.1  /  Alb  2.4[L]  /  TBili  1.8[H]  /  DBili  x   /  AST  28  /  ALT  94[H]  /  AlkPhos  695[H]  05-20    PT/INR - ( 20 May 2025 05:36 )   PT: 11.8 sec;   INR: 1.00 ratio         PTT - ( 20 May 2025 05:36 )  PTT:32.5 sec  Urinalysis Basic - ( 20 May 2025 05:36 )    Color: x / Appearance: x / SG: x / pH: x  Gluc: 405 mg/dL / Ketone: x  / Bili: x / Urobili: x   Blood: x / Protein: x / Nitrite: x   Leuk Esterase: x / RBC: x / WBC x   Sq Epi: x / Non Sq Epi: x / Bacteria: x        RADIOLOGY & ADDITIONAL STUDIES:    ASSESSMENT/PLAN:

## 2025-05-20 NOTE — PRE PROCEDURE NOTE - PRE PROCEDURE EVALUATION
Pt with malignant CBD obstruction, s/p IEBD to EBD conversion and CBD stenting (bare metal) last week.     Since then, patient has failed capping trials x 2, suggesting CBD stent is occluded.      Plan today to convert EBD to IEBD to hopefully re-establish flow through indwelling metal CBD stent.

## 2025-05-20 NOTE — PROGRESS NOTE ADULT - SUBJECTIVE AND OBJECTIVE BOX
HOSPITALIST ATTENDING PROGRESS NOTE    Chart and meds reviewed.  Patient seen and examined.    CC:  malignant SBO    Subjective: Titrating insulin in TPN.  Extensive updates provided to wife.    All other systems reviewed and found to be negative with the exception of what has been described above.    MEDICATIONS  (STANDING):  ciprofloxacin   IVPB 400 milliGRAM(s) IV Intermittent every 12 hours  dextrose 5%. 1000 milliLiter(s) (50 mL/Hr) IV Continuous <Continuous>  dextrose 5%. 1000 milliLiter(s) (100 mL/Hr) IV Continuous <Continuous>  dextrose 50% Injectable 25 Gram(s) IV Push once  dextrose 50% Injectable 12.5 Gram(s) IV Push once  dextrose 50% Injectable 25 Gram(s) IV Push once  enoxaparin Injectable 40 milliGRAM(s) SubCutaneous every 24 hours  glucagon  Injectable 1 milliGRAM(s) IntraMuscular once  insulin lispro (ADMELOG) corrective regimen sliding scale   SubCutaneous three times a day before meals  insulin lispro (ADMELOG) corrective regimen sliding scale   SubCutaneous at bedtime  lipid, fat emulsion (Fish Oil and Plant Based) 20% Infusion 0.868 Gm/kG/Day (20.83 mL/Hr) IV Continuous <Continuous>  pantoprazole    Tablet 40 milliGRAM(s) Oral before breakfast  Parenteral Nutrition - Adult 1 Each TPN Continuous <Continuous>  Parenteral Nutrition - Adult 1 Each TPN Continuous <Continuous>    MEDICATIONS  (PRN):  dextrose Oral Gel 15 Gram(s) Oral once PRN Blood Glucose LESS THAN 70 milliGRAM(s)/deciliter  dicyclomine 10 milliGRAM(s) Oral two times a day before meals PRN gas/cramps  ondansetron Injectable 4 milliGRAM(s) IV Push every 6 hours PRN Nausea and/or Vomiting  oxyCODONE    IR 5 milliGRAM(s) Oral every 6 hours PRN Severe Pain (7 - 10)      VITALS:  T(F): 97.2 (05-20-25 @ 16:05), Max: 98.2 (05-20-25 @ 15:15)  HR: 87 (05-20-25 @ 16:05) (70 - 91)  BP: 102/65 (05-20-25 @ 16:05) (82/69 - 107/68)  RR: 18 (05-20-25 @ 16:05) (12 - 18)  SpO2: 100% (05-20-25 @ 16:05) (96% - 100%)  Wt(kg): --    I&O's Summary    19 May 2025 07:01  -  20 May 2025 07:00  --------------------------------------------------------  IN: 0 mL / OUT: 68808 mL / NET: -34196 mL    20 May 2025 07:01  -  20 May 2025 20:27  --------------------------------------------------------  IN: 2950 mL / OUT: 5710 mL / NET: -2760 mL        CAPILLARY BLOOD GLUCOSE      POCT Blood Glucose.: 124 mg/dL (20 May 2025 16:40)  POCT Blood Glucose.: 96 mg/dL (20 May 2025 14:56)  POCT Blood Glucose.: 98 mg/dL (20 May 2025 14:19)  POCT Blood Glucose.: 276 mg/dL (20 May 2025 11:30)  POCT Blood Glucose.: 325 mg/dL (20 May 2025 09:48)  POCT Blood Glucose.: 408 mg/dL (20 May 2025 06:33)  POCT Blood Glucose.: 147 mg/dL (19 May 2025 21:26)      PHYSICAL EXAM:  Gen: NAD  HEENT:  pupils equal and reactive, EOMI, no oropharyngeal lesions, erythema, exudates, oral thrush  NECK:   supple, no carotid bruits, no palpable lymph nodes, no thyromegaly  CV:  +S1, +S2, regular, no murmurs or rubs  RESP:   lungs clear to auscultation bilaterally, no wheezing, rales, rhonchi, good air entry bilaterally  BREAST:  not examined  GI:  abdomen soft, non-tender, non-distended, normal BS, no bruits, no abdominal masses, no palpable masses, +venting peg, + biliary drain  RECTAL:  not examined  :  not examined  MSK:   normal muscle tone, no atrophy, no rigidity, no contractions  EXT:  no clubbing, no cyanosis, no edema, no calf pain, swelling or erythema  VASCULAR:  pulses equal and symmetric in the upper and lower extremities  NEURO:  AAOX3, no focal neurological deficits, follows all commands, able to move extremities spontaneously  SKIN:  no ulcers, lesions or rashes    LABS:                            9.9    5.52  )-----------( 190      ( 20 May 2025 05:36 )             31.5     05-20    135  |  92[L]  |  47[H]  ----------------------------<  405[H]  3.1[L]   |  37[H]  |  1.23    Ca    9.3      20 May 2025 05:36  Phos  5.4     05-20  Mg     2.1     05-20    TPro  6.1  /  Alb  2.4[L]  /  TBili  1.8[H]  /  DBili  x   /  AST  28  /  ALT  94[H]  /  AlkPhos  695[H]  05-20        LIVER FUNCTIONS - ( 20 May 2025 05:36 )  Alb: 2.4 g/dL / Pro: 6.1 gm/dL / ALK PHOS: 695 U/L / ALT: 94 U/L / AST: 28 U/L / GGT: x           PT/INR - ( 20 May 2025 05:36 )   PT: 11.8 sec;   INR: 1.00 ratio         PTT - ( 20 May 2025 05:36 )  PTT:32.5 sec  Urinalysis Basic - ( 20 May 2025 05:36 )    Color: x / Appearance: x / SG: x / pH: x  Gluc: 405 mg/dL / Ketone: x  / Bili: x / Urobili: x   Blood: x / Protein: x / Nitrite: x   Leuk Esterase: x / RBC: x / WBC x   Sq Epi: x / Non Sq Epi: x / Bacteria: x    CULTURES:  no new    Additional results/Imaging, I have personally reviewed:  no new

## 2025-05-20 NOTE — PROGRESS NOTE ADULT - TIME BILLING
Time spent  coordinating the patient's care. This includes reviewing documentation pertinent to this admission, results and imaging. Further tests, medications, and procedures have been ordered as indicated. Laboratory results and the plan of care were communicated to the patient. Discussed care plan with consultants including RD, pharmacy. Extensive convo w wife: re GOC,  needed in home supports. Supporting documentation was completed and added to the patient's chart.
Time spent  coordinating the patient's care. This includes reviewing documentation pertinent to this admission, results and imaging. Further tests, medications, and procedures have been ordered as indicated. Laboratory results and the plan of care were communicated to the patient. Discussed care plan with consultants including surgery, RD, pharmacy. Supporting documentation was completed and added to the patient's chart.

## 2025-05-20 NOTE — PROGRESS NOTE ADULT - ASSESSMENT
75yo M w/ H/o Cholangio CA w/ distant mets, admitted on 5/6 with malignant SBO. S/p Venting PEG tube placement, IR int-ext biliary drain exchange, IR bare metal biliary stent placement.    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  S/p venting PEG tube placement  Currently receiving TPN through existing mediport, and tolerating PO diet (full liquids).   Plan per Case MGMT / SW to go home with home care, remain on TPN through the Mediport (okay per Primary Onc Dr Belinda Morrell).  Continue supportive measures according to [facility]  Palliative on board  Disposition per primary team.     # Hyperglycemia:  -am hyperglycemia in setting of overnight cycled TPN  -improving, uptitrating insulin in TPN  (goal is only need insulin in TPN and not subcu).   -discussed w TPN RD and clinical pharmacist    # Anemia:  Likely AOCD with component of myelosuppression from chemo.   Hematology/Oncology on board, will follow as outpatient.   Trend CBC and transfuse PRN.     # Thrombocytopenia:  -stable     # Healthcare maintenance:  VTE prophylaxis: LMWH.   Disposition: home. SW/CM on board.     Anticipate d/c 5/22.       73yo M w/ H/o Cholangio CA w/ distant mets, admitted on 5/6 with malignant SBO. S/p Venting PEG tube placement, IR int-ext biliary drain exchange, IR bare metal biliary stent placement.    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  S/p venting PEG tube placement  Currently receiving TPN through existing mediport, and tolerating PO diet (full liquids).   Plan per Case MGMT / SW to go home with home care, remain on TPN through the Mediport (okay per Primary Onc Dr Belinda Morrell).  Continue supportive measures according to [facility]  Palliative on board  Disposition per primary team.     # Hyperglycemia:  -am hyperglycemia in setting of overnight cycled TPN  -improving, uptitrating insulin in TPN  (goal is only need insulin in TPN and not subcu).   -discussed w TPN RD and clinical pharmacist    # Anemia:  Likely AOCD with component of myelosuppression from chemo.   Hematology/Oncology on board, will follow as outpatient.   Trend CBC and transfuse PRN.     # Thrombocytopenia:  -stable     # Healthcare maintenance:  VTE prophylaxis: LMWH.   Disposition: home. SW/CM on board.     Anticipate d/c 5/22.

## 2025-05-21 LAB
ALBUMIN SERPL ELPH-MCNC: 2.2 G/DL — LOW (ref 3.3–5)
ALBUMIN SERPL ELPH-MCNC: 2.3 G/DL — LOW (ref 3.3–5)
ALP SERPL-CCNC: 558 U/L — HIGH (ref 40–120)
ALP SERPL-CCNC: 562 U/L — HIGH (ref 40–120)
ALT FLD-CCNC: 68 U/L — SIGNIFICANT CHANGE UP (ref 12–78)
ALT FLD-CCNC: 71 U/L — SIGNIFICANT CHANGE UP (ref 12–78)
ANION GAP SERPL CALC-SCNC: 3 MMOL/L — LOW (ref 5–17)
ANION GAP SERPL CALC-SCNC: 5 MMOL/L — SIGNIFICANT CHANGE UP (ref 5–17)
ANION GAP SERPL CALC-SCNC: 7 MMOL/L — SIGNIFICANT CHANGE UP (ref 5–17)
AST SERPL-CCNC: 26 U/L — SIGNIFICANT CHANGE UP (ref 15–37)
AST SERPL-CCNC: 28 U/L — SIGNIFICANT CHANGE UP (ref 15–37)
BILIRUB SERPL-MCNC: 1.5 MG/DL — HIGH (ref 0.2–1.2)
BILIRUB SERPL-MCNC: 1.5 MG/DL — HIGH (ref 0.2–1.2)
BUN SERPL-MCNC: 54 MG/DL — HIGH (ref 7–23)
BUN SERPL-MCNC: 57 MG/DL — HIGH (ref 7–23)
BUN SERPL-MCNC: 57 MG/DL — HIGH (ref 7–23)
CALCIUM SERPL-MCNC: 9 MG/DL — SIGNIFICANT CHANGE UP (ref 8.5–10.1)
CALCIUM SERPL-MCNC: 9.3 MG/DL — SIGNIFICANT CHANGE UP (ref 8.5–10.1)
CALCIUM SERPL-MCNC: 9.4 MG/DL — SIGNIFICANT CHANGE UP (ref 8.5–10.1)
CHLORIDE SERPL-SCNC: 94 MMOL/L — LOW (ref 96–108)
CHLORIDE SERPL-SCNC: 95 MMOL/L — LOW (ref 96–108)
CHLORIDE SERPL-SCNC: 97 MMOL/L — SIGNIFICANT CHANGE UP (ref 96–108)
CO2 SERPL-SCNC: 38 MMOL/L — HIGH (ref 22–31)
CO2 SERPL-SCNC: 39 MMOL/L — HIGH (ref 22–31)
CO2 SERPL-SCNC: 43 MMOL/L — HIGH (ref 22–31)
CREAT SERPL-MCNC: 1.2 MG/DL — SIGNIFICANT CHANGE UP (ref 0.5–1.3)
CREAT SERPL-MCNC: 1.21 MG/DL — SIGNIFICANT CHANGE UP (ref 0.5–1.3)
CREAT SERPL-MCNC: 1.28 MG/DL — SIGNIFICANT CHANGE UP (ref 0.5–1.3)
EGFR: 58 ML/MIN/1.73M2 — LOW
EGFR: 58 ML/MIN/1.73M2 — LOW
EGFR: 62 ML/MIN/1.73M2 — SIGNIFICANT CHANGE UP
EGFR: 62 ML/MIN/1.73M2 — SIGNIFICANT CHANGE UP
EGFR: 63 ML/MIN/1.73M2 — SIGNIFICANT CHANGE UP
EGFR: 63 ML/MIN/1.73M2 — SIGNIFICANT CHANGE UP
GLUCOSE BLDC GLUCOMTR-MCNC: 146 MG/DL — HIGH (ref 70–99)
GLUCOSE BLDC GLUCOMTR-MCNC: 176 MG/DL — HIGH (ref 70–99)
GLUCOSE BLDC GLUCOMTR-MCNC: 181 MG/DL — HIGH (ref 70–99)
GLUCOSE BLDC GLUCOMTR-MCNC: 278 MG/DL — HIGH (ref 70–99)
GLUCOSE BLDC GLUCOMTR-MCNC: 381 MG/DL — HIGH (ref 70–99)
GLUCOSE SERPL-MCNC: 251 MG/DL — HIGH (ref 70–99)
GLUCOSE SERPL-MCNC: 322 MG/DL — HIGH (ref 70–99)
GLUCOSE SERPL-MCNC: 349 MG/DL — HIGH (ref 70–99)
HCT VFR BLD CALC: 29.4 % — LOW (ref 39–50)
HCT VFR BLD CALC: 30.8 % — LOW (ref 39–50)
HGB BLD-MCNC: 9.2 G/DL — LOW (ref 13–17)
HGB BLD-MCNC: 9.6 G/DL — LOW (ref 13–17)
MAGNESIUM SERPL-MCNC: 2.1 MG/DL — SIGNIFICANT CHANGE UP (ref 1.6–2.6)
MAGNESIUM SERPL-MCNC: 2.2 MG/DL — SIGNIFICANT CHANGE UP (ref 1.6–2.6)
MCHC RBC-ENTMCNC: 29.3 PG — SIGNIFICANT CHANGE UP (ref 27–34)
MCHC RBC-ENTMCNC: 29.4 PG — SIGNIFICANT CHANGE UP (ref 27–34)
MCHC RBC-ENTMCNC: 31.2 G/DL — LOW (ref 32–36)
MCHC RBC-ENTMCNC: 31.3 G/DL — LOW (ref 32–36)
MCV RBC AUTO: 93.9 FL — SIGNIFICANT CHANGE UP (ref 80–100)
MCV RBC AUTO: 93.9 FL — SIGNIFICANT CHANGE UP (ref 80–100)
NRBC # BLD AUTO: 0 K/UL — SIGNIFICANT CHANGE UP (ref 0–0)
NRBC # BLD AUTO: 0 K/UL — SIGNIFICANT CHANGE UP (ref 0–0)
NRBC # FLD: 0 K/UL — SIGNIFICANT CHANGE UP (ref 0–0)
NRBC # FLD: 0 K/UL — SIGNIFICANT CHANGE UP (ref 0–0)
NRBC BLD AUTO-RTO: 0 /100 WBCS — SIGNIFICANT CHANGE UP (ref 0–0)
NRBC BLD AUTO-RTO: 0 /100 WBCS — SIGNIFICANT CHANGE UP (ref 0–0)
PHOSPHATE SERPL-MCNC: 3 MG/DL — SIGNIFICANT CHANGE UP (ref 2.5–4.5)
PHOSPHATE SERPL-MCNC: 4 MG/DL — SIGNIFICANT CHANGE UP (ref 2.5–4.5)
PLATELET # BLD AUTO: 181 K/UL — SIGNIFICANT CHANGE UP (ref 150–400)
PLATELET # BLD AUTO: 190 K/UL — SIGNIFICANT CHANGE UP (ref 150–400)
PMV BLD: 10.2 FL — SIGNIFICANT CHANGE UP (ref 7–13)
PMV BLD: 9.7 FL — SIGNIFICANT CHANGE UP (ref 7–13)
POTASSIUM SERPL-MCNC: 2.7 MMOL/L — CRITICAL LOW (ref 3.5–5.3)
POTASSIUM SERPL-MCNC: 2.7 MMOL/L — CRITICAL LOW (ref 3.5–5.3)
POTASSIUM SERPL-MCNC: 2.8 MMOL/L — CRITICAL LOW (ref 3.5–5.3)
POTASSIUM SERPL-SCNC: 2.7 MMOL/L — CRITICAL LOW (ref 3.5–5.3)
POTASSIUM SERPL-SCNC: 2.7 MMOL/L — CRITICAL LOW (ref 3.5–5.3)
POTASSIUM SERPL-SCNC: 2.8 MMOL/L — CRITICAL LOW (ref 3.5–5.3)
PROT SERPL-MCNC: 5.7 GM/DL — LOW (ref 6–8.3)
PROT SERPL-MCNC: 5.9 GM/DL — LOW (ref 6–8.3)
RBC # BLD: 3.13 M/UL — LOW (ref 4.2–5.8)
RBC # BLD: 3.28 M/UL — LOW (ref 4.2–5.8)
RBC # FLD: 16.9 % — HIGH (ref 10.3–14.5)
RBC # FLD: 17.1 % — HIGH (ref 10.3–14.5)
SODIUM SERPL-SCNC: 139 MMOL/L — SIGNIFICANT CHANGE UP (ref 135–145)
SODIUM SERPL-SCNC: 140 MMOL/L — SIGNIFICANT CHANGE UP (ref 135–145)
SODIUM SERPL-SCNC: 142 MMOL/L — SIGNIFICANT CHANGE UP (ref 135–145)
TRIGL SERPL-MCNC: 77 MG/DL — SIGNIFICANT CHANGE UP
WBC # BLD: 5.53 K/UL — SIGNIFICANT CHANGE UP (ref 3.8–10.5)
WBC # BLD: 6.07 K/UL — SIGNIFICANT CHANGE UP (ref 3.8–10.5)
WBC # FLD AUTO: 5.53 K/UL — SIGNIFICANT CHANGE UP (ref 3.8–10.5)
WBC # FLD AUTO: 6.07 K/UL — SIGNIFICANT CHANGE UP (ref 3.8–10.5)

## 2025-05-21 PROCEDURE — 99233 SBSQ HOSP IP/OBS HIGH 50: CPT

## 2025-05-21 PROCEDURE — 99231 SBSQ HOSP IP/OBS SF/LOW 25: CPT | Mod: FS

## 2025-05-21 RX ORDER — ACETAMINOPHEN 500 MG/5ML
1000 LIQUID (ML) ORAL ONCE
Refills: 0 | Status: DISCONTINUED | OUTPATIENT
Start: 2025-05-21 | End: 2025-05-26

## 2025-05-21 RX ORDER — ACETAMINOPHEN 500 MG/5ML
1000 LIQUID (ML) ORAL ONCE
Refills: 0 | Status: COMPLETED | OUTPATIENT
Start: 2025-05-21 | End: 2025-05-21

## 2025-05-21 RX ORDER — SODIUM/POT/MAG/CALC/CHLOR/ACET 35-20-5MEQ
1 VIAL (ML) INTRAVENOUS
Refills: 0 | Status: DISCONTINUED | OUTPATIENT
Start: 2025-05-21 | End: 2025-05-21

## 2025-05-21 RX ORDER — I.V. FAT EMULSION 20 G/100ML
0.87 EMULSION INTRAVENOUS
Qty: 50 | Refills: 0 | Status: DISCONTINUED | OUTPATIENT
Start: 2025-05-21 | End: 2025-05-21

## 2025-05-21 RX ADMIN — INSULIN LISPRO 2: 100 INJECTION, SOLUTION INTRAVENOUS; SUBCUTANEOUS at 14:57

## 2025-05-21 RX ADMIN — Medication 40 MILLIEQUIVALENT(S): at 22:02

## 2025-05-21 RX ADMIN — Medication 40 MILLIEQUIVALENT(S): at 06:41

## 2025-05-21 RX ADMIN — Medication 200 MILLIGRAM(S): at 09:27

## 2025-05-21 RX ADMIN — Medication 40 MILLIEQUIVALENT(S): at 18:18

## 2025-05-21 RX ADMIN — Medication 100 MILLIEQUIVALENT(S): at 22:01

## 2025-05-21 RX ADMIN — Medication 1000 MILLIGRAM(S): at 22:52

## 2025-05-21 RX ADMIN — Medication 100 MILLIEQUIVALENT(S): at 19:50

## 2025-05-21 RX ADMIN — Medication 200 MILLIGRAM(S): at 22:02

## 2025-05-21 RX ADMIN — Medication 1000 MILLIGRAM(S): at 03:29

## 2025-05-21 RX ADMIN — ENOXAPARIN SODIUM 40 MILLIGRAM(S): 100 INJECTION SUBCUTANEOUS at 14:54

## 2025-05-21 RX ADMIN — Medication 1 EACH: at 00:02

## 2025-05-21 RX ADMIN — Medication 100 MILLIEQUIVALENT(S): at 18:21

## 2025-05-21 RX ADMIN — Medication 40 MILLIEQUIVALENT(S): at 08:27

## 2025-05-21 RX ADMIN — Medication 400 MILLIGRAM(S): at 03:14

## 2025-05-21 RX ADMIN — Medication 40 MILLIEQUIVALENT(S): at 19:52

## 2025-05-21 RX ADMIN — I.V. FAT EMULSION 20.8 GM/KG/DAY: 20 EMULSION INTRAVENOUS at 23:19

## 2025-05-21 RX ADMIN — INSULIN LISPRO 10: 100 INJECTION, SOLUTION INTRAVENOUS; SUBCUTANEOUS at 06:40

## 2025-05-21 RX ADMIN — Medication 1 EACH: at 23:19

## 2025-05-21 RX ADMIN — Medication 400 MILLIGRAM(S): at 22:22

## 2025-05-21 RX ADMIN — I.V. FAT EMULSION 20.83 GM/KG/DAY: 20 EMULSION INTRAVENOUS at 00:03

## 2025-05-21 RX ADMIN — Medication 40 MILLIEQUIVALENT(S): at 10:16

## 2025-05-21 RX ADMIN — Medication 40 MILLIGRAM(S): at 08:27

## 2025-05-21 NOTE — PROGRESS NOTE ADULT - PROBLEM SELECTOR PLAN 1
- Drain capped at bedside today to test internal drainage  - Flush drain with 20cc of sterile saline every 6 hrs to maintain patency. Flush should be administered briskly. Two 10cc syringes may be used spaced 5 minutes apart.  - Dressing should be changed every 2-3 days or whenever soiled  - Drainage bag was left with pt. Drain should be uncapped and reconnected to drainage bag should the patient experience abdominal pain, fever, significant leakage around the drain, or significant bilirubin elevation over baseline. - Drain capped at bedside today to test internal drainage  - Flush drain with 10cc of sterile saline every 6 hrs to maintain patency. Flush should be administered briskly.   - Dressing should be changed every 2-3 days or whenever soiled  - Drainage bag was left with pt. Drain should be uncapped and reconnected to drainage bag should the patient experience abdominal pain, fever, significant leakage around the drain, or significant bilirubin elevation over baseline.    Addendum: Pt reported two bile saturate dressings after second 20cc flush. Flushing changed to 10cc sterile saline briskly every 6 hrs. If pt experiences any of the conditions above despite decrease of flushing, drain will need to be reconnected to the bag.

## 2025-05-21 NOTE — PROGRESS NOTE ADULT - SUBJECTIVE AND OBJECTIVE BOX
SURGERY DAILY PROGRESS NOTE:     Subjective:  Patient seen and examined at bedside during am rounds.   s/p IR drain study, mucus plug found  drain has been flushed Q6h.  AVSS.   Otherwise Denies any fevers, chills, n/v/d, chest pain or shortness of breath    Objective:     PHYSICAL EXAM   Physical Exam:  General: AOx3, Well developed, NAD, Jaundice improved  HEENT: NC/AT, normal pinnae and tragi  Chest: Normal respiratory effort, equal chest rise  Heart: RRR  Abdomen: Nondistended, soft, TTP around PEG and IR drains, no guarding or rebound tenderness.  Neuro/Psych: No localized deficits. Normal speech, normal tone, normal affect  Skin: Normal, warm, no rashes, no lesions noted  Extremities: Warm, well perfused, no edema      Chief complaint:      PMHx:  Bile duct cancer        PSHx:  H/O Whipple procedure        FHx:      Vitals:  T(C): 36.4 ( @ 07:23), Max: 36.8 ( @ 15:15)  HR: 82 ( 07:23) (70 - 91)  BP: 106/71 ( @ 07:23) (82/69 - 107/68)  RR: 18 ( @ 07:23) (12 - 18)  SpO2: 100% ( @ 07:23) (96% - 100%)      I&Os     @ 07:01  -   @ 07:00  --------------------------------------------------------  IN:    Fat Emulsion (Fish Oil &amp; Plant Based) 20% Infusion: 250 mL    IV PiggyBack: 200 mL    Other (mL): 500 mL    TPN (Total Parenteral Nutrition): 2000 mL  Total IN: 2950 mL    OUT:    Drain (mL): 550 mL    Oral Fluid: 0 mL    PEG (Percutaneous Endoscopic Gastrostomy) Tube (mL): 97746 mL    Voided (mL): 1125 mL  Total OUT: 84950 mL    Total NET: -8800 mL        .    Labs:   @ 07:41                    9.6  CBC: 6.07>)-------(<190                     30.8                 - | - | -    CMP:  ----------------------< -               - | - | -                      Ca:-  Phos:-  Mg:-               -|      |-        LFTs:  ------|-|-----             -|      |-  05-21 @ 04:31                    9.2  CBC: 5.53>)-------(<181                     29.4                 139 | 95 | 54    CMP:  ----------------------< 349               2.8 | 39 | 1.28                      Ca:9.0  Phos:4.0  M.1               1.5|      |28        LFTs:  ------|562|-----             -|      |-  05-20 @ 05:36                    9.9  CBC: 5.52>)-------(<190                     31.5                 135 | 92 | 47    CMP:  ----------------------< 405               3.1 | 37 | 1.23                      Ca:9.3  Phos:5.4  M.1               1.8|      |28        LFTs:  ------|695|-----             -|      |-        Cultures:        Current Inpatient Medications:  acetaminophen   IVPB .. 1000 milliGRAM(s) IV Intermittent once  ciprofloxacin   IVPB 400 milliGRAM(s) IV Intermittent every 12 hours  dextrose 5%. 1000 milliLiter(s) (100 mL/Hr) IV Continuous <Continuous>  dextrose 5%. 1000 milliLiter(s) (50 mL/Hr) IV Continuous <Continuous>  dextrose 50% Injectable 25 Gram(s) IV Push once  dextrose 50% Injectable 12.5 Gram(s) IV Push once  dextrose 50% Injectable 25 Gram(s) IV Push once  dextrose Oral Gel 15 Gram(s) Oral once PRN  dicyclomine 10 milliGRAM(s) Oral two times a day before meals PRN  enoxaparin Injectable 40 milliGRAM(s) SubCutaneous every 24 hours  glucagon  Injectable 1 milliGRAM(s) IntraMuscular once  insulin lispro (ADMELOG) corrective regimen sliding scale   SubCutaneous three times a day before meals  insulin lispro (ADMELOG) corrective regimen sliding scale   SubCutaneous at bedtime  lipid, fat emulsion (Fish Oil and Plant Based) 20% Infusion 0.868 Gm/kG/Day (20.83 mL/Hr) IV Continuous <Continuous>  ondansetron Injectable 4 milliGRAM(s) IV Push every 6 hours PRN  oxyCODONE    IR 5 milliGRAM(s) Oral every 6 hours PRN  pantoprazole    Tablet 40 milliGRAM(s) Oral before breakfast  Parenteral Nutrition - Adult 1 Each TPN Continuous <Continuous>  potassium chloride   Powder 40 milliEquivalent(s) Oral every 2 hours

## 2025-05-21 NOTE — CHART NOTE - NSCHARTNOTEFT_GEN_A_CORE
Clinical Nutrition PN Follow Up Note    * 73yo M w/ PMHx of Cholangio CA s/p Robotic Whipple on 2024 (Dr. Ocasio) w/ adjuvant Xeloda -> [place]/Cis/Durva, Found liver metastases on 2024, failed CBD stent placement (unsuccessful cannulation via ERCP), s/p internal-external biliary drainage (8.5fr) at [facility] on , c/b High external drainage output ( >2L) due to Internal drainage failure (CBD blockage), now s/p exchange of internal-external biliary drainage (12fr) on  in . Presented with 3-4 days of progressive abdominal bloating with mild nausea, generalized weakness and fatigue. Last BM 3d ago, has been passing gases. Complains of mild discomfort of epigastric pain. Admitted w/ SBO (likely malignant). NGT to LWS in place. Started on mSBO protocol - Steroids/Reglan/Octreotide. RD consulted for initiation of TPN via port (OK to use from surgical oncology).   *: Initiate TPN via Port 2/2 mSBO and suspected prolonged NPO status  *:  (+) BM, passing flatus. CLD. Would suggest to renew TPN x 1 more day to ensure >80% ENN being met with bridging PO + TPN. D/w surg resident   *: TPN dc'd  per surgery.  CT A/P: GOO. s/p NGT to LWS w/ ~3L output overnight. Will initiate TPN via implanted infusion port.   *5/10: Per gastro: rec PEG placement; plan for  for long-term gastric decompression given mSBO, inability to tolerate adequate PO intake, and palliation. D/w surg res will c/w TPN.   *: Per surg "Patient has felt he no longer wants to fight and will choose palliative measures for his care". Planning for PEG placement monday.  *: Plan for PEG today  for long-term decompression given mSBO and palliation, will reorder TPN. NGT remains in place to suction.   *: s/p venting PEG placement. Palliative care following - plan for home TPN w/ venting PEG. On CLD for pleasure. DNR/ DNI   *: Diet advanced to CLD. S/p biliary tube check () - L biliary drain possibly clogged distally, as no contrast entered the pigtail on fluoroscopic study. However drain now draining. Plan to c/w TPN.  *5/15: Diet advanced to FLD. s/p IR drain exchange. Having BMs + abdominal discomfort. C/w TPN. Will be at dextrose goal today. **CAN CYCLE TPN x12 hours TOMORROW **  *: cont on FLD, c/w TPN to be cycled tonight. RD d/w surg resident, concern for low lytes despite large amounts in TPN and repletion outside bag. POCT erratic, RD suggested to add RISS. Dc home planned for tomorrow ()  *: S/p biliary stent change to a metal stent w/ IR yesterday. Passed loose stool w/ some relief of abd cramping. Plan for discharge home sometime this weekend (today vs tomorrow?) - lytes still LOW, receiving repletion outside PN bag. POCTs remain elevated, currently receiving RISS, will continue to monitor and may need to consider adding insulin in PN bag for additional coverage.   *: noted w/ slight leakage around tube site, otherwise no pertinent changes/events overnight. Per discussion w/ surgery resident, plan for d/c home likely tomorrow. POCTs erractic x 24 hrs, will continue to monitor.  *: Tolerating diet, venting PEG improved pain. Per surg large vol of bile draining. Per IR w/ 1350 cc bilious output overnight; drain capped today. POCTs still erractic. D/w Dr. Randhawa and pharmacy, will add 10 units of insulin into the PN bag and continue w/ RISS. Will continue to monitor POCTs and adjust w/ pharmacy & MD    *TPN to be restarted 2/2 GOO and suspected prolonged NPO status.    *current status:  Drain reattached to a gravity drain bag by nursing staff overnight. Will plan for drain evaluation to check for stent patency today. Pt doesn't need to be NPO per PA - remains NPO this AM w/ TPN. POCT still spiking in AM w/ TPN running - 10 units added into bag yesterday and changed to moderate bedtime sliding scale. Plan today to increase in insulin in PN bag to 20 units - d/w Dr. Randhawa and clinical pharmacist.     *new pertinent meds: abx, ADMELOG (14 units given x 24 hrs), protonix, oxycodone, KCl (20 mEq given x 24 hrs)    *labs reviewed; Na low, corrected Na for hyperglycemia WNL - will continue to monitor and leave up to medical management to correct prn. K+ LOW - likely d/y large venting PEG output. As per ASPEN Guidelines, maintenance potassium concentration in PN is 1 – 2 mEq/kg/day. However, [facility] PN Policy indicates a maximum of 120 mEq should be added into the PN bag. Currently at 90 mEq which is close to maximum for wt. D/w surgery to continue to replete outside bag - received repletion x 48 hrs. Will increase to 100 mEq in PN bag and will NOT increase past that - maximum for pt wt = 115 mEq. Mg WNL. Phos now ELEVATES. Will decrease in bag. May need to remove. As per ASPEN Guidelines, maintenance magnesium concentration in PN is 8-20 mEq/day with a maximum concentration of 48 mEq/day.  T Bili (1.8) WNL, has been <2 x 3 days, will add trace elements back into TPN bag.  TG WNL (91) will c/w 50g lipids daily. Pending new level.  POCTs variable x 24 hrs, plan to increase insulin in bag as above and continue w/ current dextrose goal. May need to lower if does not respond well to insulin in bag (0.57 units/g dextrose)          135  |  92[L]  |  47[H]  ----------------------------<  405[H]  3.1[L]   |  37[H]  |  1.23    Ca    9.3      20 May 2025 05:36  Phos  5.4     05-  Mg     2.1         TPro  6.1  /  Alb  2.4[L]  /  TBili  1.8[H]  /  DBili  x   /  AST  28  /  ALT  94[H]  /  AlkPhos  695[H]      BMI: BMI (kg/m2): 18.2 (25 @ 03:35)  HbA1c: A1C with Estimated Average Glucose Result: 5.3 % (25 @ 04:57)    BP: 111/75 (25 @ 07:24) (102/62 - 128/73)Vital Signs Last 24 Hrs  T(C): 36.3 (25 @ 07:24), Max: 36.7 (25 @ 15:27)  T(F): 97.4 (25 @ 07:24), Max: 98 (25 @ 15:27)  HR: 78 (25 @ 07:24) (78 - 86)  BP: 111/75 (25 @ 07:24) (104/71 - 111/75)  RR: 18 (25 @ 07:24) (18 - 19)  SpO2: 100% (25 @ 07:24) (97% - 100%)    Lipid Panel: Date/Time: 05-15-25 @ 05:16  Triglycerides, Serum: 91    POCT Blood Glucose.: 408 mg/dL (20 May 2025 06:33)  POCT Blood Glucose.: 147 mg/dL (19 May 2025 21:26)  POCT Blood Glucose.: 132 mg/dL (19 May 2025 18:58)  POCT Blood Glucose.: 152 mg/dL (19 May 2025 13:09)    *I&O's Detail    19 May 2025 07:01  -  20 May 2025 07:00  --------------------------------------------------------  IN:  Total IN: 0 mL    OUT:    Drain (mL): 610 mL    PEG (Percutaneous Endoscopic Gastrostomy) Tube (mL): 8250 mL    Voided (mL): 1300 mL  Total OUT: 95868 mL    Total NET: -69032 mL  * fluid status: negative; >8L output from venting PEG doc'd - likely causing LOW POTASSIUM/Na. No TPN doc'd.   *Last (+) BM doc'd on . (+) abdominal discomfort; pt not on bowel regimen.  *edema: none doc'd  *PI: coccyx, unstageable     *malnutrition: Pt continues to meet criteria for severe protein-calorie malnutrition in context of chronic disease r/t decreased ability to meet increased nutrient needs 2/2 mets Ca s/p Whipple and malignant SBO AEB severe muscle/ fat wasting, 33% wt loss x 1 yr, <50-75% ENN chronically    Estimated Needs: based on 57.6 Kg (wt from )  Calories: -  Kcal (35- 40 Kcal/Kg)  Protein: 86- 115 g (1.5- 2 g/Kg)  Fluids: 1728 -  mL (30 - 35 mL/Kg)    *Diet, NPO (25 @ 08:50) [Active]    Weight History:  Daily Weight in k.8 (19 May 2025 06:12)  Daily Weight in k.4 (11 May 2025 05:32)    Height (cm): 177.8 (25 @ 03:35), 177.8 (25 @ 11:56), 177.8 (25 @ 10:58)  Weight (kg): 57.606 (25 @ 03:35), 57.6 (25 @ 10:58), 68 (25 @ 19:34)  BMI (kg/m2): 18.2 (25 @ 03:35), 18.2 (25 @ 11:56), 18.2 (25 @ 10:58)  BSA (m2): 1.72 (25 @ 03:35), 1.72 (25 @ 11:56), 1.72 (25 @ 10:58)    TPN Recommendations: via implanted infusion port  Total Volume: 2000 mL x 12 hours  115 g  Amino Acids  300 g Dextrose (@ goal)  50 g Lipids 20%  200 mEq Sodium Chloride  0 mEq Sodium Acetate  0 mmol Sodium Phosphate  0 mEq Potassium Chloride  71 mEq Potassium Acetate  30 mmol Potassium Phosphate  0 mEq Calcium Gluconate (CAPS out of PN solution)  20 mEq Magnesium Sulfate  200 mg Thiamine  1 ml Trace Elements  10 ml MVI  24 units Regular Insulin    Total Calories     (Meets  ~90%  of  Estimated Energy needs  and 100% Protein needs)     *Goal: achieved - Pt will receive >/= 80% ENN via tolerated route    Additional Recommendations:    1) Daily weights  2) Strict I & O's - replete losses from venting PEG  3) Daily lyte checks including magnesium and phos **cont to replete K, Phos, Mg outside PN bag PRN  4) Weekly triglycerides/LFT checks  5) POCT q6hrs; maintain 140-180mg/dL **on RISS, insulin also added to PN bag  6) Has venting PEG. Plan for home TPN w/ pleasure feeds     Nutrition recommendations to continue upon discharge. Goal to continue to meet >/= 80% ENN via tolerated route. RD to F/U prn for changes to nutrition dc plan.    *will continue to monitor and adjust PN prn*  Carol Burger, MS, RDN, CNSC, [phone]  Certified Nutrition  Clinical Nutrition PN Follow Up Note    * 73yo M w/ PMHx of Cholangio CA s/p Robotic Whipple on 2024 (Dr. Ocasio) w/ adjuvant Xeloda -> [place]/Cis/Durva, Found liver metastases on 2024, failed CBD stent placement (unsuccessful cannulation via ERCP), s/p internal-external biliary drainage (8.5fr) at [facility] on , c/b High external drainage output ( >2L) due to Internal drainage failure (CBD blockage), now s/p exchange of internal-external biliary drainage (12fr) on  in . Presented with 3-4 days of progressive abdominal bloating with mild nausea, generalized weakness and fatigue. Last BM 3d ago, has been passing gases. Complains of mild discomfort of epigastric pain. Admitted w/ SBO (likely malignant). NGT to LWS in place. Started on mSBO protocol - Steroids/Reglan/Octreotide. RD consulted for initiation of TPN via port (OK to use from surgical oncology).   *: Initiate TPN via Port 2/2 mSBO and suspected prolonged NPO status  *:  (+) BM, passing flatus. CLD. Would suggest to renew TPN x 1 more day to ensure >80% ENN being met with bridging PO + TPN. D/w surg resident   *: TPN dc'd  per surgery.  CT A/P: GOO. s/p NGT to LWS w/ ~3L output overnight. Will initiate TPN via implanted infusion port.   *5/10: Per gastro: rec PEG placement; plan for  for long-term gastric decompression given mSBO, inability to tolerate adequate PO intake, and palliation. D/w surg res will c/w TPN.   *: Per surg "Patient has felt he no longer wants to fight and will choose palliative measures for his care". Planning for PEG placement monday.  *: Plan for PEG today  for long-term decompression given mSBO and palliation, will reorder TPN. NGT remains in place to suction.   *: s/p venting PEG placement. Palliative care following - plan for home TPN w/ venting PEG. On CLD for pleasure. DNR/ DNI   *: Diet advanced to CLD. S/p biliary tube check () - L biliary drain possibly clogged distally, as no contrast entered the pigtail on fluoroscopic study. However drain now draining. Plan to c/w TPN.  *5/15: Diet advanced to FLD. s/p IR drain exchange. Having BMs + abdominal discomfort. C/w TPN. Will be at dextrose goal today. **CAN CYCLE TPN x12 hours TOMORROW **  *: cont on FLD, c/w TPN to be cycled tonight. RD d/w surg resident, concern for low lytes despite large amounts in TPN and repletion outside bag. POCT erratic, RD suggested to add RISS. Dc home planned for tomorrow ()  *: S/p biliary stent change to a metal stent w/ IR yesterday. Passed loose stool w/ some relief of abd cramping. Plan for discharge home sometime this weekend (today vs tomorrow?) - lytes still LOW, receiving repletion outside PN bag. POCTs remain elevated, currently receiving RISS, will continue to monitor and may need to consider adding insulin in PN bag for additional coverage.   *: noted w/ slight leakage around tube site, otherwise no pertinent changes/events overnight. Per discussion w/ surgery resident, plan for d/c home likely tomorrow. POCTs erractic x 24 hrs, will continue to monitor.  *: Tolerating diet, venting PEG improved pain. Per surg large vol of bile draining. Per IR w/ 1350 cc bilious output overnight; drain capped today. POCTs still erractic. D/w Dr. Randhawa and pharmacy, will add 10 units of insulin into the PN bag and continue w/ RISS. Will continue to monitor POCTs and adjust w/ pharmacy & MD  *: Drain reattached to a gravity drain bag by nursing staff overnight. Will plan for drain evaluation to check for stent patency today. Pt doesn't need to be NPO per PA - remains NPO this AM w/ TPN. POCT still spiking in AM w/ TPN running - 10 units added into bag yesterday and changed to moderate bedtime sliding scale. Plan today to increase in insulin in PN bag to 20 units - d/w Dr. Randhawa and clinical pharmacist.     *TPN to be restarted 2/2 GOO and suspected prolonged NPO status.    *current status: went to IR yesterday for conversion of EBD to IEBD to hopefully re-establish flow through indwelling metal CBD stent. Mucus plug found and removed. Remains on TPN w/ insulin in bag and persistently low K levels, likely d/t large output from venting PEG (10L x 24 hrs). Discussed TPN changes as below with MD Patricia and clinical pharmacist for insulin and potassium levels. Tentative plan for dc tomorrow  if K/POCT WNL     *new pertinent meds: abx, ADMELOG (30 units given x 24 hrs), protonix, oxycodone, KCl (80 mEq given x 24 hrs)    *labs reviewed; Na WNL. K+ LOW - likely d/t large venting PEG output. As per ASPEN Guidelines, maintenance potassium concentration in PN is 1 – 2 mEq/kg/day. However, [facility] PN Policy indicates a maximum of 120 mEq should be added into the PN bag. Plan to increase to 115 mEq in PN bag and will NOT increase past that as it is maximum dose for pt wt. Mg WNL. Phos WNL. As per ASPEN Guidelines, maintenance magnesium concentration in PN is 8-20 mEq/day with a maximum concentration of 48 mEq/day.  T Bili (1.5) WNL, trace elements added back into PN bag yesterday and stable.  TG WNL (91) will c/w 50g lipids daily. Pending new level.  POCTs variable x 24 hrs, improved slightly with increased insulin. Plan to increase insulin in bag to 24 units and decrease dextrose to 300g daily (will meet ~90% ENN) - 0.8 units/g dextrose         142  |  97  |  57[H]  ----------------------------<  322[H]  2.7[LL]   |  38[H]  |  1.21    Ca    9.3      21 May 2025 07:41  Phos  3.0       Mg     2.2         TPro  5.9[L]  /  Alb  2.3[L]  /  TBili  1.5[H]  /  DBili  x   /  AST  26  /  ALT  68  /  AlkPhos  558[H]      BMI: BMI (kg/m2): 18.2 (25 @ 03:35)  HbA1c: A1C with Estimated Average Glucose Result: 5.3 % (25 @ 04:57)    BP: 111/75 (25 @ 07:24) (102/62 - 128/73)Vital Signs Last 24 Hrs  T(C): 36.3 (25 @ 07:24), Max: 36.7 (25 @ 15:27)  T(F): 97.4 (25 @ 07:24), Max: 98 (25 @ 15:27)  HR: 78 (25 @ 07:24) (78 - 86)  BP: 111/75 (25 @ 07:24) (104/71 - 111/75)  RR: 18 (25 @ 07:24) (18 - 19)  SpO2: 100% (25 @ 07:24) (97% - 100%)    Lipid Panel: Date/Time: 05-15-25 @ 05:16  Triglycerides, Serum: 91    POCT Blood Glucose.: 381 mg/dL (21 May 2025 06:39)  POCT Blood Glucose.: 171 mg/dL (20 May 2025 21:20)  POCT Blood Glucose.: 124 mg/dL (20 May 2025 16:40)  POCT Blood Glucose.: 96 mg/dL (20 May 2025 14:56)  POCT Blood Glucose.: 98 mg/dL (20 May 2025 14:19)  POCT Blood Glucose.: 276 mg/dL (20 May 2025 11:30)  POCT Blood Glucose.: 325 mg/dL (20 May 2025 09:48)    *I&O's Detail    20 May 2025 07:01  -  21 May 2025 07:00  --------------------------------------------------------  IN:    Fat Emulsion (Fish Oil &amp; Plant Based) 20% Infusion: 250 mL    IV PiggyBack: 200 mL    Other (mL): 500 mL    TPN (Total Parenteral Nutrition): 2000 mL  Total IN: 2950 mL    OUT:    Drain (mL): 550 mL    Oral Fluid: 0 mL    PEG (Percutaneous Endoscopic Gastrostomy) Tube (mL): 10418 mL    Voided (mL): 1125 mL  Total OUT: 59459 mL    Total NET: -8800 mL      21 May 2025 07:01  -  21 May 2025 09:27  --------------------------------------------------------  IN:  Total IN: 0 mL    OUT:    Drain (mL): 250 mL    PEG (Percutaneous Endoscopic Gastrostomy) Tube (mL): 2550 mL    Voided (mL): 250 mL  Total OUT: 3050 mL    Total NET: -3050 mL  * fluid status: large negative; >10L output from venting PEG doc'd x 24 hrs- likely causing LOW POTASSIUM/Na. No TPN doc'd. May need to consider increase TPN total volume to replenish losses.  *Last (+) BM doc'd on . (+) abdominal discomfort; pt not on bowel regimen.  *edema: none doc'd  *PI: coccyx, unstageable     *malnutrition: Pt continues to meet criteria for severe protein-calorie malnutrition in context of chronic disease r/t decreased ability to meet increased nutrient needs 2/2 mets Ca s/p Whipple and malignant SBO AEB severe muscle/ fat wasting, 33% wt loss x 1 yr, <50-75% ENN chronically    Estimated Needs: based on 57.6 Kg (wt from )  Calories: -  Kcal (35- 40 Kcal/Kg)  Protein: 86- 115 g (1.5- 2 g/Kg)  Fluids: 1728 -  mL (30 - 35 mL/Kg)    *Diet, NPO (25 @ 08:50) [Active]    Weight History:  Daily Weight in k.8 (19 May 2025 06:12)  Daily Weight in k.4 (11 May 2025 05:32)    Height (cm): 177.8 (25 @ 03:35), 177.8 (25 @ 11:56), 177.8 (25 @ 10:58)  Weight (kg): 57.606 (25 @ 03:35), 57.6 (25 @ 10:58), 68 (25 @ 19:34)  BMI (kg/m2): 18.2 (25 @ 03:35), 18.2 (25 @ 11:56), 18.2 (25 @ 10:58)  BSA (m2): 1.72 (25 @ 03:35), 1.72 (25 @ 11:56), 1.72 (25 @ 10:58)    TPN Recommendations: via implanted infusion port  Total Volume: 2000 mL x 12 hours  115 g  Amino Acids  300 g Dextrose (@ goal)  50 g Lipids 20%  200 mEq Sodium Chloride  0 mEq Sodium Acetate  0 mmol Sodium Phosphate  0 mEq Potassium Chloride  71 mEq Potassium Acetate  30 mmol Potassium Phosphate  0 mEq Calcium Gluconate (CAPS out of PN solution)  20 mEq Magnesium Sulfate  200 mg Thiamine  1 ml Trace Elements  10 ml MVI  24 units Regular Insulin    Total Calories     (Meets  ~90%  of  Estimated Energy needs  and 100% Protein needs)     *Goal: achieved - Pt will receive >/= 80% ENN via tolerated route    Additional Recommendations:    1) Daily weights  2) Strict I & O's - replete losses from venting PEG  3) Daily lyte checks including magnesium and phos **cont to replete K, Phos, Mg outside PN bag PRN  4) Weekly triglycerides/LFT checks  5) POCT q6hrs; maintain 140-180mg/dL **on RISS, insulin also added to PN bag  6) Has venting PEG. Plan for home TPN w/ pleasure feeds     Nutrition recommendations to continue upon discharge. Goal to continue to meet >/= 80% ENN via tolerated route. RD to F/U prn for changes to nutrition dc plan.    *will continue to monitor and adjust PN prn*  Carol Burger, MS, RDN, CNSC, [phone]  Certified Nutrition

## 2025-05-21 NOTE — PROGRESS NOTE ADULT - ASSESSMENT
73yo M w/ H/o Cholangio CA w/ distant mets, admitted on 5/6 with malignant SBO. S/p Venting PEG tube placement, IR int-ext biliary drain exchange, IR bare metal biliary stent placement.    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  S/p venting PEG tube placement  Currently receiving TPN through existing mediport, and tolerating PO diet (full liquids).   Plan per Case MGMT / SW to go home with home care, remain on TPN through the Mediport (okay per Primary Onc Dr Belinda Morrell).  Continue supportive measures according to [facility]  Palliative on board  Disposition per primary team.     # Hyperglycemia:  -am hyperglycemia in setting of overnight cycled TPN  -improving, uptitrating insulin in TPN  (goal is only need insulin in TPN and not subcu).   -discussed w TPN RD and clinical pharmacist    # Anemia:  Likely AOCD with component of myelosuppression from chemo.   Hematology/Oncology on board, will follow as outpatient.   Trend CBC and transfuse PRN.     # Thrombocytopenia:  -stable     #Hypokalemia   - replete     # Healthcare maintenance:  VTE prophylaxis: LMWH.   Disposition: home. SW/CM on board.     Anticipate d/c 5/22-5/23

## 2025-05-21 NOTE — PROGRESS NOTE ADULT - ASSESSMENT
75yo M s/p placement of internal/external biliary drain through the clogged metal stent  - Bilirubin stable 1.5  - Drain output 550cc bile overnight  - Pt states he feels much better

## 2025-05-21 NOTE — PROGRESS NOTE ADULT - ASSESSMENT
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 Having BM.  s/p Venting PEG tube placement   s/p IR int-ext biliary drain exchange  s/p IR bare metal biliary stent placement  s/p IR drain study      Plan:  - TPN cycling & glc and Insulin adjustment for DC planning.  - Monitor blood glucose  - Can use chemoport for TPN use in outpatient and inpatient setting   - c/w TPN & Cycle  - Monitor BM  - Pain control PRN  - Monitor vitals  - Nausea control PRN  - replete electrolytes  - daily labs  - OOB/PT  - Palliative on board    Plan will be discussed with Surgical oncology attending, Dr. Wright

## 2025-05-21 NOTE — PROGRESS NOTE ADULT - SUBJECTIVE AND OBJECTIVE BOX
75yo M w/ PMHx of Cholangio CA s/p Robotic Whipple on 5/28/2024 (Dr. Ocasio) w/ adjuvant Xeloda -> [place]/Cis/Durva, Found liver metastases on 11/12/2024, failed CBD stent placement (unsuccessful cannulation via ERCP), s/p internal-external biliary drainage (8.5fr) at [facility] on 4/13, c/b High external drainage output ( >2L) due to Internal drainage failure (CBD blockage), now s/p exchange of internal-external biliary drainage (12fr) on 4/25 in . Interim procedure of metal CBD stent placement with covering external drain. Pt now s/p exchange to int/ext drain again, this time passing through the clogged metal stent.     Vital Signs Last 24 Hrs  T(C): 36.4 (21 May 2025 07:23), Max: 36.8 (20 May 2025 15:15)  T(F): 97.5 (21 May 2025 07:23), Max: 98.2 (20 May 2025 15:15)  HR: 82 (21 May 2025 07:23) (70 - 91)  BP: 106/71 (21 May 2025 07:23) (82/69 - 107/68)  BP(mean): --  RR: 18 (21 May 2025 07:23) (12 - 18)  SpO2: 100% (21 May 2025 07:23) (96% - 100%)    Parameters below as of 21 May 2025 07:23  Patient On (Oxygen Delivery Method): room air      GENERAL APPEARANCE: Well developed, in no acute distress.    ABDOMEN: Soft and nontender with normal bowel sounds.     NEUROLOGIC: Alert and oriented x 3. Normal affect.       CBC                        9.6    6.07  )-----------( 190      ( 21 May 2025 07:41 )             30.8       Chemistry  05-21    142  |  97  |  57[H]  ----------------------------<  322[H]  2.7[LL]   |  38[H]  |  1.21    Ca    9.3      21 May 2025 07:41  Phos  3.0     05-21  Mg     2.2     05-21    TPro  5.9[L]  /  Alb  2.3[L]  /  TBili  1.5[H]  /  DBili  x   /  AST  26  /  ALT  68  /  AlkPhos  558[H]  05-21      Coags  PT/INR - ( 20 May 2025 05:36 )   PT: 11.8 sec;   INR: 1.00 ratio    PTT - ( 20 May 2025 05:36 )  PTT:32.5 sec

## 2025-05-21 NOTE — CHART NOTE - NSCHARTNOTEFT_GEN_A_CORE
Patient will require a hospital bed due to significant deconditioning, advanced age, high risk for pressure ulcers, congestive heart failure. Patient requires the head of the bed to be elevated more than 30 degrees. Pillows and wedges are not effective. Patient requires positioning of the body in ways not feasible with an ordinary bed. Patient requires frequent repositioning in order to alleviate pain. Patient requires a gel overlay due to limited mobility and compromised circulatory status.

## 2025-05-21 NOTE — PROGRESS NOTE ADULT - SUBJECTIVE AND OBJECTIVE BOX
no HOSPITALIST ATTENDING PROGRESS NOTE    Chart and meds reviewed.  Patient seen and examined.    CC: malignant sbo     Subjective: Titrating insulin and potassium in TPN    All other systems reviewed and found to be negative with the exception of what has been described above.    MEDICATIONS  (STANDING):  acetaminophen   IVPB .. 1000 milliGRAM(s) IV Intermittent once  ciprofloxacin   IVPB 400 milliGRAM(s) IV Intermittent every 12 hours  dextrose 5%. 1000 milliLiter(s) (50 mL/Hr) IV Continuous <Continuous>  dextrose 5%. 1000 milliLiter(s) (100 mL/Hr) IV Continuous <Continuous>  dextrose 50% Injectable 25 Gram(s) IV Push once  dextrose 50% Injectable 12.5 Gram(s) IV Push once  dextrose 50% Injectable 25 Gram(s) IV Push once  enoxaparin Injectable 40 milliGRAM(s) SubCutaneous every 24 hours  glucagon  Injectable 1 milliGRAM(s) IntraMuscular once  insulin lispro (ADMELOG) corrective regimen sliding scale   SubCutaneous three times a day before meals  insulin lispro (ADMELOG) corrective regimen sliding scale   SubCutaneous at bedtime  lipid, fat emulsion (Fish Oil and Plant Based) 20% Infusion 0.868 Gm/kG/Day (20.83 mL/Hr) IV Continuous <Continuous>  lipid, fat emulsion (Fish Oil and Plant Based) 20% Infusion 0.868 Gm/kG/Day (20.8 mL/Hr) IV Continuous <Continuous>  pantoprazole    Tablet 40 milliGRAM(s) Oral before breakfast  Parenteral Nutrition - Adult 1 Each TPN Continuous <Continuous>  Parenteral Nutrition - Adult 1 Each TPN Continuous <Continuous>    MEDICATIONS  (PRN):  dextrose Oral Gel 15 Gram(s) Oral once PRN Blood Glucose LESS THAN 70 milliGRAM(s)/deciliter  dicyclomine 10 milliGRAM(s) Oral two times a day before meals PRN gas/cramps  ondansetron Injectable 4 milliGRAM(s) IV Push every 6 hours PRN Nausea and/or Vomiting  oxyCODONE    IR 5 milliGRAM(s) Oral every 6 hours PRN Severe Pain (7 - 10)      VITALS:  T(F): 97.5 (05-21-25 @ 07:23), Max: 98.2 (05-20-25 @ 15:15)  HR: 82 (05-21-25 @ 07:23) (70 - 91)  BP: 106/71 (05-21-25 @ 07:23) (82/69 - 107/68)  RR: 18 (05-21-25 @ 07:23) (12 - 18)  SpO2: 100% (05-21-25 @ 07:23) (96% - 100%)  Wt(kg): --    I&O's Summary    20 May 2025 07:01  -  21 May 2025 07:00  --------------------------------------------------------  IN: 2950 mL / OUT: 25505 mL / NET: -8800 mL    21 May 2025 07:01  -  21 May 2025 12:43  --------------------------------------------------------  IN: 0 mL / OUT: 3050 mL / NET: -3050 mL        CAPILLARY BLOOD GLUCOSE      POCT Blood Glucose.: 278 mg/dL (21 May 2025 11:39)  POCT Blood Glucose.: 381 mg/dL (21 May 2025 06:39)  POCT Blood Glucose.: 171 mg/dL (20 May 2025 21:20)  POCT Blood Glucose.: 124 mg/dL (20 May 2025 16:40)  POCT Blood Glucose.: 96 mg/dL (20 May 2025 14:56)  POCT Blood Glucose.: 98 mg/dL (20 May 2025 14:19)      PHYSICAL EXAM:  Gen: NAD  HEENT:  pupils equal and reactive, EOMI, no oropharyngeal lesions, erythema, exudates, oral thrush  NECK:   supple, no carotid bruits, no palpable lymph nodes, no thyromegaly  CV:  +S1, +S2, regular, no murmurs or rubs  RESP:   lungs clear to auscultation bilaterally, no wheezing, rales, rhonchi, good air entry bilaterally  BREAST:  not examined  GI:  abdomen soft, non-tender, non-distended, normal BS, no bruits, no abdominal masses, no palpable masses, +venting peg, + biliary drain  RECTAL:  not examined  :  not examined  MSK:   normal muscle tone, no atrophy, no rigidity, no contractions  EXT:  no clubbing, no cyanosis, no edema, no calf pain, swelling or erythema  VASCULAR:  pulses equal and symmetric in the upper and lower extremities  NEURO:  AAOX3, no focal neurological deficits, follows all commands, able to move extremities spontaneously  SKIN:  no ulcers, lesions or rashes    LABS:                            9.6    6.07  )-----------( 190      ( 21 May 2025 07:41 )             30.8     05-21    142  |  97  |  57[H]  ----------------------------<  322[H]  2.7[LL]   |  38[H]  |  1.21    Ca    9.3      21 May 2025 07:41  Phos  3.0     05-21  Mg     2.2     05-21    TPro  5.9[L]  /  Alb  2.3[L]  /  TBili  1.5[H]  /  DBili  x   /  AST  26  /  ALT  68  /  AlkPhos  558[H]  05-21        LIVER FUNCTIONS - ( 21 May 2025 07:41 )  Alb: 2.3 g/dL / Pro: 5.9 gm/dL / ALK PHOS: 558 U/L / ALT: 68 U/L / AST: 26 U/L / GGT: x           PT/INR - ( 20 May 2025 05:36 )   PT: 11.8 sec;   INR: 1.00 ratio         PTT - ( 20 May 2025 05:36 )  PTT:32.5 sec  Urinalysis Basic - ( 21 May 2025 07:41 )    Color: x / Appearance: x / SG: x / pH: x  Gluc: 322 mg/dL / Ketone: x  / Bili: x / Urobili: x   Blood: x / Protein: x / Nitrite: x   Leuk Esterase: x / RBC: x / WBC x   Sq Epi: x / Non Sq Epi: x / Bacteria: x               HOSPITALIST ATTENDING PROGRESS NOTE    Chart and meds reviewed.  Patient seen and examined.    CC: malignant sbo     Subjective: Titrating insulin and potassium in TPN    All other systems reviewed and found to be negative with the exception of what has been described above. Called wife to provide update unable to reach her.     MEDICATIONS  (STANDING):  acetaminophen   IVPB .. 1000 milliGRAM(s) IV Intermittent once  ciprofloxacin   IVPB 400 milliGRAM(s) IV Intermittent every 12 hours  dextrose 5%. 1000 milliLiter(s) (50 mL/Hr) IV Continuous <Continuous>  dextrose 5%. 1000 milliLiter(s) (100 mL/Hr) IV Continuous <Continuous>  dextrose 50% Injectable 25 Gram(s) IV Push once  dextrose 50% Injectable 12.5 Gram(s) IV Push once  dextrose 50% Injectable 25 Gram(s) IV Push once  enoxaparin Injectable 40 milliGRAM(s) SubCutaneous every 24 hours  glucagon  Injectable 1 milliGRAM(s) IntraMuscular once  insulin lispro (ADMELOG) corrective regimen sliding scale   SubCutaneous three times a day before meals  insulin lispro (ADMELOG) corrective regimen sliding scale   SubCutaneous at bedtime  lipid, fat emulsion (Fish Oil and Plant Based) 20% Infusion 0.868 Gm/kG/Day (20.83 mL/Hr) IV Continuous <Continuous>  lipid, fat emulsion (Fish Oil and Plant Based) 20% Infusion 0.868 Gm/kG/Day (20.8 mL/Hr) IV Continuous <Continuous>  pantoprazole    Tablet 40 milliGRAM(s) Oral before breakfast  Parenteral Nutrition - Adult 1 Each TPN Continuous <Continuous>  Parenteral Nutrition - Adult 1 Each TPN Continuous <Continuous>    MEDICATIONS  (PRN):  dextrose Oral Gel 15 Gram(s) Oral once PRN Blood Glucose LESS THAN 70 milliGRAM(s)/deciliter  dicyclomine 10 milliGRAM(s) Oral two times a day before meals PRN gas/cramps  ondansetron Injectable 4 milliGRAM(s) IV Push every 6 hours PRN Nausea and/or Vomiting  oxyCODONE    IR 5 milliGRAM(s) Oral every 6 hours PRN Severe Pain (7 - 10)      VITALS:  T(F): 97.5 (05-21-25 @ 07:23), Max: 98.2 (05-20-25 @ 15:15)  HR: 82 (05-21-25 @ 07:23) (70 - 91)  BP: 106/71 (05-21-25 @ 07:23) (82/69 - 107/68)  RR: 18 (05-21-25 @ 07:23) (12 - 18)  SpO2: 100% (05-21-25 @ 07:23) (96% - 100%)  Wt(kg): --    I&O's Summary    20 May 2025 07:01  -  21 May 2025 07:00  --------------------------------------------------------  IN: 2950 mL / OUT: 71391 mL / NET: -8800 mL    21 May 2025 07:01  -  21 May 2025 12:43  --------------------------------------------------------  IN: 0 mL / OUT: 3050 mL / NET: -3050 mL        CAPILLARY BLOOD GLUCOSE      POCT Blood Glucose.: 278 mg/dL (21 May 2025 11:39)  POCT Blood Glucose.: 381 mg/dL (21 May 2025 06:39)  POCT Blood Glucose.: 171 mg/dL (20 May 2025 21:20)  POCT Blood Glucose.: 124 mg/dL (20 May 2025 16:40)  POCT Blood Glucose.: 96 mg/dL (20 May 2025 14:56)  POCT Blood Glucose.: 98 mg/dL (20 May 2025 14:19)      PHYSICAL EXAM:  Gen: NAD  HEENT:  pupils equal and reactive, EOMI, no oropharyngeal lesions, erythema, exudates, oral thrush  NECK:   supple, no carotid bruits, no palpable lymph nodes, no thyromegaly  CV:  +S1, +S2, regular, no murmurs or rubs  RESP:   lungs clear to auscultation bilaterally, no wheezing, rales, rhonchi, good air entry bilaterally  BREAST:  not examined  GI:  abdomen soft, non-tender, non-distended, normal BS, no bruits, no abdominal masses, no palpable masses, +venting peg, + biliary drain  RECTAL:  not examined  :  not examined  MSK:   normal muscle tone, no atrophy, no rigidity, no contractions  EXT:  no clubbing, no cyanosis, no edema, no calf pain, swelling or erythema  VASCULAR:  pulses equal and symmetric in the upper and lower extremities  NEURO:  AAOX3, no focal neurological deficits, follows all commands, able to move extremities spontaneously  SKIN:  no ulcers, lesions or rashes    LABS:                            9.6    6.07  )-----------( 190      ( 21 May 2025 07:41 )             30.8     05-21    142  |  97  |  57[H]  ----------------------------<  322[H]  2.7[LL]   |  38[H]  |  1.21    Ca    9.3      21 May 2025 07:41  Phos  3.0     05-21  Mg     2.2     05-21    TPro  5.9[L]  /  Alb  2.3[L]  /  TBili  1.5[H]  /  DBili  x   /  AST  26  /  ALT  68  /  AlkPhos  558[H]  05-21        LIVER FUNCTIONS - ( 21 May 2025 07:41 )  Alb: 2.3 g/dL / Pro: 5.9 gm/dL / ALK PHOS: 558 U/L / ALT: 68 U/L / AST: 26 U/L / GGT: x           PT/INR - ( 20 May 2025 05:36 )   PT: 11.8 sec;   INR: 1.00 ratio         PTT - ( 20 May 2025 05:36 )  PTT:32.5 sec  Urinalysis Basic - ( 21 May 2025 07:41 )    Color: x / Appearance: x / SG: x / pH: x  Gluc: 322 mg/dL / Ketone: x  / Bili: x / Urobili: x   Blood: x / Protein: x / Nitrite: x   Leuk Esterase: x / RBC: x / WBC x   Sq Epi: x / Non Sq Epi: x / Bacteria: x

## 2025-05-22 LAB
ALBUMIN SERPL ELPH-MCNC: 2.4 G/DL — LOW (ref 3.3–5)
ALP SERPL-CCNC: 601 U/L — HIGH (ref 40–120)
ALT FLD-CCNC: 70 U/L — SIGNIFICANT CHANGE UP (ref 12–78)
ANION GAP SERPL CALC-SCNC: 4 MMOL/L — LOW (ref 5–17)
AST SERPL-CCNC: 36 U/L — SIGNIFICANT CHANGE UP (ref 15–37)
BILIRUB SERPL-MCNC: 1.7 MG/DL — HIGH (ref 0.2–1.2)
BUN SERPL-MCNC: 59 MG/DL — HIGH (ref 7–23)
BUN SERPL-MCNC: 60 MG/DL — HIGH (ref 7–23)
BUN SERPL-MCNC: 65 MG/DL — HIGH (ref 7–23)
CALCIUM SERPL-MCNC: 10.3 MG/DL — HIGH (ref 8.5–10.1)
CALCIUM SERPL-MCNC: 9.7 MG/DL — SIGNIFICANT CHANGE UP (ref 8.5–10.1)
CALCIUM SERPL-MCNC: 9.8 MG/DL — SIGNIFICANT CHANGE UP (ref 8.5–10.1)
CHLORIDE SERPL-SCNC: 91 MMOL/L — LOW (ref 96–108)
CHLORIDE SERPL-SCNC: 92 MMOL/L — LOW (ref 96–108)
CHLORIDE SERPL-SCNC: 97 MMOL/L — SIGNIFICANT CHANGE UP (ref 96–108)
CO2 SERPL-SCNC: 44 MMOL/L — HIGH (ref 22–31)
CO2 SERPL-SCNC: >45 MMOL/L — CRITICAL HIGH (ref 22–31)
CO2 SERPL-SCNC: >45 MMOL/L — CRITICAL HIGH (ref 22–31)
CREAT SERPL-MCNC: 1.32 MG/DL — HIGH (ref 0.5–1.3)
CREAT SERPL-MCNC: 1.34 MG/DL — HIGH (ref 0.5–1.3)
CREAT SERPL-MCNC: 1.45 MG/DL — HIGH (ref 0.5–1.3)
EGFR: 50 ML/MIN/1.73M2 — LOW
EGFR: 50 ML/MIN/1.73M2 — LOW
EGFR: 55 ML/MIN/1.73M2 — LOW
EGFR: 55 ML/MIN/1.73M2 — LOW
EGFR: 56 ML/MIN/1.73M2 — LOW
EGFR: 56 ML/MIN/1.73M2 — LOW
GLUCOSE BLDC GLUCOMTR-MCNC: 102 MG/DL — HIGH (ref 70–99)
GLUCOSE BLDC GLUCOMTR-MCNC: 173 MG/DL — HIGH (ref 70–99)
GLUCOSE BLDC GLUCOMTR-MCNC: 178 MG/DL — HIGH (ref 70–99)
GLUCOSE BLDC GLUCOMTR-MCNC: 240 MG/DL — HIGH (ref 70–99)
GLUCOSE SERPL-MCNC: 228 MG/DL — HIGH (ref 70–99)
GLUCOSE SERPL-MCNC: 279 MG/DL — HIGH (ref 70–99)
GLUCOSE SERPL-MCNC: 88 MG/DL — SIGNIFICANT CHANGE UP (ref 70–99)
HCT VFR BLD CALC: 31.7 % — LOW (ref 39–50)
HGB BLD-MCNC: 9.6 G/DL — LOW (ref 13–17)
MAGNESIUM SERPL-MCNC: 2.3 MG/DL — SIGNIFICANT CHANGE UP (ref 1.6–2.6)
MCHC RBC-ENTMCNC: 28.7 PG — SIGNIFICANT CHANGE UP (ref 27–34)
MCHC RBC-ENTMCNC: 30.3 G/DL — LOW (ref 32–36)
MCV RBC AUTO: 94.9 FL — SIGNIFICANT CHANGE UP (ref 80–100)
NRBC # BLD AUTO: 0 K/UL — SIGNIFICANT CHANGE UP (ref 0–0)
NRBC # FLD: 0 K/UL — SIGNIFICANT CHANGE UP (ref 0–0)
NRBC BLD AUTO-RTO: 0 /100 WBCS — SIGNIFICANT CHANGE UP (ref 0–0)
PHOSPHATE SERPL-MCNC: 3.6 MG/DL — SIGNIFICANT CHANGE UP (ref 2.5–4.5)
PLATELET # BLD AUTO: 208 K/UL — SIGNIFICANT CHANGE UP (ref 150–400)
PMV BLD: 9.8 FL — SIGNIFICANT CHANGE UP (ref 7–13)
POTASSIUM SERPL-MCNC: 2.9 MMOL/L — CRITICAL LOW (ref 3.5–5.3)
POTASSIUM SERPL-MCNC: 2.9 MMOL/L — CRITICAL LOW (ref 3.5–5.3)
POTASSIUM SERPL-MCNC: 3.2 MMOL/L — LOW (ref 3.5–5.3)
POTASSIUM SERPL-SCNC: 2.9 MMOL/L — CRITICAL LOW (ref 3.5–5.3)
POTASSIUM SERPL-SCNC: 2.9 MMOL/L — CRITICAL LOW (ref 3.5–5.3)
POTASSIUM SERPL-SCNC: 3.2 MMOL/L — LOW (ref 3.5–5.3)
PROT SERPL-MCNC: 6.5 GM/DL — SIGNIFICANT CHANGE UP (ref 6–8.3)
RBC # BLD: 3.34 M/UL — LOW (ref 4.2–5.8)
RBC # FLD: 16.8 % — HIGH (ref 10.3–14.5)
SODIUM SERPL-SCNC: 140 MMOL/L — SIGNIFICANT CHANGE UP (ref 135–145)
SODIUM SERPL-SCNC: 142 MMOL/L — SIGNIFICANT CHANGE UP (ref 135–145)
SODIUM SERPL-SCNC: 145 MMOL/L — SIGNIFICANT CHANGE UP (ref 135–145)
WBC # BLD: 8.11 K/UL — SIGNIFICANT CHANGE UP (ref 3.8–10.5)
WBC # FLD AUTO: 8.11 K/UL — SIGNIFICANT CHANGE UP (ref 3.8–10.5)

## 2025-05-22 PROCEDURE — 99223 1ST HOSP IP/OBS HIGH 75: CPT

## 2025-05-22 PROCEDURE — 99233 SBSQ HOSP IP/OBS HIGH 50: CPT

## 2025-05-22 PROCEDURE — 99231 SBSQ HOSP IP/OBS SF/LOW 25: CPT | Mod: FS

## 2025-05-22 RX ORDER — SODIUM CHLORIDE 9 G/1000ML
1000 INJECTION, SOLUTION INTRAVENOUS
Refills: 0 | Status: DISCONTINUED | OUTPATIENT
Start: 2025-05-22 | End: 2025-05-24

## 2025-05-22 RX ORDER — SODIUM/POT/MAG/CALC/CHLOR/ACET 35-20-5MEQ
1 VIAL (ML) INTRAVENOUS
Refills: 0 | Status: DISCONTINUED | OUTPATIENT
Start: 2025-05-22 | End: 2025-05-22

## 2025-05-22 RX ORDER — ACETAMINOPHEN 500 MG/5ML
1000 LIQUID (ML) ORAL ONCE
Refills: 0 | Status: COMPLETED | OUTPATIENT
Start: 2025-05-22 | End: 2025-05-22

## 2025-05-22 RX ORDER — I.V. FAT EMULSION 20 G/100ML
0.87 EMULSION INTRAVENOUS
Qty: 50 | Refills: 0 | Status: DISCONTINUED | OUTPATIENT
Start: 2025-05-22 | End: 2025-05-22

## 2025-05-22 RX ADMIN — Medication 100 MILLIEQUIVALENT(S): at 07:25

## 2025-05-22 RX ADMIN — Medication 100 MILLIEQUIVALENT(S): at 03:26

## 2025-05-22 RX ADMIN — Medication 100 MILLIEQUIVALENT(S): at 11:19

## 2025-05-22 RX ADMIN — INSULIN LISPRO 2: 100 INJECTION, SOLUTION INTRAVENOUS; SUBCUTANEOUS at 17:45

## 2025-05-22 RX ADMIN — INSULIN LISPRO 4: 100 INJECTION, SOLUTION INTRAVENOUS; SUBCUTANEOUS at 05:57

## 2025-05-22 RX ADMIN — Medication 200 MILLIGRAM(S): at 16:32

## 2025-05-22 RX ADMIN — Medication 100 MILLIEQUIVALENT(S): at 12:57

## 2025-05-22 RX ADMIN — Medication 1 EACH: at 22:47

## 2025-05-22 RX ADMIN — Medication 400 MILLIGRAM(S): at 22:27

## 2025-05-22 RX ADMIN — INSULIN LISPRO 2: 100 INJECTION, SOLUTION INTRAVENOUS; SUBCUTANEOUS at 12:19

## 2025-05-22 RX ADMIN — Medication 100 MILLIEQUIVALENT(S): at 09:08

## 2025-05-22 RX ADMIN — Medication 40 MILLIGRAM(S): at 05:53

## 2025-05-22 RX ADMIN — I.V. FAT EMULSION 20.8 GM/KG/DAY: 20 EMULSION INTRAVENOUS at 22:48

## 2025-05-22 RX ADMIN — Medication 1000 MILLIGRAM(S): at 22:57

## 2025-05-22 RX ADMIN — Medication 100 MILLIEQUIVALENT(S): at 05:07

## 2025-05-22 RX ADMIN — ENOXAPARIN SODIUM 40 MILLIGRAM(S): 100 INJECTION SUBCUTANEOUS at 13:19

## 2025-05-22 RX ADMIN — Medication 40 MILLIEQUIVALENT(S): at 10:10

## 2025-05-22 RX ADMIN — SODIUM CHLORIDE 150 MILLILITER(S): 9 INJECTION, SOLUTION INTRAVENOUS at 17:45

## 2025-05-22 NOTE — CHART NOTE - NSCHARTNOTEFT_GEN_A_CORE
Clinical Nutrition PN Follow Up Note    * 73yo M w/ PMHx of Cholangio CA s/p Robotic Whipple on 2024 (Dr. Ocasio) w/ adjuvant Xeloda -> Hinckley/Cis/Durva, Found liver metastases on 2024, failed CBD stent placement (unsuccessful cannulation via ERCP), s/p internal-external biliary drainage (8.5fr) at [facility] on , c/b High external drainage output ( >2L) due to Internal drainage failure (CBD blockage), now s/p exchange of internal-external biliary drainage (12fr) on  in . Presented with 3-4 days of progressive abdominal bloating with mild nausea, generalized weakness and fatigue. Last BM 3d ago, has been passing gases. Complains of mild discomfort of epigastric pain. Admitted w/ SBO (likely malignant). NGT to LWS in place. Started on mSBO protocol - Steroids/Reglan/Octreotide. RD consulted for initiation of TPN via port (OK to use from surgical oncology).   *: Initiate TPN via Port 2/2 mSBO and suspected prolonged NPO status  *:  (+) BM, passing flatus. CLD. Would suggest to renew TPN x 1 more day to ensure >80% ENN being met with bridging PO + TPN. D/w surg resident   *: TPN dc'd  per surgery.  CT A/P: GOO. s/p NGT to LWS w/ ~3L output overnight. Will initiate TPN via implanted infusion port.   *5/10: Per gastro: rec PEG placement; plan for  for long-term gastric decompression given mSBO, inability to tolerate adequate PO intake, and palliation. D/w surg res will c/w TPN.   *: Per surg "Patient has felt he no longer wants to fight and will choose palliative measures for his care". Planning for PEG placement monday.  *: Plan for PEG today  for long-term decompression given mSBO and palliation, will reorder TPN. NGT remains in place to suction.   *: s/p venting PEG placement. Palliative care following - plan for home TPN w/ venting PEG. On CLD for pleasure. DNR/ DNI   *: Diet advanced to CLD. S/p biliary tube check () - L biliary drain possibly clogged distally, as no contrast entered the pigtail on fluoroscopic study. However drain now draining. Plan to c/w TPN.  *5/15: Diet advanced to FLD. s/p IR drain exchange. Having BMs + abdominal discomfort. C/w TPN. Will be at dextrose goal today. **CAN CYCLE TPN x12 hours TOMORROW **  *: cont on FLD, c/w TPN to be cycled tonight. RD d/w surg resident, concern for low lytes despite large amounts in TPN and repletion outside bag. POCT erratic, RD suggested to add RISS. Dc home planned for tomorrow ()  *: S/p biliary stent change to a metal stent w/ IR yesterday. Passed loose stool w/ some relief of abd cramping. Plan for discharge home sometime this weekend (today vs tomorrow?) - lytes still LOW, receiving repletion outside PN bag. POCTs remain elevated, currently receiving RISS, will continue to monitor and may need to consider adding insulin in PN bag for additional coverage.   *: noted w/ slight leakage around tube site, otherwise no pertinent changes/events overnight. Per discussion w/ surgery resident, plan for d/c home likely tomorrow. POCTs erractic x 24 hrs, will continue to monitor.  *: Tolerating diet, venting PEG improved pain. Per surg large vol of bile draining. Per IR w/ 1350 cc bilious output overnight; drain capped today. POCTs still erractic. D/w Dr. Randhawa and pharmacy, will add 10 units of insulin into the PN bag and continue w/ RISS. Will continue to monitor POCTs and adjust w/ pharmacy & MD  *: Drain reattached to a gravity drain bag by nursing staff overnight. Will plan for drain evaluation to check for stent patency today. Pt doesn't need to be NPO per PA - remains NPO this AM w/ TPN. POCT still spiking in AM w/ TPN running - 10 units added into bag yesterday and changed to moderate bedtime sliding scale. Plan today to increase in insulin in PN bag to 20 units - d/w Dr. Randhawa and clinical pharmacist.   *: went to IR yesterday for conversion of EBD to IEBD to hopefully re-establish flow through indwelling metal CBD stent. Mucus plug found and removed. Remains on TPN w/ insulin in bag and persistently low K levels, likely d/t large output from venting PEG (10L x 24 hrs). Discussed TPN changes as below with MD Patricia and clinical pharmacist for insulin and potassium levels. Tentative plan for dc tomorrow  if K/POCT WNL     *TPN to be restarted 2/2 GOO and suspected prolonged NPO status.    *current status: c/w TPN, pending dc home. Delayed d/t persistent hypokalemia and hyperglycemia. POCT are improving with insulin in bag. Discussed K is likely persistently low d/t high venting PEG outputs ~>/= 10L the last few days.     *new pertinent meds: abx, ADMELOG (16 units given x 24 hrs), protonix, oxycodone, KCl (100 mEq given x 24 hrs); plan for additional KCL repletion today     *labs reviewed; Na WNL. K+ continues to be LOW - likely d/t large venting PEG output. As per [employer] Guidelines, maintenance potassium concentration in PN is 1 – 2 mEq/kg/day. However, [facility] PN Policy indicates a maximum of 120 mEq should be added into the PN bag. Plan to c/w 115 mEq in PN bag and will NOT increase past that as it is maximum dose for pt wt. Mg WNL. Phos WNL. As per [employer] Guidelines, maintenance magnesium concentration in PN is 8-20 mEq/day with a maximum concentration of 48 mEq/day.  T Bili (<2) WNL, trace elements to be continued.  New TG WNL (77) will c/w 50g lipids daily.  POCTs still following trend of elevation in AM, improved with increased insulin. Plan to increase insulin in bag to 30 units and c/w dextrose to 300g daily (will meet ~90% ENN) - 0.1 units/g dextrose     -    142  |  92[L]  |  60[H]  ----------------------------<  228[H]  2.9[LL]   |  >45[HH]  |  1.32[H]    Ca    9.8      22 May 2025 05:04  Phos  3.6       Mg     2.3         TPro  6.5  /  Alb  2.4[L]  /  TBili  1.7[H]  /  DBili  x   /  AST  36  /  ALT  70  /  AlkPhos  601[H]      BMI: BMI (kg/m2): 18.2 (25 @ 03:35)  HbA1c: A1C with Estimated Average Glucose Result: 5.3 % (25 @ 04:57)    BP: 112/74 (25 @ 08:30) (77/52 - 115/66)Vital Signs Last 24 Hrs  T(C): 36.3 (25 @ 08:30), Max: 36.7 (25 @ 19:45)  T(F): 97.3 (25 @ 08:30), Max: 98.1 (25 @ 00:51)  HR: 96 (25 @ 08:30) (78 - 127)  BP: 112/74 (25 @ 08:30) (77/52 - 115/66)  RR: 18 (25 @ 08:30) (17 - 18)  SpO2: 100% (25 @ 08:30) (98% - 100%)    Lipid Panel: Date/Time: 25 @ 04:31  Triglycerides, Serum: 77    POCT Blood Glucose.: 240 mg/dL (22 May 2025 05:52)  POCT Blood Glucose.: 176 mg/dL (21 May 2025 22:08)  POCT Blood Glucose.: 146 mg/dL (21 May 2025 18:02)  POCT Blood Glucose.: 181 mg/dL (21 May 2025 14:57)  POCT Blood Glucose.: 278 mg/dL (21 May 2025 11:39)    *I&O's Detail    21 May 2025 07:01  -  22 May 2025 07:00  --------------------------------------------------------  IN:  Total IN: 0 mL    OUT:    Drain (mL): 340 mL    PEG (Percutaneous Endoscopic Gastrostomy) Tube (mL): 92847 mL    Voided (mL): 925 mL  Total OUT: 33920 mL    Total NET: -09639 mL  * fluid status: large negative; >13L output from venting PEG doc'd x 24 hrs- likely causing LOW POTASSIUM. No TPN doc'd. May need to consider increase TPN total volume to replenish losses. Consider adding additional IVF  *Last (+) BM doc'd on . (+) abdominal discomfort; pt not on bowel regimen.  *edema: none doc'd  *PI: coccyx, unstageable     *malnutrition: Pt continues to meet criteria for severe protein-calorie malnutrition in context of chronic disease r/t decreased ability to meet increased nutrient needs 2/2 mets Ca s/p Whipple and malignant SBO AEB severe muscle/ fat wasting, 33% wt loss x 1 yr, <50-75% ENN chronically    Estimated Needs: based on 57.6 Kg (wt from )  Calories: -  Kcal (35- 40 Kcal/Kg)  Protein: 86- 115 g (1.5- 2 g/Kg)  Fluids: 1728 - 2016 mL (30 - 35 mL/Kg)    *Diet, NPO (25 @ 08:50) [Active]    Weight History:  Daily Weight in k.8 (19 May 2025 06:12)  Daily Weight in k.4 (11 May 2025 05:32)    Height (cm): 177.8 (25 @ 03:35), 177.8 (25 @ 11:56), 177.8 (25 @ 10:58)  Weight (kg): 57.606 (25 @ 03:35), 57.6 (25 @ 10:58), 68 (25 @ 19:34)  BMI (kg/m2): 18.2 (25 @ 03:35), 18.2 (25 @ 11:56), 18.2 (25 @ 10:58)  BSA (m2): 1.72 (25 @ 03:35), 1.72 (25 @ 11:56), 1.72 (25 @ 10:58)    TPN Recommendations: via implanted infusion port  Total Volume: 2000 mL x 12 hours  115 g  Amino Acids  300 g Dextrose (@ goal)  50 g Lipids 20%  200 mEq Sodium Chloride  0 mEq Sodium Acetate  0 mmol Sodium Phosphate  0 mEq Potassium Chloride  71 mEq Potassium Acetate  30 mmol Potassium Phosphate  0 mEq Calcium Gluconate (CAPS out of PN solution)  20 mEq Magnesium Sulfate  200 mg Thiamine  1 ml Trace Elements  10 ml MVI  30 units Regular Insulin    Total Calories  1980   (Meets  ~90%  of  Estimated Energy needs  and 100% Protein needs)     *Goal: achieved - Pt will receive >/= 80% ENN via tolerated route    Additional Recommendations:    1) Daily weights  2) Strict I & O's - replete losses from venting PEG  3) Daily lyte checks including magnesium and phos **cont to replete K, Phos, Mg outside PN bag PRN  4) Weekly triglycerides/LFT checks  5) POCT q6hrs; maintain 140-180mg/dL **on RISS, insulin also added to PN bag  6) Has venting PEG. Plan for home TPN w/ pleasure feeds     Nutrition recommendations to continue upon discharge. Goal to continue to meet >/= 80% ENN via tolerated route. RD to F/U prn for changes to nutrition dc plan.    *will continue to monitor and adjust PN prn*  Carol Burger, MS, RDN, CNSC, [phone]  Certified Nutrition

## 2025-05-22 NOTE — PROGRESS NOTE ADULT - SUBJECTIVE AND OBJECTIVE BOX
HOSPITALIST ATTENDING PROGRESS NOTE    Chart and meds reviewed.  Patient seen and examined.      Subjective: At length discussion with pt and wife bedside today regarding prognosis. Despite aggressive electrolyte repletion and tpn pt is still developing severe metabolic alkalosis hypokalemia and rising cr function. Pt understood that despite heroic efforts he is an unsafe discharge home unless he chooses to go home with home hospice. Plan for d/c Monday with home hospice after pt has had time to visit with family over the weekend while admitted here. In interim will continue with tpn fluids and k repletion while admitted.     All other systems reviewed and found to be negative with the exception of what has been described above.    MEDICATIONS  (STANDING):  acetaminophen   IVPB .. 1000 milliGRAM(s) IV Intermittent once  ciprofloxacin   IVPB 400 milliGRAM(s) IV Intermittent every 12 hours  dextrose 5%. 1000 milliLiter(s) (50 mL/Hr) IV Continuous <Continuous>  dextrose 5%. 1000 milliLiter(s) (100 mL/Hr) IV Continuous <Continuous>  dextrose 50% Injectable 25 Gram(s) IV Push once  dextrose 50% Injectable 12.5 Gram(s) IV Push once  dextrose 50% Injectable 25 Gram(s) IV Push once  enoxaparin Injectable 40 milliGRAM(s) SubCutaneous every 24 hours  glucagon  Injectable 1 milliGRAM(s) IntraMuscular once  insulin lispro (ADMELOG) corrective regimen sliding scale   SubCutaneous three times a day before meals  insulin lispro (ADMELOG) corrective regimen sliding scale   SubCutaneous at bedtime  lipid, fat emulsion (Fish Oil and Plant Based) 20% Infusion 0.868 Gm/kG/Day (20.8 mL/Hr) IV Continuous <Continuous>  pantoprazole    Tablet 40 milliGRAM(s) Oral before breakfast  Parenteral Nutrition - Adult 1 Each TPN Continuous <Continuous>  Parenteral Nutrition - Adult 1 Each TPN Continuous <Continuous>  sodium chloride 0.9% 1000 milliLiter(s) (150 mL/Hr) IV Continuous <Continuous>    MEDICATIONS  (PRN):  dextrose Oral Gel 15 Gram(s) Oral once PRN Blood Glucose LESS THAN 70 milliGRAM(s)/deciliter  dicyclomine 10 milliGRAM(s) Oral two times a day before meals PRN gas/cramps  ondansetron Injectable 4 milliGRAM(s) IV Push every 6 hours PRN Nausea and/or Vomiting  oxyCODONE    IR 5 milliGRAM(s) Oral every 6 hours PRN Severe Pain (7 - 10)      VITALS:  T(F): 97.3 (05-22-25 @ 08:30), Max: 98.1 (05-22-25 @ 00:51)  HR: 96 (05-22-25 @ 08:30) (79 - 127)  BP: 112/74 (05-22-25 @ 08:30) (77/52 - 115/66)  RR: 18 (05-22-25 @ 08:30) (17 - 18)  SpO2: 100% (05-22-25 @ 08:30) (100% - 100%)  Wt(kg): --    I&O's Summary    21 May 2025 07:01  -  22 May 2025 07:00  --------------------------------------------------------  IN: 0 mL / OUT: 87214 mL / NET: -29217 mL    22 May 2025 07:01  -  22 May 2025 17:27  --------------------------------------------------------  IN: 0 mL / OUT: 3910 mL / NET: -3910 mL        CAPILLARY BLOOD GLUCOSE      POCT Blood Glucose.: 173 mg/dL (22 May 2025 11:31)  POCT Blood Glucose.: 240 mg/dL (22 May 2025 05:52)  POCT Blood Glucose.: 176 mg/dL (21 May 2025 22:08)  POCT Blood Glucose.: 146 mg/dL (21 May 2025 18:02)      PHYSICAL EXAM:  Gen: NAD  HEENT:  pupils equal and reactive, EOMI, no oropharyngeal lesions, erythema, exudates, oral thrush  NECK:   supple, no carotid bruits, no palpable lymph nodes, no thyromegaly  CV:  +S1, +S2, regular, no murmurs or rubs  RESP:   lungs clear to auscultation bilaterally, no wheezing, rales, rhonchi, good air entry bilaterally  BREAST:  not examined  GI:  abdomen soft, non-tender, non-distended, normal BS, no bruits, no abdominal masses, no palpable masses  RECTAL:  not examined  :  not examined  MSK:   normal muscle tone, no atrophy, no rigidity, no contractions  EXT:  no clubbing, no cyanosis, no edema, no calf pain, swelling or erythema  VASCULAR:  pulses equal and symmetric in the upper and lower extremities  NEURO:  AAOX3, no focal neurological deficits, follows all commands, able to move extremities spontaneously  SKIN:  no ulcers, lesions or rashes    LABS:                            9.6    8.11  )-----------( 208      ( 22 May 2025 05:04 )             31.7     05-22    142  |  92[L]  |  60[H]  ----------------------------<  228[H]  2.9[LL]   |  >45[HH]  |  1.32[H]    Ca    9.8      22 May 2025 05:04  Phos  3.6     05-22  Mg     2.3     05-22    TPro  6.5  /  Alb  2.4[L]  /  TBili  1.7[H]  /  DBili  x   /  AST  36  /  ALT  70  /  AlkPhos  601[H]  05-22        LIVER FUNCTIONS - ( 22 May 2025 05:04 )  Alb: 2.4 g/dL / Pro: 6.5 gm/dL / ALK PHOS: 601 U/L / ALT: 70 U/L / AST: 36 U/L / GGT: x             Urinalysis Basic - ( 22 May 2025 05:04 )    Color: x / Appearance: x / SG: x / pH: x  Gluc: 228 mg/dL / Ketone: x  / Bili: x / Urobili: x   Blood: x / Protein: x / Nitrite: x   Leuk Esterase: x / RBC: x / WBC x   Sq Epi: x / Non Sq Epi: x / Bacteria: x              CULTURES:      Additional results/Imaging, I have personally reviewed:    Telemetry, personally reviewed:

## 2025-05-22 NOTE — CONSULT NOTE ADULT - SUBJECTIVE AND OBJECTIVE BOX
Chief complaints.  admitted for malignant SBO on 5/5.    HPI:  75 yo man with PMHX of Cholangiocarcinoma s/p whipple procedure with liver mets on adjuvant therapy admitted with malignant SBO on 5/12.  Related inability to eat with nausea and vomiting.  Started on TPN with NG drainage and underwent PEG for venting on 5/12.  On admission, noted for Hyponatremia with gradual resolution and normal renal function and CO2.  Noted for gradual increase in serum CO2 for several days and > 45 today.  Pt is continued on TPN and PEG venting.  TPN at 2 L per day with increasing PEG output in last 2 days ! 10 Liters.              PMHX and PSHX.      Home Medications:   * Patient Currently Takes Medications as of 02-May-2025 10:58 documented in Structured Notes  · 	ciprofloxacin 500 mg oral tablet: 1 tab(s) orally 2 times a day  · 	sodium bicarbonate 650 mg oral tablet: 1 tab(s) orally 2 times a day  · 	sodium chloride 1 g oral tablet: 1 tab(s) orally 2 times a day  · 	acetaminophen 325 mg oral tablet: 2 tab(s) orally every 6 hours as needed for Temp greater or equal to 38C (100.4F), Mild Pain (1 - 3)  · 	calcium carbonate 500 mg (200 mg elemental calcium) oral tablet, chewable: 1 tab(s) orally 3 times a day as needed for acid reflux  · 	polyethylene glycol 3350 oral powder for reconstitution: 17 gram(s) orally once a day as needed for Constipation  · 	colesevelam 625 mg oral tablet: 2 tab(s) orally once a day (in the morning)  · 	pancrelipase 36,000 units-114,000 units-180,000 units oral delayed release capsule: 3 cap(s) orally 3 times a day (with meals) *** also takes 1-2 caps with snacks***  · 	famotidine 40 mg oral tablet: 1 tab(s) orally once a day as needed for acid reflux  · 	dicyclomine 10 mg oral capsule: 2 cap(s) orally once a day (at bedtime) as needed for cramping/gas overnight  · 	magnesium oxide 400 mg oral tablet: 1.5 tab(s) orally 2 times a day  · 	colesevelam 625 mg oral tablet: 1 tab(s) orally once a day (in the evening) ***DrFirst***      FAMILY HISTORY:      SOCIAL HISTORY :  Allergies    statins (Unknown)  atorvastatin (Hives)    Intolerances        MEDICATIONS  (STANDING):  acetaminophen   IVPB .. 1000 milliGRAM(s) IV Intermittent once  ciprofloxacin   IVPB 400 milliGRAM(s) IV Intermittent every 12 hours  dextrose 5%. 1000 milliLiter(s) (100 mL/Hr) IV Continuous <Continuous>  dextrose 5%. 1000 milliLiter(s) (50 mL/Hr) IV Continuous <Continuous>  dextrose 50% Injectable 25 Gram(s) IV Push once  dextrose 50% Injectable 12.5 Gram(s) IV Push once  dextrose 50% Injectable 25 Gram(s) IV Push once  enoxaparin Injectable 40 milliGRAM(s) SubCutaneous every 24 hours  glucagon  Injectable 1 milliGRAM(s) IntraMuscular once  insulin lispro (ADMELOG) corrective regimen sliding scale   SubCutaneous three times a day before meals  insulin lispro (ADMELOG) corrective regimen sliding scale   SubCutaneous at bedtime  lipid, fat emulsion (Fish Oil and Plant Based) 20% Infusion 0.868 Gm/kG/Day (20.8 mL/Hr) IV Continuous <Continuous>  lipid, fat emulsion (Fish Oil and Plant Based) 20% Infusion 0.868 Gm/kG/Day (20.8 mL/Hr) IV Continuous <Continuous>  pantoprazole    Tablet 40 milliGRAM(s) Oral before breakfast  Parenteral Nutrition - Adult 1 Each TPN Continuous <Continuous>  Parenteral Nutrition - Adult 1 Each TPN Continuous <Continuous>  potassium chloride  10 mEq/100 mL IVPB 10 milliEquivalent(s) IV Intermittent every 1 hour    MEDICATIONS  (PRN):  dextrose Oral Gel 15 Gram(s) Oral once PRN Blood Glucose LESS THAN 70 milliGRAM(s)/deciliter  dicyclomine 10 milliGRAM(s) Oral two times a day before meals PRN gas/cramps  ondansetron Injectable 4 milliGRAM(s) IV Push every 6 hours PRN Nausea and/or Vomiting  oxyCODONE    IR 5 milliGRAM(s) Oral every 6 hours PRN Severe Pain (7 - 10)         Vital Signs Last 24 Hrs  T(C): 36.3 (22 May 2025 08:30), Max: 36.7 (21 May 2025 19:45)  T(F): 97.3 (22 May 2025 08:30), Max: 98.1 (22 May 2025 00:51)  HR: 96 (22 May 2025 08:30) (78 - 127)  BP: 112/74 (22 May 2025 08:30) (77/52 - 115/66)  BP(mean): --  RR: 18 (22 May 2025 08:30) (17 - 18)  SpO2: 100% (22 May 2025 08:30) (98% - 100%)    Parameters below as of 22 May 2025 08:30  Patient On (Oxygen Delivery Method): room air      Daily     Daily   I&O's Summary    21 May 2025 07:01  -  22 May 2025 07:00  --------------------------------------------------------  IN: 0 mL / OUT: 01698 mL / NET: -45049 mL        PHYSICAL EXAM:  GEN:   HEENT:   NECK   CV:   LUNGS:   ABD:   EXT:     LABS:                        9.6    8.11  )-----------( 208      ( 22 May 2025 05:04 )             31.7     05-22    142  |  92[L]  |  60[H]  ----------------------------<  228[H]  2.9[LL]   |  >45[HH]  |  1.32[H]    Ca    9.8      22 May 2025 05:04  Phos  3.6     05-22  Mg     2.3     05-22    TPro  6.5  /  Alb  2.4[L]  /  TBili  1.7[H]  /  DBili  x   /  AST  36  /  ALT  70  /  AlkPhos  601[H]  05-22      Urinalysis Basic - ( 22 May 2025 05:04 )    Color: x / Appearance: x / SG: x / pH: x  Gluc: 228 mg/dL / Ketone: x  / Bili: x / Urobili: x   Blood: x / Protein: x / Nitrite: x   Leuk Esterase: x / RBC: x / WBC x   Sq Epi: x / Non Sq Epi: x / Bacteria: x      Magnesium: 2.3 mg/dL (05-22 @ 05:04)  Phosphorus: 3.6 mg/dL (05-22 @ 05:04)       Chief complaints.  admitted for malignant SBO on 5/5.    HPI:  75 yo man with PMHX of Cholangiocarcinoma s/p whipple procedure with liver mets on adjuvant therapy admitted with malignant SBO on 5/6.  S/p biliary stent placement several weeks ago at [facility] [place] and then change of stent per Dr Oviedo several days pta.  Presented to ED due to persistent abd pain and inability to eat with nausea and vomiting.  Found to have Malignant SBO.  Started on TPN with NG drainage and underwent PEG for venting on 5/12.  On admission, noted for Hyponatremia with gradual resolution and normal renal function and CO2.  Now noted for gradual increase in serum CO2 for several days and > 45 today and persistent Hypokalemia  Pt is continued on TPN and PEG venting.  TPN at 2 L per day with increasing PEG output in last 2 days ! > 10 L/day.  Pt is consuming thickened liquid diet and fluid on his own as well.        PMHX and PSHX.  --Cholangiocarcinoma with mets --post Whipple.      Home Medications:   * Patient Currently Takes Medications as of 02-May-2025 10:58 documented in Structured Notes  · 	ciprofloxacin 500 mg oral tablet: 1 tab(s) orally 2 times a day  · 	sodium bicarbonate 650 mg oral tablet: 1 tab(s) orally 2 times a day  · 	sodium chloride 1 g oral tablet: 1 tab(s) orally 2 times a day  · 	acetaminophen 325 mg oral tablet: 2 tab(s) orally every 6 hours as needed for Temp greater or equal to 38C (100.4F), Mild Pain (1 - 3)  · 	calcium carbonate 500 mg (200 mg elemental calcium) oral tablet, chewable: 1 tab(s) orally 3 times a day as needed for acid reflux  · 	polyethylene glycol 3350 oral powder for reconstitution: 17 gram(s) orally once a day as needed for Constipation  · 	colesevelam 625 mg oral tablet: 2 tab(s) orally once a day (in the morning)  · 	pancrelipase 36,000 units-114,000 units-180,000 units oral delayed release capsule: 3 cap(s) orally 3 times a day (with meals) *** also takes 1-2 caps with snacks***  · 	famotidine 40 mg oral tablet: 1 tab(s) orally once a day as needed for acid reflux  · 	dicyclomine 10 mg oral capsule: 2 cap(s) orally once a day (at bedtime) as needed for cramping/gas overnight  · 	magnesium oxide 400 mg oral tablet: 1.5 tab(s) orally 2 times a day  · 	colesevelam 625 mg oral tablet: 1 tab(s) orally once a day (in the evening) ***DrFirst***      FAMILY HISTORY: N/C    SOCIAL HISTORY : No hx of smoking or ETOH    Allergies :  statins (Unknown)  atorvastatin (Hives)    REVIEW OF SYSTEMS:  Denies SOB  Abd discomfort improved  No chest discomfort  No nausea.      MEDICATIONS  (STANDING):  acetaminophen   IVPB .. 1000 milliGRAM(s) IV Intermittent once  ciprofloxacin   IVPB 400 milliGRAM(s) IV Intermittent every 12 hours  dextrose 5%. 1000 milliLiter(s) (100 mL/Hr) IV Continuous <Continuous>  dextrose 5%. 1000 milliLiter(s) (50 mL/Hr) IV Continuous <Continuous>  dextrose 50% Injectable 25 Gram(s) IV Push once  dextrose 50% Injectable 12.5 Gram(s) IV Push once  dextrose 50% Injectable 25 Gram(s) IV Push once  enoxaparin Injectable 40 milliGRAM(s) SubCutaneous every 24 hours  glucagon  Injectable 1 milliGRAM(s) IntraMuscular once  insulin lispro (ADMELOG) corrective regimen sliding scale   SubCutaneous three times a day before meals  insulin lispro (ADMELOG) corrective regimen sliding scale   SubCutaneous at bedtime  lipid, fat emulsion (Fish Oil and Plant Based) 20% Infusion 0.868 Gm/kG/Day (20.8 mL/Hr) IV Continuous <Continuous>  lipid, fat emulsion (Fish Oil and Plant Based) 20% Infusion 0.868 Gm/kG/Day (20.8 mL/Hr) IV Continuous <Continuous>  pantoprazole    Tablet 40 milliGRAM(s) Oral before breakfast  Parenteral Nutrition - Adult 1 Each TPN Continuous <Continuous>  Parenteral Nutrition - Adult 1 Each TPN Continuous <Continuous>  potassium chloride  10 mEq/100 mL IVPB 10 milliEquivalent(s) IV Intermittent every 1 hour    MEDICATIONS  (PRN):  dextrose Oral Gel 15 Gram(s) Oral once PRN Blood Glucose LESS THAN 70 milliGRAM(s)/deciliter  dicyclomine 10 milliGRAM(s) Oral two times a day before meals PRN gas/cramps  ondansetron Injectable 4 milliGRAM(s) IV Push every 6 hours PRN Nausea and/or Vomiting  oxyCODONE    IR 5 milliGRAM(s) Oral every 6 hours PRN Severe Pain (7 - 10)         Vital Signs Last 24 Hrs  T(C): 36.3 (22 May 2025 08:30), Max: 36.7 (21 May 2025 19:45)  T(F): 97.3 (22 May 2025 08:30), Max: 98.1 (22 May 2025 00:51)  HR: 96 (22 May 2025 08:30) (78 - 127)  BP: 112/74 (22 May 2025 08:30) (77/52 - 115/66)  BP(mean): --  RR: 18 (22 May 2025 08:30) (17 - 18)  SpO2: 100% (22 May 2025 08:30) (98% - 100%)    Parameters below as of 22 May 2025 08:30  Patient On (Oxygen Delivery Method): room air    21 May 2025 07:01  -  22 May 2025 07:00  --------------------------------------------------------  IN: 0 mL / OUT: 56436 mL / NET: -94872 mL    PHYSICAL EXAM:  GEN: no acute distress  alert and appropriate  HEENT: WNL  NECK : supple  CV: S1S2 RRR  LUNGS: clear to aus  ABD: soft  EXT: no edema    LABS:                        9.6    8.11  )-----------( 208      ( 22 May 2025 05:04 )             31.7     05-22    142  |  92[L]  |  60[H]  ----------------------------<  228[H]  2.9[LL]   |  >45[HH]  |  1.32[H]    Ca    9.8      22 May 2025 05:04  Phos  3.6     05-22  Mg     2.3     05-22    TPro  6.5  /  Alb  2.4[L]  /  TBili  1.7[H]  /  DBili  x   /  AST  36  /  ALT  70  /  AlkPhos  601[H]  05-22      Urinalysis Basic - ( 22 May 2025 05:04 )    Color: x / Appearance: x / SG: x / pH: x  Gluc: 228 mg/dL / Ketone: x  / Bili: x / Urobili: x   Blood: x / Protein: x / Nitrite: x   Leuk Esterase: x / RBC: x / WBC x   Sq Epi: x / Non Sq Epi: x / Bacteria: x      Magnesium: 2.3 mg/dL (05-22 @ 05:04)  Phosphorus: 3.6 mg/dL (05-22 @ 05:04)

## 2025-05-22 NOTE — PROGRESS NOTE ADULT - SUBJECTIVE AND OBJECTIVE BOX
Interventional Radiology Follow-Up Note.         75yo M w/ PMHx of Cholangio CA s/p Robotic Whipple on 5/28/2024 (Dr. Ocasio) w/ adjuvant Xeloda -> [place]/Cis/Durva, Found liver metastases on 11/12/2024, failed CBD stent placement (unsuccessful cannulation via ERCP), [place] [facility] [place] c/b High external drainage output ( >2L) due to Internal drainage failure (CBD blockage), now s/p exchange of internal-external biliary drainage (12fr) on 4/25 in . Interim procedure of metal CBD stent placement with covering external drain. Pt now s/p exchange to int/ext drain again, this time passing through the clogged metal stent. s/p failed capping trial due to leaking around the tube. Drain reattached to a drain bag today.  Pt also with electrolyte abnormalities.   Tbili 1.7.      Medication:     ciprofloxacin   IVPB: (05-21)  enoxaparin Injectable: (05-21)    Vitals:   T(F): 97.3, Max: 98.1 (00:51)  HR: 96  BP: 112/74  RR: 18  SpO2: 100%    Physical Exam:  General: Nontoxic, in NAD.  Abdomen: soft, NTND.    Drain Device: Drain intact attached to a cap. Dressing saturated.  Flushed with 10cc NS w/o difficulty. Dressing clean, dry, intact.        LABS:  WBC 8.11 / Hgb 9.6 / Hct 31.7 / Plt 208  Na 142 / K 2.9 / CO2 >45 / Cl 92 / BUN 60 / Cr 1.32 / Glucose 228  ALT 70 / AST 36 / Alk Phos 601 / Tbili 1.7  Ptt -- / Pt -- / INR --      Assessment/Plan: 75yo M w/ PMHx of Cholangio CA s/p Robotic Whipple on 5/28/2024 (Dr. Ocasio) w/ adjuvant Xeloda -> [place]/Cis/Durva, Found liver metastases on 11/12/2024, failed CBD stent placement (unsuccessful cannulation via ERCP), [place]fr[place] [facility] [place] c/b High external drainage output ( >2L) due to Internal drainage failure (CBD blockage), now s/p exchange of internal-external biliary drainage (12fr) on 4/25 in . Interim procedure of metal CBD stent placement with covering external drain. Pt now s/p exchange to int/ext drain again, this time passing through the clogged metal stent. s/p failed capping trial due to leaking around the tube. Drain reattached to a drain bag today.  Pt also with electrolyte abnormality.    Tbili 1.7.     Pt with failed capping trials despite metal stent and i/e  biliary drain upsizing. No further role for IR drain manipulation.    - Recommend 1:1 fluid replacement and replenishing electrolytes.  - Recommend hospice evaluation.   - D/w Dr. Oviedo and Surgical resident.

## 2025-05-22 NOTE — PROGRESS NOTE ADULT - ASSESSMENT
73yo M w/ H/o Cholangio CA w/ distant mets, admitted on 5/6 with malignant SBO. S/p Venting PEG tube placement, IR int-ext biliary drain exchange, IR bare metal biliary stent placement.    # Stage 4 cholangiocarcinoma complicated by malignant SBO:  Initially diagnosed 04 - 05/2024; s/p Robotic Whipple late 05/2024 by Dr Ocasio with adjuvant Xeloda   Initiated Walker / Cis / Durva when noted to have mets 10/2024 ---> most recent dosing 03/2025  Hematology/Oncology, surgical oncology & IR following.  S/p venting PEG tube placement  Currently receiving TPN through existing mediport, and tolerating PO diet (full liquids).   Plan per Case MGMT / SW to go home with home care, remain on TPN through the Mediport (okay per Primary Onc Dr Belinda Morrell).  Continue supportive measures according to [facility]  Palliative on board  Disposition per primary team.     #Hypokalemia   #Metabolic alkalosis   #SHANT   Baseline cr ~0.6 now with Cr of 1.3 and extensive gi losses causing severe metabolic alkalosis and persistent and severe hypokalemia despite tpn and aggressive electrolyte repletion.   - nephrology consulted, appreciate recs   - will switch potassium acetate  in tpn to potassium chloride discussed with nutrition   - start NS fluids     # Hyperglycemia:  -am hyperglycemia in setting of overnight cycled TPN  -improving, uptitrating insulin in TPN  (goal is only need insulin in TPN and not subcu).   -discussed w TPN RD and clinical pharmacist    # Anemia:  Likely AOCD with component of myelosuppression from chemo.   Hematology/Oncology on board, will follow as outpatient.   Trend CBC and transfuse PRN.     # Thrombocytopenia:  -stable     #Hypokalemia   - replete     # Healthcare maintenance:  VTE prophylaxis: LMWH.   Disposition: home w/ home hospice on Monday

## 2025-05-22 NOTE — PROGRESS NOTE ADULT - ASSESSMENT
Pt doing well, AFVSS, hypokalemia being repleted. Drain leaked around dressing, capped, flushing reduced to 10cc q 6 hrs; will continue at home. Pending AM glucose, if controlled plan for discharge. Home TPN will have to be called in.    Plan discussed with attending.

## 2025-05-22 NOTE — PROVIDER CONTACT NOTE (CRITICAL VALUE NOTIFICATION) - SITUATION
Pt with a critical potassium of 3.9 and critical CO2> 45. Pt with a critical potassium of 2.9 and critical CO2> 45.

## 2025-05-22 NOTE — PROGRESS NOTE ADULT - SUBJECTIVE AND OBJECTIVE BOX
Pt. seen and examined at bedside. Patient reports feeling better, tolerating full liquids, voiding, having bowel function. He denies fever, chest pain, SOB, nausea, vomiting.    ROS negative except as above.    Physical Exam:  General: AOx3, Well developed, NAD  HEENT: NC/AT, normal pinnae and tragi  Chest: Normal respiratory effort, equal chest rise  Heart: RRR  Abdomen: PEG and IR tubes in place, dressing clean and dry, abd nondistended, soft, nontender.  Neuro/Psych: No localized deficits. Normal speech, normal tone, normal affect  Skin: Normal, warm, no rashes, no lesions noted  Extremities: Warm, well perfused, no edema    Vitals:  T(C): 36.7 ( @ 00:51), Max: 36.7 ( @ 19:45)  HR: 84 ( @ 00:51) (78 - 127)  BP: 115/66 ( @ 00:51) (77/52 - 115/66)  RR: 17 ( @ 19:45) (17 - 18)  SpO2: 100% ( @ 00:51) (98% - 100%)     @ 07:01  -   @ 07:00  --------------------------------------------------------  IN:    Fat Emulsion (Fish Oil &amp; Plant Based) 20% Infusion: 250 mL    IV PiggyBack: 200 mL    Other (mL): 500 mL    TPN (Total Parenteral Nutrition): 2000 mL  Total IN: 2950 mL    OUT:    Drain (mL): 550 mL    Oral Fluid: 0 mL    PEG (Percutaneous Endoscopic Gastrostomy) Tube (mL): 60669 mL    Voided (mL): 1125 mL  Total OUT: 05964 mL    Total NET: -8800 mL       @ 07:01  -   @ 02:41  --------------------------------------------------------  IN:  Total IN: 0 mL    OUT:    Drain (mL): 340 mL    PEG (Percutaneous Endoscopic Gastrostomy) Tube (mL): 42840 mL    Voided (mL): 675 mL  Total OUT: 23328 mL    Total NET: -01297 mL           @ 01:42                    -  CBC: ->)-------(<-                     -                 140 | 91 | 59    CMP:  ----------------------< 279               2.9 | >45 | 1.45                      Ca:9.7  Phos:-  Mg:-               -|      |-        LFTs:  ------|-|-----             -|      | @ 12:44                    -  CBC: ->)-------(<-                     -                 140 | 94 | 57    CMP:  ----------------------< 251               2.7 | 43 | 1.20                      Ca:9.4  Phos:-  Mg:-               -|      |-        LFTs:  ------|-|-----             -|      |-   @ 07:41                    9.6  CBC: 6.07>)-------(<190                     30.8                 142 | 97 | 57    CMP:  ----------------------< 322               2.7 | 38 | 1.21                      Ca:9.3  Phos:3.0  M.2               1.5|      |26        LFTs:  ------|558|-----             -|      | @ 04:31                    9.2  CBC: 5.53>)-------(<181                     29.4                 139 | 95 | 54    CMP:  ----------------------< 349               2.8 | 39 | 1.28                      Ca:9.0  Phos:4.0  M.1               1.5|      |28        LFTs:  ------|562|-----             -|      |-

## 2025-05-22 NOTE — CONSULT NOTE ADULT - ASSESSMENT
73 yo man with Cholangiocarcinoma with liver mets admitted due to malignant SBO.  Now maintained on TPN and PEG drainage for comfort.    --SHANT : due to volume depletion.  --Metabolic alkalosis : appears due to combination of volume contraction and HCL loss via PEG drainage.    Will continue TPN at 2L/day       Acetate to be removed from TPN--d/w Dr Patricia--will communicate to Pharmacy.    Start NACL with KCL to volume repletion.  --Pt appears to fully comprehend his very grave prognosis.     Wishes to have sometime at home to spend with his family with continued TPN and IVF.  --Unclear if GI output can be decreased at all--d/w Dr Wright.  D/w Nathan Patricia and Kyle.

## 2025-05-23 LAB
ALBUMIN SERPL ELPH-MCNC: 2.3 G/DL — LOW (ref 3.3–5)
ALP SERPL-CCNC: 497 U/L — HIGH (ref 40–120)
ALT FLD-CCNC: 57 U/L — SIGNIFICANT CHANGE UP (ref 12–78)
ANION GAP SERPL CALC-SCNC: 5 MMOL/L — SIGNIFICANT CHANGE UP (ref 5–17)
AST SERPL-CCNC: 31 U/L — SIGNIFICANT CHANGE UP (ref 15–37)
BILIRUB SERPL-MCNC: 1.5 MG/DL — HIGH (ref 0.2–1.2)
BUN SERPL-MCNC: 69 MG/DL — HIGH (ref 7–23)
CALCIUM SERPL-MCNC: 9.5 MG/DL — SIGNIFICANT CHANGE UP (ref 8.5–10.1)
CHLORIDE SERPL-SCNC: 101 MMOL/L — SIGNIFICANT CHANGE UP (ref 96–108)
CO2 SERPL-SCNC: 42 MMOL/L — HIGH (ref 22–31)
CREAT SERPL-MCNC: 1.28 MG/DL — SIGNIFICANT CHANGE UP (ref 0.5–1.3)
EGFR: 58 ML/MIN/1.73M2 — LOW
EGFR: 58 ML/MIN/1.73M2 — LOW
GLUCOSE BLDC GLUCOMTR-MCNC: 117 MG/DL — HIGH (ref 70–99)
GLUCOSE BLDC GLUCOMTR-MCNC: 137 MG/DL — HIGH (ref 70–99)
GLUCOSE BLDC GLUCOMTR-MCNC: 207 MG/DL — HIGH (ref 70–99)
GLUCOSE BLDC GLUCOMTR-MCNC: 252 MG/DL — HIGH (ref 70–99)
GLUCOSE SERPL-MCNC: 202 MG/DL — HIGH (ref 70–99)
HCT VFR BLD CALC: 34.1 % — LOW (ref 39–50)
HGB BLD-MCNC: 10.2 G/DL — LOW (ref 13–17)
MAGNESIUM SERPL-MCNC: 2.3 MG/DL — SIGNIFICANT CHANGE UP (ref 1.6–2.6)
MCHC RBC-ENTMCNC: 29.1 PG — SIGNIFICANT CHANGE UP (ref 27–34)
MCHC RBC-ENTMCNC: 29.9 G/DL — LOW (ref 32–36)
MCV RBC AUTO: 97.2 FL — SIGNIFICANT CHANGE UP (ref 80–100)
NRBC # BLD AUTO: 0 K/UL — SIGNIFICANT CHANGE UP (ref 0–0)
NRBC # FLD: 0 K/UL — SIGNIFICANT CHANGE UP (ref 0–0)
NRBC BLD AUTO-RTO: 0 /100 WBCS — SIGNIFICANT CHANGE UP (ref 0–0)
PHOSPHATE SERPL-MCNC: 3.5 MG/DL — SIGNIFICANT CHANGE UP (ref 2.5–4.5)
PLATELET # BLD AUTO: 185 K/UL — SIGNIFICANT CHANGE UP (ref 150–400)
PMV BLD: 9.9 FL — SIGNIFICANT CHANGE UP (ref 7–13)
POTASSIUM SERPL-MCNC: 3.5 MMOL/L — SIGNIFICANT CHANGE UP (ref 3.5–5.3)
POTASSIUM SERPL-SCNC: 3.5 MMOL/L — SIGNIFICANT CHANGE UP (ref 3.5–5.3)
PROT SERPL-MCNC: 5.8 GM/DL — LOW (ref 6–8.3)
RBC # BLD: 3.51 M/UL — LOW (ref 4.2–5.8)
RBC # FLD: 16.8 % — HIGH (ref 10.3–14.5)
SODIUM SERPL-SCNC: 148 MMOL/L — HIGH (ref 135–145)
WBC # BLD: 9.25 K/UL — SIGNIFICANT CHANGE UP (ref 3.8–10.5)
WBC # FLD AUTO: 9.25 K/UL — SIGNIFICANT CHANGE UP (ref 3.8–10.5)

## 2025-05-23 PROCEDURE — 99232 SBSQ HOSP IP/OBS MODERATE 35: CPT

## 2025-05-23 PROCEDURE — 99233 SBSQ HOSP IP/OBS HIGH 50: CPT

## 2025-05-23 RX ORDER — SODIUM/POT/MAG/CALC/CHLOR/ACET 35-20-5MEQ
1 VIAL (ML) INTRAVENOUS
Refills: 0 | Status: DISCONTINUED | OUTPATIENT
Start: 2025-05-23 | End: 2025-05-23

## 2025-05-23 RX ORDER — I.V. FAT EMULSION 20 G/100ML
0.87 EMULSION INTRAVENOUS
Qty: 50 | Refills: 0 | Status: DISCONTINUED | OUTPATIENT
Start: 2025-05-23 | End: 2025-05-23

## 2025-05-23 RX ADMIN — INSULIN LISPRO 6: 100 INJECTION, SOLUTION INTRAVENOUS; SUBCUTANEOUS at 18:02

## 2025-05-23 RX ADMIN — Medication 40 MILLIGRAM(S): at 05:56

## 2025-05-23 RX ADMIN — I.V. FAT EMULSION 20.83 GM/KG/DAY: 20 EMULSION INTRAVENOUS at 22:11

## 2025-05-23 RX ADMIN — Medication 1 EACH: at 22:08

## 2025-05-23 RX ADMIN — Medication 200 MILLIGRAM(S): at 09:01

## 2025-05-23 RX ADMIN — INSULIN LISPRO 4: 100 INJECTION, SOLUTION INTRAVENOUS; SUBCUTANEOUS at 05:56

## 2025-05-23 RX ADMIN — SODIUM CHLORIDE 150 MILLILITER(S): 9 INJECTION, SOLUTION INTRAVENOUS at 19:05

## 2025-05-23 RX ADMIN — SODIUM CHLORIDE 150 MILLILITER(S): 9 INJECTION, SOLUTION INTRAVENOUS at 09:01

## 2025-05-23 RX ADMIN — Medication 40 MILLIEQUIVALENT(S): at 10:27

## 2025-05-23 RX ADMIN — Medication 200 MILLIGRAM(S): at 04:28

## 2025-05-23 RX ADMIN — ENOXAPARIN SODIUM 40 MILLIGRAM(S): 100 INJECTION SUBCUTANEOUS at 12:25

## 2025-05-23 RX ADMIN — Medication 200 MILLIGRAM(S): at 21:01

## 2025-05-23 RX ADMIN — SODIUM CHLORIDE 150 MILLILITER(S): 9 INJECTION, SOLUTION INTRAVENOUS at 01:09

## 2025-05-23 NOTE — PROGRESS NOTE ADULT - SUBJECTIVE AND OBJECTIVE BOX
NEPHROLOGY INTERVAL HPI/OVERNIGHT EVENTS:  05-23-25 @ 15:31      5/23 planned for tpn over weekend with dc home to hospice on monday.  tearful after learning that IVF had resulted in rapid correction   HPI:  73 yo man with PMHX of Cholangiocarcinoma s/p whipple procedure with liver mets on adjuvant therapy admitted with malignant SBO on 5/6.  [place] [facility] [place] of stent per Dr Oviedo several days pta.  Presented to ED due to persistent abd pain and inability to eat with nausea and vomiting.  Found to have Malignant SBO.  Started on TPN with NG drainage and underwent PEG for venting on 5/12.  On admission, noted for Hyponatremia with gradual resolution and normal renal function and CO2.  Now noted for gradual increase in serum CO2 for several days and > 45 today and persistent Hypokalemia  Pt is continued on TPN and PEG venting.  TPN at 2 L per day with increasing PEG output in last 2 days ! > 10 L/day.  Pt is consuming thickened liquid diet and fluid on his own as well.        PMHX and PSHX.  --Cholangiocarcinoma with mets --post Whipple.    MEDICATIONS  (STANDING):  acetaminophen   IVPB .. 1000 milliGRAM(s) IV Intermittent once  ciprofloxacin   IVPB 400 milliGRAM(s) IV Intermittent every 12 hours  dextrose 5%. 1000 milliLiter(s) (100 mL/Hr) IV Continuous <Continuous>  dextrose 5%. 1000 milliLiter(s) (50 mL/Hr) IV Continuous <Continuous>  dextrose 50% Injectable 25 Gram(s) IV Push once  dextrose 50% Injectable 12.5 Gram(s) IV Push once  dextrose 50% Injectable 25 Gram(s) IV Push once  enoxaparin Injectable 40 milliGRAM(s) SubCutaneous every 24 hours  glucagon  Injectable 1 milliGRAM(s) IntraMuscular once  insulin lispro (ADMELOG) corrective regimen sliding scale   SubCutaneous three times a day before meals  insulin lispro (ADMELOG) corrective regimen sliding scale   SubCutaneous at bedtime  lipid, fat emulsion (Fish Oil and Plant Based) 20% Infusion 0.8681 Gm/kG/Day (20.83 mL/Hr) IV Continuous <Continuous>  lipid, fat emulsion (Fish Oil and Plant Based) 20% Infusion 0.868 Gm/kG/Day (20.8 mL/Hr) IV Continuous <Continuous>  pantoprazole    Tablet 40 milliGRAM(s) Oral before breakfast  Parenteral Nutrition - Adult 1 Each TPN Continuous <Continuous>  Parenteral Nutrition - Adult 1 Each TPN Continuous <Continuous>  sodium chloride 0.9% 1000 milliLiter(s) (150 mL/Hr) IV Continuous <Continuous>    MEDICATIONS  (PRN):  dextrose Oral Gel 15 Gram(s) Oral once PRN Blood Glucose LESS THAN 70 milliGRAM(s)/deciliter  dicyclomine 10 milliGRAM(s) Oral two times a day before meals PRN gas/cramps  ondansetron Injectable 4 milliGRAM(s) IV Push every 6 hours PRN Nausea and/or Vomiting      Allergies    statins (Unknown)  atorvastatin (Hives)    Intolerances        I&O's Detail    22 May 2025 07:01  -  23 May 2025 07:00  --------------------------------------------------------  IN:  Total IN: 0 mL    OUT:    Drain (mL): 650 mL    PEG (Percutaneous Endoscopic Gastrostomy) Tube (mL): 8900 mL    Voided (mL): 460 mL  Total OUT: 11424 mL    Total NET: -47329 mL      23 May 2025 07:01  -  23 May 2025 15:31  --------------------------------------------------------  IN:  Total IN: 0 mL    OUT:    Drain (mL): 350 mL    PEG (Percutaneous Endoscopic Gastrostomy) Tube (mL): 5800 mL  Total OUT: 6150 mL    Total NET: -6150 mL        Vital Signs Last 24 Hrs  T(C): 36.4 (23 May 2025 15:00), Max: 36.7 (23 May 2025 08:13)  T(F): 97.6 (23 May 2025 15:00), Max: 98 (23 May 2025 08:13)  HR: 105 (23 May 2025 15:00) (82 - 109)  BP: 99/65 (23 May 2025 15:00) (99/65 - 120/81)  BP(mean): --  RR: 18 (23 May 2025 15:00) (18 - 18)  SpO2: 97% (23 May 2025 15:00) (95% - 100%)    Parameters below as of 23 May 2025 15:00  Patient On (Oxygen Delivery Method): room air      Daily     Daily     PHYSICAL EXAM:  General: alert. awake NAD  HEENT: MMM  CV: s1s2 rrr  LUNGS: B/L CTA  EXT: no edema    LABS:                        10.2   9.25  )-----------( 185      ( 23 May 2025 04:53 )             34.1     05-23    148[H]  |  101  |  69[H]  ----------------------------<  202[H]  3.5   |  42[H]  |  1.28    Ca    9.5      23 May 2025 04:53  Phos  3.5     05-23  Mg     2.3     05-23    TPro  5.8[L]  /  Alb  2.3[L]  /  TBili  1.5[H]  /  DBili  x   /  AST  31  /  ALT  57  /  AlkPhos  497[H]  05-23      Urinalysis Basic - ( 23 May 2025 04:53 )    Color: x / Appearance: x / SG: x / pH: x  Gluc: 202 mg/dL / Ketone: x  / Bili: x / Urobili: x   Blood: x / Protein: x / Nitrite: x   Leuk Esterase: x / RBC: x / WBC x   Sq Epi: x / Non Sq Epi: x / Bacteria: x      Magnesium: 2.3 mg/dL (05-23 @ 04:53)  Phosphorus: 3.5 mg/dL (05-23 @ 04:53)

## 2025-05-23 NOTE — CHART NOTE - NSCHARTNOTEFT_GEN_A_CORE
Clinical Nutrition PN Follow Up Note    * 73yo M w/ PMHx of Cholangio CA s/p Robotic Whipple on 2024 (Dr. Ocasio) w/ adjuvant Xeloda -> [place]/Cis/Durva, Found liver metastases on 2024, failed CBD stent placement (unsuccessful cannulation via ERCP), s/p internal-external biliary drainage (8.5fr) at [facility] on , c/b High external drainage output ( >2L) due to Internal drainage failure (CBD blockage), now s/p exchange of internal-external biliary drainage (12fr) on  in . Presented with 3-4 days of progressive abdominal bloating with mild nausea, generalized weakness and fatigue. Last BM 3d ago, has been passing gases. Complains of mild discomfort of epigastric pain. Admitted w/ SBO (likely malignant). NGT to LWS in place. Started on mSBO protocol - Steroids/Reglan/Octreotide. RD consulted for initiation of TPN via port (OK to use from surgical oncology).   *: Initiate TPN via Port 2/2 mSBO and suspected prolonged NPO status  *:  (+) BM, passing flatus. CLD. Would suggest to renew TPN x 1 more day to ensure >80% ENN being met with bridging PO + TPN. D/w surg resident   *: TPN dc'd  per surgery.  CT A/P: GOO. s/p NGT to LWS w/ ~3L output overnight. Will initiate TPN via implanted infusion port.   *5/10: Per gastro: rec PEG placement; plan for  for long-term gastric decompression given mSBO, inability to tolerate adequate PO intake, and palliation. D/w surg res will c/w TPN.   *: Per surg "Patient has felt he no longer wants to fight and will choose palliative measures for his care". Planning for PEG placement monday.  *: Plan for PEG today  for long-term decompression given mSBO and palliation, will reorder TPN. NGT remains in place to suction.   *: s/p venting PEG placement. Palliative care following - plan for home TPN w/ venting PEG. On CLD for pleasure. DNR/ DNI   *: Diet advanced to CLD. S/p biliary tube check () - L biliary drain possibly clogged distally, as no contrast entered the pigtail on fluoroscopic study. However drain now draining. Plan to c/w TPN.  *5/15: Diet advanced to FLD. s/p IR drain exchange. Having BMs + abdominal discomfort. C/w TPN. Will be at dextrose goal today. **CAN CYCLE TPN x12 hours TOMORROW **  *: cont on FLD, c/w TPN to be cycled tonight. RD d/w surg resident, concern for low lytes despite large amounts in TPN and repletion outside bag. POCT erratic, RD suggested to add RISS. Dc home planned for tomorrow ()  *: S/p biliary stent change to a metal stent w/ IR yesterday. Passed loose stool w/ some relief of abd cramping. Plan for discharge home sometime this weekend (today vs tomorrow?) - lytes still LOW, receiving repletion outside PN bag. POCTs remain elevated, currently receiving RISS, will continue to monitor and may need to consider adding insulin in PN bag for additional coverage.   *: noted w/ slight leakage around tube site, otherwise no pertinent changes/events overnight. Per discussion w/ surgery resident, plan for d/c home likely tomorrow. POCTs erractic x 24 hrs, will continue to monitor.  *: Tolerating diet, venting PEG improved pain. Per surg large vol of bile draining. Per IR w/ 1350 cc bilious output overnight; drain capped today. POCTs still erractic. D/w Dr. Randhawa and pharmacy, will add 10 units of insulin into the PN bag and continue w/ RISS. Will continue to monitor POCTs and adjust w/ pharmacy & MD  *: Drain reattached to a gravity drain bag by nursing staff overnight. Will plan for drain evaluation to check for stent patency today. Pt doesn't need to be NPO per PA - remains NPO this AM w/ TPN. POCT still spiking in AM w/ TPN running - 10 units added into bag yesterday and changed to moderate bedtime sliding scale. Plan today to increase in insulin in PN bag to 20 units - d/w Dr. Randhawa and clinical pharmacist.   *: went to IR yesterday for conversion of EBD to IEBD to hopefully re-establish flow through indwelling metal CBD stent. Mucus plug found and removed. Remains on TPN w/ insulin in bag and persistently low K levels, likely d/t large output from venting PEG (10L x 24 hrs). Discussed TPN changes as below with MD Patricia and clinical pharmacist for insulin and potassium levels. Tentative plan for dc tomorrow  if K/POCT WNL   *:  pending dc home. Delayed d/t persistent hypokalemia and hyperglycemia. POCT are improving with insulin in bag. Discussed K is likely persistently low d/t high venting PEG outputs ~>/= 10L the last few days.     *TPN to be restarted 2/2 GOO and suspected prolonged NPO status.    *current status: No acute changes overnight. Plan for dc home w/ home hospice on monday per hospitalist. plan to c/w TPN.    *new pertinent meds: abx, kcl (100 mEq), abx, admleog (12 units x 24 hours), protonix, IVF    *labs reviewed; Hypernatremia noted, will c/w current volume of TPN today. Pt receiving IVF outside of PN. K+ now WNL. As per ASPEN Guidelines, maintenance potassium concentration in PN is 1 – 2 mEq/kg/day. However, [facility] PN Policy indicates a maximum of 120 mEq should be added into the PN bag. Plan to c/w 115 mEq in PN bag and will NOT increase past that as it is maximum dose for pt wt. Mg WNL. Phos WNL. As per ASPEN Guidelines, maintenance magnesium concentration in PN is 8-20 mEq/day with a maximum concentration of 48 mEq/day.  T Bili (<2) WNL, trace elements to be continued.  New TG WNL (77) will c/w 50g lipids daily.  POCTs with improvement (102-207 mg/dL), plan to c/w 30 units insulin in bag and c/w dextrose to 300g daily (will meet ~90% ENN) - 0.1 units/g dextrose  05-    148[H]  |  101  |  69[H]  ----------------------------<  202[H]  3.5   |  42[H]  |  1.28    Ca    9.5      23 May 2025 04:53  Phos  3.5       Mg     2.3         TPro  5.8[L]  /  Alb  2.3[L]  /  TBili  1.5[H]  /  DBili  x   /  AST  31  /  ALT  57  /  AlkPhos  497[H]      BMI: BMI (kg/m2): 18.2 (25 @ 03:35)  HbA1c: A1C with Estimated Average Glucose Result: 5.3 % (25 @ 04:57)    BP: 112/74 (25 @ 08:30) (77/52 - 115/66)Vital Signs Last 24 Hrs  T(C): 36.3 (25 @ 08:30), Max: 36.7 (25 @ 19:45)  T(F): 97.3 (25 @ 08:30), Max: 98.1 (25 @ 00:51)  HR: 96 (25 @ 08:30) (78 - 127)  BP: 112/74 (25 @ 08:30) (77/52 - 115/66)  RR: 18 (25 @ 08:30) (17 - 18)  SpO2: 100% (25 @ 08:30) (98% - 100%)    Lipid Panel: Date/Time: 25 @ 04:31  Triglycerides, Serum: 77    POCT Blood Glucose.: 207 mg/dL (23 May 2025 05:55)  POCT Blood Glucose.: 102 mg/dL (22 May 2025 22:11)  POCT Blood Glucose.: 178 mg/dL (22 May 2025 17:40)  POCT Blood Glucose.: 173 mg/dL (22 May 2025 11:31)    *I&O's Detail    22 May 2025 07:01  -  23 May 2025 07:00  --------------------------------------------------------  IN:  Total IN: 0 mL    OUT:    Drain (mL): 650 mL    PEG (Percutaneous Endoscopic Gastrostomy) Tube (mL): 8900 mL    Voided (mL): 460 mL  Total OUT: 84511 mL    Total NET: -21757 mL  * fluid status: large negative; >10L output from venting PEG doc'd x 24 hrs. No TPN doc'd. May need to consider increase TPN total volume to replenish losses. Consider adding additional IVF  *Last (+) BM doc'd on . (+) abdominal discomfort; pt not on bowel regimen.  *edema: none doc'd  *PI: coccyx, unstageable     *malnutrition: Pt continues to meet criteria for severe protein-calorie malnutrition in context of chronic disease r/t decreased ability to meet increased nutrient needs 2/2 mets Ca s/p Whipple and malignant SBO AEB severe muscle/ fat wasting, 33% wt loss x 1 yr, <50-75% ENN chronically    Estimated Needs: based on 57.6 Kg (wt from )  Calories: - 230 Kcal (35- 40 Kcal/Kg)  Protein: 86- 115 g (1.5- 2 g/Kg)  Fluids: 1728 - 2016 mL (30 - 35 mL/Kg)    *Diet, NPO (25 @ 08:50) [Active]    Weight History:  Daily Weight in k.8 (19 May 2025 06:12)  Daily Weight in k.4 (11 May 2025 05:32)    Height (cm): 177.8 (25 @ 03:35), 177.8 (25 @ 11:56), 177.8 (25 @ 10:58)  Weight (kg): 57.606 (25 @ 03:35), 57.6 (25 @ 10:58), 68 (25 @ 19:34)  BMI (kg/m2): 18.2 (25 @ 03:35), 18.2 (25 @ 11:56), 18.2 (25 @ 10:58)  BSA (m2): 1.72 (25 @ 03:35), 1.72 (25 @ 11:56), 1.72 (25 @ 10:58)    TPN Recommendations: via implanted infusion port  Total Volume: 2000 mL x 12 hours  115 g  Amino Acids  300 g Dextrose (@ goal)  50 g Lipids 20%  200 mEq Sodium Chloride  0 mEq Sodium Acetate  0 mmol Sodium Phosphate  71 mEq Potassium Chloride  0 mEq Potassium Acetate  30 mmol Potassium Phosphate  0 mEq Calcium Gluconate (CAPS out of PN solution)  20 mEq Magnesium Sulfate  200 mg Thiamine  1 ml Trace Elements  10 ml MVI  30 units Regular Insulin    Total Calories  1980   (Meets  ~90%  of  Estimated Energy needs  and 100% Protein needs)     *Goal: achieved - Pt will receive >/= 80% ENN via tolerated route    Additional Recommendations:    1) Daily weights  2) Strict I & O's - replete losses from venting PEG  3) Daily lyte checks including magnesium and phos **cont to replete K, Phos, Mg outside PN bag PRN  4) Weekly triglycerides/LFT checks  5) POCT q6hrs; maintain 140-180mg/dL **on RISS, insulin also added to PN bag  6) Has venting PEG. Plan for home TPN w/ pleasure feeds     Nutrition recommendations to continue upon discharge. Goal to continue to meet >/= 80% ENN via tolerated route. RD to F/U prn for changes to nutrition dc plan.    *will continue to monitor and adjust PN prn*  Rose Fernandez, MS, RDN, CDN, [facility] [phone]  Certified Nutrition

## 2025-05-23 NOTE — PROGRESS NOTE ADULT - ASSESSMENT
73yo M w/ H/o Cholangio CA w/ distant mets, admitted on 5/6 with malignant SBO. S/p Venting PEG tube placement, IR int-ext biliary drain exchange, IR bare metal biliary stent placement.    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  S/p venting PEG tube placement  Currently receiving TPN through existing mediport, and tolerating PO diet (full liquids).   -plan for home hospice on 5/26    #Hypokalemia   #Metabolic alkalosis   #SHANT   Baseline cr ~0.6 now with Cr of 1.3 and extensive gi losses causing severe metabolic alkalosis and persistent and severe hypokalemia despite tpn and aggressive electrolyte repletion.   - nephrology consulted, appreciate recs   - will switch potassium acetate  in tpn to potassium chloride discussed with nutrition   - start NS fluids     # Hyperglycemia:  -am hyperglycemia in setting of overnight cycled TPN  -improving, uptitrating insulin in TPN  -discussed w TPN RD and clinical pharmacist    # Anemia:  Likely AOCD with component of myelosuppression from chemo.   Hematology/Oncology on board, will follow as outpatient.   Trend CBC and transfuse PRN.     # Thrombocytopenia:  -stable     #Hypokalemia   - replete     # Healthcare maintenance:  VTE prophylaxis: LMWH.   Disposition: home w/ home hospice on Monday 5/26

## 2025-05-23 NOTE — PROGRESS NOTE ADULT - ASSESSMENT
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 Having BM.  s/p Venting PEG tube placement   s/p IR int-ext biliary drain exchange  s/p IR bare metal biliary stent placement  s/p IR drain study      Plan:  - treatment resistant hypokalemia remaining barrier to DC  - TPN cycling & glc and Insulin adjustment for DC planning.  - Monitor blood glucose  - Can use chemoport for TPN use in outpatient and inpatient setting   - c/w TPN & Cycle  - Monitor BM  - Pain control PRN  - Monitor vitals  - Nausea control PRN  - replete electrolytes  - daily labs  - OOB/PT  - Palliative on board    Plan will be discussed with Surgical oncology attending, Dr. Wright

## 2025-05-23 NOTE — PROGRESS NOTE ADULT - SUBJECTIVE AND OBJECTIVE BOX
HOSPITALIST ATTENDING PROGRESS NOTE    Chart and meds reviewed.  Patient seen and examined.    CC: malignant SBO    Subjective: Plan for home hospice (w no TPN) on Monday 5/26.    All other systems reviewed and found to be negative with the exception of what has been described above.    MEDICATIONS  (STANDING):  acetaminophen   IVPB .. 1000 milliGRAM(s) IV Intermittent once  ciprofloxacin   IVPB 400 milliGRAM(s) IV Intermittent every 12 hours  dextrose 5%. 1000 milliLiter(s) (100 mL/Hr) IV Continuous <Continuous>  dextrose 5%. 1000 milliLiter(s) (50 mL/Hr) IV Continuous <Continuous>  dextrose 50% Injectable 25 Gram(s) IV Push once  dextrose 50% Injectable 12.5 Gram(s) IV Push once  dextrose 50% Injectable 25 Gram(s) IV Push once  enoxaparin Injectable 40 milliGRAM(s) SubCutaneous every 24 hours  glucagon  Injectable 1 milliGRAM(s) IntraMuscular once  insulin lispro (ADMELOG) corrective regimen sliding scale   SubCutaneous three times a day before meals  insulin lispro (ADMELOG) corrective regimen sliding scale   SubCutaneous at bedtime  lipid, fat emulsion (Fish Oil and Plant Based) 20% Infusion 0.8681 Gm/kG/Day (20.83 mL/Hr) IV Continuous <Continuous>  lipid, fat emulsion (Fish Oil and Plant Based) 20% Infusion 0.868 Gm/kG/Day (20.8 mL/Hr) IV Continuous <Continuous>  pantoprazole    Tablet 40 milliGRAM(s) Oral before breakfast  Parenteral Nutrition - Adult 1 Each TPN Continuous <Continuous>  Parenteral Nutrition - Adult 1 Each TPN Continuous <Continuous>  sodium chloride 0.9% 1000 milliLiter(s) (150 mL/Hr) IV Continuous <Continuous>    MEDICATIONS  (PRN):  dextrose Oral Gel 15 Gram(s) Oral once PRN Blood Glucose LESS THAN 70 milliGRAM(s)/deciliter  dicyclomine 10 milliGRAM(s) Oral two times a day before meals PRN gas/cramps  ondansetron Injectable 4 milliGRAM(s) IV Push every 6 hours PRN Nausea and/or Vomiting      VITALS:  T(F): 97.6 (05-23-25 @ 15:00), Max: 98 (05-23-25 @ 08:13)  HR: 105 (05-23-25 @ 15:00) (82 - 109)  BP: 99/65 (05-23-25 @ 15:00) (99/65 - 120/81)  RR: 18 (05-23-25 @ 15:00) (18 - 18)  SpO2: 97% (05-23-25 @ 15:00) (95% - 100%)  Wt(kg): --    I&O's Summary    22 May 2025 07:01  -  23 May 2025 07:00  --------------------------------------------------------  IN: 0 mL / OUT: 23244 mL / NET: -80048 mL    23 May 2025 07:01  -  23 May 2025 15:41  --------------------------------------------------------  IN: 0 mL / OUT: 6150 mL / NET: -6150 mL        CAPILLARY BLOOD GLUCOSE      POCT Blood Glucose.: 137 mg/dL (23 May 2025 12:21)  POCT Blood Glucose.: 207 mg/dL (23 May 2025 05:55)  POCT Blood Glucose.: 102 mg/dL (22 May 2025 22:11)  POCT Blood Glucose.: 178 mg/dL (22 May 2025 17:40)      PHYSICAL EXAM:  Gen: NAD  HEENT:  pupils equal and reactive, EOMI, no oropharyngeal lesions, erythema, exudates, oral thrush  NECK:   supple, no carotid bruits, no palpable lymph nodes, no thyromegaly  CV:  +S1, +S2, regular, no murmurs or rubs  RESP:   lungs clear to auscultation bilaterally, no wheezing, rales, rhonchi, good air entry bilaterally  BREAST:  not examined  GI:  abdomen soft, non-tender, non-distended, normal BS, no bruits, no abdominal masses, no palpable masses, + venting peg, biliary drain  RECTAL:  not examined  :  not examined  MSK:   normal muscle tone, no atrophy, no rigidity, no contractions  EXT:  no clubbing, no cyanosis, no edema, no calf pain, swelling or erythema  VASCULAR:  pulses equal and symmetric in the upper and lower extremities  NEURO:  AAOX3, no focal neurological deficits, follows all commands, able to move extremities spontaneously  SKIN:  no ulcers, lesions or rashes    LABS:                            10.2   9.25  )-----------( 185      ( 23 May 2025 04:53 )             34.1     05-23    148[H]  |  101  |  69[H]  ----------------------------<  202[H]  3.5   |  42[H]  |  1.28    Ca    9.5      23 May 2025 04:53  Phos  3.5     05-23  Mg     2.3     05-23    TPro  5.8[L]  /  Alb  2.3[L]  /  TBili  1.5[H]  /  DBili  x   /  AST  31  /  ALT  57  /  AlkPhos  497[H]  05-23        LIVER FUNCTIONS - ( 23 May 2025 04:53 )  Alb: 2.3 g/dL / Pro: 5.8 gm/dL / ALK PHOS: 497 U/L / ALT: 57 U/L / AST: 31 U/L / GGT: x             Urinalysis Basic - ( 23 May 2025 04:53 )    Color: x / Appearance: x / SG: x / pH: x  Gluc: 202 mg/dL / Ketone: x  / Bili: x / Urobili: x   Blood: x / Protein: x / Nitrite: x   Leuk Esterase: x / RBC: x / WBC x   Sq Epi: x / Non Sq Epi: x / Bacteria: x    CULTURES:  no new    Additional results/Imaging, I have personally reviewed:  no new

## 2025-05-23 NOTE — PROGRESS NOTE ADULT - ASSESSMENT
Pt is a 74y old Male with hx of cholangiocarcinoma s/p robotic whipple (pylorus preserving-Ninfa) with adjucavnt therapy and recurrence with liver mets and soft tissue lesion s/p IR drains, admitted recently for IR drain exchanges due to leakage/high output, now with worsening bloating/abdominal pain, found to have malignant SBO.  Patient was having more bloating, early satiety, nausea and that changed into vomiting while at home after discharge. He was supposed to see Dr. Encinas to discuss internalization of biliary drains next week. He had no abdominal pain just bloating. No jaundice. No overt signs of Gi bleeding like hematmeemsis, melena, hematochezia. Found to have malignant SBO on imaging s/p NG tube. He is being maintained on TPN now and has NGT for decomrpession. No fevers or chills. +fatigue. Palliative medicine consulted to help establish GOC and advance care planning     1) Malignant SBO - history of bile duct cancer   - s/p venting peg placement  - pleasure feeds and drainage to leg bag  - patient has been on TPN  and would like to continue we disused the philosophy of hospice in detail and patient would like to continue TPN at this time - patient not having severe symptoms at this time so plan right now is to go home with home care to continue TPN at home and family and patient aware of hospice philosophy if patient were to decline or not want to return to the hospital.   - 5/23/25 pt and family have decided to continue TPN through the weekend and then patient will return home Monday with support of hospice and no further TPN.     Process of Care  --Reviewed dx/treatment problems and alignment with Goals of Care    Physical Aspects of Care  --Pain  patient denies at this time  c/w current managment    --Dyspnea  No SOB at this time  comfortable and in NAD    --Nausea Vomiting  denies    --Weakness  PT as tolerated     Psychological and Psychiatric Aspects of Care:   --Greif/Bereavment: emotional support provided  --Hx of psychiatric dx: none  -Pastoral Care Available PRN     Social Aspects of Care  -SW involved     Cultural Aspects  -Primary Language: English    Goals of Care:     We discussed Palliative Care team being a supportive team when a patient has ongoing illnesses.  We also discussed that it is not an end of life care service, but can help navigate symptoms and emotional support througout their hospital stay here.    Ethical and Legal Aspects: NA  Capacity- pt has capacity   HCP/Surrogate: wife would be HCP   Code Status- DNR/I with a trial of NIV   MOLST- on chart   Dispo Plan- home with hospice     Discussed With: Dr. Vega coordinated with attending and SW and RN     Time Spent: 60 minutes including the care, coordination and counseling of this patient, 50% of which was spent coordinating and counseling.

## 2025-05-23 NOTE — PROGRESS NOTE ADULT - SUBJECTIVE AND OBJECTIVE BOX
Patient seen and examined this morning  Has been ahving some leaking around his IR drain again  Discussed having to keep it to gravity bag  Has been up and walking  AVSS        PAST MEDICAL & SURGICAL HISTORY:  Bile duct cancer      H/O Whipple procedure          MEDICATIONS  (STANDING):  acetaminophen   IVPB .. 1000 milliGRAM(s) IV Intermittent once  ciprofloxacin   IVPB 400 milliGRAM(s) IV Intermittent every 12 hours  dextrose 5%. 1000 milliLiter(s) (50 mL/Hr) IV Continuous <Continuous>  dextrose 5%. 1000 milliLiter(s) (100 mL/Hr) IV Continuous <Continuous>  dextrose 50% Injectable 25 Gram(s) IV Push once  dextrose 50% Injectable 12.5 Gram(s) IV Push once  dextrose 50% Injectable 25 Gram(s) IV Push once  enoxaparin Injectable 40 milliGRAM(s) SubCutaneous every 24 hours  glucagon  Injectable 1 milliGRAM(s) IntraMuscular once  insulin lispro (ADMELOG) corrective regimen sliding scale   SubCutaneous three times a day before meals  insulin lispro (ADMELOG) corrective regimen sliding scale   SubCutaneous at bedtime  lipid, fat emulsion (Fish Oil and Plant Based) 20% Infusion 0.868 Gm/kG/Day (20.8 mL/Hr) IV Continuous <Continuous>  pantoprazole    Tablet 40 milliGRAM(s) Oral before breakfast  Parenteral Nutrition - Adult 1 Each TPN Continuous <Continuous>  Parenteral Nutrition - Adult 1 Each TPN Continuous <Continuous>  sodium chloride 0.9% 1000 milliLiter(s) (150 mL/Hr) IV Continuous <Continuous>    MEDICATIONS  (PRN):  dextrose Oral Gel 15 Gram(s) Oral once PRN Blood Glucose LESS THAN 70 milliGRAM(s)/deciliter  dicyclomine 10 milliGRAM(s) Oral two times a day before meals PRN gas/cramps  ondansetron Injectable 4 milliGRAM(s) IV Push every 6 hours PRN Nausea and/or Vomiting      Allergies    statins (Unknown)  atorvastatin (Hives)    Intolerances        SOCIAL HISTORY:    FAMILY HISTORY:          Physical Exam:  General: AOx3, Well developed, NAD  HEENT: NC/AT, normal pinnae and tragi  Chest: Normal respiratory effort, equal chest rise  Heart: RRR  Abdomen: PEG and IR tubes in place, dressing clean and dry, abd nondistended, soft, nontender.  Neuro/Psych: No localized deficits. Normal speech, normal tone, normal affect  Skin: Normal, warm, no rashes, no lesions noted  Extremities: Warm, well perfused, no edema      Vital Signs Last 24 Hrs  T(C): 36.3 (23 May 2025 00:10), Max: 36.6 (22 May 2025 17:35)  T(F): 97.3 (23 May 2025 00:10), Max: 97.8 (22 May 2025 17:35)  HR: 82 (23 May 2025 00:10) (82 - 97)  BP: 120/81 (23 May 2025 00:10) (112/74 - 120/81)  BP(mean): --  RR: 18 (22 May 2025 17:35) (18 - 18)  SpO2: 95% (23 May 2025 00:10) (95% - 100%)    Parameters below as of 23 May 2025 00:10  Patient On (Oxygen Delivery Method): room air        I&O's Summary    21 May 2025 07:01  -  22 May 2025 07:00  --------------------------------------------------------  IN: 0 mL / OUT: 23176 mL / NET: -94461 mL    22 May 2025 07:01  -  23 May 2025 03:27  --------------------------------------------------------  IN: 0 mL / OUT: 8660 mL / NET: -8660 mL            LABS:                        9.6    8.11  )-----------( 208      ( 22 May 2025 05:04 )             31.7     05-22    145  |  97  |  65[H]  ----------------------------<  88  3.2[L]   |  44[H]  |  1.34[H]    Ca    10.3[H]      22 May 2025 20:33  Phos  3.6     05-22  Mg     2.3     05-22    TPro  6.5  /  Alb  2.4[L]  /  TBili  1.7[H]  /  DBili  x   /  AST  36  /  ALT  70  /  AlkPhos  601[H]  05-22      Urinalysis Basic - ( 22 May 2025 20:33 )    Color: x / Appearance: x / SG: x / pH: x  Gluc: 88 mg/dL / Ketone: x  / Bili: x / Urobili: x   Blood: x / Protein: x / Nitrite: x   Leuk Esterase: x / RBC: x / WBC x   Sq Epi: x / Non Sq Epi: x / Bacteria: x      CAPILLARY BLOOD GLUCOSE      POCT Blood Glucose.: 102 mg/dL (22 May 2025 22:11)  POCT Blood Glucose.: 178 mg/dL (22 May 2025 17:40)  POCT Blood Glucose.: 173 mg/dL (22 May 2025 11:31)  POCT Blood Glucose.: 240 mg/dL (22 May 2025 05:52)    LIVER FUNCTIONS - ( 22 May 2025 05:04 )  Alb: 2.4 g/dL / Pro: 6.5 gm/dL / ALK PHOS: 601 U/L / ALT: 70 U/L / AST: 36 U/L / GGT: x             Cultures:      RADIOLOGY & ADDITIONAL STUDIES:

## 2025-05-23 NOTE — PROGRESS NOTE ADULT - SUBJECTIVE AND OBJECTIVE BOX
HPI:      PAIN: ( )Yes   ( )No  Level:  Location:  Intensity:    /10  Quality:  Aggravating Factors:  Alleviating Factors:  Radiation:  Duration/Timing:  Impact on ADLs:    DYSPNEA: ( ) Yes  ( ) No  Level:        Review of Systems:    Anxiety-  Depression-  Physical Discomfort-  Dyspnea-  Constipation-  Diarrhea-  Nausea-  Vomiting-  Anorexia-  Weight Loss-   Cough-  Secretions-  Fatigue-  Weakness-  Delirium-    All other systems reviewed and negative  Unable to obtain/Limited due to:        PHYSICAL EXAM:    Vital Signs Last 24 Hrs  T(C): 36.7 (23 May 2025 08:13), Max: 36.7 (23 May 2025 08:13)  T(F): 98 (23 May 2025 08:13), Max: 98 (23 May 2025 08:13)  HR: 109 (23 May 2025 08:13) (82 - 109)  BP: 109/75 (23 May 2025 08:13) (109/75 - 120/81)  BP(mean): --  RR: 18 (23 May 2025 08:13) (18 - 18)  SpO2: 97% (23 May 2025 08:13) (95% - 100%)    Parameters below as of 23 May 2025 08:13  Patient On (Oxygen Delivery Method): room air      Daily     Daily     PPSV2:   %  FAST:    General:  HEENT:  Lungs:  Cardiac:  GI:  :  Ext:  Neuro:      LABS:                        10.2   9.25  )-----------( 185      ( 23 May 2025 04:53 )             34.1     05-23    148[H]  |  101  |  69[H]  ----------------------------<  202[H]  3.5   |  42[H]  |  1.28    Ca    9.5      23 May 2025 04:53  Phos  3.5     05-23  Mg     2.3     05-23    TPro  5.8[L]  /  Alb  2.3[L]  /  TBili  1.5[H]  /  DBili  x   /  AST  31  /  ALT  57  /  AlkPhos  497[H]  05-23      Albumin: Albumin: 2.3 g/dL (05-23 @ 04:53)      Allergies    statins (Unknown)  atorvastatin (Hives)    Intolerances      MEDICATIONS  (STANDING):  acetaminophen   IVPB .. 1000 milliGRAM(s) IV Intermittent once  ciprofloxacin   IVPB 400 milliGRAM(s) IV Intermittent every 12 hours  dextrose 5%. 1000 milliLiter(s) (50 mL/Hr) IV Continuous <Continuous>  dextrose 5%. 1000 milliLiter(s) (100 mL/Hr) IV Continuous <Continuous>  dextrose 50% Injectable 25 Gram(s) IV Push once  dextrose 50% Injectable 12.5 Gram(s) IV Push once  dextrose 50% Injectable 25 Gram(s) IV Push once  enoxaparin Injectable 40 milliGRAM(s) SubCutaneous every 24 hours  glucagon  Injectable 1 milliGRAM(s) IntraMuscular once  insulin lispro (ADMELOG) corrective regimen sliding scale   SubCutaneous three times a day before meals  insulin lispro (ADMELOG) corrective regimen sliding scale   SubCutaneous at bedtime  lipid, fat emulsion (Fish Oil and Plant Based) 20% Infusion 0.868 Gm/kG/Day (20.8 mL/Hr) IV Continuous <Continuous>  lipid, fat emulsion (Fish Oil and Plant Based) 20% Infusion 0.8681 Gm/kG/Day (20.83 mL/Hr) IV Continuous <Continuous>  pantoprazole    Tablet 40 milliGRAM(s) Oral before breakfast  Parenteral Nutrition - Adult 1 Each TPN Continuous <Continuous>  Parenteral Nutrition - Adult 1 Each TPN Continuous <Continuous>  sodium chloride 0.9% 1000 milliLiter(s) (150 mL/Hr) IV Continuous <Continuous>    MEDICATIONS  (PRN):  dextrose Oral Gel 15 Gram(s) Oral once PRN Blood Glucose LESS THAN 70 milliGRAM(s)/deciliter  dicyclomine 10 milliGRAM(s) Oral two times a day before meals PRN gas/cramps  ondansetron Injectable 4 milliGRAM(s) IV Push every 6 hours PRN Nausea and/or Vomiting      RADIOLOGY: HPI: patient seen and examined with no family at bedside this AM. patient states that they plan has changed that he will be on TPN through the weekend and then plan is on Monday for patient to return home with the support of hospice in place with no further TPN. Patient states he has no questions at this time , asked if dietician could come speak with him as he has questions about different foods he can potentially continue to have as pleasure feeds once he is home, reached out and registered disticain team will follow up with patient. Patient denies any other complaints at this time Patient was enjoying a root beer Italian ice during our visit       PAIN: ( )Yes   (x )No  DYSPNEA: ( ) Yes  (x ) No  Level:    Review of Systems:    Physical Discomfort-  Constipation- yes  Nausea- at times  Vomiting- yes  Anorexia- yes  Weight Loss-  yes    All other systems reviewed and negative    PHYSICAL EXAM:    Vital Signs Last 24 Hrs  T(C): 36.7 (23 May 2025 08:13), Max: 36.7 (23 May 2025 08:13)  T(F): 98 (23 May 2025 08:13), Max: 98 (23 May 2025 08:13)  HR: 109 (23 May 2025 08:13) (82 - 109)  BP: 109/75 (23 May 2025 08:13) (109/75 - 120/81)  RR: 18 (23 May 2025 08:13) (18 - 18)  SpO2: 97% (23 May 2025 08:13) (95% - 100%)    Parameters below as of 23 May 2025 08:13  Patient On (Oxygen Delivery Method): room air    PPSV2: 50-60  %    General: pleasant male in bed, NAD   Mental Status: alert and oriented   HEENT: mmm   GI: distended   : +voiding   Ext: HERNANDEZ  Neuro: nonfocal      LABS:                        10.2   9.25  )-----------( 185      ( 23 May 2025 04:53 )             34.1     05-23    148[H]  |  101  |  69[H]  ----------------------------<  202[H]  3.5   |  42[H]  |  1.28    Ca    9.5      23 May 2025 04:53  Phos  3.5     05-23  Mg     2.3     05-23    TPro  5.8[L]  /  Alb  2.3[L]  /  TBili  1.5[H]  /  DBili  x   /  AST  31  /  ALT  57  /  AlkPhos  497[H]  05-23      Albumin: Albumin: 2.3 g/dL (05-23 @ 04:53)      Allergies    statins (Unknown)  atorvastatin (Hives)    Intolerances      MEDICATIONS  (STANDING):  acetaminophen   IVPB .. 1000 milliGRAM(s) IV Intermittent once  ciprofloxacin   IVPB 400 milliGRAM(s) IV Intermittent every 12 hours  dextrose 5%. 1000 milliLiter(s) (50 mL/Hr) IV Continuous <Continuous>  dextrose 5%. 1000 milliLiter(s) (100 mL/Hr) IV Continuous <Continuous>  dextrose 50% Injectable 25 Gram(s) IV Push once  dextrose 50% Injectable 12.5 Gram(s) IV Push once  dextrose 50% Injectable 25 Gram(s) IV Push once  enoxaparin Injectable 40 milliGRAM(s) SubCutaneous every 24 hours  glucagon  Injectable 1 milliGRAM(s) IntraMuscular once  insulin lispro (ADMELOG) corrective regimen sliding scale   SubCutaneous three times a day before meals  insulin lispro (ADMELOG) corrective regimen sliding scale   SubCutaneous at bedtime  lipid, fat emulsion (Fish Oil and Plant Based) 20% Infusion 0.868 Gm/kG/Day (20.8 mL/Hr) IV Continuous <Continuous>  lipid, fat emulsion (Fish Oil and Plant Based) 20% Infusion 0.8681 Gm/kG/Day (20.83 mL/Hr) IV Continuous <Continuous>  pantoprazole    Tablet 40 milliGRAM(s) Oral before breakfast  Parenteral Nutrition - Adult 1 Each TPN Continuous <Continuous>  Parenteral Nutrition - Adult 1 Each TPN Continuous <Continuous>  sodium chloride 0.9% 1000 milliLiter(s) (150 mL/Hr) IV Continuous <Continuous>    MEDICATIONS  (PRN):  dextrose Oral Gel 15 Gram(s) Oral once PRN Blood Glucose LESS THAN 70 milliGRAM(s)/deciliter  dicyclomine 10 milliGRAM(s) Oral two times a day before meals PRN gas/cramps  ondansetron Injectable 4 milliGRAM(s) IV Push every 6 hours PRN Nausea and/or Vomiting      RADIOLOGY:

## 2025-05-23 NOTE — PROGRESS NOTE ADULT - ASSESSMENT
75 yo man with Cholangiocarcinoma with liver mets admitted due to malignant SBO.  Now maintained on TPN and PEG drainage for comfort.    --SHANT : due to volume depletion.  --Metabolic alkalosis : appears due to combination of volume contraction and HCL loss via PEG drainage.    Will continue TPN at 2L/day       Acetate to be removed from TPN--d/w Dr Patricia--will communicate to Pharmacy.    Start NACL with KCL to volume repletion.  --Pt appears to fully comprehend his very grave prognosis.     Wishes to have sometime at home to spend with his family with continued TPN and IVF.  --Unclear if GI output can be decreased at all--d/w Dr Wright.  D/w Nathan Patricia and Kyle.    5/23MK   - SHANT due to volume depletion, improving     cw ns with kcl     outpt noted at !)L   - ALkalosis, improving with acetate dc from TPN ( verified)   - GOC for home hospice after TPN during weekend,     home hospice on monday   dw JORGE Bhatia      73 yo man with Cholangiocarcinoma with liver mets admitted due to malignant SBO.  Now maintained on TPN and PEG drainage for comfort.    --SHANT : due to volume depletion.  --Metabolic alkalosis : appears due to combination of volume contraction and HCL loss via PEG drainage.    Will continue TPN at 2L/day       Acetate to be removed from TPN--d/w Dr Patricia--will communicate to Pharmacy.    Start NACL with KCL to volume repletion.  --Pt appears to fully comprehend his very grave prognosis.     Wishes to have sometime at home to spend with his family with continued TPN and IVF.  --Unclear if GI output can be decreased at all--d/w Dr Wright.  D/w Nathan Patricia and Kyle.    5/23MK   - SHANT due to volume depletion, improving     cw ns with kcl     outpt noted at 10L   - hypernatremia     pt taking in water and other liquids    fu trend  - ALkalosis, improving with acetate dc from TPN ( verified)   - GOC for home hospice after TPN during weekend,     home hospice on monday   dw JORGE Bhatia

## 2025-05-24 LAB
ALBUMIN SERPL ELPH-MCNC: 2.4 G/DL — LOW (ref 3.3–5)
ALP SERPL-CCNC: 474 U/L — HIGH (ref 40–120)
ALT FLD-CCNC: 54 U/L — SIGNIFICANT CHANGE UP (ref 12–78)
ANION GAP SERPL CALC-SCNC: 2 MMOL/L — LOW (ref 5–17)
AST SERPL-CCNC: 31 U/L — SIGNIFICANT CHANGE UP (ref 15–37)
BILIRUB SERPL-MCNC: 1.6 MG/DL — HIGH (ref 0.2–1.2)
BUN SERPL-MCNC: 68 MG/DL — HIGH (ref 7–23)
CALCIUM SERPL-MCNC: 10 MG/DL — SIGNIFICANT CHANGE UP (ref 8.5–10.1)
CHLORIDE SERPL-SCNC: 105 MMOL/L — SIGNIFICANT CHANGE UP (ref 96–108)
CO2 SERPL-SCNC: 44 MMOL/L — HIGH (ref 22–31)
CREAT SERPL-MCNC: 1.46 MG/DL — HIGH (ref 0.5–1.3)
EGFR: 50 ML/MIN/1.73M2 — LOW
EGFR: 50 ML/MIN/1.73M2 — LOW
GLUCOSE BLDC GLUCOMTR-MCNC: 134 MG/DL — HIGH (ref 70–99)
GLUCOSE BLDC GLUCOMTR-MCNC: 134 MG/DL — HIGH (ref 70–99)
GLUCOSE BLDC GLUCOMTR-MCNC: 142 MG/DL — HIGH (ref 70–99)
GLUCOSE BLDC GLUCOMTR-MCNC: 254 MG/DL — HIGH (ref 70–99)
GLUCOSE BLDC GLUCOMTR-MCNC: 49 MG/DL — CRITICAL LOW (ref 70–99)
GLUCOSE BLDC GLUCOMTR-MCNC: 57 MG/DL — LOW (ref 70–99)
GLUCOSE BLDC GLUCOMTR-MCNC: 60 MG/DL — LOW (ref 70–99)
GLUCOSE SERPL-MCNC: 197 MG/DL — HIGH (ref 70–99)
HCT VFR BLD CALC: 33.6 % — LOW (ref 39–50)
HGB BLD-MCNC: 9.7 G/DL — LOW (ref 13–17)
MAGNESIUM SERPL-MCNC: 2.4 MG/DL — SIGNIFICANT CHANGE UP (ref 1.6–2.6)
MCHC RBC-ENTMCNC: 28.6 PG — SIGNIFICANT CHANGE UP (ref 27–34)
MCHC RBC-ENTMCNC: 28.9 G/DL — LOW (ref 32–36)
MCV RBC AUTO: 99.1 FL — SIGNIFICANT CHANGE UP (ref 80–100)
NRBC # BLD AUTO: 0 K/UL — SIGNIFICANT CHANGE UP (ref 0–0)
NRBC # FLD: 0 K/UL — SIGNIFICANT CHANGE UP (ref 0–0)
NRBC BLD AUTO-RTO: 0 /100 WBCS — SIGNIFICANT CHANGE UP (ref 0–0)
PHOSPHATE SERPL-MCNC: 3.1 MG/DL — SIGNIFICANT CHANGE UP (ref 2.5–4.5)
PLATELET # BLD AUTO: 229 K/UL — SIGNIFICANT CHANGE UP (ref 150–400)
PMV BLD: 9.5 FL — SIGNIFICANT CHANGE UP (ref 7–13)
POTASSIUM SERPL-MCNC: 3.3 MMOL/L — LOW (ref 3.5–5.3)
POTASSIUM SERPL-SCNC: 3.3 MMOL/L — LOW (ref 3.5–5.3)
PROT SERPL-MCNC: 6.4 GM/DL — SIGNIFICANT CHANGE UP (ref 6–8.3)
RBC # BLD: 3.39 M/UL — LOW (ref 4.2–5.8)
RBC # FLD: 16.7 % — HIGH (ref 10.3–14.5)
SODIUM SERPL-SCNC: 151 MMOL/L — HIGH (ref 135–145)
WBC # BLD: 9.05 K/UL — SIGNIFICANT CHANGE UP (ref 3.8–10.5)
WBC # FLD AUTO: 9.05 K/UL — SIGNIFICANT CHANGE UP (ref 3.8–10.5)

## 2025-05-24 PROCEDURE — 99232 SBSQ HOSP IP/OBS MODERATE 35: CPT

## 2025-05-24 RX ORDER — HYDROMORPHONE/SOD CHLOR,ISO/PF 2 MG/10 ML
1 SYRINGE (ML) INJECTION ONCE
Refills: 0 | Status: DISCONTINUED | OUTPATIENT
Start: 2025-05-24 | End: 2025-05-24

## 2025-05-24 RX ORDER — ACETAMINOPHEN 500 MG/5ML
1000 LIQUID (ML) ORAL ONCE
Refills: 0 | Status: COMPLETED | OUTPATIENT
Start: 2025-05-24 | End: 2025-05-24

## 2025-05-24 RX ORDER — SODIUM/POT/MAG/CALC/CHLOR/ACET 35-20-5MEQ
1 VIAL (ML) INTRAVENOUS
Refills: 0 | Status: DISCONTINUED | OUTPATIENT
Start: 2025-05-24 | End: 2025-05-25

## 2025-05-24 RX ORDER — SODIUM CHLORIDE 9 G/1000ML
1000 INJECTION, SOLUTION INTRAVENOUS
Refills: 0 | Status: DISCONTINUED | OUTPATIENT
Start: 2025-05-24 | End: 2025-05-25

## 2025-05-24 RX ORDER — DEXTROSE 50 % IN WATER 50 %
12.5 SYRINGE (ML) INTRAVENOUS ONCE
Refills: 0 | Status: COMPLETED | OUTPATIENT
Start: 2025-05-24 | End: 2025-05-24

## 2025-05-24 RX ORDER — I.V. FAT EMULSION 20 G/100ML
0.87 EMULSION INTRAVENOUS
Qty: 50 | Refills: 0 | Status: DISCONTINUED | OUTPATIENT
Start: 2025-05-24 | End: 2025-05-25

## 2025-05-24 RX ADMIN — Medication 12.5 GRAM(S): at 21:22

## 2025-05-24 RX ADMIN — SODIUM CHLORIDE 150 MILLILITER(S): 9 INJECTION, SOLUTION INTRAVENOUS at 07:45

## 2025-05-24 RX ADMIN — Medication 200 MILLIGRAM(S): at 09:41

## 2025-05-24 RX ADMIN — SODIUM CHLORIDE 150 MILLILITER(S): 9 INJECTION, SOLUTION INTRAVENOUS at 15:56

## 2025-05-24 RX ADMIN — Medication 200 MILLIGRAM(S): at 21:38

## 2025-05-24 RX ADMIN — Medication 1 MILLIGRAM(S): at 07:40

## 2025-05-24 RX ADMIN — INSULIN LISPRO 6: 100 INJECTION, SOLUTION INTRAVENOUS; SUBCUTANEOUS at 18:08

## 2025-05-24 RX ADMIN — ENOXAPARIN SODIUM 40 MILLIGRAM(S): 100 INJECTION SUBCUTANEOUS at 15:56

## 2025-05-24 RX ADMIN — Medication 400 MILLIGRAM(S): at 01:56

## 2025-05-24 NOTE — PROVIDER CONTACT NOTE (OTHER) - SITUATION
Pt's venting PEG suddenly having zero output. Pt's venting PEG stopped having output 30 minutes ago, patient complaining of discomfort

## 2025-05-24 NOTE — PROGRESS NOTE ADULT - SUBJECTIVE AND OBJECTIVE BOX
NEPHROLOGY INTERVAL HPI/OVERNIGHT EVENTS:    Date of Service: 05-24-25 @ 13:19    5/24--Wishes to go home with hospice support.  Accepts that he can not be continued on TPN/IVF on hospice service.  Continues with PEG drainage.  5/23 planned for tpn over weekend with dc home to hospice on monday.  tearful after learning that IVF had resulted in rapid correction     HPI:  75 yo man with PMHX of Cholangiocarcinoma s/p whipple procedure with liver mets on adjuvant therapy admitted with malignant SBO on 5/6.  S/p biliary stent placement several weeks ago at [facility] and then change of stent per Dr Oviedo several days pta.  Presented to ED due to persistent abd pain and inability to eat with nausea and vomiting.  Found to have Malignant SBO.  Started on TPN with NG drainage and underwent PEG for venting on 5/12.  On admission, noted for Hyponatremia with gradual resolution and normal renal function and CO2.  Now noted for gradual increase in serum CO2 for several days and > 45 today and persistent Hypokalemia  Pt is continued on TPN and PEG venting.  TPN at 2 L per day with increasing PEG output in last 2 days ! > 10 L/day.  Pt is consuming thickened liquid diet and fluid on his own as well.    PMHX and PSHX.  --Cholangiocarcinoma with mets --post Whipple.    MEDICATIONS  (STANDING):  acetaminophen   IVPB .. 1000 milliGRAM(s) IV Intermittent once  ciprofloxacin   IVPB 400 milliGRAM(s) IV Intermittent every 12 hours  dextrose 5%. 1000 milliLiter(s) (50 mL/Hr) IV Continuous <Continuous>  dextrose 5%. 1000 milliLiter(s) (100 mL/Hr) IV Continuous <Continuous>  dextrose 50% Injectable 25 Gram(s) IV Push once  dextrose 50% Injectable 12.5 Gram(s) IV Push once  dextrose 50% Injectable 25 Gram(s) IV Push once  enoxaparin Injectable 40 milliGRAM(s) SubCutaneous every 24 hours  glucagon  Injectable 1 milliGRAM(s) IntraMuscular once  insulin lispro (ADMELOG) corrective regimen sliding scale   SubCutaneous three times a day before meals  insulin lispro (ADMELOG) corrective regimen sliding scale   SubCutaneous at bedtime  lipid, fat emulsion (Fish Oil and Plant Based) 20% Infusion 0.8681 Gm/kG/Day (20.83 mL/Hr) IV Continuous <Continuous>  lipid, fat emulsion (Fish Oil and Plant Based) 20% Infusion 0.868 Gm/kG/Day (20.83 mL/Hr) IV Continuous <Continuous>  pantoprazole    Tablet 40 milliGRAM(s) Oral before breakfast  Parenteral Nutrition - Adult 1 Each TPN Continuous <Continuous>  Parenteral Nutrition - Adult 1 Each TPN Continuous <Continuous>  sodium chloride 0.9% 1000 milliLiter(s) (150 mL/Hr) IV Continuous <Continuous>    MEDICATIONS  (PRN):  dextrose Oral Gel 15 Gram(s) Oral once PRN Blood Glucose LESS THAN 70 milliGRAM(s)/deciliter  dicyclomine 10 milliGRAM(s) Oral two times a day before meals PRN gas/cramps  ondansetron Injectable 4 milliGRAM(s) IV Push every 6 hours PRN Nausea and/or Vomiting    Vital Signs Last 24 Hrs  T(C): 36.3 (24 May 2025 07:32), Max: 36.4 (23 May 2025 15:00)  T(F): 97.3 (24 May 2025 07:32), Max: 97.6 (23 May 2025 15:00)  HR: 95 (24 May 2025 07:32) (93 - 105)  BP: 123/79 (24 May 2025 07:32) (99/65 - 123/79)  BP(mean): --  RR: 17 (24 May 2025 07:32) (17 - 18)  SpO2: 100% (24 May 2025 07:32) (97% - 100%)    Parameters below as of 24 May 2025 07:32  Patient On (Oxygen Delivery Method): room air    05-23 @ 07:01  -  05-24 @ 07:00  --------------------------------------------------------  IN: 0 mL / OUT: 11943 mL / NET: -61352 mL    PHYSICAL EXAM:  GENERAL: Alert, no acute distress  CHEST/LUNG: clear to aus  HEART: S1S2 RRR  ABDOMEN: soft  EXTREMITIES: no edema  SKIN:     LABS:                        9.7    9.05  )-----------( 229      ( 24 May 2025 04:47 )             33.6     05-24    151[H]  |  105  |  68[H]  ----------------------------<  197[H]  3.3[L]   |  44[H]  |  1.46[H]    Ca    10.0      24 May 2025 04:47  Phos  3.1     05-24  Mg     2.4     05-24    TPro  6.4  /  Alb  2.4[L]  /  TBili  1.6[H]  /  DBili  x   /  AST  31  /  ALT  54  /  AlkPhos  474[H]  05-24      Urinalysis Basic - ( 24 May 2025 04:47 )    Color: x / Appearance: x / SG: x / pH: x  Gluc: 197 mg/dL / Ketone: x  / Bili: x / Urobili: x   Blood: x / Protein: x / Nitrite: x   Leuk Esterase: x / RBC: x / WBC x   Sq Epi: x / Non Sq Epi: x / Bacteria: x      Magnesium: 2.4 mg/dL (05-24 @ 04:47)  Phosphorus: 3.1 mg/dL (05-24 @ 04:47)          RADIOLOGY & ADDITIONAL TESTS:

## 2025-05-24 NOTE — PROGRESS NOTE ADULT - ASSESSMENT
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 Having BM.  s/p Venting PEG tube placement   s/p IR int-ext biliary drain exchange  s/p IR bare metal biliary stent placement  s/p IR drain study      Plan:  - Home hospice 5/26  - TPN cycling & glc and Insulin adjustment for DC planning.  - Monitor blood glucose  - Can use chemoport for TPN use in outpatient and inpatient setting   - c/w TPN & Cycle  - Monitor BM  - Pain control PRN  - Monitor vitals  - Nausea control PRN  - replete electrolytes  - daily labs  - OOB/PT  - Palliative on board    Plan will be discussed with Surgical oncology attending, Dr. Wright

## 2025-05-24 NOTE — CHART NOTE - NSCHARTNOTEFT_GEN_A_CORE
Clinical Nutrition PN Follow Up Note    * 75yo M w/ PMHx of Cholangio CA s/p Robotic Whipple on 2024 (Dr. Ocasio) w/ adjuvant Xeloda -> [place]/Cis/Durva, Found liver metastases on 2024, failed CBD stent placement (unsuccessful cannulation via ERCP), s/p internal-external biliary drainage (8.5fr) at [facility] on , c/b High external drainage output ( >2L) due to Internal drainage failure (CBD blockage), now s/p exchange of internal-external biliary drainage (12fr) on  in . Presented with 3-4 days of progressive abdominal bloating with mild nausea, generalized weakness and fatigue. Last BM 3d ago, has been passing gases. Complains of mild discomfort of epigastric pain. Admitted w/ SBO (likely malignant). NGT to LWS in place. Started on mSBO protocol - Steroids/Reglan/Octreotide. RD consulted for initiation of TPN via port (OK to use from surgical oncology).   *: Initiate TPN via Port 2/2 mSBO and suspected prolonged NPO status  *:  (+) BM, passing flatus. CLD. Would suggest to renew TPN x 1 more day to ensure >80% ENN being met with bridging PO + TPN. D/w surg resident   *: TPN dc'd  per surgery.  CT A/P: GOO. s/p NGT to LWS w/ ~3L output overnight. Will initiate TPN via implanted infusion port.   *5/10: Per gastro: rec PEG placement; plan for  for long-term gastric decompression given mSBO, inability to tolerate adequate PO intake, and palliation. D/w surg res will c/w TPN.   *: Per surg "Patient has felt he no longer wants to fight and will choose palliative measures for his care". Planning for PEG placement monday.  *: Plan for PEG today  for long-term decompression given mSBO and palliation, will reorder TPN. NGT remains in place to suction.   *: s/p venting PEG placement. Palliative care following - plan for home TPN w/ venting PEG. On CLD for pleasure. DNR/ DNI   *: Diet advanced to CLD. S/p biliary tube check () - L biliary drain possibly clogged distally, as no contrast entered the pigtail on fluoroscopic study. However drain now draining. Plan to c/w TPN.  *5/15: Diet advanced to FLD. s/p IR drain exchange. Having BMs + abdominal discomfort. C/w TPN. Will be at dextrose goal today. **CAN CYCLE TPN x12 hours TOMORROW **  *: cont on FLD, c/w TPN to be cycled tonight. RD d/w surg resident, concern for low lytes despite large amounts in TPN and repletion outside bag. POCT erratic, RD suggested to add RISS. Dc home planned for tomorrow ()  *: S/p biliary stent change to a metal stent w/ IR yesterday. Passed loose stool w/ some relief of abd cramping. Plan for discharge home sometime this weekend (today vs tomorrow?) - lytes still LOW, receiving repletion outside PN bag. POCTs remain elevated, currently receiving RISS, will continue to monitor and may need to consider adding insulin in PN bag for additional coverage.   *: noted w/ slight leakage around tube site, otherwise no pertinent changes/events overnight. Per discussion w/ surgery resident, plan for d/c home likely tomorrow. POCTs erractic x 24 hrs, will continue to monitor.  *: Tolerating diet, venting PEG improved pain. Per surg large vol of bile draining. Per IR w/ 1350 cc bilious output overnight; drain capped today. POCTs still erractic. D/w Dr. Randhawa and pharmacy, will add 10 units of insulin into the PN bag and continue w/ RISS. Will continue to monitor POCTs and adjust w/ pharmacy & MD  *: Drain reattached to a gravity drain bag by nursing staff overnight. Will plan for drain evaluation to check for stent patency today. Pt doesn't need to be NPO per PA - remains NPO this AM w/ TPN. POCT still spiking in AM w/ TPN running - 10 units added into bag yesterday and changed to moderate bedtime sliding scale. Plan today to increase in insulin in PN bag to 20 units - d/w Dr. Randhawa and clinical pharmacist.   *: went to IR yesterday for conversion of EBD to IEBD to hopefully re-establish flow through indwelling metal CBD stent. Mucus plug found and removed. Remains on TPN w/ insulin in bag and persistently low K levels, likely d/t large output from venting PEG (10L x 24 hrs). Discussed TPN changes as below with MD Patricia and clinical pharmacist for insulin and potassium levels. Tentative plan for dc tomorrow  if K/POCT WNL   *:  pending dc home. Delayed d/t persistent hypokalemia and hyperglycemia. POCT are improving with insulin in bag. Discussed K is likely persistently low d/t high venting PEG outputs ~>/= 10L the last few days.     *TPN to be restarted 2/2 GOO and suspected prolonged NPO status.    *current status: Plan for dc home w/ home hospice on Monday per hospitalist. plan to c/w TPN for the weekend, on . Still having very large outputs from venting PEG     *new pertinent meds: KCl (40 mEq), ADMELOG (10 units x 24 hours), NaCl 0.9% IVF    *labs reviewed; Hypernatremia noted, will c/w current volume of TPN today. Pt receiving IVF outside of PN - d/w MD Patricia who will d/w Nephro to change to 1/2 NS instead. K+ low. As per ASPEN Guidelines, maintenance potassium concentration in PN is 1 – 2 mEq/kg/day. However, [facility] PN Policy indicates a maximum of 120 mEq should be added into the PN bag. Plan to c/w 115 mEq in PN bag and will NOT increase past that as it is maximum dose for pt wt. Mg and Phos WNL. As per ASPEN Guidelines, maintenance magnesium concentration in PN is 8-20 mEq/day with a maximum concentration of 48 mEq/day.  T Bili (<2) WNL, trace elements to be continued.  New TG WNL (77) will c/w 50g lipids daily.  POCTs with improvement (102-207 mg/dL), plan to c/w 30 units insulin in bag and c/w dextrose to 300g daily (will meet ~90% ENN) - 0.1 units/g dextrose        151[H]  |  105  |  68[H]  ----------------------------<  197[H]  3.3[L]   |  44[H]  |  1.46[H]    Ca    10.0      24 May 2025 04:47  Phos  3.1       Mg     2.4         TPro  6.4  /  Alb  2.4[L]  /  TBili  1.6[H]  /  DBili  x   /  AST  31  /  ALT  54  /  AlkPhos  474[H]      BMI: BMI (kg/m2): 18.2 (25 @ 03:35)  HbA1c: A1C with Estimated Average Glucose Result: 5.3 % (25 @ 04:57)    BP: 112/74 (25 @ 08:30) (77/52 - 115/66)Vital Signs Last 24 Hrs  T(C): 36.3 (25 @ 08:30), Max: 36.7 (25 @ 19:45)  T(F): 97.3 (25 @ 08:30), Max: 98.1 (25 @ 00:51)  HR: 96 (25 @ 08:30) (78 - 127)  BP: 112/74 (25 @ 08:30) (77/52 - 115/66)  RR: 18 (25 @ 08:30) (17 - 18)  SpO2: 100% (25 @ 08:30) (98% - 100%)    Lipid Panel: Date/Time: 25 @ 04:31  Triglycerides, Serum: 77    POCT Blood Glucose.: 134 mg/dL (24 May 2025 06:40)  POCT Blood Glucose.: 117 mg/dL (23 May 2025 22:02)  POCT Blood Glucose.: 252 mg/dL (23 May 2025 18:00)  POCT Blood Glucose.: 137 mg/dL (23 May 2025 12:21)    *I&O's Detail    22 May 2025 07:01  -  23 May 2025 07:00  --------------------------------------------------------  IN:  Total IN: 0 mL    OUT:    Drain (mL): 650 mL    PEG (Percutaneous Endoscopic Gastrostomy) Tube (mL): 8900 mL    Voided (mL): 460 mL  Total OUT: 90198 mL    Total NET: -23287 mL  * fluid status: large negative; >10L output from venting PEG doc'd x 24 hrs. No TPN doc'd. May need to consider increase TPN total volume to replenish losses. Consider adding additional IVF  *Last (+) BM doc'd on . (+) abdominal discomfort; pt not on bowel regimen.  *edema: none doc'd  *PI: coccyx, unstageable     *malnutrition: Pt continues to meet criteria for severe protein-calorie malnutrition in context of chronic disease r/t decreased ability to meet increased nutrient needs 2/2 mets Ca s/p Whipple and malignant SBO AEB severe muscle/ fat wasting, 33% wt loss x 1 yr, <50-75% ENN chronically    Estimated Needs: based on 57.6 Kg (wt from )  Calories: -  Kcal (35- 40 Kcal/Kg)  Protein: 86- 115 g (1.5- 2 g/Kg)  Fluids: 1728 - 2016 mL (30 - 35 mL/Kg)    *Diet, NPO (25 @ 08:50) [Active]    Weight History:  Daily Weight in k.8 (19 May 2025 06:12)  Daily Weight in k.4 (11 May 2025 05:32)    Height (cm): 177.8 (25 @ 03:35), 177.8 (25 @ 11:56), 177.8 (25 @ 10:58)  Weight (kg): 57.606 (25 @ 03:35), 57.6 (25 @ 10:58), 68 (25 @ 19:34)  BMI (kg/m2): 18.2 (25 @ 03:35), 18.2 (25 @ 11:56), 18.2 (25 @ 10:58)  BSA (m2): 1.72 (25 @ 03:35), 1.72 (25 @ 11:56), 1.72 (25 @ 10:58)    TPN Recommendations: via implanted infusion port  Total Volume: 2000 mL x 12 hours  115 g  Amino Acids  300 g Dextrose (@ goal)  50 g Lipids 20%  200 mEq Sodium Chloride  0 mEq Sodium Acetate  0 mmol Sodium Phosphate  71 mEq Potassium Chloride  0 mEq Potassium Acetate  30 mmol Potassium Phosphate  0 mEq Calcium Gluconate (CAPS out of PN solution)  20 mEq Magnesium Sulfate  200 mg Thiamine  1 ml Trace Elements  10 ml MVI  30 units Regular Insulin    Total Calories  1980   (Meets  ~90%  of  Estimated Energy needs  and 100% Protein needs)     *Goal: achieved - Pt will receive >/= 80% ENN via tolerated route    Additional Recommendations:    1) Daily weights  2) Strict I & O's - replete losses from venting PEG  3) Daily lyte checks including magnesium and phos **cont to replete K, Phos, Mg outside PN bag PRN  4) Weekly triglycerides/LFT checks  5) POCT q6hrs; maintain 140-180mg/dL **on RISS, insulin also added to PN bag  6) Has venting PEG. Plan for home TPN w/ pleasure feeds     Nutrition recommendations to continue upon discharge. Goal to continue to meet >/= 80% ENN via tolerated route. RD to F/U prn for changes to nutrition dc plan.    *will continue to monitor and adjust PN prn*  Rose Fernandez, MS, RDN, CDN, [facility] [phone]  Certified Nutrition  Clinical Nutrition PN Follow Up Note    * 75yo M w/ PMHx of Cholangio CA s/p Robotic Whipple on 2024 (Dr. Ocasio) w/ adjuvant Xeloda -> [place]/Cis/Durva, Found liver metastases on 2024, failed CBD stent placement (unsuccessful cannulation via ERCP), s/p internal-external biliary drainage (8.5fr) at [facility] on , c/b High external drainage output ( >2L) due to Internal drainage failure (CBD blockage), now s/p exchange of internal-external biliary drainage (12fr) on  in . Presented with 3-4 days of progressive abdominal bloating with mild nausea, generalized weakness and fatigue. Last BM 3d ago, has been passing gases. Complains of mild discomfort of epigastric pain. Admitted w/ SBO (likely malignant). NGT to LWS in place. Started on mSBO protocol - Steroids/Reglan/Octreotide. RD consulted for initiation of TPN via port (OK to use from surgical oncology).   *: Initiate TPN via Port 2/2 mSBO and suspected prolonged NPO status  *:  (+) BM, passing flatus. CLD. Would suggest to renew TPN x 1 more day to ensure >80% ENN being met with bridging PO + TPN. D/w surg resident   *: TPN dc'd  per surgery.  CT A/P: GOO. s/p NGT to LWS w/ ~3L output overnight. Will initiate TPN via implanted infusion port.   *5/10: Per gastro: rec PEG placement; plan for  for long-term gastric decompression given mSBO, inability to tolerate adequate PO intake, and palliation. D/w surg res will c/w TPN.   *: Per surg "Patient has felt he no longer wants to fight and will choose palliative measures for his care". Planning for PEG placement monday.  *: Plan for PEG today  for long-term decompression given mSBO and palliation, will reorder TPN. NGT remains in place to suction.   *: s/p venting PEG placement. Palliative care following - plan for home TPN w/ venting PEG. On CLD for pleasure. DNR/ DNI   *: Diet advanced to CLD. S/p biliary tube check () - L biliary drain possibly clogged distally, as no contrast entered the pigtail on fluoroscopic study. However drain now draining. Plan to c/w TPN.  *5/15: Diet advanced to FLD. s/p IR drain exchange. Having BMs + abdominal discomfort. C/w TPN. Will be at dextrose goal today. **CAN CYCLE TPN x12 hours TOMORROW **  *: cont on FLD, c/w TPN to be cycled tonight. RD d/w surg resident, concern for low lytes despite large amounts in TPN and repletion outside bag. POCT erratic, RD suggested to add RISS. Dc home planned for tomorrow ()  *: S/p biliary stent change to a metal stent w/ IR yesterday. Passed loose stool w/ some relief of abd cramping. Plan for discharge home sometime this weekend (today vs tomorrow?) - lytes still LOW, receiving repletion outside PN bag. POCTs remain elevated, currently receiving RISS, will continue to monitor and may need to consider adding insulin in PN bag for additional coverage.   *: noted w/ slight leakage around tube site, otherwise no pertinent changes/events overnight. Per discussion w/ surgery resident, plan for d/c home likely tomorrow. POCTs erractic x 24 hrs, will continue to monitor.  *: Tolerating diet, venting PEG improved pain. Per surg large vol of bile draining. Per IR w/ 1350 cc bilious output overnight; drain capped today. POCTs still erractic. D/w Dr. Randhawa and pharmacy, will add 10 units of insulin into the PN bag and continue w/ RISS. Will continue to monitor POCTs and adjust w/ pharmacy & MD  *: Drain reattached to a gravity drain bag by nursing staff overnight. Will plan for drain evaluation to check for stent patency today. Pt doesn't need to be NPO per PA - remains NPO this AM w/ TPN. POCT still spiking in AM w/ TPN running - 10 units added into bag yesterday and changed to moderate bedtime sliding scale. Plan today to increase in insulin in PN bag to 20 units - d/w Dr. Randhawa and clinical pharmacist.   *: went to IR yesterday for conversion of EBD to IEBD to hopefully re-establish flow through indwelling metal CBD stent. Mucus plug found and removed. Remains on TPN w/ insulin in bag and persistently low K levels, likely d/t large output from venting PEG (10L x 24 hrs). Discussed TPN changes as below with MD Patricia and clinical pharmacist for insulin and potassium levels. Tentative plan for dc tomorrow  if K/POCT WNL   *:  pending dc home. Delayed d/t persistent hypokalemia and hyperglycemia. POCT are improving with insulin in bag. Discussed K is likely persistently low d/t high venting PEG outputs ~>/= 10L the last few days.     *TPN to be restarted 2/2 GOO and suspected prolonged NPO status.    *current status: Plan for dc home w/ home hospice on Monday per hospitalist. plan to c/w TPN for the weekend, on . Still having very large outputs from venting PEG     *new pertinent meds: KCl (40 mEq), ADMELOG (10 units x 24 hours), NaCl 0.9% IVF    *labs reviewed; Hypernatremia noted, will c/w current volume of TPN today. Pt receiving IVF outside of PN - d/w MD Patricia who will d/w Nephro to change to 1/2 NS instead. K+ low. As per ASPEN Guidelines, maintenance potassium concentration in PN is 1 – 2 mEq/kg/day. However, [facility] PN Policy indicates a maximum of 120 mEq should be added into the PN bag. Plan to c/w 115 mEq in PN bag and will NOT increase past that as it is maximum dose for pt wt. Mg and Phos WNL. As per ASPEN Guidelines, maintenance magnesium concentration in PN is 8-20 mEq/day with a maximum concentration of 48 mEq/day.  T Bili (<2) WNL, trace elements to be continued.  New TG WNL (77) will c/w 50g lipids daily.  POCTs with improvement, plan to c/w 30 units insulin in bag and c/w dextrose to 300g daily (will meet ~90% ENN) - 0.1 units/g dextrose        151[H]  |  105  |  68[H]  ----------------------------<  197[H]  3.3[L]   |  44[H]  |  1.46[H]    Ca    10.0      24 May 2025 04:47  Phos  3.1       Mg     2.4         TPro  6.4  /  Alb  2.4[L]  /  TBili  1.6[H]  /  DBili  x   /  AST  31  /  ALT  54  /  AlkPhos  474[H]      BMI: BMI (kg/m2): 18.2 (25 @ 03:35)  HbA1c: A1C with Estimated Average Glucose Result: 5.3 % (25 @ 04:57)    BP: 112/74 (25 @ 08:30) (77/52 - 115/66)Vital Signs Last 24 Hrs  T(C): 36.3 (25 @ 08:30), Max: 36.7 (25 @ 19:45)  T(F): 97.3 (25 @ 08:30), Max: 98.1 (25 @ 00:51)  HR: 96 (25 @ 08:30) (78 - 127)  BP: 112/74 (25 @ 08:30) (77/52 - 115/66)  RR: 18 (25 @ 08:30) (17 - 18)  SpO2: 100% (25 @ 08:30) (98% - 100%)    Lipid Panel: Date/Time: 25 @ 04:31  Triglycerides, Serum: 77    POCT Blood Glucose.: 134 mg/dL (24 May 2025 06:40)  POCT Blood Glucose.: 117 mg/dL (23 May 2025 22:02)  POCT Blood Glucose.: 252 mg/dL (23 May 2025 18:00)  POCT Blood Glucose.: 137 mg/dL (23 May 2025 12:21)    *I&O's Detail    22 May 2025 07:01  -  23 May 2025 07:00  --------------------------------------------------------  IN:  Total IN: 0 mL    OUT:    Drain (mL): 650 mL    PEG (Percutaneous Endoscopic Gastrostomy) Tube (mL): 8900 mL    Voided (mL): 460 mL  Total OUT: 30918 mL    Total NET: -06159 mL  * fluid status: large negative; >10L output from venting PEG doc'd x 24 hrs. No TPN doc'd. May need to consider increase TPN total volume to replenish losses. Consider adding additional IVF  *Last (+) BM doc'd on . (+) abdominal discomfort; pt not on bowel regimen.  *edema: none doc'd  *PI: coccyx, unstageable     *malnutrition: Pt continues to meet criteria for severe protein-calorie malnutrition in context of chronic disease r/t decreased ability to meet increased nutrient needs 2/2 mets Ca s/p Whipple and malignant SBO AEB severe muscle/ fat wasting, 33% wt loss x 1 yr, <50-75% ENN chronically    Estimated Needs: based on 57.6 Kg (wt from )  Calories: -  Kcal (35- 40 Kcal/Kg)  Protein: 86- 115 g (1.5- 2 g/Kg)  Fluids: 1728 - 2016 mL (30 - 35 mL/Kg)    *Diet, NPO (25 @ 08:50) [Active]    Weight History:  Daily Weight in k.8 (19 May 2025 06:12)  Daily Weight in k.4 (11 May 2025 05:32)    Height (cm): 177.8 (25 @ 03:35), 177.8 (25 @ 11:56), 177.8 (25 @ 10:58)  Weight (kg): 57.606 (25 @ 03:35), 57.6 (25 @ 10:58), 68 (25 @ 19:34)  BMI (kg/m2): 18.2 (25 @ 03:35), 18.2 (25 @ 11:56), 18.2 (25 @ 10:58)  BSA (m2): 1.72 (25 @ 03:35), 1.72 (25 @ 11:56), 1.72 (25 @ 10:58)    TPN Recommendations: via implanted infusion port  Total Volume: 2000 mL x 12 hours  115 g  Amino Acids  300 g Dextrose (@ goal)  50 g Lipids 20%  200 mEq Sodium Chloride  0 mEq Sodium Acetate  0 mmol Sodium Phosphate  71 mEq Potassium Chloride  0 mEq Potassium Acetate  30 mmol Potassium Phosphate  0 mEq Calcium Gluconate (CAPS out of PN solution)  20 mEq Magnesium Sulfate  200 mg Thiamine  1 ml Trace Elements  10 ml MVI  30 units Regular Insulin    Total Calories  1980   (Meets  ~90%  of  Estimated Energy needs  and 100% Protein needs)     *Goal: achieved - Pt will receive >/= 80% ENN via tolerated route    Additional Recommendations:    1) Daily weights  2) Strict I & O's - replete losses from venting PEG  3) Daily lyte checks including magnesium and phos **cont to replete K, Phos, Mg outside PN bag PRN  4) Weekly triglycerides/LFT checks  5) POCT q6hrs; maintain 140-180mg/dL **on RISS, insulin also added to PN bag  6) Has venting PEG. Plan for dc of TPN after tomorrow, home hospice      Nutrition recommendations to continue upon discharge. Goal to continue to meet >/= 80% ENN via tolerated route. RD to F/U prn for changes to nutrition dc plan.    *will continue to monitor and adjust PN prn*  Carol Burger, MS, RDN, CNSC, [phone]  Certified Nutrition

## 2025-05-24 NOTE — PROGRESS NOTE ADULT - SUBJECTIVE AND OBJECTIVE BOX
SURGERY DAILY PROGRESS NOTE:     Subjective:  Patient seen and examined this morning. AVSS. Some leaking noted around drain site.       Objective:    MEDICATIONS  (STANDING):  acetaminophen   IVPB .. 1000 milliGRAM(s) IV Intermittent once  ciprofloxacin   IVPB 400 milliGRAM(s) IV Intermittent every 12 hours  dextrose 5%. 1000 milliLiter(s) (50 mL/Hr) IV Continuous <Continuous>  dextrose 5%. 1000 milliLiter(s) (100 mL/Hr) IV Continuous <Continuous>  dextrose 50% Injectable 25 Gram(s) IV Push once  dextrose 50% Injectable 12.5 Gram(s) IV Push once  dextrose 50% Injectable 25 Gram(s) IV Push once  enoxaparin Injectable 40 milliGRAM(s) SubCutaneous every 24 hours  glucagon  Injectable 1 milliGRAM(s) IntraMuscular once  insulin lispro (ADMELOG) corrective regimen sliding scale   SubCutaneous three times a day before meals  insulin lispro (ADMELOG) corrective regimen sliding scale   SubCutaneous at bedtime  lipid, fat emulsion (Fish Oil and Plant Based) 20% Infusion 0.8681 Gm/kG/Day (20.83 mL/Hr) IV Continuous <Continuous>  pantoprazole    Tablet 40 milliGRAM(s) Oral before breakfast  Parenteral Nutrition - Adult 1 Each TPN Continuous <Continuous>  sodium chloride 0.9% 1000 milliLiter(s) (150 mL/Hr) IV Continuous <Continuous>    MEDICATIONS  (PRN):  dextrose Oral Gel 15 Gram(s) Oral once PRN Blood Glucose LESS THAN 70 milliGRAM(s)/deciliter  dicyclomine 10 milliGRAM(s) Oral two times a day before meals PRN gas/cramps  ondansetron Injectable 4 milliGRAM(s) IV Push every 6 hours PRN Nausea and/or Vomiting      Vital Signs Last 24 Hrs  T(C): 36.4 (23 May 2025 23:41), Max: 36.7 (23 May 2025 08:13)  T(F): 97.6 (23 May 2025 23:41), Max: 98 (23 May 2025 08:13)  HR: 93 (23 May 2025 23:41) (93 - 109)  BP: 122/83 (23 May 2025 23:41) (99/65 - 122/83)  BP(mean): --  RR: 18 (23 May 2025 15:00) (18 - 18)  SpO2: 100% (23 May 2025 23:41) (97% - 100%)    Parameters below as of 23 May 2025 23:41  Patient On (Oxygen Delivery Method): room air    Physical Exam:  General: AOx3, Well developed, NAD  HEENT: NC/AT, normal pinnae and tragi  Chest: Normal respiratory effort, equal chest rise  Heart: RRR  Abdomen: PEG and IR tubes in place, dressing clean and dry, abd nondistended, soft, nontender.  Neuro/Psych: No localized deficits. Normal speech, normal tone, normal affect  Skin: Normal, warm, no rashes, no lesions noted  Extremities: Warm, well perfused, no edema      I&O's Detail    22 May 2025 07:01  -  23 May 2025 07:00  --------------------------------------------------------  IN:  Total IN: 0 mL    OUT:    Drain (mL): 650 mL    PEG (Percutaneous Endoscopic Gastrostomy) Tube (mL): 8900 mL    Voided (mL): 460 mL  Total OUT: 01137 mL    Total NET: -02540 mL      23 May 2025 07:01  -  24 May 2025 06:45  --------------------------------------------------------  IN:  Total IN: 0 mL    OUT:    Drain (mL): 530 mL    PEG (Percutaneous Endoscopic Gastrostomy) Tube (mL): 59658 mL    Voided (mL): 700 mL  Total OUT: 79080 mL    Total NET: -72772 mL        LABS:                        9.7    9.05  )-----------( 229      ( 24 May 2025 04:47 )             33.6     05-24    151[H]  |  105  |  68[H]  ----------------------------<  197[H]  3.3[L]   |  44[H]  |  1.46[H]    Ca    10.0      24 May 2025 04:47  Phos  3.1     05-24  Mg     2.4     05-24    TPro  6.4  /  Alb  2.4[L]  /  TBili  1.6[H]  /  DBili  x   /  AST  31  /  ALT  54  /  AlkPhos  474[H]  05-24      Urinalysis Basic - ( 24 May 2025 04:47 )    Color: x / Appearance: x / SG: x / pH: x  Gluc: 197 mg/dL / Ketone: x  / Bili: x / Urobili: x   Blood: x / Protein: x / Nitrite: x   Leuk Esterase: x / RBC: x / WBC x   Sq Epi: x / Non Sq Epi: x / Bacteria: x

## 2025-05-24 NOTE — PROGRESS NOTE ADULT - SUBJECTIVE AND OBJECTIVE BOX
HOSPITALIST ATTENDING PROGRESS NOTE    Chart and meds reviewed.  Patient seen and examined.      CC: malignant SBO    Subjective: Plan for home hospice (w no TPN) on Monday 5/26. Discussed with renal switching fluids to 1/2 ns     All other systems reviewed and found to be negative with the exception of what has been described above.    MEDICATIONS  (STANDING):  acetaminophen   IVPB .. 1000 milliGRAM(s) IV Intermittent once  ciprofloxacin   IVPB 400 milliGRAM(s) IV Intermittent every 12 hours  dextrose 5%. 1000 milliLiter(s) (100 mL/Hr) IV Continuous <Continuous>  dextrose 5%. 1000 milliLiter(s) (50 mL/Hr) IV Continuous <Continuous>  dextrose 50% Injectable 25 Gram(s) IV Push once  dextrose 50% Injectable 12.5 Gram(s) IV Push once  dextrose 50% Injectable 25 Gram(s) IV Push once  enoxaparin Injectable 40 milliGRAM(s) SubCutaneous every 24 hours  glucagon  Injectable 1 milliGRAM(s) IntraMuscular once  insulin lispro (ADMELOG) corrective regimen sliding scale   SubCutaneous three times a day before meals  insulin lispro (ADMELOG) corrective regimen sliding scale   SubCutaneous at bedtime  lipid, fat emulsion (Fish Oil and Plant Based) 20% Infusion 0.8681 Gm/kG/Day (20.83 mL/Hr) IV Continuous <Continuous>  lipid, fat emulsion (Fish Oil and Plant Based) 20% Infusion 0.868 Gm/kG/Day (20.83 mL/Hr) IV Continuous <Continuous>  pantoprazole    Tablet 40 milliGRAM(s) Oral before breakfast  Parenteral Nutrition - Adult 1 Each TPN Continuous <Continuous>  Parenteral Nutrition - Adult 1 Each TPN Continuous <Continuous>  sodium chloride 0.45% 1000 milliLiter(s) (150 mL/Hr) IV Continuous <Continuous>    MEDICATIONS  (PRN):  dextrose Oral Gel 15 Gram(s) Oral once PRN Blood Glucose LESS THAN 70 milliGRAM(s)/deciliter  dicyclomine 10 milliGRAM(s) Oral two times a day before meals PRN gas/cramps  ondansetron Injectable 4 milliGRAM(s) IV Push every 6 hours PRN Nausea and/or Vomiting      VITALS:  T(F): 97.3 (05-24-25 @ 07:32), Max: 97.6 (05-23-25 @ 15:00)  HR: 95 (05-24-25 @ 07:32) (93 - 105)  BP: 123/79 (05-24-25 @ 07:32) (99/65 - 123/79)  RR: 17 (05-24-25 @ 07:32) (17 - 18)  SpO2: 100% (05-24-25 @ 07:32) (97% - 100%)  Wt(kg): --    I&O's Summary    23 May 2025 07:01  -  24 May 2025 07:00  --------------------------------------------------------  IN: 0 mL / OUT: 11059 mL / NET: -65088 mL        CAPILLARY BLOOD GLUCOSE      POCT Blood Glucose.: 142 mg/dL (24 May 2025 12:13)  POCT Blood Glucose.: 134 mg/dL (24 May 2025 06:40)  POCT Blood Glucose.: 117 mg/dL (23 May 2025 22:02)  POCT Blood Glucose.: 252 mg/dL (23 May 2025 18:00)      PHYSICAL EXAM:  Gen: NAD  HEENT:  pupils equal and reactive, EOMI, no oropharyngeal lesions, erythema, exudates, oral thrush  NECK:   supple, no carotid bruits, no palpable lymph nodes, no thyromegaly  CV:  +S1, +S2, regular, no murmurs or rubs  RESP:   lungs clear to auscultation bilaterally, no wheezing, rales, rhonchi, good air entry bilaterally  BREAST:  not examined  GI:  abdomen soft, non-tender, non-distended, normal BS, no bruits, no abdominal masses, no palpable masses, + venting peg, biliary drain  RECTAL:  not examined  :  not examined  MSK:   normal muscle tone, no atrophy, no rigidity, no contractions  EXT:  no clubbing, no cyanosis, no edema, no calf pain, swelling or erythema  VASCULAR:  pulses equal and symmetric in the upper and lower extremities  NEURO:  AAOX3, no focal neurological deficits, follows all commands, able to move extremities spontaneously  SKIN:  no ulcers, lesions or rashes    LABS:                            9.7    9.05  )-----------( 229      ( 24 May 2025 04:47 )             33.6     05-24    151[H]  |  105  |  68[H]  ----------------------------<  197[H]  3.3[L]   |  44[H]  |  1.46[H]    Ca    10.0      24 May 2025 04:47  Phos  3.1     05-24  Mg     2.4     05-24    TPro  6.4  /  Alb  2.4[L]  /  TBili  1.6[H]  /  DBili  x   /  AST  31  /  ALT  54  /  AlkPhos  474[H]  05-24        LIVER FUNCTIONS - ( 24 May 2025 04:47 )  Alb: 2.4 g/dL / Pro: 6.4 gm/dL / ALK PHOS: 474 U/L / ALT: 54 U/L / AST: 31 U/L / GGT: x             Urinalysis Basic - ( 24 May 2025 04:47 )    Color: x / Appearance: x / SG: x / pH: x  Gluc: 197 mg/dL / Ketone: x  / Bili: x / Urobili: x   Blood: x / Protein: x / Nitrite: x   Leuk Esterase: x / RBC: x / WBC x   Sq Epi: x / Non Sq Epi: x / Bacteria: x              CULTURES:      Additional results/Imaging, I have personally reviewed:    Telemetry, personally reviewed:

## 2025-05-24 NOTE — PROGRESS NOTE ADULT - ASSESSMENT
75yo M w/ H/o Cholangio CA w/ distant mets, admitted on 5/6 with malignant SBO. S/p Venting PEG tube placement, IR int-ext biliary drain exchange, IR bare metal biliary stent placement.    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  S/p venting PEG tube placement  Currently receiving TPN through existing mediport, and tolerating PO diet (full liquids).   -plan for home hospice on 5/26    #Hypokalemia   #Metabolic alkalosis   #SHANT   Baseline cr ~0.6 now with Cr of 1.3 and extensive gi losses causing severe metabolic alkalosis and persistent and severe hypokalemia despite tpn and aggressive electrolyte repletion.   - nephrology consulted, appreciate recs   - will switch potassium acetate  in tpn to potassium chloride discussed with nutrition   - start NS fluids     # Hyperglycemia:  -am hyperglycemia in setting of overnight cycled TPN  -improving, uptitrating insulin in TPN  -discussed w TPN RD and clinical pharmacist    # Anemia:  Likely AOCD with component of myelosuppression from chemo.   Hematology/Oncology on board, will follow as outpatient.   Trend CBC and transfuse PRN.     # Thrombocytopenia:  -stable     #Hypokalemia   - replete     # Healthcare maintenance:  VTE prophylaxis: LMWH.   Disposition: home w/ home hospice on Monday 5/26

## 2025-05-24 NOTE — PROGRESS NOTE ADULT - ASSESSMENT
73 yo man with Cholangiocarcinoma with liver mets admitted due to malignant SBO.  Now maintained on TPN and PEG drainage for comfort.    --SHANT : due to volume depletion.  --Metabolic alkalosis : appears due to combination of volume contraction and HCL loss via PEG drainage.    Will continue TPN at 2L/day       Acetate to be removed from TPN--d/w Dr Patricia--will communicate to Pharmacy.    Start NACL with KCL to volume repletion.  --Pt appears to fully comprehend his very grave prognosis.     Wishes to have sometime at home to spend with his family with continued TPN and IVF.  --Unclear if GI output can be decreased at all--d/w Dr Wright.  D/w Nathan Patricia and Kyle.    5/23MK   - SHANT due to volume depletion, improving     cw ns with kcl     outpt noted at 10L   - hypernatremia     pt taking in water and other liquids    fu trend  - ALkalosis, improving with acetate dc from TPN ( verified)   - GOC for home hospice after TPN during weekend,     home hospice on monday   seun Bhatia     5/24 SY  --SHANT : due to volume depletion     Tapering off TPN.    Will continue IVF for now until d/c  --Hypernatremia : Change IVF to 1/2 NS with KCL  --Metabolic Alkalosis : continue IVF for volume repletion.  No additional tx option at this point.  --for Hospice transition on Monday.  D/w Dr Patricia and pt.

## 2025-05-25 LAB
ALBUMIN SERPL ELPH-MCNC: 2.4 G/DL — LOW (ref 3.3–5)
ALP SERPL-CCNC: 412 U/L — HIGH (ref 40–120)
ALT FLD-CCNC: 53 U/L — SIGNIFICANT CHANGE UP (ref 12–78)
ANION GAP SERPL CALC-SCNC: 4 MMOL/L — LOW (ref 5–17)
AST SERPL-CCNC: 33 U/L — SIGNIFICANT CHANGE UP (ref 15–37)
BILIRUB SERPL-MCNC: 1.6 MG/DL — HIGH (ref 0.2–1.2)
BUN SERPL-MCNC: 72 MG/DL — HIGH (ref 7–23)
CALCIUM SERPL-MCNC: 10 MG/DL — SIGNIFICANT CHANGE UP (ref 8.5–10.1)
CHLORIDE SERPL-SCNC: 111 MMOL/L — HIGH (ref 96–108)
CO2 SERPL-SCNC: 43 MMOL/L — HIGH (ref 22–31)
CREAT SERPL-MCNC: 1.45 MG/DL — HIGH (ref 0.5–1.3)
EGFR: 50 ML/MIN/1.73M2 — LOW
EGFR: 50 ML/MIN/1.73M2 — LOW
GLUCOSE BLDC GLUCOMTR-MCNC: 111 MG/DL — HIGH (ref 70–99)
GLUCOSE BLDC GLUCOMTR-MCNC: 132 MG/DL — HIGH (ref 70–99)
GLUCOSE BLDC GLUCOMTR-MCNC: 173 MG/DL — HIGH (ref 70–99)
GLUCOSE BLDC GLUCOMTR-MCNC: 90 MG/DL — SIGNIFICANT CHANGE UP (ref 70–99)
GLUCOSE SERPL-MCNC: 153 MG/DL — HIGH (ref 70–99)
HCT VFR BLD CALC: 35 % — LOW (ref 39–50)
HGB BLD-MCNC: 10 G/DL — LOW (ref 13–17)
MAGNESIUM SERPL-MCNC: 2.2 MG/DL — SIGNIFICANT CHANGE UP (ref 1.6–2.6)
MCHC RBC-ENTMCNC: 28.6 G/DL — LOW (ref 32–36)
MCHC RBC-ENTMCNC: 28.9 PG — SIGNIFICANT CHANGE UP (ref 27–34)
MCV RBC AUTO: 101.2 FL — HIGH (ref 80–100)
NRBC # BLD AUTO: 0 K/UL — SIGNIFICANT CHANGE UP (ref 0–0)
NRBC # FLD: 0 K/UL — SIGNIFICANT CHANGE UP (ref 0–0)
NRBC BLD AUTO-RTO: 0 /100 WBCS — SIGNIFICANT CHANGE UP (ref 0–0)
PHOSPHATE SERPL-MCNC: 3 MG/DL — SIGNIFICANT CHANGE UP (ref 2.5–4.5)
PLATELET # BLD AUTO: 233 K/UL — SIGNIFICANT CHANGE UP (ref 150–400)
PMV BLD: 10 FL — SIGNIFICANT CHANGE UP (ref 7–13)
POTASSIUM SERPL-MCNC: 3.4 MMOL/L — LOW (ref 3.5–5.3)
POTASSIUM SERPL-SCNC: 3.4 MMOL/L — LOW (ref 3.5–5.3)
PROT SERPL-MCNC: 5.9 GM/DL — LOW (ref 6–8.3)
RBC # BLD: 3.46 M/UL — LOW (ref 4.2–5.8)
RBC # FLD: 16.9 % — HIGH (ref 10.3–14.5)
SODIUM SERPL-SCNC: 158 MMOL/L — HIGH (ref 135–145)
WBC # BLD: 9.99 K/UL — SIGNIFICANT CHANGE UP (ref 3.8–10.5)
WBC # FLD AUTO: 9.99 K/UL — SIGNIFICANT CHANGE UP (ref 3.8–10.5)

## 2025-05-25 PROCEDURE — 99232 SBSQ HOSP IP/OBS MODERATE 35: CPT

## 2025-05-25 RX ORDER — HYDROMORPHONE/SOD CHLOR,ISO/PF 2 MG/10 ML
1 SYRINGE (ML) INJECTION ONCE
Refills: 0 | Status: DISCONTINUED | OUTPATIENT
Start: 2025-05-25 | End: 2025-05-25

## 2025-05-25 RX ORDER — I.V. FAT EMULSION 20 G/100ML
0.87 EMULSION INTRAVENOUS
Qty: 50 | Refills: 0 | Status: DISCONTINUED | OUTPATIENT
Start: 2025-05-25 | End: 2025-05-25

## 2025-05-25 RX ORDER — SODIUM/POT/MAG/CALC/CHLOR/ACET 35-20-5MEQ
1 VIAL (ML) INTRAVENOUS
Refills: 0 | Status: DISCONTINUED | OUTPATIENT
Start: 2025-05-25 | End: 2025-05-26

## 2025-05-25 RX ORDER — SODIUM CHLORIDE 9 G/1000ML
1000 INJECTION, SOLUTION INTRAVENOUS
Refills: 0 | Status: DISCONTINUED | OUTPATIENT
Start: 2025-05-25 | End: 2025-05-26

## 2025-05-25 RX ADMIN — I.V. FAT EMULSION 20.83 GM/KG/DAY: 20 EMULSION INTRAVENOUS at 00:03

## 2025-05-25 RX ADMIN — Medication 1 EACH: at 00:02

## 2025-05-25 RX ADMIN — INSULIN LISPRO 2: 100 INJECTION, SOLUTION INTRAVENOUS; SUBCUTANEOUS at 17:51

## 2025-05-25 RX ADMIN — Medication 1 MILLIGRAM(S): at 22:48

## 2025-05-25 RX ADMIN — SODIUM CHLORIDE 150 MILLILITER(S): 9 INJECTION, SOLUTION INTRAVENOUS at 00:21

## 2025-05-25 RX ADMIN — SODIUM CHLORIDE 100 MILLILITER(S): 9 INJECTION, SOLUTION INTRAVENOUS at 23:19

## 2025-05-25 RX ADMIN — ENOXAPARIN SODIUM 40 MILLIGRAM(S): 100 INJECTION SUBCUTANEOUS at 12:57

## 2025-05-25 RX ADMIN — Medication 200 MILLIGRAM(S): at 21:11

## 2025-05-25 RX ADMIN — Medication 1 MILLIGRAM(S): at 01:13

## 2025-05-25 RX ADMIN — Medication 1 MILLIGRAM(S): at 22:33

## 2025-05-25 RX ADMIN — SODIUM CHLORIDE 150 MILLILITER(S): 9 INJECTION, SOLUTION INTRAVENOUS at 08:06

## 2025-05-25 RX ADMIN — Medication 200 MILLIGRAM(S): at 09:58

## 2025-05-25 RX ADMIN — SODIUM CHLORIDE 100 MILLILITER(S): 9 INJECTION, SOLUTION INTRAVENOUS at 17:55

## 2025-05-25 RX ADMIN — Medication 1 MILLIGRAM(S): at 00:08

## 2025-05-25 RX ADMIN — Medication 40 MILLIGRAM(S): at 10:09

## 2025-05-25 RX ADMIN — SODIUM CHLORIDE 100 MILLILITER(S): 9 INJECTION, SOLUTION INTRAVENOUS at 12:45

## 2025-05-25 NOTE — PROGRESS NOTE ADULT - SUBJECTIVE AND OBJECTIVE BOX
Patient seen and examined at the bedside in the AM  PEG tube clogged and flushed   Patient tolerating FLD  Episode of hypoglycemia resolved with 50% dextrose bolus  AVSS        PAST MEDICAL & SURGICAL HISTORY:  Bile duct cancer      H/O Whipple procedure          MEDICATIONS  (STANDING):  acetaminophen   IVPB .. 1000 milliGRAM(s) IV Intermittent once  ciprofloxacin   IVPB 400 milliGRAM(s) IV Intermittent every 12 hours  dextrose 5%. 1000 milliLiter(s) (100 mL/Hr) IV Continuous <Continuous>  dextrose 5%. 1000 milliLiter(s) (50 mL/Hr) IV Continuous <Continuous>  dextrose 50% Injectable 25 Gram(s) IV Push once  dextrose 50% Injectable 12.5 Gram(s) IV Push once  dextrose 50% Injectable 25 Gram(s) IV Push once  enoxaparin Injectable 40 milliGRAM(s) SubCutaneous every 24 hours  glucagon  Injectable 1 milliGRAM(s) IntraMuscular once  insulin lispro (ADMELOG) corrective regimen sliding scale   SubCutaneous three times a day before meals  insulin lispro (ADMELOG) corrective regimen sliding scale   SubCutaneous at bedtime  lipid, fat emulsion (Fish Oil and Plant Based) 20% Infusion 0.868 Gm/kG/Day (20.83 mL/Hr) IV Continuous <Continuous>  pantoprazole    Tablet 40 milliGRAM(s) Oral before breakfast  Parenteral Nutrition - Adult 1 Each TPN Continuous <Continuous>  Parenteral Nutrition - Adult 1 Each TPN Continuous <Continuous>  sodium chloride 0.45% 1000 milliLiter(s) (150 mL/Hr) IV Continuous <Continuous>    MEDICATIONS  (PRN):  dextrose Oral Gel 15 Gram(s) Oral once PRN Blood Glucose LESS THAN 70 milliGRAM(s)/deciliter  dicyclomine 10 milliGRAM(s) Oral two times a day before meals PRN gas/cramps  ondansetron Injectable 4 milliGRAM(s) IV Push every 6 hours PRN Nausea and/or Vomiting      Allergies    statins (Unknown)  atorvastatin (Hives)    Intolerances        SOCIAL HISTORY:    FAMILY HISTORY:        Physical Exam:  General: AOx3, Well developed, NAD  HEENT: NC/AT, normal pinnae and tragi  Chest: Normal respiratory effort, equal chest rise  Heart: RRR  Abdomen: PEG and IR tubes in place, dressing clean and dry, abd nondistended, soft, nontender.  Neuro/Psych: No localized deficits. Normal speech, normal tone, normal affect  Skin: Normal, warm, no rashes, no lesions noted  Extremities: Warm, well perfused, no edema        Vital Signs Last 24 Hrs  T(C): 36.3 (25 May 2025 00:14), Max: 36.6 (24 May 2025 15:20)  T(F): 97.3 (25 May 2025 00:14), Max: 97.8 (24 May 2025 15:20)  HR: 98 (25 May 2025 00:14) (95 - 119)  BP: 113/79 (25 May 2025 00:14) (102/70 - 123/79)  BP(mean): 77 (24 May 2025 15:20) (77 - 77)  RR: 18 (24 May 2025 15:20) (17 - 18)  SpO2: 100% (25 May 2025 00:14) (100% - 100%)    Parameters below as of 25 May 2025 00:14  Patient On (Oxygen Delivery Method): room air        I&O's Summary    23 May 2025 07:01  -  24 May 2025 07:00  --------------------------------------------------------  IN: 0 mL / OUT: 18957 mL / NET: -10658 mL    24 May 2025 07:01  -  25 May 2025 04:02  --------------------------------------------------------  IN: 0 mL / OUT: 7125 mL / NET: -7125 mL            LABS:                        9.7    9.05  )-----------( 229      ( 24 May 2025 04:47 )             33.6     05-24    151[H]  |  105  |  68[H]  ----------------------------<  197[H]  3.3[L]   |  44[H]  |  1.46[H]    Ca    10.0      24 May 2025 04:47  Phos  3.1     05-24  Mg     2.4     05-24    TPro  6.4  /  Alb  2.4[L]  /  TBili  1.6[H]  /  DBili  x   /  AST  31  /  ALT  54  /  AlkPhos  474[H]  05-24      Urinalysis Basic - ( 24 May 2025 04:47 )    Color: x / Appearance: x / SG: x / pH: x  Gluc: 197 mg/dL / Ketone: x  / Bili: x / Urobili: x   Blood: x / Protein: x / Nitrite: x   Leuk Esterase: x / RBC: x / WBC x   Sq Epi: x / Non Sq Epi: x / Bacteria: x      CAPILLARY BLOOD GLUCOSE      POCT Blood Glucose.: 134 mg/dL (24 May 2025 21:40)  POCT Blood Glucose.: 60 mg/dL (24 May 2025 21:21)  POCT Blood Glucose.: 57 mg/dL (24 May 2025 21:08)  POCT Blood Glucose.: 49 mg/dL (24 May 2025 20:54)  POCT Blood Glucose.: 254 mg/dL (24 May 2025 18:00)  POCT Blood Glucose.: 142 mg/dL (24 May 2025 12:13)  POCT Blood Glucose.: 134 mg/dL (24 May 2025 06:40)    LIVER FUNCTIONS - ( 24 May 2025 04:47 )  Alb: 2.4 g/dL / Pro: 6.4 gm/dL / ALK PHOS: 474 U/L / ALT: 54 U/L / AST: 31 U/L / GGT: x             Cultures:      RADIOLOGY & ADDITIONAL STUDIES:

## 2025-05-25 NOTE — PROGRESS NOTE ADULT - NUTRITIONAL ASSESSMENT
This patient has been assessed with a concern for Malnutrition and has been determined to have a diagnosis/diagnoses of Severe protein-calorie malnutrition and Underweight (BMI < 19).    This patient is being managed with:   Parenteral Nutrition - Adult-  Entered: May 11 2025 10:00PM    lipid fat emulsion (Fish Oil and Plant Based) 20% Infusion-[Known as SMOFLIPID 20% Infusion]  50 Gram(s) in IV Solution 250 milliLiter(s) infuse at 20.8 mL/Hr  Dose Rate: 0.868 Gm/kG/Day Infuse Over: 12 Hours; Stop After 12 Hours  Administration Instructions: Use 1.2 micron in-line filter  Entered: May 11 2025 10:00PM    Diet NPO after Midnight-     NPO Start Date: 11-May-2025   NPO Start Time: 23:59  Entered: May 11 2025  5:42PM    Diet NPO-  With Ice Chips/Sips of Water  Entered: May  9 2025  7:34AM  
This patient has been assessed with a concern for Malnutrition and has been determined to have a diagnosis/diagnoses of Severe protein-calorie malnutrition and Underweight (BMI < 19).    This patient is being managed with:   Parenteral Nutrition - Adult-  Entered: May 13 2025 10:00PM    lipid fat emulsion (Fish Oil and Plant Based) 20% Infusion-[Known as SMOFLIPID 20% Infusion]  50 Gram(s) in IV Solution 250 milliLiter(s) infuse at 20.83 mL/Hr  Dose Rate: 0.868 Gm/kG/Day Infuse Over: 12 Hours; Stop After 12 Hours  Administration Instructions: Use 1.2 micron in-line filter  Entered: May 13 2025 10:00PM    Diet NPO after Midnight-     NPO Start Date: 13-May-2025   NPO Start Time: 23:59  Entered: May 13 2025  3:55PM    Parenteral Nutrition - Adult-  Entered: May 12 2025 10:00PM    Diet Clear Liquid-  Entered: May 12 2025  1:53PM    Diet NPO after Midnight-     NPO Start Date: 11-May-2025   NPO Start Time: 23:59  Entered: May 11 2025  5:42PM  
This patient has been assessed with a concern for Malnutrition and has been determined to have a diagnosis/diagnoses of Severe protein-calorie malnutrition and Underweight (BMI < 19).    This patient is being managed with:   Parenteral Nutrition - Adult-  Entered: May 14 2025 10:00PM    lipid fat emulsion (Fish Oil and Plant Based) 20% Infusion-[Known as SMOFLIPID 20% Infusion]  50 Gram(s) in IV Solution 250 milliLiter(s) infuse at 20.8 mL/Hr  Dose Rate: 0.8681 Gm/kG/Day Infuse Over: 12 Hours; Stop After 12 Hours  Administration Instructions: Use 1.2 micron in-line filter  Entered: May 14 2025 10:00PM    Diet Full Liquid-  Entered: May 14 2025  4:55PM    Parenteral Nutrition - Adult-  Entered: May 13 2025 10:00PM  
This patient has been assessed with a concern for Malnutrition and has been determined to have a diagnosis/diagnoses of Underweight (BMI < 19) and Severe protein-calorie malnutrition.    This patient is being managed with:   Diet Full Liquid-  Supplement Feeding Modality:  Oral  Ensure Clear Cans or Servings Per Day:  3       Frequency:  Three Times a day  Entered: May  7 2025  3:55PM    Parenteral Nutrition - Adult-  Entered: May  6 2025 10:00PM    The following pending diet order is being considered for treatment of Underweight (BMI < 19) and Severe protein-calorie malnutrition:  Parenteral Nutrition - Adult-  Entered: May  7 2025  5:00PM  
This patient has been assessed with a concern for Malnutrition and has been determined to have a diagnosis/diagnoses of Severe protein-calorie malnutrition and Underweight (BMI < 19).    This patient is being managed with:   Parenteral Nutrition - Adult-  Entered: May 10 2025 10:00PM    lipid fat emulsion (Fish Oil and Plant Based) 20% Infusion-[Known as SMOFLIPID 20% Infusion]  50 Gram(s) in IV Solution 250 milliLiter(s) infuse at 20.8 mL/Hr  Dose Rate: 0.868 Gm/kG/Day Infuse Over: 12 Hours; Stop After 12 Hours  Administration Instructions: Use 1.2 micron in-line filter  Entered: May 10 2025 10:00PM    Parenteral Nutrition - Adult-  Entered: May  9 2025 10:00PM    Diet NPO-  With Ice Chips/Sips of Water  Entered: May  9 2025  7:34AM  
This patient has been assessed with a concern for Malnutrition and has been determined to have a diagnosis/diagnoses of Severe protein-calorie malnutrition and Underweight (BMI < 19).    This patient is being managed with:   lipid fat emulsion (Fish Oil and Plant Based) 20% Infusion-[Known as SMOFLIPID 20% Infusion]  50 Gram(s) in IV Solution 250 milliLiter(s) infuse at 20.83 mL/Hr  Dose Rate: 0.868 Gm/kG/Day Infuse Over: 12 Hours; Stop After 12 Hours  Administration Instructions: Use 1.2 micron in-line filter  Entered: May 20 2025 10:00PM    Parenteral Nutrition - Adult-  Entered: May 20 2025 10:00PM    Diet Full Liquid-  Entered: May 20 2025  3:20PM    Parenteral Nutrition - Adult-  Entered: May 19 2025 10:00PM  
This patient has been assessed with a concern for Malnutrition and has been determined to have a diagnosis/diagnoses of Underweight (BMI < 19) and Severe protein-calorie malnutrition.    This patient is being managed with:   Parenteral Nutrition - Adult-  Entered: May 12 2025 10:00PM    lipid fat emulsion (Fish Oil and Plant Based) 20% Infusion-[Known as SMOFLIPID 20% Infusion]  50 Gram(s) in IV Solution 250 milliLiter(s) infuse at 20.8 mL/Hr  Dose Rate: 0.868 Gm/kG/Day Infuse Over: 12 Hours; Stop After 12 Hours  Administration Instructions: Use 1.2 micron in-line filter  Entered: May 12 2025 10:00PM    Diet Clear Liquid-  Entered: May 12 2025  1:53PM    Diet NPO after Midnight-     NPO Start Date: 11-May-2025   NPO Start Time: 23:59  Entered: May 11 2025  5:42PM  
This patient has been assessed with a concern for Malnutrition and has been determined to have a diagnosis/diagnoses of Severe protein-calorie malnutrition and Underweight (BMI < 19).    This patient is being managed with:   lipid fat emulsion (Fish Oil and Plant Based) 20% Infusion-[Known as SMOFLIPID 20% Infusion]  50 Gram(s) in IV Solution 250 milliLiter(s) infuse at 20.8 mL/Hr  Dose Rate: 0.868 Gm/kG/Day Infuse Over: 12 Hours; Stop After 12 Hours  Administration Instructions: Use 1.2 micron in-line filter  Entered: May 18 2025 10:00PM    Parenteral Nutrition - Adult-  Entered: May 18 2025 10:00PM    Parenteral Nutrition - Adult-  Entered: May 17 2025 10:00PM    Diet Full Liquid-  Supplement Feeding Modality:  Oral  Ensure Clear Cans or Servings Per Day:  3       Frequency:  Daily  Entered: May 15 2025  6:09PM  
This patient has been assessed with a concern for Malnutrition and has been determined to have a diagnosis/diagnoses of Underweight (BMI < 19) and Severe protein-calorie malnutrition.    This patient is being managed with:   lipid fat emulsion (Fish Oil and Plant Based) 20% Infusion-[Known as SMOFLIPID 20% Infusion]  50 Gram(s) in IV Solution 250 milliLiter(s) infuse at 20.83 mL/Hr  Dose Rate: 0.8681 Gm/kG/Day Infuse Over: 12 Hours; Stop After 12 Hours  Administration Instructions: Use 1.2 micron in-line filter  Entered: May 23 2025 10:00PM    Parenteral Nutrition - Adult-  Entered: May 23 2025 10:00PM    Diet Full Liquid-  Entered: May 20 2025  3:20PM  
This patient has been assessed with a concern for Malnutrition and has been determined to have a diagnosis/diagnoses of Severe protein-calorie malnutrition and Underweight (BMI < 19).    This patient is being managed with:   lipid fat emulsion (Fish Oil and Plant Based) 20% Infusion-[Known as SMOFLIPID 20% Infusion]  50 Gram(s) in IV Solution 250 milliLiter(s) infuse at 20.8 mL/Hr  Dose Rate: 0.868 Gm/kG/Day Infuse Over: 12 Hours; Stop After 12 Hours  Administration Instructions: Use 1.2 micron in-line filter  Entered: May 17 2025 10:00PM    Parenteral Nutrition - Adult-  Entered: May 17 2025 10:00PM    Parenteral Nutrition - Adult-  Entered: May 16 2025 10:00PM    Diet Full Liquid-  Supplement Feeding Modality:  Oral  Ensure Clear Cans or Servings Per Day:  3       Frequency:  Daily  Entered: May 15 2025  6:09PM  
This patient has been assessed with a concern for Malnutrition and has been determined to have a diagnosis/diagnoses of Underweight (BMI < 19) and Severe protein-calorie malnutrition.    This patient is being managed with:   lipid fat emulsion (Fish Oil and Plant Based) 20% Infusion-[Known as SMOFLIPID 20% Infusion]  50 Gram(s) in IV Solution 250 milliLiter(s) infuse at 20.8 mL/Hr  Dose Rate: 0.868 Gm/kG/Day Infuse Over: 12 Hours; Stop After 12 Hours  Administration Instructions: Use 1.2 micron in-line filter  Entered: May 22 2025 10:00PM    Parenteral Nutrition - Adult-  Entered: May 22 2025 10:00PM    Parenteral Nutrition - Adult-  Entered: May 21 2025 10:00PM    Diet Full Liquid-  Entered: May 20 2025  3:20PM  
This patient has been assessed with a concern for Malnutrition and has been determined to have a diagnosis/diagnoses of Underweight (BMI < 19) and Severe protein-calorie malnutrition.    This patient is being managed with:   lipid fat emulsion (Fish Oil and Plant Based) 20% Infusion-[Known as SMOFLIPID 20% Infusion]  50 Gram(s) in IV Solution 250 milliLiter(s) infuse at 20.83 mL/Hr  Dose Rate: 0.868 Gm/kG/Day Infuse Over: 12 Hours; Stop After 12 Hours  Administration Instructions: Use 1.2 micron in-line filter  Entered: May 24 2025 10:00PM    Parenteral Nutrition - Adult-  Entered: May 24 2025 10:00PM    Parenteral Nutrition - Adult-  Entered: May 23 2025 10:00PM    Diet Full Liquid-  Entered: May 20 2025  3:20PM  
This patient has been assessed with a concern for Malnutrition and has been determined to have a diagnosis/diagnoses of Severe protein-calorie malnutrition and Underweight (BMI < 19).    This patient is being managed with:   Parenteral Nutrition - Adult-  Entered: May  6 2025 10:00PM    Diet NPO-  With Ice Chips/Sips of Water  Entered: May  6 2025  2:42AM

## 2025-05-25 NOTE — PROVIDER CONTACT NOTE (OTHER) - BACKGROUND
73 y/o male admitted d/t intestinal obstruction with a PMH of bile duct carcinoma.
here with bile duct carcinoma
bile duct carcinoma s/p stents and drain.   5/16 metal stent inserted

## 2025-05-25 NOTE — PROVIDER CONTACT NOTE (OTHER) - NAME OF MD/NP/PA/DO NOTIFIED:
Dr Gray
MD Marina Bangura
Surgical resident [facility]
Dr. Cordova
Joseph Carrasquillo surgical resident

## 2025-05-25 NOTE — CHART NOTE - NSCHARTNOTEFT_GEN_A_CORE
Clinical Nutrition PN Follow Up Note    * 73yo M w/ PMHx of Cholangio CA s/p Robotic Whipple on 2024 (Dr. Ocasio) w/ adjuvant Xeloda -> [place]/Cis/Durva, Found liver metastases on 2024, failed CBD stent placement (unsuccessful cannulation via ERCP), s/p internal-external biliary drainage (8.5fr) at [facility] on , c/b High external drainage output ( >2L) due to Internal drainage failure (CBD blockage), now s/p exchange of internal-external biliary drainage (12fr) on  in . Presented with 3-4 days of progressive abdominal bloating with mild nausea, generalized weakness and fatigue. Last BM 3d ago, has been passing gases. Complains of mild discomfort of epigastric pain. Admitted w/ SBO (likely malignant). NGT to LWS in place. Started on mSBO protocol - Steroids/Reglan/Octreotide. RD consulted for initiation of TPN via port (OK to use from surgical oncology).   *: Initiate TPN via Port 2/2 mSBO and suspected prolonged NPO status  *:  (+) BM, passing flatus. CLD. Would suggest to renew TPN x 1 more day to ensure >80% ENN being met with bridging PO + TPN. D/w surg resident   *: TPN dc'd  per surgery.  CT A/P: GOO. s/p NGT to LWS w/ ~3L output overnight. Will initiate TPN via implanted infusion port.   *5/10: Per gastro: rec PEG placement; plan for  for long-term gastric decompression given mSBO, inability to tolerate adequate PO intake, and palliation. D/w surg res will c/w TPN.   *: Per surg "Patient has felt he no longer wants to fight and will choose palliative measures for his care". Planning for PEG placement monday.  *: Plan for PEG today  for long-term decompression given mSBO and palliation, will reorder TPN. NGT remains in place to suction.   *: s/p venting PEG placement. Palliative care following - plan for home TPN w/ venting PEG. On CLD for pleasure. DNR/ DNI   *: Diet advanced to CLD. S/p biliary tube check () - L biliary drain possibly clogged distally, as no contrast entered the pigtail on fluoroscopic study. However drain now draining. Plan to c/w TPN.  *5/15: Diet advanced to FLD. s/p IR drain exchange. Having BMs + abdominal discomfort. C/w TPN. Will be at dextrose goal today. **CAN CYCLE TPN x12 hours TOMORROW **  *: cont on FLD, c/w TPN to be cycled tonight. RD d/w surg resident, concern for low lytes despite large amounts in TPN and repletion outside bag. POCT erratic, RD suggested to add RISS. Dc home planned for tomorrow ()  *: S/p biliary stent change to a metal stent w/ IR yesterday. Passed loose stool w/ some relief of abd cramping. Plan for discharge home sometime this weekend (today vs tomorrow?) - lytes still LOW, receiving repletion outside PN bag. POCTs remain elevated, currently receiving RISS, will continue to monitor and may need to consider adding insulin in PN bag for additional coverage.   *: noted w/ slight leakage around tube site, otherwise no pertinent changes/events overnight. Per discussion w/ surgery resident, plan for d/c home likely tomorrow. POCTs erractic x 24 hrs, will continue to monitor.  *: Tolerating diet, venting PEG improved pain. Per surg large vol of bile draining. Per IR w/ 1350 cc bilious output overnight; drain capped today. POCTs still erractic. D/w Dr. Randhawa and pharmacy, will add 10 units of insulin into the PN bag and continue w/ RISS. Will continue to monitor POCTs and adjust w/ pharmacy & MD  *: Drain reattached to a gravity drain bag by nursing staff overnight. Will plan for drain evaluation to check for stent patency today. Pt doesn't need to be NPO per PA - remains NPO this AM w/ TPN. POCT still spiking in AM w/ TPN running - 10 units added into bag yesterday and changed to moderate bedtime sliding scale. Plan today to increase in insulin in PN bag to 20 units - d/w Dr. Randhawa and clinical pharmacist.   *: went to IR yesterday for conversion of EBD to IEBD to hopefully re-establish flow through indwelling metal CBD stent. Mucus plug found and removed. Remains on TPN w/ insulin in bag and persistently low K levels, likely d/t large output from venting PEG (10L x 24 hrs). Discussed TPN changes as below with MD Patricia and clinical pharmacist for insulin and potassium levels. Tentative plan for dc tomorrow  if K/POCT WNL   *:  pending dc home. Delayed d/t persistent hypokalemia and hyperglycemia. POCT are improving with insulin in bag. Discussed K is likely persistently low d/t high venting PEG outputs ~>/= 10L the last few days.     *TPN to be restarted 2/2 GOO and suspected prolonged NPO status.    *current status: Plan for dc home w/ home hospice on tomorrow , per hospitalist want to reorder TPN for tomorrow, on FLD. Still having very large outputs from venting PEG. RD rec'd to dc TPN as with little benefit to pt at this point. Had episode of hypoglycemia overnight. Will adjust insulin in bag per MD Patricia. Additional IVF to be dc'd 2/2 hypernatremia     *new pertinent meds: ADMELOG (6 units x 24 hours)    *labs reviewed; Hypernatremia noted, will c/w current volume of TPN today. d/w MD Patricia, will lower Na in bag (200 -> 100 mEq). K+ low. As per ASPEN Guidelines, maintenance potassium concentration in PN is 1 – 2 mEq/kg/day. However, [facility] PN Policy indicates a maximum of 120 mEq should be added into the PN bag. Plan to c/w 115 mEq in PN bag and will NOT increase past that as it is maximum dose for pt wt. Mg and Phos WNL. T Bili (<2) WNL, trace elements to be continued.  TG WNL (77) will c/w 50g lipids daily.  POCTs WNL except episode of hypoglycemia, plan to decrease to 25 units insulin in bag and c/w dextrose to 300g daily (will meet ~90% ENN)         158[H]  |  111[H]  |  72[H]  ----------------------------<  153[H]  3.4[L]   |  43[H]  |  1.45[H]    Ca    10.0      25 May 2025 05:39  Phos  3.0       Mg     2.2         TPro  5.9[L]  /  Alb  2.4[L]  /  TBili  1.6[H]  /  DBili  x   /  AST  33  /  ALT  53  /  AlkPhos  412[H]      BMI: BMI (kg/m2): 18.2 (25 @ 03:35)  HbA1c: A1C with Estimated Average Glucose Result: 5.3 % (25 @ 04:57)    BP: 112/74 (25 @ 08:30) (77/52 - 115/66)Vital Signs Last 24 Hrs  T(C): 36.3 (25 @ 08:30), Max: 36.7 (25 @ 19:45)  T(F): 97.3 (25 @ 08:30), Max: 98.1 (25 @ 00:51)  HR: 96 (25 @ 08:30) (78 - 127)  BP: 112/74 (25 @ 08:30) (77/52 - 115/66)  RR: 18 (25 @ 08:30) (17 - 18)  SpO2: 100% (25 @ 08:30) (98% - 100%)    Lipid Panel: Date/Time: 25 @ 04:31  Triglycerides, Serum: 77    POCT Blood Glucose.: 111 mg/dL (25 May 2025 08:05)  POCT Blood Glucose.: 134 mg/dL (24 May 2025 21:40)  POCT Blood Glucose.: 60 mg/dL (24 May 2025 21:21)  POCT Blood Glucose.: 57 mg/dL (24 May 2025 21:08)  POCT Blood Glucose.: 49 mg/dL (24 May 2025 20:54)  POCT Blood Glucose.: 254 mg/dL (24 May 2025 18:00)  POCT Blood Glucose.: 142 mg/dL (24 May 2025 12:13)    *I&O's Detail    24 May 2025 07:01  -  25 May 2025 07:00  --------------------------------------------------------  IN:  Total IN: 0 mL    OUT:    Drain (mL): 275 mL    PEG (Percutaneous Endoscopic Gastrostomy) Tube (mL): 8150 mL    Voided (mL): 500 mL  Total OUT: 8925 mL    Total NET: -8925 mL      25 May 2025 07:01  -  25 May 2025 09:09  --------------------------------------------------------  IN:  Total IN: 0 mL    OUT:    PEG (Percutaneous Endoscopic Gastrostomy) Tube (mL): 1800 mL  Total OUT: 1800 mL    Total NET: -1800 mL  * fluid status: negative; >8L output from venting PEG doc'd, decreased in last 24 hrs. No TPN doc'd. May need to consider increase TPN total volume to replenish losses.   *Last (+) BM doc'd on . (+) abdominal discomfort; pt not on bowel regimen.  *edema: none doc'd  *PI: coccyx, unstageable     *malnutrition: Pt continues to meet criteria for severe protein-calorie malnutrition in context of chronic disease r/t decreased ability to meet increased nutrient needs 2/2 mets Ca s/p Whipple and malignant SBO AEB severe muscle/ fat wasting, 33% wt loss x 1 yr, <50-75% ENN chronically    Estimated Needs: based on 57.6 Kg (wt from )  Calories: -  Kcal (35- 40 Kcal/Kg)  Protein: 86- 115 g (1.5- 2 g/Kg)  Fluids: 1728 -  mL (30 - 35 mL/Kg)    *Diet, NPO (25 @ 08:50) [Active]    Weight History:  Daily Weight in k.8 (19 May 2025 06:12)  Daily Weight in k.4 (11 May 2025 05:32)    Height (cm): 177.8 (25 @ 03:35), 177.8 (25 @ 11:56), 177.8 (25 @ 10:58)  Weight (kg): 57.606 (25 @ 03:35), 57.6 (25 @ 10:58), 68 (25 @ 19:34)  BMI (kg/m2): 18.2 (25 @ 03:35), 18.2 (25 @ 11:56), 18.2 (25 @ 10:58)  BSA (m2): 1.72 (25 @ 03:35), 1.72 (25 @ 11:56), 1.72 (25 @ 10:58)    TPN Recommendations: via implanted infusion port  Total Volume: 2000 mL x 12 hours  115 g  Amino Acids  300 g Dextrose (@ goal)  50 g Lipids 20%  100 mEq Sodium Chloride  0 mEq Sodium Acetate  0 mmol Sodium Phosphate  71 mEq Potassium Chloride  0 mEq Potassium Acetate  30 mmol Potassium Phosphate  0 mEq Calcium Gluconate (CAPS out of PN solution)  20 mEq Magnesium Sulfate  200 mg Thiamine  1 ml Trace Elements  10 ml MVI  25 units Regular Insulin    Total Calories     (Meets  ~90%  of  Estimated Energy needs  and 100% Protein needs)     *Goal: achieved - Pt will receive >/= 80% ENN via tolerated route    Additional Recommendations:    1) Daily weights  2) Strict I & O's - replete losses from venting PEG  3) Daily lyte checks including magnesium and phos **cont to replete K, Phos, Mg outside PN bag PRN  4) Weekly triglycerides/LFT checks  5) POCT q6hrs; maintain 140-180mg/dL **on RISS, insulin also added to PN bag  6) Has venting PEG. Plan for dc of TPN after ?tomorrow, tentative home hospice      Nutrition recommendations to continue upon discharge. Goal to continue to meet >/= 80% ENN via tolerated route. RD to F/U prn for changes to nutrition dc plan.    *will continue to monitor and adjust PN prn*  Carol Burger MS, RDN, [facility], [phone]  Certified Nutrition  **ADDENDUM: dc home hospice finalized for tomorrow , will not reorder PPN now. RD service to no longer follow. Reconsult if GOC change.   Clinical Nutrition PN Follow Up Note    * 75yo M w/ PMHx of Cholangio CA s/p Robotic Whipple on 2024 (Dr. Ocasio) w/ adjuvant Xeloda -> [place]/Cis/Durva, Found liver metastases on 2024, failed CBD stent placement (unsuccessful cannulation via ERCP), s/p internal-external biliary drainage (8.5fr) at [facility] on , c/b High external drainage output ( >2L) due to Internal drainage failure (CBD blockage), now s/p exchange of internal-external biliary drainage (12fr) on  in . Presented with 3-4 days of progressive abdominal bloating with mild nausea, generalized weakness and fatigue. Last BM 3d ago, has been passing gases. Complains of mild discomfort of epigastric pain. Admitted w/ SBO (likely malignant). NGT to LWS in place. Started on mSBO protocol - Steroids/Reglan/Octreotide. RD consulted for initiation of TPN via port (OK to use from surgical oncology).   *: Initiate TPN via Port 2/2 mSBO and suspected prolonged NPO status  *:  (+) BM, passing flatus. CLD. Would suggest to renew TPN x 1 more day to ensure >80% ENN being met with bridging PO + TPN. D/w surg resident   *: TPN dc'd  per surgery.  CT A/P: GOO. s/p NGT to LWS w/ ~3L output overnight. Will initiate TPN via implanted infusion port.   *5/10: Per gastro: rec PEG placement; plan for  for long-term gastric decompression given mSBO, inability to tolerate adequate PO intake, and palliation. D/w surg res will c/w TPN.   *: Per surg "Patient has felt he no longer wants to fight and will choose palliative measures for his care". Planning for PEG placement monday.  *: Plan for PEG today  for long-term decompression given mSBO and palliation, will reorder TPN. NGT remains in place to suction.   *: s/p venting PEG placement. Palliative care following - plan for home TPN w/ venting PEG. On CLD for pleasure. DNR/ DNI   *: Diet advanced to CLD. S/p biliary tube check () - L biliary drain possibly clogged distally, as no contrast entered the pigtail on fluoroscopic study. However drain now draining. Plan to c/w TPN.  *5/15: Diet advanced to FLD. s/p IR drain exchange. Having BMs + abdominal discomfort. C/w TPN. Will be at dextrose goal today. **CAN CYCLE TPN x12 hours TOMORROW **  *: cont on FLD, c/w TPN to be cycled tonight. RD d/w surg resident, concern for low lytes despite large amounts in TPN and repletion outside bag. POCT erratic, RD suggested to add RISS. Dc home planned for tomorrow ()  *: S/p biliary stent change to a metal stent w/ IR yesterday. Passed loose stool w/ some relief of abd cramping. Plan for discharge home sometime this weekend (today vs tomorrow?) - lytes still LOW, receiving repletion outside PN bag. POCTs remain elevated, currently receiving RISS, will continue to monitor and may need to consider adding insulin in PN bag for additional coverage.   *: noted w/ slight leakage around tube site, otherwise no pertinent changes/events overnight. Per discussion w/ surgery resident, plan for d/c home likely tomorrow. POCTs erractic x 24 hrs, will continue to monitor.  *: Tolerating diet, venting PEG improved pain. Per surg large vol of bile draining. Per IR w/ 1350 cc bilious output overnight; drain capped today. POCTs still erractic. D/w Dr. Randhawa and pharmacy, will add 10 units of insulin into the PN bag and continue w/ RISS. Will continue to monitor POCTs and adjust w/ pharmacy & MD  *: Drain reattached to a gravity drain bag by nursing staff overnight. Will plan for drain evaluation to check for stent patency today. Pt doesn't need to be NPO per PA - remains NPO this AM w/ TPN. POCT still spiking in AM w/ TPN running - 10 units added into bag yesterday and changed to moderate bedtime sliding scale. Plan today to increase in insulin in PN bag to 20 units - d/w Dr. Randhawa and clinical pharmacist.   *: went to IR yesterday for conversion of EBD to IEBD to hopefully re-establish flow through indwelling metal CBD stent. Mucus plug found and removed. Remains on TPN w/ insulin in bag and persistently low K levels, likely d/t large output from venting PEG (10L x 24 hrs). Discussed TPN changes as below with MD Patricia and clinical pharmacist for insulin and potassium levels. Tentative plan for dc tomorrow  if K/POCT WNL   *:  pending dc home. Delayed d/t persistent hypokalemia and hyperglycemia. POCT are improving with insulin in bag. Discussed K is likely persistently low d/t high venting PEG outputs ~>/= 10L the last few days.     *TPN to be restarted 2/2 GOO and suspected prolonged NPO status.    *current status: Plan for dc home w/ home hospice on tomorrow , per hospitalist want to reorder TPN for tomorrow, on FLD. Still having very large outputs from venting PEG. RD rec'd to dc TPN as with little benefit to pt at this point. Had episode of hypoglycemia overnight. Will adjust insulin in bag per MD Patricia. Additional IVF to be dc'd 2/2 hypernatremia     *new pertinent meds: ADMELOG (6 units x 24 hours)    *labs reviewed; Hypernatremia noted, will c/w current volume of TPN today. d/w MD Patricia, will lower Na in bag (200 -> 100 mEq). K+ low. As per ASPEN Guidelines, maintenance potassium concentration in PN is 1 – 2 mEq/kg/day. However, [facility] PN Policy indicates a maximum of 120 mEq should be added into the PN bag. Plan to c/w 115 mEq in PN bag and will NOT increase past that as it is maximum dose for pt wt. Mg and Phos WNL. T Bili (<2) WNL, trace elements to be continued.  TG WNL (77) will c/w 50g lipids daily.  POCTs WNL except episode of hypoglycemia, plan to decrease to 25 units insulin in bag and c/w dextrose to 300g daily (will meet ~90% ENN)         158[H]  |  111[H]  |  72[H]  ----------------------------<  153[H]  3.4[L]   |  43[H]  |  1.45[H]    Ca    10.0      25 May 2025 05:39  Phos  3.0       Mg     2.2         TPro  5.9[L]  /  Alb  2.4[L]  /  TBili  1.6[H]  /  DBili  x   /  AST  33  /  ALT  53  /  AlkPhos  412[H]      BMI: BMI (kg/m2): 18.2 (25 @ 03:35)  HbA1c: A1C with Estimated Average Glucose Result: 5.3 % (25 @ 04:57)    BP: 112/74 (25 @ 08:30) (77/52 - 115/66)Vital Signs Last 24 Hrs  T(C): 36.3 (25 @ 08:30), Max: 36.7 (25 @ 19:45)  T(F): 97.3 (25 @ 08:30), Max: 98.1 (25 @ 00:51)  HR: 96 (25 @ 08:30) (78 - 127)  BP: 112/74 (25 @ 08:30) (77/52 - 115/66)  RR: 18 (25 @ 08:30) (17 - 18)  SpO2: 100% (25 @ 08:30) (98% - 100%)    Lipid Panel: Date/Time: 25 @ 04:31  Triglycerides, Serum: 77    POCT Blood Glucose.: 111 mg/dL (25 May 2025 08:05)  POCT Blood Glucose.: 134 mg/dL (24 May 2025 21:40)  POCT Blood Glucose.: 60 mg/dL (24 May 2025 21:21)  POCT Blood Glucose.: 57 mg/dL (24 May 2025 21:08)  POCT Blood Glucose.: 49 mg/dL (24 May 2025 20:54)  POCT Blood Glucose.: 254 mg/dL (24 May 2025 18:00)  POCT Blood Glucose.: 142 mg/dL (24 May 2025 12:13)    *I&O's Detail    24 May 2025 07:01  -  25 May 2025 07:00  --------------------------------------------------------  IN:  Total IN: 0 mL    OUT:    Drain (mL): 275 mL    PEG (Percutaneous Endoscopic Gastrostomy) Tube (mL): 8150 mL    Voided (mL): 500 mL  Total OUT: 8925 mL    Total NET: -8925 mL      25 May 2025 07:01  -  25 May 2025 09:09  --------------------------------------------------------  IN:  Total IN: 0 mL    OUT:    PEG (Percutaneous Endoscopic Gastrostomy) Tube (mL): 1800 mL  Total OUT: 1800 mL    Total NET: -1800 mL  * fluid status: negative; >8L output from venting PEG doc'd, decreased in last 24 hrs. No TPN doc'd. May need to consider increase TPN total volume to replenish losses.   *Last (+) BM doc'd on . (+) abdominal discomfort; pt not on bowel regimen.  *edema: none doc'd  *PI: coccyx, unstageable     *malnutrition: Pt continues to meet criteria for severe protein-calorie malnutrition in context of chronic disease r/t decreased ability to meet increased nutrient needs 2/2 mets Ca s/p Whipple and malignant SBO AEB severe muscle/ fat wasting, 33% wt loss x 1 yr, <50-75% ENN chronically    Estimated Needs: based on 57.6 Kg (wt from )  Calories: - 2304 Kcal (35- 40 Kcal/Kg)  Protein: 86- 115 g (1.5- 2 g/Kg)  Fluids: 1728 - 2016 mL (30 - 35 mL/Kg)    *Diet, NPO (25 @ 08:50) [Active]    Weight History:  Daily Weight in k.8 (19 May 2025 06:12)  Daily Weight in k.4 (11 May 2025 05:32)    Height (cm): 177.8 (25 @ 03:35), 177.8 (25 @ 11:56), 177.8 (25 @ 10:58)  Weight (kg): 57.606 (25 @ 03:35), 57.6 (25 @ 10:58), 68 (25 @ 19:34)  BMI (kg/m2): 18.2 (25 @ 03:35), 18.2 (25 @ 11:56), 18.2 (25 @ 10:58)  BSA (m2): 1.72 (25 @ 03:35), 1.72 (25 @ 11:56), 1.72 (25 @ 10:58)    TPN Recommendations: via implanted infusion port  Total Volume: 2000 mL x 12 hours  115 g  Amino Acids  300 g Dextrose (@ goal)  50 g Lipids 20%  100 mEq Sodium Chloride  0 mEq Sodium Acetate  0 mmol Sodium Phosphate  71 mEq Potassium Chloride  0 mEq Potassium Acetate  30 mmol Potassium Phosphate  0 mEq Calcium Gluconate (CAPS out of PN solution)  20 mEq Magnesium Sulfate  200 mg Thiamine  1 ml Trace Elements  10 ml MVI  25 units Regular Insulin    Total Calories     (Meets  ~90%  of  Estimated Energy needs  and 100% Protein needs)     *Goal: achieved - Pt will receive >/= 80% ENN via tolerated route    Additional Recommendations:    1) Daily weights  2) Strict I & O's - replete losses from venting PEG  3) Daily lyte checks including magnesium and phos **cont to replete K, Phos, Mg outside PN bag PRN  4) Weekly triglycerides/LFT checks  5) POCT q6hrs; maintain 140-180mg/dL **on RISS, insulin also added to PN bag  6) Has venting PEG. Plan for dc of TPN after ?tomorrow, tentative home hospice      Nutrition recommendations to continue upon discharge. Goal to continue to meet >/= 80% ENN via tolerated route. RD to F/U prn for changes to nutrition dc plan.    *will continue to monitor and adjust PN prn*  Carol Burger MS, RDN, [facility], [phone]  Certified Nutrition

## 2025-05-25 NOTE — PROGRESS NOTE ADULT - ASSESSMENT
73yo M w/ H/o Cholangio CA w/ distant mets, admitted on 5/6 with malignant SBO. S/p Venting PEG tube placement, IR int-ext biliary drain exchange, IR bare metal biliary stent placement.    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  S/p venting PEG tube placement  Currently receiving TPN through existing mediport, and tolerating PO diet (full liquids).   -plan for home hospice on 5/26    #Hypokalemia   #Metabolic alkalosis   #SHANT   Baseline cr ~0.6 now with Cr of 1.3 and extensive gi losses causing severe metabolic alkalosis and persistent and severe hypokalemia despite tpn and aggressive electrolyte repletion.   - nephrology consulted, appreciate recs   - d/c fluids and tpn tomorrow 5/26 as plan to dc on home hospice     # Anemia:  Likely AOCD with component of myelosuppression from chemo.   Hematology/Oncology on board, will follow as outpatient.   Trend CBC and transfuse PRN.     # Thrombocytopenia:  -stable     #Hypokalemia   - replete     # Healthcare maintenance:  VTE prophylaxis: LMWH.   Disposition: home w/ home hospice on Monday 5/26

## 2025-05-25 NOTE — PROGRESS NOTE ADULT - SUBJECTIVE AND OBJECTIVE BOX
HOSPITALIST ATTENDING PROGRESS NOTE    Chart and meds reviewed.  Patient seen and examined.    CC: malignant SBO    Subjective: Plan for home hospice (w no TPN) on Monday 5/26. Discussed with social work and case management. D/c fluids as hypernatremic     All other systems reviewed and found to be negative with the exception of what has been described above.    MEDICATIONS  (STANDING):  acetaminophen   IVPB .. 1000 milliGRAM(s) IV Intermittent once  ciprofloxacin   IVPB 400 milliGRAM(s) IV Intermittent every 12 hours  dextrose 5% 1000 milliLiter(s) (100 mL/Hr) IV Continuous <Continuous>  dextrose 5%. 1000 milliLiter(s) (50 mL/Hr) IV Continuous <Continuous>  dextrose 5%. 1000 milliLiter(s) (100 mL/Hr) IV Continuous <Continuous>  dextrose 50% Injectable 25 Gram(s) IV Push once  dextrose 50% Injectable 12.5 Gram(s) IV Push once  dextrose 50% Injectable 25 Gram(s) IV Push once  enoxaparin Injectable 40 milliGRAM(s) SubCutaneous every 24 hours  glucagon  Injectable 1 milliGRAM(s) IntraMuscular once  insulin lispro (ADMELOG) corrective regimen sliding scale   SubCutaneous at bedtime  insulin lispro (ADMELOG) corrective regimen sliding scale   SubCutaneous three times a day before meals  lipid, fat emulsion (Fish Oil and Plant Based) 20% Infusion 0.868 Gm/kG/Day (20.83 mL/Hr) IV Continuous <Continuous>  lipid, fat emulsion (Fish Oil and Plant Based) 20% Infusion 0.868 Gm/kG/Day (20.8 mL/Hr) IV Continuous <Continuous>  pantoprazole    Tablet 40 milliGRAM(s) Oral before breakfast  Parenteral Nutrition - Adult 1 Each TPN Continuous <Continuous>  Parenteral Nutrition - Adult 1 Each TPN Continuous <Continuous>    MEDICATIONS  (PRN):  dextrose Oral Gel 15 Gram(s) Oral once PRN Blood Glucose LESS THAN 70 milliGRAM(s)/deciliter  dicyclomine 10 milliGRAM(s) Oral two times a day before meals PRN gas/cramps  ondansetron Injectable 4 milliGRAM(s) IV Push every 6 hours PRN Nausea and/or Vomiting      VITALS:  T(F): 98 (05-25-25 @ 15:21), Max: 98.5 (05-25-25 @ 08:31)  HR: 60 (05-25-25 @ 15:21) (60 - 98)  BP: 143/77 (05-25-25 @ 15:21) (111/79 - 143/77)  RR: 18 (05-25-25 @ 15:21) (16 - 18)  SpO2: 98% (05-25-25 @ 15:21) (98% - 100%)  Wt(kg): --    I&O's Summary    24 May 2025 07:01  -  25 May 2025 07:00  --------------------------------------------------------  IN: 0 mL / OUT: 8925 mL / NET: -8925 mL    25 May 2025 07:01  -  25 May 2025 15:46  --------------------------------------------------------  IN: 0 mL / OUT: 4300 mL / NET: -4300 mL        CAPILLARY BLOOD GLUCOSE      POCT Blood Glucose.: 90 mg/dL (25 May 2025 11:33)  POCT Blood Glucose.: 111 mg/dL (25 May 2025 08:05)  POCT Blood Glucose.: 134 mg/dL (24 May 2025 21:40)  POCT Blood Glucose.: 60 mg/dL (24 May 2025 21:21)  POCT Blood Glucose.: 57 mg/dL (24 May 2025 21:08)  POCT Blood Glucose.: 49 mg/dL (24 May 2025 20:54)  POCT Blood Glucose.: 254 mg/dL (24 May 2025 18:00)      PHYSICAL EXAM:  Gen: NAD  HEENT:  pupils equal and reactive, EOMI, no oropharyngeal lesions, erythema, exudates, oral thrush  NECK:   supple, no carotid bruits, no palpable lymph nodes, no thyromegaly  CV:  +S1, +S2, regular, no murmurs or rubs  RESP:   lungs clear to auscultation bilaterally, no wheezing, rales, rhonchi, good air entry bilaterally  BREAST:  not examined  GI:  abdomen soft, non-tender, non-distended, normal BS, no bruits, no abdominal masses, no palpable masses, + venting peg, biliary drain  RECTAL:  not examined  :  not examined  MSK:   normal muscle tone, no atrophy, no rigidity, no contractions  EXT:  no clubbing, no cyanosis, no edema, no calf pain, swelling or erythema  VASCULAR:  pulses equal and symmetric in the upper and lower extremities  NEURO:  AAOX3, no focal neurological deficits, follows all commands, able to move extremities spontaneously  SKIN:  no ulcers, lesions or rashes    LABS:                            10.0   9.99  )-----------( 233      ( 25 May 2025 06:29 )             35.0     05-25    158[H]  |  111[H]  |  72[H]  ----------------------------<  153[H]  3.4[L]   |  43[H]  |  1.45[H]    Ca    10.0      25 May 2025 05:39  Phos  3.0     05-25  Mg     2.2     05-25    TPro  5.9[L]  /  Alb  2.4[L]  /  TBili  1.6[H]  /  DBili  x   /  AST  33  /  ALT  53  /  AlkPhos  412[H]  05-25        LIVER FUNCTIONS - ( 25 May 2025 05:39 )  Alb: 2.4 g/dL / Pro: 5.9 gm/dL / ALK PHOS: 412 U/L / ALT: 53 U/L / AST: 33 U/L / GGT: x             Urinalysis Basic - ( 25 May 2025 05:39 )    Color: x / Appearance: x / SG: x / pH: x  Gluc: 153 mg/dL / Ketone: x  / Bili: x / Urobili: x   Blood: x / Protein: x / Nitrite: x   Leuk Esterase: x / RBC: x / WBC x   Sq Epi: x / Non Sq Epi: x / Bacteria: x              CULTURES:      Additional results/Imaging, I have personally reviewed:    Telemetry, personally reviewed:

## 2025-05-25 NOTE — PROVIDER CONTACT NOTE (OTHER) - SITUATION
patient hypoglycemic at 46 at 2054, drank orange juice and came up to 57--> 12.5g of dextrose given. upon recheck blood sugar now 134 at 2140    was given 6units of insulin at 6pm

## 2025-05-26 ENCOUNTER — INPATIENT (INPATIENT)
Facility: HOSPITAL | Age: 75
LOS: 0 days | Discharge: HOSPICE | DRG: 951 | End: 2025-05-27
Attending: INTERNAL MEDICINE | Admitting: INTERNAL MEDICINE
Payer: MEDICARE

## 2025-05-26 ENCOUNTER — TRANSCRIPTION ENCOUNTER (OUTPATIENT)
Age: 75
End: 2025-05-26

## 2025-05-26 VITALS
TEMPERATURE: 99 F | HEART RATE: 116 BPM | SYSTOLIC BLOOD PRESSURE: 91 MMHG | DIASTOLIC BLOOD PRESSURE: 69 MMHG | RESPIRATION RATE: 17 BRPM | OXYGEN SATURATION: 99 %

## 2025-05-26 DIAGNOSIS — Z90.410 ACQUIRED TOTAL ABSENCE OF PANCREAS: Chronic | ICD-10-CM

## 2025-05-26 LAB
ALBUMIN SERPL ELPH-MCNC: 2.3 G/DL — LOW (ref 3.3–5)
ALP SERPL-CCNC: 385 U/L — HIGH (ref 40–120)
ALT FLD-CCNC: 54 U/L — SIGNIFICANT CHANGE UP (ref 12–78)
ANION GAP SERPL CALC-SCNC: <4 MMOL/L — LOW (ref 5–17)
AST SERPL-CCNC: 44 U/L — HIGH (ref 15–37)
BILIRUB SERPL-MCNC: 2.2 MG/DL — HIGH (ref 0.2–1.2)
BUN SERPL-MCNC: 68 MG/DL — HIGH (ref 7–23)
CALCIUM SERPL-MCNC: 10 MG/DL — SIGNIFICANT CHANGE UP (ref 8.5–10.1)
CHLORIDE SERPL-SCNC: 105 MMOL/L — SIGNIFICANT CHANGE UP (ref 96–108)
CO2 SERPL-SCNC: >45 MMOL/L — CRITICAL HIGH (ref 22–31)
CREAT SERPL-MCNC: 1.59 MG/DL — HIGH (ref 0.5–1.3)
EGFR: 45 ML/MIN/1.73M2 — LOW
EGFR: 45 ML/MIN/1.73M2 — LOW
GLUCOSE BLDC GLUCOMTR-MCNC: 199 MG/DL — HIGH (ref 70–99)
GLUCOSE SERPL-MCNC: 218 MG/DL — HIGH (ref 70–99)
HCT VFR BLD CALC: 34.8 % — LOW (ref 39–50)
HGB BLD-MCNC: 10.1 G/DL — LOW (ref 13–17)
MAGNESIUM SERPL-MCNC: 2 MG/DL — SIGNIFICANT CHANGE UP (ref 1.6–2.6)
MCHC RBC-ENTMCNC: 28.9 PG — SIGNIFICANT CHANGE UP (ref 27–34)
MCHC RBC-ENTMCNC: 29 G/DL — LOW (ref 32–36)
MCV RBC AUTO: 99.4 FL — SIGNIFICANT CHANGE UP (ref 80–100)
NRBC # BLD AUTO: 0 K/UL — SIGNIFICANT CHANGE UP (ref 0–0)
NRBC # FLD: 0 K/UL — SIGNIFICANT CHANGE UP (ref 0–0)
NRBC BLD AUTO-RTO: 0 /100 WBCS — SIGNIFICANT CHANGE UP (ref 0–0)
PHOSPHATE SERPL-MCNC: 3.7 MG/DL — SIGNIFICANT CHANGE UP (ref 2.5–4.5)
PLATELET # BLD AUTO: 212 K/UL — SIGNIFICANT CHANGE UP (ref 150–400)
PMV BLD: 10 FL — SIGNIFICANT CHANGE UP (ref 7–13)
POTASSIUM SERPL-MCNC: 3.4 MMOL/L — LOW (ref 3.5–5.3)
POTASSIUM SERPL-SCNC: 3.4 MMOL/L — LOW (ref 3.5–5.3)
PROT SERPL-MCNC: 5.9 GM/DL — LOW (ref 6–8.3)
RBC # BLD: 3.5 M/UL — LOW (ref 4.2–5.8)
RBC # FLD: 16.7 % — HIGH (ref 10.3–14.5)
SODIUM SERPL-SCNC: 154 MMOL/L — HIGH (ref 135–145)
WBC # BLD: 7.26 K/UL — SIGNIFICANT CHANGE UP (ref 3.8–10.5)
WBC # FLD AUTO: 7.26 K/UL — SIGNIFICANT CHANGE UP (ref 3.8–10.5)

## 2025-05-26 PROCEDURE — 99239 HOSP IP/OBS DSCHRG MGMT >30: CPT

## 2025-05-26 PROCEDURE — 99285 EMERGENCY DEPT VISIT HI MDM: CPT

## 2025-05-26 RX ORDER — ACETAMINOPHEN 500 MG/5ML
1000 LIQUID (ML) ORAL ONCE
Refills: 0 | Status: COMPLETED | OUTPATIENT
Start: 2025-05-26 | End: 2025-05-26

## 2025-05-26 RX ORDER — HEPARIN SODIUM,PORCINE/NS/PF 20/20 ML
300 SYRINGE (ML) INTRAVENOUS ONCE
Refills: 0 | Status: COMPLETED | OUTPATIENT
Start: 2025-05-26 | End: 2025-05-26

## 2025-05-26 RX ADMIN — INSULIN LISPRO 2: 100 INJECTION, SOLUTION INTRAVENOUS; SUBCUTANEOUS at 07:34

## 2025-05-26 RX ADMIN — Medication 40 MILLIGRAM(S): at 08:29

## 2025-05-26 RX ADMIN — Medication 400 MILLIGRAM(S): at 02:45

## 2025-05-26 RX ADMIN — Medication 300 UNIT(S): at 10:31

## 2025-05-26 NOTE — DISCHARGE NOTE PROVIDER - NSDCCPCAREPLAN_GEN_ALL_CORE_FT
PRINCIPAL DISCHARGE DIAGNOSIS  Diagnosis: Small bowel obstruction  Assessment and Plan of Treatment:       SECONDARY DISCHARGE DIAGNOSES  Diagnosis: Bile duct cancer  Assessment and Plan of Treatment:

## 2025-05-26 NOTE — PROVIDER CONTACT NOTE (CRITICAL VALUE NOTIFICATION) - TEST AND RESULT REPORTED:
K = 2.7
K 2.8
Potassium- 2.9   CO2> 45
CRITICAL POTASSIUM OF 2.7
CO2 greater than 45
Potassium- 2.9  CO2- >45

## 2025-05-26 NOTE — DISCHARGE NOTE PROVIDER - HOSPITAL COURSE
75yo M w/ PMHx of Cholangio CA s/p Robotic Whipple on 5/28/2024 (Dr. Ocasio) w/ adjuvant Xeloda -> [place]/Cis/Durva, Found liver metastases on 11/12/2024, failed CBD stent placement (unsuccessful cannulation via ERCP), [place]fr[place] [facility] [place] c/b High external drainage output ( >2L) due to Internal drainage failure (CBD blockage), now s/p exchange of internal-external biliary drainage (12fr) on 4/25 in . Currently on Durvalumab, Presented with 3-4 days of progressive abdominal bloating with mild nausea, generalized weakness and fatigue. Denies fevers or chills. Denies emesis. Last BM 3d ago, has been passing gases. Complains of mild discomfort of epigastric pain. fount to have a malignant SBO & Admitted to surgical service. The patient eventually underwent PEG tube placement for venting, and now ready to be discharged today for comfort care.

## 2025-05-26 NOTE — PROGRESS NOTE ADULT - ASSESSMENT
73yo M w/ H/o Cholangio CA w/ distant mets, admitted on 5/6 with malignant SBO. S/p Venting PEG tube placement, IR int-ext biliary drain exchange, IR bare metal biliary stent placement.    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  S/p venting PEG tube placement  Currently receiving TPN through existing mediport, and tolerating PO diet (full liquids).   -plan for home hospice on 5/26    #Hypokalemia   #Metabolic alkalosis   #SHANT   Baseline cr ~0.6 now with Cr of 1.3 and extensive gi losses causing severe metabolic alkalosis and persistent and severe hypokalemia despite tpn and aggressive electrolyte repletion.   - nephrology consulted, appreciate recs   - d/c fluids and tpn tomorrow 5/26 as plan to dc on home hospice     # Anemia:  Likely AOCD with component of myelosuppression from chemo.   Hematology/Oncology on board, will follow as outpatient.   Trend CBC and transfuse PRN.     # Thrombocytopenia:  -stable     #Hypokalemia   - replete     # Healthcare maintenance:  VTE prophylaxis: LMWH.   Disposition: home w/ home hospice on Monday 5/26

## 2025-05-26 NOTE — PROVIDER CONTACT NOTE (CRITICAL VALUE NOTIFICATION) - ASSESSMENT
pt alert and oriented, Vital signs stable. denies SOB chest pain or palpitations
No s/s of acute distress.
pt w/o complaints at this time
No s/s of acute distress.

## 2025-05-26 NOTE — PROGRESS NOTE ADULT - SUBJECTIVE AND OBJECTIVE BOX
HOSPITALIST ATTENDING PROGRESS NOTE    Chart and meds reviewed.  Patient seen and examined.  CC: malignant SBO    Subjective: Plan for home hospice (w no TPN) today on Monday 5/26. Discussed with social work and case management    All other systems reviewed and found to be negative with the exception of what has been described above.    MEDICATIONS  (STANDING):  acetaminophen   IVPB .. 1000 milliGRAM(s) IV Intermittent once  ciprofloxacin   IVPB 400 milliGRAM(s) IV Intermittent every 12 hours  dextrose 5% 1000 milliLiter(s) (100 mL/Hr) IV Continuous <Continuous>  dextrose 5%. 1000 milliLiter(s) (100 mL/Hr) IV Continuous <Continuous>  dextrose 5%. 1000 milliLiter(s) (50 mL/Hr) IV Continuous <Continuous>  dextrose 50% Injectable 25 Gram(s) IV Push once  dextrose 50% Injectable 12.5 Gram(s) IV Push once  dextrose 50% Injectable 25 Gram(s) IV Push once  enoxaparin Injectable 40 milliGRAM(s) SubCutaneous every 24 hours  glucagon  Injectable 1 milliGRAM(s) IntraMuscular once  insulin lispro (ADMELOG) corrective regimen sliding scale   SubCutaneous three times a day before meals  insulin lispro (ADMELOG) corrective regimen sliding scale   SubCutaneous at bedtime  lipid, fat emulsion (Fish Oil and Plant Based) 20% Infusion 0.868 Gm/kG/Day (20.8 mL/Hr) IV Continuous <Continuous>  pantoprazole    Tablet 40 milliGRAM(s) Oral before breakfast  Parenteral Nutrition - Adult 1 Each TPN Continuous <Continuous>    MEDICATIONS  (PRN):  dextrose Oral Gel 15 Gram(s) Oral once PRN Blood Glucose LESS THAN 70 milliGRAM(s)/deciliter  dicyclomine 10 milliGRAM(s) Oral two times a day before meals PRN gas/cramps  ondansetron Injectable 4 milliGRAM(s) IV Push every 6 hours PRN Nausea and/or Vomiting      VITALS:  T(F): 98.8 (05-26-25 @ 08:01), Max: 98.8 (05-26-25 @ 08:01)  HR: 116 (05-26-25 @ 08:01) (60 - 116)  BP: 91/69 (05-26-25 @ 08:01) (91/69 - 143/77)  RR: 17 (05-26-25 @ 08:01) (17 - 18)  SpO2: 99% (05-26-25 @ 08:01) (93% - 99%)  Wt(kg): --    I&O's Summary    25 May 2025 07:01  -  26 May 2025 07:00  --------------------------------------------------------  IN: 0 mL / OUT: 8750 mL / NET: -8750 mL    26 May 2025 07:01  -  26 May 2025 12:01  --------------------------------------------------------  IN: 0 mL / OUT: 200 mL / NET: -200 mL        CAPILLARY BLOOD GLUCOSE      POCT Blood Glucose.: 199 mg/dL (26 May 2025 07:27)  POCT Blood Glucose.: 132 mg/dL (25 May 2025 21:10)  POCT Blood Glucose.: 173 mg/dL (25 May 2025 17:48)      PHYSICAL EXAM:  Gen: NAD  HEENT:  pupils equal and reactive, EOMI, no oropharyngeal lesions, erythema, exudates, oral thrush  NECK:   supple, no carotid bruits, no palpable lymph nodes, no thyromegaly  CV:  +S1, +S2, regular, no murmurs or rubs  RESP:   lungs clear to auscultation bilaterally, no wheezing, rales, rhonchi, good air entry bilaterally  BREAST:  not examined  GI:  abdomen soft, non-tender, non-distended, normal BS, no bruits, no abdominal masses, no palpable masses, + venting peg, biliary drain  RECTAL:  not examined  :  not examined  MSK:   normal muscle tone, no atrophy, no rigidity, no contractions  EXT:  no clubbing, no cyanosis, no edema, no calf pain, swelling or erythema  VASCULAR:  pulses equal and symmetric in the upper and lower extremities  NEURO:  AAOX3, no focal neurological deficits, follows all commands, able to move extremities spontaneously  SKIN:  no ulcers, lesions or rashes      LABS:                            10.1   7.26  )-----------( 212      ( 26 May 2025 05:10 )             34.8     05-26    154[H]  |  105  |  68[H]  ----------------------------<  218[H]  3.4[L]   |  >45[HH]  |  1.59[H]    Ca    10.0      26 May 2025 05:08  Phos  3.7     05-26  Mg     2.0     05-26    TPro  5.9[L]  /  Alb  2.3[L]  /  TBili  2.2[H]  /  DBili  x   /  AST  44[H]  /  ALT  54  /  AlkPhos  385[H]  05-26        LIVER FUNCTIONS - ( 26 May 2025 05:08 )  Alb: 2.3 g/dL / Pro: 5.9 gm/dL / ALK PHOS: 385 U/L / ALT: 54 U/L / AST: 44 U/L / GGT: x             Urinalysis Basic - ( 26 May 2025 05:08 )    Color: x / Appearance: x / SG: x / pH: x  Gluc: 218 mg/dL / Ketone: x  / Bili: x / Urobili: x   Blood: x / Protein: x / Nitrite: x   Leuk Esterase: x / RBC: x / WBC x   Sq Epi: x / Non Sq Epi: x / Bacteria: x              CULTURES:      Additional results/Imaging, I have personally reviewed:    Telemetry, personally reviewed:

## 2025-05-26 NOTE — PROGRESS NOTE ADULT - PROVIDER SPECIALTY LIST ADULT
Hospitalist
Hospitalist
Intervent Radiology
Palliative Care
Surgery
Hospitalist
Intervent Radiology
Intervent Radiology
Nephrology
Palliative Care
Surgery
Hospitalist
Surgery
Hospitalist
Nephrology
Surgery
Hospitalist
Intervent Radiology

## 2025-05-26 NOTE — PROVIDER CONTACT NOTE (CRITICAL VALUE NOTIFICATION) - PERSON GIVING RESULT:
Britney Ahn from [facility]
JOHNATHAN ESTRADA
michael garcia
Antonio Quispe
Yelena from [facility]
Elton ESCALANTE from [facility]

## 2025-05-26 NOTE — PROVIDER CONTACT NOTE (CRITICAL VALUE NOTIFICATION) - BACKGROUND
73 y/o male admitted d/t intestinal obstruction with a PMH of bile duct carcinoma.
pt going home on hospice today
73 y/o male admitted d/t intestinal obstruction with a PMH of bile duct carcinoma.
pt had critical potassium earlier this morning of 2.8, 3 riders of potassium ordered

## 2025-05-26 NOTE — DISCHARGE NOTE PROVIDER - NSDCFUADDINST_GEN_ALL_CORE_FT
Please resume regular diet and regular activity as tolerated.   You may shower as normal, Please avoid submerging your body in water to prevent wound dehiscence  please limit strenuous activity for 4-6weeks.   Continue Home medications and call for follow up visit in the office as advised.   Please continue Full liquid diet and regular activity as tolerated.   You may shower as normal, Please avoid submerging your body in water  please limit strenuous activity for 4-6weeks.   Continue Home medications and call for follow up visit in the office as advised.

## 2025-05-26 NOTE — DISCHARGE NOTE PROVIDER - CARE PROVIDER_API CALL
Stacy Higgins  Medical Oncology  [address], [place] [address]  Phone: [phone]  Fax: [phone]  Follow Up Time: 2 weeks

## 2025-05-26 NOTE — DISCHARGE NOTE PROVIDER - DETAILS OF MALNUTRITION DIAGNOSIS/DIAGNOSES
This patient has been assessed with a concern for Malnutrition and was treated during this hospitalization for the following Nutrition diagnosis/diagnoses:     -  05/06/2025: Severe protein-calorie malnutrition   -  05/06/2025: Underweight (BMI < 19)

## 2025-05-26 NOTE — PROVIDER CONTACT NOTE (CRITICAL VALUE NOTIFICATION) - ACTION/TREATMENT ORDERED:
MD made aware - continue with current plan of care at this time.
MD made aware. Potassium ordered per MD.
Provider made aware. Potassium chloride ordered.
2nd round of PO potassium administered, 3rd round to be given at 10 am- will recheck potassium levels again at 12pm
MD green

## 2025-05-26 NOTE — PROGRESS NOTE ADULT - REASON FOR ADMISSION
Malignant SBO

## 2025-05-26 NOTE — DISCHARGE NOTE PROVIDER - NSDCMRMEDTOKEN_GEN_ALL_CORE_FT
acetaminophen 500 mg oral tablet: 2 tab(s) orally 3 times a day as needed  ciprofloxacin 500 mg oral tablet: 1 tab(s) orally 2 times a day x 10 days ***Course Not Complete***  colesevelam 625 mg oral tablet: 1 tab(s) orally once a day (in the morning) ***per patient***  dicyclomine 10 mg oral capsule: 2 cap(s) orally 2 times a day as needed for cramping/gas overnight  famotidine 40 mg oral tablet: 1 tab(s) orally once a day (at bedtime)  Flonase 50 mcg/inh nasal spray: 2 spray(s) in each nostril once a day (in the morning)  magnesium oxide 400 mg oral tablet: 1.5 tab(s) orally 2 times a day ***take at least 2 hours before/after cipro***  pancrelipase 36,000 units-114,000 units-180,000 units oral delayed release capsule: 2 cap(s) orally 3 times a day (with meals)  pancrelipase 36,000 units-114,000 units-180,000 units oral delayed release capsule: 1 cap(s) orally with snacks  sodium bicarbonate 650 mg oral tablet: 1 tab(s) orally 2 times a day

## 2025-05-27 ENCOUNTER — TRANSCRIPTION ENCOUNTER (OUTPATIENT)
Age: 75
End: 2025-05-27

## 2025-05-27 VITALS
TEMPERATURE: 98 F | SYSTOLIC BLOOD PRESSURE: 78 MMHG | HEIGHT: 70 IN | WEIGHT: 115.08 LBS | HEART RATE: 91 BPM | RESPIRATION RATE: 20 BRPM | DIASTOLIC BLOOD PRESSURE: 48 MMHG | OXYGEN SATURATION: 98 %

## 2025-05-27 VITALS — WEIGHT: 115.08 LBS

## 2025-05-27 DIAGNOSIS — K94.23 GASTROSTOMY MALFUNCTION: ICD-10-CM

## 2025-05-27 LAB
ALBUMIN SERPL ELPH-MCNC: 2.4 G/DL — LOW (ref 3.3–5)
ALP SERPL-CCNC: 394 U/L — HIGH (ref 40–120)
ALT FLD-CCNC: 103 U/L — HIGH (ref 12–78)
APTT BLD: 30.3 SEC — SIGNIFICANT CHANGE UP (ref 26.1–36.8)
AST SERPL-CCNC: 140 U/L — HIGH (ref 15–37)
BASOPHILS # BLD AUTO: 0.01 K/UL — SIGNIFICANT CHANGE UP (ref 0–0.2)
BASOPHILS NFR BLD AUTO: 0.1 % — SIGNIFICANT CHANGE UP (ref 0–2)
BILIRUB SERPL-MCNC: 2.6 MG/DL — HIGH (ref 0.2–1.2)
BUN SERPL-MCNC: 86 MG/DL — HIGH (ref 7–23)
CALCIUM SERPL-MCNC: 9.8 MG/DL — SIGNIFICANT CHANGE UP (ref 8.5–10.1)
CHLORIDE SERPL-SCNC: 95 MMOL/L — LOW (ref 96–108)
CO2 SERPL-SCNC: >45 MMOL/L — CRITICAL HIGH (ref 22–31)
CREAT SERPL-MCNC: 2.85 MG/DL — HIGH (ref 0.5–1.3)
EGFR: 22 ML/MIN/1.73M2 — LOW
EGFR: 22 ML/MIN/1.73M2 — LOW
EOSINOPHIL # BLD AUTO: 0.02 K/UL — SIGNIFICANT CHANGE UP (ref 0–0.5)
EOSINOPHIL NFR BLD AUTO: 0.2 % — SIGNIFICANT CHANGE UP (ref 0–6)
GLUCOSE BLDC GLUCOMTR-MCNC: 176 MG/DL — HIGH (ref 70–99)
GLUCOSE SERPL-MCNC: 194 MG/DL — HIGH (ref 70–99)
HCT VFR BLD CALC: 37.1 % — LOW (ref 39–50)
HGB BLD-MCNC: 10.9 G/DL — LOW (ref 13–17)
IMM GRANULOCYTES # BLD AUTO: 0.04 K/UL — SIGNIFICANT CHANGE UP (ref 0–0.07)
IMM GRANULOCYTES NFR BLD AUTO: 0.4 % — SIGNIFICANT CHANGE UP (ref 0–0.9)
INR BLD: 1.15 RATIO — SIGNIFICANT CHANGE UP (ref 0.85–1.16)
LYMPHOCYTES # BLD AUTO: 2.05 K/UL — SIGNIFICANT CHANGE UP (ref 1–3.3)
LYMPHOCYTES NFR BLD AUTO: 21.2 % — SIGNIFICANT CHANGE UP (ref 13–44)
MCHC RBC-ENTMCNC: 29.3 PG — SIGNIFICANT CHANGE UP (ref 27–34)
MCHC RBC-ENTMCNC: 29.4 G/DL — LOW (ref 32–36)
MCV RBC AUTO: 99.7 FL — SIGNIFICANT CHANGE UP (ref 80–100)
MONOCYTES # BLD AUTO: 0.43 K/UL — SIGNIFICANT CHANGE UP (ref 0–0.9)
MONOCYTES NFR BLD AUTO: 4.5 % — SIGNIFICANT CHANGE UP (ref 2–14)
NEUTROPHILS # BLD AUTO: 7.11 K/UL — SIGNIFICANT CHANGE UP (ref 1.8–7.4)
NEUTROPHILS NFR BLD AUTO: 73.6 % — SIGNIFICANT CHANGE UP (ref 43–77)
NRBC # BLD AUTO: 0 K/UL — SIGNIFICANT CHANGE UP (ref 0–0)
NRBC # FLD: 0 K/UL — SIGNIFICANT CHANGE UP (ref 0–0)
NRBC BLD AUTO-RTO: 0 /100 WBCS — SIGNIFICANT CHANGE UP (ref 0–0)
PLATELET # BLD AUTO: 219 K/UL — SIGNIFICANT CHANGE UP (ref 150–400)
PMV BLD: 9.8 FL — SIGNIFICANT CHANGE UP (ref 7–13)
POTASSIUM SERPL-MCNC: 2.7 MMOL/L — CRITICAL LOW (ref 3.5–5.3)
POTASSIUM SERPL-SCNC: 2.7 MMOL/L — CRITICAL LOW (ref 3.5–5.3)
PROT SERPL-MCNC: 6.3 GM/DL — SIGNIFICANT CHANGE UP (ref 6–8.3)
PROTHROM AB SERPL-ACNC: 13.2 SEC — SIGNIFICANT CHANGE UP (ref 9.9–13.4)
RBC # BLD: 3.72 M/UL — LOW (ref 4.2–5.8)
RBC # FLD: 16.5 % — HIGH (ref 10.3–14.5)
SODIUM SERPL-SCNC: 154 MMOL/L — HIGH (ref 135–145)
WBC # BLD: 9.66 K/UL — SIGNIFICANT CHANGE UP (ref 3.8–10.5)
WBC # FLD AUTO: 9.66 K/UL — SIGNIFICANT CHANGE UP (ref 3.8–10.5)

## 2025-05-27 PROCEDURE — 85730 THROMBOPLASTIN TIME PARTIAL: CPT

## 2025-05-27 PROCEDURE — 99239 HOSP IP/OBS DSCHRG MGMT >30: CPT

## 2025-05-27 PROCEDURE — 80053 COMPREHEN METABOLIC PANEL: CPT

## 2025-05-27 PROCEDURE — 36415 COLL VENOUS BLD VENIPUNCTURE: CPT

## 2025-05-27 PROCEDURE — 82962 GLUCOSE BLOOD TEST: CPT

## 2025-05-27 PROCEDURE — 85025 COMPLETE CBC W/AUTO DIFF WBC: CPT

## 2025-05-27 PROCEDURE — 85610 PROTHROMBIN TIME: CPT

## 2025-05-27 PROCEDURE — 99284 EMERGENCY DEPT VISIT MOD MDM: CPT

## 2025-05-27 RX ORDER — HYDROMORPHONE/SOD CHLOR,ISO/PF 2 MG/10 ML
0.5 SYRINGE (ML) INJECTION EVERY 4 HOURS
Refills: 0 | Status: DISCONTINUED | OUTPATIENT
Start: 2025-05-27 | End: 2025-05-27

## 2025-05-27 RX ORDER — INSULIN LISPRO 100 U/ML
INJECTION, SOLUTION INTRAVENOUS; SUBCUTANEOUS
Refills: 0 | Status: DISCONTINUED | OUTPATIENT
Start: 2025-05-27 | End: 2025-05-27

## 2025-05-27 RX ORDER — LORAZEPAM 4 MG/ML
0.5 VIAL (ML) INJECTION EVERY 6 HOURS
Refills: 0 | Status: DISCONTINUED | OUTPATIENT
Start: 2025-05-27 | End: 2025-05-27

## 2025-05-27 RX ORDER — HYDROMORPHONE/SOD CHLOR,ISO/PF 2 MG/10 ML
0.5 SYRINGE (ML) INJECTION ONCE
Refills: 0 | Status: DISCONTINUED | OUTPATIENT
Start: 2025-05-27 | End: 2025-05-27

## 2025-05-27 RX ORDER — DEXTROSE 50 % IN WATER 50 %
12.5 SYRINGE (ML) INTRAVENOUS ONCE
Refills: 0 | Status: DISCONTINUED | OUTPATIENT
Start: 2025-05-27 | End: 2025-05-27

## 2025-05-27 RX ORDER — DEXTROSE 50 % IN WATER 50 %
25 SYRINGE (ML) INTRAVENOUS ONCE
Refills: 0 | Status: DISCONTINUED | OUTPATIENT
Start: 2025-05-27 | End: 2025-05-27

## 2025-05-27 RX ORDER — GLUCAGON 3 MG/1
1 POWDER NASAL ONCE
Refills: 0 | Status: DISCONTINUED | OUTPATIENT
Start: 2025-05-27 | End: 2025-05-27

## 2025-05-27 RX ORDER — SODIUM CHLORIDE 9 G/1000ML
1000 INJECTION, SOLUTION INTRAVENOUS
Refills: 0 | Status: DISCONTINUED | OUTPATIENT
Start: 2025-05-27 | End: 2025-05-27

## 2025-05-27 RX ORDER — HYDROMORPHONE/SOD CHLOR,ISO/PF 2 MG/10 ML
1 SYRINGE (ML) INJECTION
Qty: 0 | Refills: 0 | DISCHARGE
Start: 2025-05-27

## 2025-05-27 RX ORDER — LORAZEPAM 4 MG/ML
0.5 VIAL (ML) INJECTION
Qty: 0 | Refills: 0 | DISCHARGE
Start: 2025-05-27

## 2025-05-27 RX ORDER — DEXTROSE 50 % IN WATER 50 %
15 SYRINGE (ML) INTRAVENOUS ONCE
Refills: 0 | Status: DISCONTINUED | OUTPATIENT
Start: 2025-05-27 | End: 2025-05-27

## 2025-05-27 RX ORDER — MAGNESIUM OXIDE 400 MG
400 TABLET ORAL EVERY 12 HOURS
Refills: 0 | Status: DISCONTINUED | OUTPATIENT
Start: 2025-05-27 | End: 2025-05-27

## 2025-05-27 RX ORDER — LIPASE/PROTEASE/AMYLASE 10K-37.5K
1 CAPSULE,DELAYED RELEASE (ENTERIC COATED) ORAL
Refills: 0 | DISCHARGE

## 2025-05-27 RX ORDER — FLUTICASONE PROPIONATE 50 UG/1
2 SPRAY, METERED NASAL
Refills: 0 | DISCHARGE

## 2025-05-27 RX ORDER — HEPARIN SODIUM 1000 [USP'U]/ML
5000 INJECTION INTRAVENOUS; SUBCUTANEOUS EVERY 12 HOURS
Refills: 0 | Status: DISCONTINUED | OUTPATIENT
Start: 2025-05-27 | End: 2025-05-27

## 2025-05-27 RX ORDER — ACETAMINOPHEN 500 MG/5ML
2 LIQUID (ML) ORAL
Refills: 0 | DISCHARGE

## 2025-05-27 RX ORDER — LIPASE/PROTEASE/AMYLASE 10K-37.5K
1 CAPSULE,DELAYED RELEASE (ENTERIC COATED) ORAL
Refills: 0 | Status: DISCONTINUED | OUTPATIENT
Start: 2025-05-27 | End: 2025-05-27

## 2025-05-27 RX ADMIN — Medication 1000 MILLILITER(S): at 05:09

## 2025-05-27 RX ADMIN — Medication 100 MILLIEQUIVALENT(S): at 06:51

## 2025-05-27 RX ADMIN — Medication 0.5 MILLIGRAM(S): at 07:46

## 2025-05-27 RX ADMIN — Medication 0.5 MILLIGRAM(S): at 04:50

## 2025-05-27 NOTE — DIETITIAN INITIAL EVALUATION ADULT - PERTINENT LABORATORY DATA
05-27    154[H]  |  95[L]  |  86[H]  ----------------------------<  194[H]  2.7[LL]   |  >45[HH]  |  2.85[H]    Ca    9.8      27 May 2025 05:06  Phos  3.7     05-26  Mg     2.0     05-26    TPro  6.3  /  Alb  2.4[L]  /  TBili  2.6[H]  /  DBili  x   /  AST  140[H]  /  ALT  103[H]  /  AlkPhos  394[H]  05-27  POCT Blood Glucose.: 176 mg/dL (05-27-25 @ 07:17)  A1C with Estimated Average Glucose Result: 5.3 % (05-17-25 @ 04:57)

## 2025-05-27 NOTE — DISCHARGE NOTE PROVIDER - NSDCMRMEDTOKEN_GEN_ALL_CORE_FT
HYDROmorphone: 1 milligram(s) intravenous every 2 to 3 hours as needed for  severe pain  LORazepam 1 mg/mL-NaCl 0.9% intravenous solution: 0.5 milliliter(s) intravenous every 6 hours As needed Agitation

## 2025-05-27 NOTE — ED ADULT NURSE REASSESSMENT NOTE - NS ED NURSE REASSESS COMMENT FT1
Pt received A&Ox3. BP remains extremely soft and pt is hypoxic on room air. Pt crying out in pain and extremely anxious. Dr. Randhawa called and made aware. Pt medicated for pain (MD aware of BP) and comfort measures addressed. Pt does not require telemetry or vitals at this time. Pt boosted and repositioned in bed-warm blankets applied. Peg tube and biliary drain to bed side drainage draining dark green output. Right chest wall port in place unaccessed. Plan is to attempt to get pt to hospice in today-case management aware.

## 2025-05-27 NOTE — DISCHARGE NOTE PROVIDER - CARE PROVIDER_API CALL
Hospice team,   Phone: (   )    -  Fax: (   )    -  Follow Up Time: 1-3 days   The patient is a 3y1m Male complaining of difficulty breathing.

## 2025-05-27 NOTE — PATIENT PROFILE ADULT - FALL HARM RISK - HARM RISK INTERVENTIONS

## 2025-05-27 NOTE — ED ADULT NURSE NOTE - CHIEF COMPLAINT QUOTE
Pt BIBEMS c/o abdominal pain r/t PEG tube being clogged. Pt reports hospice was able to use it earlier today, but not this evening and told pt to come to the ED. Pt is on hospice. Hx of cholangio cancer. Pt was discharged on 5/26 from . AO x 4. Pt reports taking Ativan and Morphine 5 mg PTA. Pt on 3L NC, pt not on oxygen baseline. Wife reports pt is chronically hypotensive.

## 2025-05-27 NOTE — ED ADULT NURSE REASSESSMENT NOTE - NS ED NURSE REASSESS COMMENT FT1
MD Lucia aware of BP, pt medicated w/ 500cc NS as per MD order. As per MD Lucia via phone call: plan of care is pain management and inpatient hospice evaluation.  Pt is mentating AAOx3 at this time. No further orders at this time.

## 2025-05-27 NOTE — H&P ADULT - HISTORY OF PRESENT ILLNESS
this is a 73yo M w/ PMHx of Cholangio CA s/p Robotic Whipple on 5/28/2024 (Dr. Ocasio) w/  liver metastases and BCD blockage s/p biliary drain and venting PEG tube Presents with peg tube obstruction or malfunction.    Patient was discharged yesterday from  to home hospice and did not have a syringe large enough to flush the PEG tube and it is blocked and not draining.  Reports he vomited once. patient wanted PEG evaluated by IR,   Peg tube was checked in ER and it seems to be functioning and draining, patient's wife would like patient evaluated for inpatient hospice.  patient is currently uncomfortable and anxious. hypotensive to 70s systolic, however per his wife, patient's BP is known to be low

## 2025-05-27 NOTE — DISCHARGE NOTE NURSING/CASE MANAGEMENT/SOCIAL WORK - PATIENT PORTAL LINK FT
You can access the FollowMyHealth Patient Portal offered by [facility] by registering at the following website: [email or web address]. By joining [facility]’s FollowMyHealth portal, you will also be able to view your health information using other applications (apps) compatible with our system.

## 2025-05-27 NOTE — DIETITIAN INITIAL EVALUATION ADULT - PERTINENT MEDS FT
MEDICATIONS  (STANDING):  famotidine    Tablet 10 milliGRAM(s) Oral every 48 hours  pancrelipase  (CREON 36,000 Lipase Units) 1 Capsule(s) Oral three times a day with meals    MEDICATIONS  (PRN):  dicyclomine 20 milliGRAM(s) Oral <User Schedule> PRN cramping/gas  HYDROmorphone  Injectable 0.5 milliGRAM(s) IV Push every 4 hours PRN Severe Pain (7 - 10)  LORazepam   Injectable 0.5 milliGRAM(s) IV Push every 6 hours PRN Agitation    Home Medications:  acetaminophen 500 mg oral tablet: 2 tab(s) orally 3 times a day as needed (27 May 2025 10:01)  colesevelam 625 mg oral tablet: 1 tab(s) orally once a day (in the morning) ***per patient*** (27 May 2025 10:01)  dicyclomine 10 mg oral capsule: 2 cap(s) orally 2 times a day as needed for cramping/gas overnight (27 May 2025 10:01)  famotidine 40 mg oral tablet: 1 tab(s) orally once a day (at bedtime) (27 May 2025 10:01)  Flonase 50 mcg/inh nasal spray: 2 spray(s) in each nostril once a day (in the morning) (27 May 2025 10:01)  magnesium oxide 400 mg oral tablet: 1.5 tab(s) orally 2 times a day ***take at least 2 hours before/after cipro*** (27 May 2025 10:01)  pancrelipase 36,000 units-114,000 units-180,000 units oral delayed release capsule: 2 cap(s) orally 3 times a day (with meals) (27 May 2025 10:01)  pancrelipase 36,000 units-114,000 units-180,000 units oral delayed release capsule: 1 cap(s) orally with snacks (27 May 2025 10:01)

## 2025-05-27 NOTE — DIETITIAN INITIAL EVALUATION ADULT - ADD RECOMMEND
Limited nutrition interventions warranted. Rec’d to liberalize diet to regular to allow pleasure feeds as tolerated. RD will not actively follow. Please reconsult RD if GOC/ status changes.

## 2025-05-27 NOTE — ED ADULT TRIAGE NOTE - CHIEF COMPLAINT QUOTE
Pt BIBEMS c/o abdominal pain r/t PEG tube being clogged. Pt reports he was able to use it earlier today, but not this evening. Pt is on hospice. Hx of cholangio cancer. Pt was discharged on 5/26 from . AO x 4. Pt reports taking Ativan and Morphine 5 mg PTA. Pt on 3L NC, pt not on oxygen baseline. Pt BIBEMS c/o abdominal pain r/t PEG tube being clogged. Pt reports hospice was able to use it earlier today, but not this evening and told pt to come to the ED. Pt is on hospice. Hx of cholangio cancer. Pt was discharged on 5/26 from . AO x 4. Pt reports taking Ativan and Morphine 5 mg PTA. Pt on 3L NC, pt not on oxygen baseline. Wife reports pt is chronically hypotensive.

## 2025-05-27 NOTE — CONSULT NOTE ADULT - ASSESSMENT
HPI: Pt is a 74y old Male with hx of Cholangio CA s/p Robotic Whipple on 5/28/2024 (Dr. Ocasio) w/  liver metastases and BCD blockage s/p biliary drain and venting PEG tube Presents with peg tube obstruction or malfunction.  Patient was discharged yesterday from  to home hospice and did not have a syringe large enough to flush the PEG tube and it is blocked and not draining.  Reports he vomited once. patient wanted PEG evaluated by IR,  Peg tube was checked in ER and it seems to be functioning and draining, patient's wife would like patient evaluated for inpatient hospice.  patient is currently uncomfortable and anxious. hypotensive to 70s systolic, however per his wife, patient's BP is known to be low. Palliative medicine consulted to help establish GOC and advance care planning       Process of Care  --Reviewed dx/treatment problems and alignment with Goals of Care    Physical Aspects of Care  --Pain  patient denies at this time  c/w current managment    --Bowel Regimen  denies constipation  risk for constipation d/t immobility  daily dulcolax    --Dyspnea  No SOB at this time  comfortable and in NAD    --Nausea Vomiting  denies    --Weakness  PT as tolerated     Psychological and Psychiatric Aspects of Care:   --Greif/Bereavment: emotional support provided  -Pastoral Care Available PRN     Social Aspects of Care  -SW involved     Goals of Care:     We discussed Palliative Care team being a supportive team when a patient has ongoing illnesses.  We also discussed that it is not an end of life care service, but can help navigate symptoms and emotional support througout their hospital stay here.      Ethical and Legal Aspects: NA  Capacity- pt appears to have simple capacity   HCP/Surrogate: HCP on file naming wife   Code Status- DNR/I trial of NIV   MOLST- on chart   Dispo Plan- to inpatient hospice     Discussed With:    Case coordinated with attending and SW and RN     Time Spent: 60 minutes including the care, coordination and counseling of this patient,  This includes chart review, patient assessment, discussion and collaboration with interdisciplinary team members, excluding ACP discussions

## 2025-05-27 NOTE — CHART NOTE - NSCHARTNOTEFT_GEN_A_CORE
Pt was recently hospitalized at  at d/c home 5/25/25 with services from [facility]. Pt with hx of Cholangio s/p Whipple, Peg tube. Pt returned to the hospital as they did not have a syringe large enough to flush blocked peg tube. Tube checked in the ER and was functioning.  Pt was is requesting in patient hospice evaluation. Pt is hypotensive with BP in the 70s. Pt was evaluated by Palliative Care in the ER.  Sw sent referral to [facility] for in patient hospice and pt was accepted for bed available today. Ambulance transport was arranged for 1:30pm . Pt/wife agreaeble to d/c plan.  Case discussed with RN/MD.

## 2025-05-27 NOTE — ED ADULT NURSE REASSESSMENT NOTE - NS ED NURSE REASSESS COMMENT FT1
Pt discharged to hospice. Report given to receiving RN at facility. MOLST placed in transport folder. IV to left hand wrapped with gauze and kurlex. Gtube and peg tube bag to gravity both emptied. Pt remains emotional and thankful to staff. Wife at bedside. Pt does not require any additional medication at this time. Discharge complete.

## 2025-05-27 NOTE — PATIENT PROFILE ADULT - IS THERE A SUSPICION OF ABUSE/NEGLIGENCE?
Call placed to [facility] pharmacy, writer waited on hold for 18 minutes. Pt should have refills on file. Writer will contact pharmacy back.    no

## 2025-05-27 NOTE — DISCHARGE NOTE PROVIDER - HOSPITAL COURSE
75yo M w/ PMHx of Cholangio CA s/p Robotic Whipple on 5/28/2024 (Dr. Ocasio) w/  liver metastases and BCD blockage s/p biliary drain and venting PEG tube Presents with peg tube obstruction or malfunction.    Patient was discharged yesterday from  to home hospice and did not have a syringe large enough to flush the PEG tube and it is blocked and not draining.  Reports he vomited once. patient wanted PEG evaluated by IR,   Peg tube was checked in ER and it seems to be functioning and draining, patient's wife would like patient evaluated for inpatient hospice.  patient is currently uncomfortable and anxious. 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:  Initially diagnosed 04 - 05/2024; s/p Robotic Whipple late 05/2024 by Dr Ocasio with adjuvant Xeloda   Initiated [place] / Cis / Durva when noted to have mets 10/2024 ---> most recent dosing 03/2025  S/p venting PEG tube placement, seem to be working and draining at this time, abdomen is not distended  has been receiving TPN through existing mediport, and was dced on thin liquids  consult inpatient hospice     # severe pt calorie malnutrition, severe cachexia,  persistent intractable Hypokalemia , metabolic alkalosis, SHANT due to GI secretions  I dont think these are correctable problems    Disp  prognosis is very poor  Transfer to inpatient hospice

## 2025-05-27 NOTE — CONSULT NOTE ADULT - SUBJECTIVE AND OBJECTIVE BOX
HPI: Pt is a 74y old Male with hx of       PAIN: ( )Yes   ( )No  Level:  Location:  Intensity:    /10  Quality:  Aggravating Factors:  Alleviating Factors:  Radiation:  Duration/Timing:  Impact on ADLs:    DYSPNEA: ( ) Yes  ( ) No  Level:    PAST MEDICAL & SURGICAL HISTORY:  Bile duct cancer      H/O Whipple procedure          SOCIAL HX:    Hx opiate tolerance ( )YES  ( )NO    Baseline ADLs  (Prior to Admission)  ( ) Independent   ( )Dependent    FAMILY HISTORY:      Review of Systems:    Anxiety-  Depression-  Physical Discomfort-  Dyspnea-  Constipation-  Diarrhea-  Nausea-  Vomiting-  Anorexia-  Weight Loss-   Cough-  Secretions-  Fatigue-  Weakness-  Delirium-    All other systems reviewed and negative  Unable to obtain/Limited due to:      PHYSICAL EXAM:    Vital Signs Last 24 Hrs  T(C): 36.4 (27 May 2025 07:22), Max: 36.7 (27 May 2025 00:02)  T(F): 97.5 (27 May 2025 07:22), Max: 98.1 (27 May 2025 00:02)  HR: 109 (27 May 2025 07:22) (69 - 109)  BP: 73/57 (27 May 2025 07:22) (73/56 - 91/69)  BP(mean): 78 (27 May 2025 06:12) (63 - 78)  RR: 22 (27 May 2025 07:22) (16 - 22)  SpO2: 75% (27 May 2025 07:22) (75% - 100%)    Parameters below as of 27 May 2025 07:22  Patient On (Oxygen Delivery Method): room air      Daily Height in cm: 177.8 (27 May 2025 00:02)    Daily     PPSV2:   %  FAST:    General:  Mental Status:  HEENT:  Lungs:  Cardiac:  GI:  :  Ext:  Neuro:      LABS:                        10.9   9.66  )-----------( 219      ( 27 May 2025 05:06 )             37.1     05-27    154[H]  |  95[L]  |  86[H]  ----------------------------<  194[H]  2.7[LL]   |  >45[HH]  |  2.85[H]    Ca    9.8      27 May 2025 05:06  Phos  3.7     05-26  Mg     2.0     05-26    TPro  6.3  /  Alb  2.4[L]  /  TBili  2.6[H]  /  DBili  x   /  AST  140[H]  /  ALT  103[H]  /  AlkPhos  394[H]  05-27    PT/INR - ( 27 May 2025 05:06 )   PT: 13.2 sec;   INR: 1.15 ratio         PTT - ( 27 May 2025 05:06 )  PTT:30.3 sec  Albumin: Albumin: 2.4 g/dL (05-27 @ 05:06)      Allergies    statins (Unknown)  atorvastatin (Hives)    Intolerances      MEDICATIONS  (STANDING):  famotidine    Tablet 10 milliGRAM(s) Oral every 48 hours  pancrelipase  (CREON 36,000 Lipase Units) 1 Capsule(s) Oral three times a day with meals    MEDICATIONS  (PRN):  dicyclomine 20 milliGRAM(s) Oral <User Schedule> PRN cramping/gas  HYDROmorphone  Injectable 0.5 milliGRAM(s) IV Push every 4 hours PRN Severe Pain (7 - 10)  LORazepam   Injectable 0.5 milliGRAM(s) IV Push every 6 hours PRN Agitation      RADIOLOGY/ADDITIONAL STUDIES: HPI: Pt is a 74y old Male with hx of Cholangio CA s/p Robotic Whipple on 5/28/2024 (Dr. Ocasio) w/  liver metastases and BCD blockage s/p biliary drain and venting PEG tube Presents with peg tube obstruction or malfunction.  Patient was discharged yesterday from  to home hospice and did not have a syringe large enough to flush the PEG tube and it is blocked and not draining.  Reports he vomited once. patient wanted PEG evaluated by IR,  Peg tube was checked in ER and it seems to be functioning and draining, patient's wife would like patient evaluated for inpatient hospice.  patient is currently uncomfortable and anxious. hypotensive to 70s systolic, however per his wife, patient's BP is known to be low. Palliative medicine consulted to help establish GOC and advance care planning     5/27/2025 pt seen and examined with wife at bedside, patient shared that he always wanted to go home, patient at this time is agreeable to inpatient hospice at this time as his symptoms  have become worse.       PAIN: ( x)Yes   ( )No  as per RN was moaning in pain patient is not awake at this time   DYSPNEA: ( ) Yes  ( x) No  Level:     PAST MEDICAL & SURGICAL HISTORY:  Bile duct cancer  H/O Whipple procedure    SOCIAL HX: lives at home with his wife     Hx opiate tolerance ( )YES  (x )NO    Baseline ADLs  (Prior to Admission)  (x ) Independent   ( )Dependent    FAMILY HISTORY:      Review of Systems:    Unable to obtain/Limited due to: lethargy       PHYSICAL EXAM:    Vital Signs Last 24 Hrs  T(C): 36.4 (27 May 2025 07:22), Max: 36.7 (27 May 2025 00:02)  T(F): 97.5 (27 May 2025 07:22), Max: 98.1 (27 May 2025 00:02)  HR: 109 (27 May 2025 07:22) (69 - 109)  BP: 73/57 (27 May 2025 07:22) (73/56 - 91/69)  BP(mean): 78 (27 May 2025 06:12) (63 - 78)  RR: 22 (27 May 2025 07:22) (16 - 22)  SpO2: 75% (27 May 2025 07:22) (75% - 100%)    Parameters below as of 27 May 2025 07:22  Patient On (Oxygen Delivery Method): room air    Daily Height in cm: 177.8 (27 May 2025 00:02)      PPSV2: 50-60  %    General: cachetic male in bed   Mental Status: lethargic   HEENT: mmm   GI: distended   : +voiding   Ext: HERNANDEZ  Neuro: nonfocal    LABS:                        10.9   9.66  )-----------( 219      ( 27 May 2025 05:06 )             37.1     05-27    154[H]  |  95[L]  |  86[H]  ----------------------------<  194[H]  2.7[LL]   |  >45[HH]  |  2.85[H]    Ca    9.8      27 May 2025 05:06  Phos  3.7     05-26  Mg     2.0     05-26    TPro  6.3  /  Alb  2.4[L]  /  TBili  2.6[H]  /  DBili  x   /  AST  140[H]  /  ALT  103[H]  /  AlkPhos  394[H]  05-27    PT/INR - ( 27 May 2025 05:06 )   PT: 13.2 sec;   INR: 1.15 ratio         PTT - ( 27 May 2025 05:06 )  PTT:30.3 sec  Albumin: Albumin: 2.4 g/dL (05-27 @ 05:06)      Allergies    statins (Unknown)  atorvastatin (Hives)    Intolerances      MEDICATIONS  (STANDING):  famotidine    Tablet 10 milliGRAM(s) Oral every 48 hours  pancrelipase  (CREON 36,000 Lipase Units) 1 Capsule(s) Oral three times a day with meals    MEDICATIONS  (PRN):  dicyclomine 20 milliGRAM(s) Oral <User Schedule> PRN cramping/gas  HYDROmorphone  Injectable 0.5 milliGRAM(s) IV Push every 4 hours PRN Severe Pain (7 - 10)  LORazepam   Injectable 0.5 milliGRAM(s) IV Push every 6 hours PRN Agitation      RADIOLOGY/ADDITIONAL STUDIES:

## 2025-05-27 NOTE — ED PROVIDER NOTE - MDM ORDERS SUBMITTED SELECTION
Subjective   Patient ID: Yeimi Reza is a 58 y.o. female who presents for No chief complaint on file..  HPI  Yeimi presents to [facility].  I had last seen her 3 years ago.  She has not seen anyone since.  She is concerned due to lump she palpated to left armpit, as well as food sticking in chest.  She has history of breast cancer status post mastectomy.  Review of Systems   Constitutional:  Positive for fatigue and unexpected weight change (gaining). Negative for appetite change.   HENT:  Negative for hearing loss.    Eyes:  Positive for visual disturbance.   Respiratory:  Positive for shortness of breath (occasional).    Cardiovascular:  Positive for chest pain (at times).   Gastrointestinal:  Positive for abdominal pain, constipation and nausea.   Musculoskeletal:  Positive for arthralgias.   Skin:         History of non-healing ulcerative wound to chest   Psychiatric/Behavioral:  Positive for dysphoric mood and sleep disturbance. The patient is nervous/anxious.        Objective   Vitals:    03/29/24 1039   BP: 126/76   Pulse: 81   SpO2: 96%   Weight: 71.7 kg (158 lb)      Physical Exam  Constitutional:       General: She is not in acute distress.     Appearance: Normal appearance. She is not ill-appearing.   HENT:      Head: Normocephalic and atraumatic.   Eyes:      Extraocular Movements: Extraocular movements intact.      Pupils: Pupils are equal, round, and reactive to light.   Cardiovascular:      Rate and Rhythm: Normal rate and regular rhythm.   Pulmonary:      Effort: Pulmonary effort is normal.      Breath sounds: Normal breath sounds.   Chest:      Chest wall: Tenderness present.       Abdominal:      General: There is no distension.      Palpations: Abdomen is soft.      Tenderness: There is generalized abdominal tenderness. There is no right CVA tenderness, left CVA tenderness, guarding or rebound.   Musculoskeletal:      Cervical back: Normal range of motion and neck supple.    Neurological:      General: No focal deficit present.      Mental Status: She is alert and oriented to person, place, and time.   Psychiatric:         Mood and Affect: Mood normal.         Behavior: Behavior normal.         Assessment/Plan   There are no diagnoses linked to this encounter.    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  She is gaining weight, which is reassuring         Relevant Orders    BI mammo bilateral diagnostic    Mild intermittent asthma without complication J45.20     Will restart ICS/LABA combination         Relevant Medications    budesonide-formoteroL (Symbicort) 80-4.5 mcg/actuation inhaler    Gastroesophageal reflux disease with esophagitis without hemorrhage K21.00     Will trial PPI, if symptoms do not improve will need EGD         Relevant Medications    omeprazole (PriLOSEC) 40 mg DR capsule    Mass of chest wall, left R22.2     As above, does not feel overly suspicious but given her history need to rule out return of cancer.         Relevant Orders    BI mammo bilateral diagnostic     Other Visit Diagnoses         Codes    Screening for colon cancer     Z12.11    Relevant Orders    Cologuard® colon cancer screening           Not Applicable

## 2025-05-27 NOTE — DIETITIAN INITIAL EVALUATION ADULT - REASON INDICATOR FOR ASSESSMENT
Full Nutrition Initial Assessment not warranted at this time due to:  [ ] Pt seen for LOS; [ ] Pt seen for MST consult ("Unsure" of weight loss)  [ ] No nutrition risk or diagnosis identified   [ ] Current medical condition precludes nutrition intervention at this time  [X] Hospice Care and/or Comfort Measures

## 2025-05-27 NOTE — DISCHARGE NOTE NURSING/CASE MANAGEMENT/SOCIAL WORK - FINANCIAL ASSISTANCE
[facility] provides services at a reduced cost to those who are determined to be eligible through [facility]’s financial assistance program. Information regarding [facility]’s financial assistance program can be found by going to [email or web address] or by calling [phone].

## 2025-05-27 NOTE — ED PROVIDER NOTE - OBJECTIVE STATEMENT
73yo M w/ PMHx of Cholangio CA s/p Robotic Whipple on 5/28/2024 (Dr. Ocasio) w/  liver metastases and BCD blockage s/p biliary drain and venting PEG tube Presents with peg tube obstruction or malfunction.  Patient states that he was discharged yesterday from the hospital and did not have a syringe large enough to flush the PEG tube and it is blocked and not draining.  Reports he vomited once. no fevers.

## 2025-05-27 NOTE — DIETITIAN INITIAL EVALUATION ADULT - OTHER INFO
75 y/o M with a PMHx of Cholangio CA s/p Robotic Whipple on 5/28/2024 w/ liver metastases and BCD blockage s/p biliary drain and venting PEG tube presented with peg tube obstruction or malfunction. Patient was discharged yesterday from  to home hospice and did not have a syringe large enough to flush the PEG tube and it is blocked and not draining. Reports he vomited once. Patient wanted PEG evaluated by IR. Peg tube was checked in ER and it seems to be functioning and draining, patient's wife would like patient evaluated for inpatient hospice. Pt admitted from home on hospice. DNR/ DNI/ NFT. Limited nutrition interventions warrante. Upon completion of Nutrition Screen review and patient interview, RD has determined that a full Initial Nutrition Assessment is not warranted due to: Current medical diagnosis precludes nutrition intervention, an abbreviated Initial Nutrition Assessment will be completed.

## 2025-05-27 NOTE — H&P ADULT - NSHPPHYSICALEXAM_GEN_ALL_CORE
Gen: awake, cachectic, chronically ill looking, anxious  Head : bitemporal wasting  EENT: ears and nose externally normal without lesions or deformities  NECK: no JVD  CVS: RRR s1s2   RESP:  no respiratory distress, no wheezing, no rhonchi   GI: scaphoid abdomen, peg tube in place, non distended     MSK:  diffuse muscle wasting, cachexia   EXT: no edema or cyanosis   NEURO: face symmetrical, moves all ext  SKIN: normal temp  Psych: anxious, uncomfortable

## 2025-05-27 NOTE — ED PROVIDER NOTE - CLINICAL SUMMARY MEDICAL DECISION MAKING FREE TEXT BOX
74-year-old male with venting PEG tube with obstruction.  While here PEG tube began redraining on its own and then again  Appeared to pause with flow.  Wife feels he would be better suited possibly at an inpatient hospice facility where they could flush this for him when needed.  Patient requesting to have PEG tube evaluated by IR.  will obtain labs and admit for care.  Signout given to hospitalist Dr. Lucia.

## 2025-05-27 NOTE — CONSULT NOTE ADULT - CONSULT REASON
GOC
Benefits, risks, and possible complications of procedure explained to patient/caregiver who verbalized understanding and gave verbal consent.

## 2025-05-27 NOTE — ED ADULT NURSE NOTE - DOES PATIENT HAVE ADVANCE DIRECTIVE
Recommendation Preamble: The following recommendations were made during the visit:
Detail Level: Zone
Recommendations (Free Text): Advised patients mother and father to check lesion monthly for any changes in size, shape, or color. Advised patients parents to take a photo for compassion. Patient was very upset and would not allow for a photograph at todays visit. If changes occur, patient will need to be referred to pediatric dermatology for biopsy.
Render Risk Assessment In Note?: yes
Yes

## 2025-05-27 NOTE — H&P ADULT - ASSESSMENT
74 year old male with  advanced  Cholangio CA w/ distant mets, admitted on 5/6 with malignant SBO. S/p Venting PEG tube placement, IR int-ext biliary drain exchange, IR bare metal biliary stent placement. 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:  Initially diagnosed 04 - 05/2024; s/p Robotic Whipple late 05/2024 by Dr Ocasio with adjuvant Xeloda   Initiated [place] / Cis / Durva when noted to have mets 10/2024 ---> most recent dosing 03/2025  S/p venting PEG tube placement, seem to be working and draining at this time, abdomen is not distended  has been receiving TPN through existing mediport, and was dced on thin liquids  consult inpatient hospice     # severe pt calorie malnutrition, severe cachexia,  persistent intractable Hypokalemia , metabolic alkalosis, SHANT due to GI secretions  I dont think these are correctable problems    Disp  prognosis is very poor, inpt hospice seem appropriate to handle anxiety, pain, GI secretions. consult placed

## 2025-06-02 NOTE — CDI QUERY NOTE - NSCDIOTHERTXTBX_GEN_ALL_CORE_HH
Clinical documentation and/or evidence of the patient’s presentation, evaluation, and medical management, as evidenced below, may support a diagnosis that is not documented in the medical record.  In order to ensure accurate coding and accuracy of the clinical record, the documentation in this patient’s medical record requires additional clarification.    If you think the supporting documentation and/or clinical evidence supports a more specific diagnosis, please include more specific documentation of a diagnosis associated with these findings in the discharge summary or new chart note  Please clarify if the patient was found to have one of the following:      •	SBO increased due to  failure of the previous common bile duct drainage device  •	SBO due to due to stricture at the gastrojejunal anastomosis associated with carcinomatosis   •	SBO associated with failed CBD stent placement (unsuccessful cannulation via ERCP), s/p internal-external biliary drainage (8.5fr) at [facility] [place] on 4/13, c/b High external drainage output ( >2L) due to Internal drainage failure (CBD blockage), now s/p exchange of internal-external biliary drainage (12fr)   •	Other (specify)      Supporting documentation and/or clinical evidence:     Surgery PN 5/23-Has been ahving some leaking around his IR drain again-admitted for malignant SBO, clinically improving. Having BM.s/p Venting PEG tube placement s/p IR int-ext biliary drain exchanges/p IR bare metal biliary stent placements/p IR drain study    H&P- malignant SBO     CT A/P: Interval increase in intrahepatic biliary dilatation, now moderately severe. Stable positioning of internal/external biliary stent. Correlate for stent dysfunction. we cannot assume, it appears the failure of the previous common bile duct drainage device increased the SBO and patient still had SBO even after new stent and drainage were placed,      s/p Venting PEG tube placement s/p IR int-ext biliary drain exchange s/p IR bare metal biliary stent placement 1. High-grade stenosis of the common bile duct despite indwelling internal/external biliary drain. 2. Placement 10 mm x 60 mm bare-metal stent in the common bile duct with appropriate position and function on final imaging. 3. External biliary drain placed biliary confluence, 10 [place] Ayden. 05/20 Contrast was injected through the patient's indwelling external biliary drain demonstrating occlusion of the recently placed bare-metal stent in the common bile duct. A 0.035 wire was advanced. A Berenstein catheter was advanced over the wire and antegrade cholangiography was performed demonstrating an appropriate profile stent (suggesting absence of external compression), but rather there was some mucoid debris likely occluding its patency.     c/b High external drainage output ( >2L) due to Internal drainage failure (CBD blockage), now s/p exchange of internal-external biliary drainage (12fr) on 4/25 in . 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 S/p venting PEG tube placement Currently receiving TPN through existing mediport, and tolerating PO diet (full liquids). -plan for home hospice on 5/26 patient had CBD stent placed on 05/16 then the CBD stent showed by 05/20 NOT POA.  Inpatient      IR 5/14-< from: IR Procedure (05.14.25 @ 16:38) >INTERPRETATION:  INDICATION AND FOCUSED HISTORY: Common bile duct obstruction. Possible extrinsic compression. Hyperbilirubinemia-< from: IR Procedure (05.14.25 @ 16:38)1. Obstruction of indwelling 14 [place] biliary drain secondary to internal debris versus extrinsic compression.2. New 14 [place] biliary drain placed via existing access site with appropriate antegrade passage of contrastIMPRESSION:Successful biliary drain check and exchange/upsize as above    CT Abdomen and Pelvis w/ Oral Cont (05.08.25 @ 10:55) >Gastric outlet obstruction, which correlating with prior CT, is likely   due to stricture at the gastrojejunal anastomosis.Mildly dilated proximal small bowel. The possibility of second site of obstruction is considered.Percutaneous biliary drain in place with moderate ductal dilatation.    Xray Chest 1 View- PORTABLE-Urgent (Xray Chest 1 View- PORTABLE-Urgent .) (05.06.25 @ 10:03) > Enteric tube tip is at the level of the upper thoracic esophagus. Recommend repositioning. Clinical documentation and/or evidence of the patient’s presentation, evaluation, and medical management, as evidenced below, may support a cause and effect link that is not documented in the medical record.  In order to ensure accurate coding and accuracy of the clinical record, the documentation in this patient’s medical record requires additional clarification.    If you think the supporting documentation and/or clinical evidence supports a cause and effect link, please include more specific documentation associated with these findings in the discharge summary or new chart note  Please clarify if there is a relationship between the SBO and stent malfunction CBD drainage device failure,  stricture at the gastrojejunal anastomosis, carcinomatosis,  such as:  •	SBO increased due to  failure of the previous common bile duct drainage device  •	SBO due to due to stricture at the gastrojejunal anastomosis associated with carcinomatosis   •	SBO associated with failed CBD stent placement (unsuccessful cannulation via ERCP), s/p internal-external biliary drainage (8.5fr) at [facility] [place] on 4/13, c/b High external drainage output ( >2L) due to Internal drainage failure (CBD blockage), now s/p exchange of internal-external biliary drainage (12fr)   •	Other (specify)    Supporting documentation and/or clinical evidence:     Surgery PN 5/23-Has been ahving some leaking around his IR drain again-admitted for malignant SBO, clinically improving. Having BM.s/p Venting PEG tube placement s/p IR int-ext biliary drain exchanges/p IR bare metal biliary stent placements/p IR drain study    H&P- malignant SBO     CT A/P: Interval increase in intrahepatic biliary dilatation, now moderately severe. Stable positioning of internal/external biliary stent. Correlate for stent dysfunction. we cannot assume, it appears the failure of the previous common bile duct drainage device increased the SBO and patient still had SBO even after new stent and drainage were placed,      s/p Venting PEG tube placement s/p IR int-ext biliary drain exchange s/p IR bare metal biliary stent placement 1. High-grade stenosis of the common bile duct despite indwelling internal/external biliary drain. 2. Placement 10 mm x 60 mm bare-metal stent in the common bile duct with appropriate position and function on final imaging. 3. External biliary drain placed biliary confluence, 10 [place] Ayden. 05/20 Contrast was injected through the patient's indwelling external biliary drain demonstrating occlusion of the recently placed bare-metal stent in the common bile duct. A 0.035 wire was advanced. A Berenstein catheter was advanced over the wire and antegrade cholangiography was performed demonstrating an appropriate profile stent (suggesting absence of external compression), but rather there was some mucoid debris likely occluding its patency.     c/b High external drainage output ( >2L) due to Internal drainage failure (CBD blockage), now s/p exchange of internal-external biliary drainage (12fr) on 4/25 in . 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 S/p venting PEG tube placement Currently receiving TPN through existing mediport, and tolerating PO diet (full liquids). -plan for home hospice on 5/26 patient had CBD stent placed on 05/16 then the CBD stent showed by 05/20 NOT POA.  Inpatient      IR 5/14-< from: IR Procedure (05.14.25 @ 16:38) >INTERPRETATION:  INDICATION AND FOCUSED HISTORY: Common bile duct obstruction. Possible extrinsic compression. Hyperbilirubinemia-< from: IR Procedure (05.14.25 @ 16:38)1. Obstruction of indwelling 14 [place] biliary drain secondary to internal debris versus extrinsic compression.2. New 14 [place] biliary drain placed via existing access site with appropriate antegrade passage of contrastIMPRESSION:Successful biliary drain check and exchange/upsize as above    CT Abdomen and Pelvis w/ Oral Cont (05.08.25 @ 10:55) >Gastric outlet obstruction, which correlating with prior CT, is likely   due to stricture at the gastrojejunal anastomosis.Mildly dilated proximal small bowel. The possibility of second site of obstruction is considered.Percutaneous biliary drain in place with moderate ductal dilatation.    Xray Chest 1 View- PORTABLE-Urgent (Xray Chest 1 View- PORTABLE-Urgent .) (05.06.25 @ 10:03) > Enteric tube tip is at the level of the upper thoracic esophagus. Recommend repositioning.

## 2025-06-10 NOTE — CHART NOTE - NSCHARTNOTEFT_GEN_A_CORE
This note is meant to clarify that the patients bowel obstruction was due to progression of his known cholangiocarcinoma. The stent occlusion did not contribute to the bowel obstruction. However the Stent occlusion was also caused by his cancer.

## 2025-06-10 NOTE — CHART NOTE - NSCHARTNOTESELECT_GEN_ALL_CORE
Event Note
Event Note
Follow Up/Nutrition Services
Follow-up/Nutrition Services
Surgery/Event Note
Event Note
Follow Up/Nutrition Services
Follow-up/Nutrition Services
Follow-up/Nutrition Services
Surgical Oncology/Event Note
leaking around the biliary/Event Note

## 2025-06-11 DIAGNOSIS — R62.7 ADULT FAILURE TO THRIVE: ICD-10-CM

## 2025-06-11 DIAGNOSIS — K56.699 OTHER INTESTINAL OBSTRUCTION UNSPECIFIED AS TO PARTIAL VERSUS COMPLETE OBSTRUCTION: ICD-10-CM

## 2025-06-11 DIAGNOSIS — I95.9 HYPOTENSION, UNSPECIFIED: ICD-10-CM

## 2025-06-11 DIAGNOSIS — Z66 DO NOT RESUSCITATE: ICD-10-CM

## 2025-06-11 DIAGNOSIS — N17.9 ACUTE KIDNEY FAILURE, UNSPECIFIED: ICD-10-CM

## 2025-06-11 DIAGNOSIS — R64 CACHEXIA: ICD-10-CM

## 2025-06-11 DIAGNOSIS — Z51.5 ENCOUNTER FOR PALLIATIVE CARE: ICD-10-CM

## 2025-06-11 DIAGNOSIS — E87.6 HYPOKALEMIA: ICD-10-CM

## 2025-06-11 DIAGNOSIS — C22.1 INTRAHEPATIC BILE DUCT CARCINOMA: ICD-10-CM

## 2025-06-11 DIAGNOSIS — E43 UNSPECIFIED SEVERE PROTEIN-CALORIE MALNUTRITION: ICD-10-CM

## 2025-06-11 DIAGNOSIS — Z88.8 ALLERGY STATUS TO OTHER DRUGS, MEDICAMENTS AND BIOLOGICAL SUBSTANCES: ICD-10-CM

## 2025-06-11 DIAGNOSIS — K94.23 GASTROSTOMY MALFUNCTION: ICD-10-CM
